# Patient Record
Sex: FEMALE | Race: WHITE | NOT HISPANIC OR LATINO | URBAN - METROPOLITAN AREA
[De-identification: names, ages, dates, MRNs, and addresses within clinical notes are randomized per-mention and may not be internally consistent; named-entity substitution may affect disease eponyms.]

---

## 2017-02-22 ENCOUNTER — OUTPATIENT (OUTPATIENT)
Dept: OUTPATIENT SERVICES | Facility: HOSPITAL | Age: 67
LOS: 1 days | End: 2017-02-22

## 2017-02-22 ENCOUNTER — APPOINTMENT (OUTPATIENT)
Dept: INTERNAL MEDICINE | Facility: HOSPITAL | Age: 67
End: 2017-02-22

## 2017-02-22 VITALS — SYSTOLIC BLOOD PRESSURE: 145 MMHG | DIASTOLIC BLOOD PRESSURE: 80 MMHG

## 2017-02-22 VITALS — BODY MASS INDEX: 51.21 KG/M2 | HEIGHT: 63 IN | WEIGHT: 289 LBS

## 2017-02-24 DIAGNOSIS — E66.9 OBESITY, UNSPECIFIED: ICD-10-CM

## 2017-02-24 DIAGNOSIS — L03.019 CELLULITIS OF UNSPECIFIED FINGER: ICD-10-CM

## 2017-02-24 DIAGNOSIS — I10 ESSENTIAL (PRIMARY) HYPERTENSION: ICD-10-CM

## 2017-08-30 ENCOUNTER — RX RENEWAL (OUTPATIENT)
Age: 67
End: 2017-08-30

## 2017-09-05 ENCOUNTER — RX RENEWAL (OUTPATIENT)
Age: 67
End: 2017-09-05

## 2017-09-11 ENCOUNTER — RX RENEWAL (OUTPATIENT)
Age: 67
End: 2017-09-11

## 2017-09-11 ENCOUNTER — OTHER (OUTPATIENT)
Age: 67
End: 2017-09-11

## 2017-09-12 ENCOUNTER — MEDICATION RENEWAL (OUTPATIENT)
Age: 67
End: 2017-09-12

## 2018-01-25 ENCOUNTER — RX RENEWAL (OUTPATIENT)
Age: 68
End: 2018-01-25

## 2018-03-14 ENCOUNTER — APPOINTMENT (OUTPATIENT)
Dept: INTERNAL MEDICINE | Facility: HOSPITAL | Age: 68
End: 2018-03-14

## 2018-07-06 ENCOUNTER — OTHER (OUTPATIENT)
Age: 68
End: 2018-07-06

## 2018-08-10 ENCOUNTER — RX RENEWAL (OUTPATIENT)
Age: 68
End: 2018-08-10

## 2018-09-11 ENCOUNTER — OTHER (OUTPATIENT)
Age: 68
End: 2018-09-11

## 2018-09-11 ENCOUNTER — MED ADMIN CHARGE (OUTPATIENT)
Age: 68
End: 2018-09-11

## 2018-09-11 ENCOUNTER — APPOINTMENT (OUTPATIENT)
Dept: INTERNAL MEDICINE | Facility: HOSPITAL | Age: 68
End: 2018-09-11
Payer: MEDICARE

## 2018-09-11 ENCOUNTER — OUTPATIENT (OUTPATIENT)
Dept: OUTPATIENT SERVICES | Facility: HOSPITAL | Age: 68
LOS: 1 days | End: 2018-09-11

## 2018-09-11 VITALS — SYSTOLIC BLOOD PRESSURE: 140 MMHG | DIASTOLIC BLOOD PRESSURE: 80 MMHG

## 2018-09-11 VITALS — HEIGHT: 63 IN | WEIGHT: 284 LBS | BODY MASS INDEX: 50.32 KG/M2

## 2018-09-11 VITALS — HEART RATE: 70 BPM

## 2018-09-11 DIAGNOSIS — L03.019 CELLULITIS OF UNSPECIFIED FINGER: ICD-10-CM

## 2018-09-11 PROCEDURE — 99213 OFFICE O/P EST LOW 20 MIN: CPT | Mod: GE

## 2018-09-11 RX ORDER — MUPIROCIN 2 G/100G
2 CREAM TOPICAL
Qty: 1 | Refills: 0 | Status: DISCONTINUED | COMMUNITY
Start: 2017-02-22 | End: 2018-09-11

## 2018-09-12 DIAGNOSIS — Z23 ENCOUNTER FOR IMMUNIZATION: ICD-10-CM

## 2018-09-14 DIAGNOSIS — E66.9 OBESITY, UNSPECIFIED: ICD-10-CM

## 2018-09-14 DIAGNOSIS — I10 ESSENTIAL (PRIMARY) HYPERTENSION: ICD-10-CM

## 2018-09-14 DIAGNOSIS — R73.03 PREDIABETES: ICD-10-CM

## 2018-09-14 DIAGNOSIS — K76.0 FATTY (CHANGE OF) LIVER, NOT ELSEWHERE CLASSIFIED: ICD-10-CM

## 2018-09-14 NOTE — PHYSICAL EXAM
[No Acute Distress] : no acute distress [Well Nourished] : well nourished [Well Developed] : well developed [Normal Sclera/Conjunctiva] : normal sclera/conjunctiva [Normal Outer Ear/Nose] : the outer ears and nose were normal in appearance [Normal Oropharynx] : the oropharynx was normal [Supple] : supple [No Lymphadenopathy] : no lymphadenopathy [No Respiratory Distress] : no respiratory distress  [Clear to Auscultation] : lungs were clear to auscultation bilaterally [No Accessory Muscle Use] : no accessory muscle use [Regular Rhythm] : with a regular rhythm [Normal S1, S2] : normal S1 and S2 [Soft] : abdomen soft [Non Tender] : non-tender [Non-distended] : non-distended [Normal Posterior Cervical Nodes] : no posterior cervical lymphadenopathy [Normal Anterior Cervical Nodes] : no anterior cervical lymphadenopathy [No CVA Tenderness] : no CVA  tenderness [No Spinal Tenderness] : no spinal tenderness [No Joint Swelling] : no joint swelling [Grossly Normal Strength/Tone] : grossly normal strength/tone [No Rash] : no rash [Normal Gait] : normal gait [No Focal Deficits] : no focal deficits [Normal Affect] : the affect was normal [de-identified] : 2+ b/l LE edema, non-putting

## 2018-09-14 NOTE — END OF VISIT
[] : Resident [FreeTextEntry3] : 67F with history as above here today for check-up. BP noted to be suboptimally controlled, will increase chlorthalidone and call patient this week to assess at-home BP control. Patient refused labs today but will come back next week for blood draws. Mammo and GYN referrals given today for HCM.

## 2018-09-14 NOTE — HISTORY OF PRESENT ILLNESS
[FreeTextEntry1] : 67 F w/ HTN, prediabetes, MEJIA, and obesity present for CPE. Pt last seen in Feb 2017. [de-identified] : 67 F w/ HTN, prediabetes, MEJIA, and obesity present for CPE. Pt last seen in Feb 2017. She reports chronic generalized joint pain. Denies any headache, blurry vision, chest pain, sob, abd pain, nausea, vomiting, fevers, sweats, or chills. Reports that she checks her BP at home and has been mostly running in the 130-140s/70-80s. She tries to eat less but endorses consuming take out and junk food. Exercise is limited due to joint pains.\par \par Per documentation, pt declined most screening exams and immunizations in the past. She is open to referrals today and will schedule them at her convenience. Advised pt that she is due for blood work but she would like to hold off until next week because she is stressed with her  being in the hospital.

## 2018-09-14 NOTE — REVIEW OF SYSTEMS
[Lower Ext Edema] : lower extremity edema [Joint Pain] : joint pain [Fever] : no fever [Chills] : no chills [Pain] : no pain [Vision Problems] : no vision problems [Hearing Loss] : no hearing loss [Nasal Discharge] : no nasal discharge [Sore Throat] : no sore throat [Chest Pain] : no chest pain [Palpitations] : no palpitations [Orthopnea] : no orthopnea [Shortness Of Breath] : no shortness of breath [Wheezing] : no wheezing [Abdominal Pain] : no abdominal pain [Nausea] : no nausea [Vomiting] : no vomiting [Heartburn] : no heartburn [Dysuria] : no dysuria [Incontinence] : no incontinence [Joint Stiffness] : no joint stiffness [Joint Swelling] : no joint swelling [Itching] : no itching [Skin Rash] : no skin rash [Headache] : no headache [Dizziness] : no dizziness [Fainting] : no fainting [Anxiety] : no anxiety [Depression] : no depression [Easy Bleeding] : no easy bleeding [Easy Bruising] : no easy bruising

## 2018-09-14 NOTE — ASSESSMENT
[FreeTextEntry1] : 67 F w/ HTN, prediabetes, MEJIA, and obesity present for CPE.\par \par #HTN\par -BP suboptimally controlled, will increase chlorthalidone to 25mg daily. c/w metoprolol 100mg BID and lisinopril 40mg QD; metoprolol is not an optimal BP medication but will continue for now as patient has been on it for some time.\par -Nutrition referral\par -Counseled on diet and exercise\par -f/u BMP (labs to be drawn next week)\par -RTC in 1 week for nursing visit for repeat BP check\par -Pt advised to continue to check BP at home and to call clinic immediately if she is symptomatic or if SBP < 100. \par \par #Pre-diabetes\par -f/u A1c (labs to be drawn next week)\par -Consider starting Metformin and diabetes wellness referral if pt diabetic\par -Nutritionist referral\par \par #MEJIA\par -f/u CMP (labs to be drawn next week)\par \par #HCM\par -Pt declined blood draws today but will return next week for blood work - sent for CBC, CMP, A1c, and lipid profile\par \par #Joint pains\par -likely OA, pt advised to take OTC Acetaminophen PRN and to continue activity as tolerated\par \par #LE Edema\par -Likely 2/2 to venous stasis and obesity. Low suspicion for DVT/CHF\par \par #HCM\par -Pt declined labs today, but will come back next week for blood work. Will have pt do BP check w/ nursing visit then\par -Pt agreeable to Prevnar today, will return for flu and zostavax. Tdap in 2016\par -Ob/Gyn referral\par -GI referral for c-scope\par -Mammogram rx given to pt\par -RTC in 5 weeks \par \par D/w Dr. Henderson\par \par WW\par [employer] [facility]

## 2018-09-20 ENCOUNTER — RX RENEWAL (OUTPATIENT)
Age: 68
End: 2018-09-20

## 2018-10-10 ENCOUNTER — APPOINTMENT (OUTPATIENT)
Dept: OBGYN | Facility: HOSPITAL | Age: 68
End: 2018-10-10

## 2018-10-12 ENCOUNTER — OTHER (OUTPATIENT)
Age: 68
End: 2018-10-12

## 2019-03-04 ENCOUNTER — RX RENEWAL (OUTPATIENT)
Age: 69
End: 2019-03-04

## 2019-03-05 ENCOUNTER — RX RENEWAL (OUTPATIENT)
Age: 69
End: 2019-03-05

## 2019-06-03 ENCOUNTER — RX RENEWAL (OUTPATIENT)
Age: 69
End: 2019-06-03

## 2019-06-24 ENCOUNTER — RX RENEWAL (OUTPATIENT)
Age: 69
End: 2019-06-24

## 2019-06-27 ENCOUNTER — MESSAGE (OUTPATIENT)
Age: 69
End: 2019-06-27

## 2019-07-17 ENCOUNTER — LABORATORY RESULT (OUTPATIENT)
Age: 69
End: 2019-07-17

## 2019-07-18 ENCOUNTER — APPOINTMENT (OUTPATIENT)
Dept: DERMATOLOGY | Facility: CLINIC | Age: 69
End: 2019-07-18
Payer: MEDICARE

## 2019-07-18 VITALS — HEIGHT: 63 IN | BODY MASS INDEX: 50.5 KG/M2 | WEIGHT: 285 LBS

## 2019-07-18 DIAGNOSIS — D48.5 NEOPLASM OF UNCERTAIN BEHAVIOR OF SKIN: ICD-10-CM

## 2019-07-18 PROCEDURE — 11105 PUNCH BX SKIN EA SEP/ADDL: CPT

## 2019-07-18 PROCEDURE — 11104 PUNCH BX SKIN SINGLE LESION: CPT

## 2019-07-18 PROCEDURE — 99203 OFFICE O/P NEW LOW 30 MIN: CPT | Mod: 25

## 2019-07-22 PROBLEM — D48.5 NEOPLASM OF UNCERTAIN BEHAVIOR OF SKIN: Status: ACTIVE | Noted: 2019-07-22

## 2019-07-22 PROBLEM — D48.5 NEOPLASM OF UNCERTAIN BEHAVIOR OF SKIN OF ABDOMEN: Status: ACTIVE | Noted: 2019-07-22

## 2019-07-31 ENCOUNTER — RX RENEWAL (OUTPATIENT)
Age: 69
End: 2019-07-31

## 2019-08-13 ENCOUNTER — APPOINTMENT (OUTPATIENT)
Dept: DERMATOLOGY | Facility: CLINIC | Age: 69
End: 2019-08-13
Payer: MEDICARE

## 2019-08-13 PROCEDURE — 99213 OFFICE O/P EST LOW 20 MIN: CPT

## 2019-08-20 ENCOUNTER — APPOINTMENT (OUTPATIENT)
Dept: DERMATOLOGY | Facility: CLINIC | Age: 69
End: 2019-08-20
Payer: MEDICARE

## 2019-08-20 PROCEDURE — 96910 PHOTCHMTX TAR&UVB/PTRLTM&UVB: CPT

## 2019-08-22 ENCOUNTER — APPOINTMENT (OUTPATIENT)
Dept: DERMATOLOGY | Facility: CLINIC | Age: 69
End: 2019-08-22
Payer: MEDICARE

## 2019-08-22 PROCEDURE — 96910 PHOTCHMTX TAR&UVB/PTRLTM&UVB: CPT

## 2019-08-27 ENCOUNTER — MESSAGE (OUTPATIENT)
Age: 69
End: 2019-08-27

## 2019-08-27 ENCOUNTER — APPOINTMENT (OUTPATIENT)
Dept: DERMATOLOGY | Facility: CLINIC | Age: 69
End: 2019-08-27
Payer: MEDICARE

## 2019-08-27 PROCEDURE — 96910 PHOTCHMTX TAR&UVB/PTRLTM&UVB: CPT

## 2019-08-29 ENCOUNTER — APPOINTMENT (OUTPATIENT)
Dept: DERMATOLOGY | Facility: CLINIC | Age: 69
End: 2019-08-29

## 2019-09-03 ENCOUNTER — APPOINTMENT (OUTPATIENT)
Dept: DERMATOLOGY | Facility: CLINIC | Age: 69
End: 2019-09-03

## 2019-09-03 ENCOUNTER — RX RENEWAL (OUTPATIENT)
Age: 69
End: 2019-09-03

## 2019-09-06 ENCOUNTER — APPOINTMENT (OUTPATIENT)
Dept: DERMATOLOGY | Facility: CLINIC | Age: 69
End: 2019-09-06

## 2019-09-10 ENCOUNTER — APPOINTMENT (OUTPATIENT)
Dept: DERMATOLOGY | Facility: CLINIC | Age: 69
End: 2019-09-10
Payer: MEDICARE

## 2019-09-10 PROCEDURE — 96910 PHOTCHMTX TAR&UVB/PTRLTM&UVB: CPT

## 2019-09-13 ENCOUNTER — APPOINTMENT (OUTPATIENT)
Dept: DERMATOLOGY | Facility: CLINIC | Age: 69
End: 2019-09-13
Payer: MEDICARE

## 2019-09-13 PROCEDURE — 96910 PHOTCHMTX TAR&UVB/PTRLTM&UVB: CPT

## 2019-09-17 ENCOUNTER — APPOINTMENT (OUTPATIENT)
Dept: DERMATOLOGY | Facility: CLINIC | Age: 69
End: 2019-09-17
Payer: MEDICARE

## 2019-09-17 PROCEDURE — 96910 PHOTCHMTX TAR&UVB/PTRLTM&UVB: CPT

## 2019-09-20 ENCOUNTER — APPOINTMENT (OUTPATIENT)
Dept: DERMATOLOGY | Facility: CLINIC | Age: 69
End: 2019-09-20
Payer: MEDICARE

## 2019-09-20 PROCEDURE — 96910 PHOTCHMTX TAR&UVB/PTRLTM&UVB: CPT

## 2019-09-23 ENCOUNTER — APPOINTMENT (OUTPATIENT)
Dept: DERMATOLOGY | Facility: CLINIC | Age: 69
End: 2019-09-23
Payer: MEDICARE

## 2019-09-23 PROCEDURE — 96910 PHOTCHMTX TAR&UVB/PTRLTM&UVB: CPT

## 2019-09-25 ENCOUNTER — APPOINTMENT (OUTPATIENT)
Dept: DERMATOLOGY | Facility: CLINIC | Age: 69
End: 2019-09-25
Payer: MEDICARE

## 2019-09-25 PROCEDURE — 96910 PHOTCHMTX TAR&UVB/PTRLTM&UVB: CPT

## 2019-09-30 ENCOUNTER — APPOINTMENT (OUTPATIENT)
Dept: DERMATOLOGY | Facility: CLINIC | Age: 69
End: 2019-09-30
Payer: MEDICARE

## 2019-09-30 PROCEDURE — 96910 PHOTCHMTX TAR&UVB/PTRLTM&UVB: CPT

## 2019-09-30 NOTE — DISCUSSION/SUMMARY
[FreeTextEntry1] : SABINO  is here for NBUVB therapy treatment for Psoriasis ( L 40.9)\par Starting dose is 250mJ increasing by 15% as tolerated three times a week, holding dose at 1000mJ as Dr Rodriguez prescribed. \par \par Pt tolerated treatment well, mineral oil applied.\par \par Today's treatment: 9/23/19  668  mJ 2m 16 seconds \par \par treatment history:\par  9/20/19  581 mJ 1m 59 seconds \par \par  9/17/19  507 mJ 1m 38 seconds \par  9/13/19  438 mJ 1m 30 seconds \par 9/10/19  384 mJ 1m 19 seconds \par 8/27/19  333 mJ 1m 14 seconds\par  8/22/19  292 mJ 1m 8 seconds \par 8/20/19  252 mJ 0m 59 seconds

## 2019-10-07 ENCOUNTER — APPOINTMENT (OUTPATIENT)
Dept: DERMATOLOGY | Facility: CLINIC | Age: 69
End: 2019-10-07
Payer: MEDICARE

## 2019-10-07 PROCEDURE — 96910 PHOTCHMTX TAR&UVB/PTRLTM&UVB: CPT

## 2019-10-09 ENCOUNTER — APPOINTMENT (OUTPATIENT)
Dept: DERMATOLOGY | Facility: CLINIC | Age: 69
End: 2019-10-09
Payer: MEDICARE

## 2019-10-09 PROCEDURE — 96910 PHOTCHMTX TAR&UVB/PTRLTM&UVB: CPT

## 2019-10-11 ENCOUNTER — APPOINTMENT (OUTPATIENT)
Dept: DERMATOLOGY | Facility: CLINIC | Age: 69
End: 2019-10-11
Payer: MEDICARE

## 2019-10-11 PROCEDURE — 96910 PHOTCHMTX TAR&UVB/PTRLTM&UVB: CPT

## 2019-10-15 ENCOUNTER — APPOINTMENT (OUTPATIENT)
Dept: DERMATOLOGY | Facility: CLINIC | Age: 69
End: 2019-10-15
Payer: MEDICARE

## 2019-10-15 PROCEDURE — 96910 PHOTCHMTX TAR&UVB/PTRLTM&UVB: CPT

## 2019-10-18 ENCOUNTER — APPOINTMENT (OUTPATIENT)
Dept: DERMATOLOGY | Facility: CLINIC | Age: 69
End: 2019-10-18
Payer: MEDICARE

## 2019-10-18 PROCEDURE — 96910 PHOTCHMTX TAR&UVB/PTRLTM&UVB: CPT

## 2019-10-22 ENCOUNTER — APPOINTMENT (OUTPATIENT)
Dept: DERMATOLOGY | Facility: CLINIC | Age: 69
End: 2019-10-22
Payer: MEDICARE

## 2019-10-22 PROCEDURE — 96910 PHOTCHMTX TAR&UVB/PTRLTM&UVB: CPT

## 2019-10-24 ENCOUNTER — APPOINTMENT (OUTPATIENT)
Dept: DERMATOLOGY | Facility: CLINIC | Age: 69
End: 2019-10-24
Payer: MEDICARE

## 2019-10-24 PROCEDURE — 96910 PHOTCHMTX TAR&UVB/PTRLTM&UVB: CPT

## 2019-10-29 ENCOUNTER — APPOINTMENT (OUTPATIENT)
Dept: DERMATOLOGY | Facility: CLINIC | Age: 69
End: 2019-10-29
Payer: MEDICARE

## 2019-10-29 PROCEDURE — 96910 PHOTCHMTX TAR&UVB/PTRLTM&UVB: CPT

## 2019-11-01 ENCOUNTER — APPOINTMENT (OUTPATIENT)
Dept: DERMATOLOGY | Facility: CLINIC | Age: 69
End: 2019-11-01
Payer: MEDICARE

## 2019-11-01 PROCEDURE — 96910 PHOTCHMTX TAR&UVB/PTRLTM&UVB: CPT

## 2019-11-05 ENCOUNTER — APPOINTMENT (OUTPATIENT)
Dept: DERMATOLOGY | Facility: CLINIC | Age: 69
End: 2019-11-05
Payer: MEDICARE

## 2019-11-05 PROCEDURE — 96910 PHOTCHMTX TAR&UVB/PTRLTM&UVB: CPT

## 2019-11-07 ENCOUNTER — APPOINTMENT (OUTPATIENT)
Dept: DERMATOLOGY | Facility: CLINIC | Age: 69
End: 2019-11-07
Payer: MEDICARE

## 2019-11-07 PROCEDURE — 96910 PHOTCHMTX TAR&UVB/PTRLTM&UVB: CPT

## 2019-11-12 ENCOUNTER — APPOINTMENT (OUTPATIENT)
Dept: DERMATOLOGY | Facility: CLINIC | Age: 69
End: 2019-11-12
Payer: MEDICARE

## 2019-11-12 PROCEDURE — 96910 PHOTCHMTX TAR&UVB/PTRLTM&UVB: CPT

## 2019-11-15 ENCOUNTER — APPOINTMENT (OUTPATIENT)
Dept: DERMATOLOGY | Facility: CLINIC | Age: 69
End: 2019-11-15
Payer: MEDICARE

## 2019-11-15 PROCEDURE — 96910 PHOTCHMTX TAR&UVB/PTRLTM&UVB: CPT

## 2019-11-19 ENCOUNTER — APPOINTMENT (OUTPATIENT)
Dept: DERMATOLOGY | Facility: CLINIC | Age: 69
End: 2019-11-19
Payer: MEDICARE

## 2019-11-19 PROCEDURE — 96910 PHOTCHMTX TAR&UVB/PTRLTM&UVB: CPT

## 2019-11-22 ENCOUNTER — OTHER (OUTPATIENT)
Age: 69
End: 2019-11-22

## 2019-11-22 ENCOUNTER — APPOINTMENT (OUTPATIENT)
Dept: DERMATOLOGY | Facility: CLINIC | Age: 69
End: 2019-11-22
Payer: MEDICARE

## 2019-11-22 PROCEDURE — 96910 PHOTCHMTX TAR&UVB/PTRLTM&UVB: CPT

## 2019-11-26 ENCOUNTER — APPOINTMENT (OUTPATIENT)
Dept: DERMATOLOGY | Facility: CLINIC | Age: 69
End: 2019-11-26

## 2019-11-27 ENCOUNTER — APPOINTMENT (OUTPATIENT)
Dept: DERMATOLOGY | Facility: CLINIC | Age: 69
End: 2019-11-27
Payer: MEDICARE

## 2019-11-27 PROCEDURE — 96910 PHOTCHMTX TAR&UVB/PTRLTM&UVB: CPT

## 2019-12-09 ENCOUNTER — APPOINTMENT (OUTPATIENT)
Dept: DERMATOLOGY | Facility: CLINIC | Age: 69
End: 2019-12-09
Payer: MEDICARE

## 2019-12-09 PROCEDURE — 96910 PHOTCHMTX TAR&UVB/PTRLTM&UVB: CPT

## 2019-12-11 ENCOUNTER — APPOINTMENT (OUTPATIENT)
Dept: DERMATOLOGY | Facility: CLINIC | Age: 69
End: 2019-12-11
Payer: MEDICARE

## 2019-12-11 PROCEDURE — 96910 PHOTCHMTX TAR&UVB/PTRLTM&UVB: CPT

## 2019-12-12 ENCOUNTER — APPOINTMENT (OUTPATIENT)
Dept: DERMATOLOGY | Facility: CLINIC | Age: 69
End: 2019-12-12

## 2019-12-13 ENCOUNTER — APPOINTMENT (OUTPATIENT)
Dept: DERMATOLOGY | Facility: CLINIC | Age: 69
End: 2019-12-13

## 2019-12-18 ENCOUNTER — APPOINTMENT (OUTPATIENT)
Dept: DERMATOLOGY | Facility: CLINIC | Age: 69
End: 2019-12-18
Payer: MEDICARE

## 2019-12-18 PROCEDURE — 96910 PHOTCHMTX TAR&UVB/PTRLTM&UVB: CPT

## 2019-12-20 ENCOUNTER — APPOINTMENT (OUTPATIENT)
Dept: DERMATOLOGY | Facility: CLINIC | Age: 69
End: 2019-12-20
Payer: MEDICARE

## 2019-12-20 PROCEDURE — 96910 PHOTCHMTX TAR&UVB/PTRLTM&UVB: CPT

## 2019-12-23 ENCOUNTER — APPOINTMENT (OUTPATIENT)
Dept: DERMATOLOGY | Facility: CLINIC | Age: 69
End: 2019-12-23
Payer: MEDICARE

## 2019-12-23 PROCEDURE — 96910 PHOTCHMTX TAR&UVB/PTRLTM&UVB: CPT

## 2020-01-03 ENCOUNTER — APPOINTMENT (OUTPATIENT)
Dept: DERMATOLOGY | Facility: CLINIC | Age: 70
End: 2020-01-03
Payer: MEDICARE

## 2020-01-03 PROCEDURE — 96910 PHOTCHMTX TAR&UVB/PTRLTM&UVB: CPT

## 2020-01-06 ENCOUNTER — APPOINTMENT (OUTPATIENT)
Dept: DERMATOLOGY | Facility: CLINIC | Age: 70
End: 2020-01-06
Payer: MEDICARE

## 2020-01-06 PROCEDURE — 96910 PHOTCHMTX TAR&UVB/PTRLTM&UVB: CPT

## 2020-01-10 ENCOUNTER — APPOINTMENT (OUTPATIENT)
Dept: DERMATOLOGY | Facility: CLINIC | Age: 70
End: 2020-01-10
Payer: MEDICARE

## 2020-01-10 PROCEDURE — 96910 PHOTCHMTX TAR&UVB/PTRLTM&UVB: CPT

## 2020-01-13 ENCOUNTER — APPOINTMENT (OUTPATIENT)
Dept: DERMATOLOGY | Facility: CLINIC | Age: 70
End: 2020-01-13
Payer: MEDICARE

## 2020-01-13 PROCEDURE — 96910 PHOTCHMTX TAR&UVB/PTRLTM&UVB: CPT

## 2020-01-15 ENCOUNTER — APPOINTMENT (OUTPATIENT)
Dept: DERMATOLOGY | Facility: CLINIC | Age: 70
End: 2020-01-15
Payer: MEDICARE

## 2020-01-15 PROCEDURE — 96910 PHOTCHMTX TAR&UVB/PTRLTM&UVB: CPT

## 2020-01-21 ENCOUNTER — APPOINTMENT (OUTPATIENT)
Dept: DERMATOLOGY | Facility: CLINIC | Age: 70
End: 2020-01-21
Payer: MEDICARE

## 2020-01-21 PROCEDURE — 96910 PHOTCHMTX TAR&UVB/PTRLTM&UVB: CPT

## 2020-01-28 ENCOUNTER — APPOINTMENT (OUTPATIENT)
Dept: DERMATOLOGY | Facility: CLINIC | Age: 70
End: 2020-01-28
Payer: MEDICARE

## 2020-01-28 PROCEDURE — 96910 PHOTCHMTX TAR&UVB/PTRLTM&UVB: CPT

## 2020-02-05 ENCOUNTER — APPOINTMENT (OUTPATIENT)
Dept: DERMATOLOGY | Facility: CLINIC | Age: 70
End: 2020-02-05
Payer: MEDICARE

## 2020-02-05 PROCEDURE — 96910 PHOTCHMTX TAR&UVB/PTRLTM&UVB: CPT

## 2020-02-11 ENCOUNTER — APPOINTMENT (OUTPATIENT)
Dept: DERMATOLOGY | Facility: CLINIC | Age: 70
End: 2020-02-11
Payer: COMMERCIAL

## 2020-02-11 PROCEDURE — 96910 PHOTCHMTX TAR&UVB/PTRLTM&UVB: CPT

## 2020-02-14 ENCOUNTER — RX RENEWAL (OUTPATIENT)
Age: 70
End: 2020-02-14

## 2020-02-18 ENCOUNTER — NON-APPOINTMENT (OUTPATIENT)
Age: 70
End: 2020-02-18

## 2020-02-18 ENCOUNTER — APPOINTMENT (OUTPATIENT)
Dept: DERMATOLOGY | Facility: CLINIC | Age: 70
End: 2020-02-18
Payer: COMMERCIAL

## 2020-02-18 ENCOUNTER — RX RENEWAL (OUTPATIENT)
Age: 70
End: 2020-02-18

## 2020-02-18 PROCEDURE — 96910 PHOTCHMTX TAR&UVB/PTRLTM&UVB: CPT

## 2020-02-25 ENCOUNTER — APPOINTMENT (OUTPATIENT)
Dept: DERMATOLOGY | Facility: CLINIC | Age: 70
End: 2020-02-25
Payer: COMMERCIAL

## 2020-02-25 PROCEDURE — 96910 PHOTCHMTX TAR&UVB/PTRLTM&UVB: CPT

## 2020-03-06 ENCOUNTER — APPOINTMENT (OUTPATIENT)
Dept: DERMATOLOGY | Facility: CLINIC | Age: 70
End: 2020-03-06
Payer: MEDICARE

## 2020-03-06 PROCEDURE — 96910 PHOTCHMTX TAR&UVB/PTRLTM&UVB: CPT

## 2020-03-13 ENCOUNTER — APPOINTMENT (OUTPATIENT)
Dept: DERMATOLOGY | Facility: CLINIC | Age: 70
End: 2020-03-13
Payer: MEDICARE

## 2020-03-13 PROCEDURE — 96910 PHOTCHMTX TAR&UVB/PTRLTM&UVB: CPT

## 2020-03-23 ENCOUNTER — NON-APPOINTMENT (OUTPATIENT)
Age: 70
End: 2020-03-23

## 2020-03-23 ENCOUNTER — APPOINTMENT (OUTPATIENT)
Dept: DERMATOLOGY | Facility: CLINIC | Age: 70
End: 2020-03-23

## 2020-06-15 ENCOUNTER — OUTPATIENT (OUTPATIENT)
Dept: OUTPATIENT SERVICES | Facility: HOSPITAL | Age: 70
LOS: 1 days | End: 2020-06-15

## 2020-06-15 ENCOUNTER — APPOINTMENT (OUTPATIENT)
Dept: INTERNAL MEDICINE | Facility: CLINIC | Age: 70
End: 2020-06-15
Payer: MEDICARE

## 2020-06-15 VITALS
SYSTOLIC BLOOD PRESSURE: 124 MMHG | BODY MASS INDEX: 51.91 KG/M2 | DIASTOLIC BLOOD PRESSURE: 55 MMHG | WEIGHT: 293 LBS | HEIGHT: 63 IN

## 2020-06-15 PROCEDURE — ZZZZZ: CPT

## 2020-06-15 NOTE — HISTORY OF PRESENT ILLNESS
[Home] : at home, [unfilled] , at the time of the visit. [Other Location: e.g. Home (Enter Location, City,State)___] : at [unfilled] [FreeTextEntry1] : follow up visit  [de-identified] : 69y woman with HTN and psorasis here for f/u visit. \par Attempted to do Telehealth video visit, however, due to tech difficulties, was converted to telephonic visit. \par \par Pt was last seen in medicine clinic Sept 2018, pt wishes to re-establish her care here. Due to COVID pandemic, pt was not seen in office, and was prescribed 3 month supply of HTN meds from clinic. \par \par HTN: currently on metoprolol 100mg BID, chlorthalidone 25mg qD, lisinopril 40mg qD, has a blood pressure machine at home. BP today 122/55.  \par \par For her Psorasis, pt was undergoing phototherapy with Dermatology clinic 2x per week, the sessions stopped due to COVID pandemic, pt is scheduled for derm follow up in a couple weeks. \par \par No recent hospitalization or ER visit. No sick contact at home. No other complain.

## 2020-06-15 NOTE — ADDENDUM
[FreeTextEntry1] : reviewed with resident via telephone due to covid19 Agree with residents evaluation and plan [email or web address]

## 2020-06-15 NOTE — ASSESSMENT
[FreeTextEntry1] : 69y woman with HTN and psorasis here for telephonic visit.\par \par HTN: \par -  BP well controlled \par - continue metoprolol 100mg BID, chlorthalidone 25mg qD, lisinopril 40mg qD, has a blood pressure machine at [facility]\par \par Psorasis: \par - continue f/u with derm \par \par RTC in 5 weeks for Comprehensive visit - pt will need in office visit for labs (CBC, CMP, lipid profile, A1c) \par \par Case discussed with Dr. Cabrera \par

## 2020-06-30 ENCOUNTER — APPOINTMENT (OUTPATIENT)
Dept: DERMATOLOGY | Facility: CLINIC | Age: 70
End: 2020-06-30
Payer: MEDICARE

## 2020-06-30 PROCEDURE — 96910 PHOTCHMTX TAR&UVB/PTRLTM&UVB: CPT

## 2020-07-02 ENCOUNTER — APPOINTMENT (OUTPATIENT)
Dept: DERMATOLOGY | Facility: CLINIC | Age: 70
End: 2020-07-02
Payer: MEDICARE

## 2020-07-02 VITALS — TEMPERATURE: 97.5 F

## 2020-07-02 PROCEDURE — 96910 PHOTCHMTX TAR&UVB/PTRLTM&UVB: CPT

## 2020-07-07 ENCOUNTER — APPOINTMENT (OUTPATIENT)
Dept: DERMATOLOGY | Facility: CLINIC | Age: 70
End: 2020-07-07
Payer: MEDICARE

## 2020-07-07 VITALS — TEMPERATURE: 96.8 F

## 2020-07-07 PROCEDURE — 96910 PHOTCHMTX TAR&UVB/PTRLTM&UVB: CPT

## 2020-07-09 ENCOUNTER — APPOINTMENT (OUTPATIENT)
Dept: DERMATOLOGY | Facility: CLINIC | Age: 70
End: 2020-07-09
Payer: MEDICARE

## 2020-07-09 PROCEDURE — 96910 PHOTCHMTX TAR&UVB/PTRLTM&UVB: CPT

## 2020-07-14 ENCOUNTER — APPOINTMENT (OUTPATIENT)
Dept: DERMATOLOGY | Facility: CLINIC | Age: 70
End: 2020-07-14
Payer: MEDICARE

## 2020-07-14 PROCEDURE — 96910 PHOTCHMTX TAR&UVB/PTRLTM&UVB: CPT

## 2020-07-16 ENCOUNTER — APPOINTMENT (OUTPATIENT)
Dept: DERMATOLOGY | Facility: CLINIC | Age: 70
End: 2020-07-16
Payer: MEDICARE

## 2020-07-16 VITALS — TEMPERATURE: 96.9 F

## 2020-07-16 PROCEDURE — 96910 PHOTCHMTX TAR&UVB/PTRLTM&UVB: CPT

## 2020-07-21 ENCOUNTER — APPOINTMENT (OUTPATIENT)
Dept: DERMATOLOGY | Facility: CLINIC | Age: 70
End: 2020-07-21
Payer: MEDICARE

## 2020-07-21 PROCEDURE — 96910 PHOTCHMTX TAR&UVB/PTRLTM&UVB: CPT

## 2020-07-23 ENCOUNTER — APPOINTMENT (OUTPATIENT)
Dept: DERMATOLOGY | Facility: CLINIC | Age: 70
End: 2020-07-23
Payer: MEDICARE

## 2020-07-23 VITALS — TEMPERATURE: 97.3 F

## 2020-07-23 PROCEDURE — 96910 PHOTCHMTX TAR&UVB/PTRLTM&UVB: CPT

## 2020-07-28 ENCOUNTER — APPOINTMENT (OUTPATIENT)
Dept: DERMATOLOGY | Facility: CLINIC | Age: 70
End: 2020-07-28

## 2020-07-30 ENCOUNTER — APPOINTMENT (OUTPATIENT)
Dept: DERMATOLOGY | Facility: CLINIC | Age: 70
End: 2020-07-30
Payer: MEDICARE

## 2020-07-30 PROCEDURE — 96910 PHOTCHMTX TAR&UVB/PTRLTM&UVB: CPT

## 2020-08-04 ENCOUNTER — APPOINTMENT (OUTPATIENT)
Dept: DERMATOLOGY | Facility: CLINIC | Age: 70
End: 2020-08-04

## 2020-08-06 ENCOUNTER — APPOINTMENT (OUTPATIENT)
Dept: DERMATOLOGY | Facility: CLINIC | Age: 70
End: 2020-08-06
Payer: MEDICARE

## 2020-08-06 PROCEDURE — 96910 PHOTCHMTX TAR&UVB/PTRLTM&UVB: CPT

## 2020-08-11 ENCOUNTER — APPOINTMENT (OUTPATIENT)
Dept: DERMATOLOGY | Facility: CLINIC | Age: 70
End: 2020-08-11
Payer: MEDICARE

## 2020-08-11 VITALS — TEMPERATURE: 96.8 F

## 2020-08-11 PROCEDURE — 96910 PHOTCHMTX TAR&UVB/PTRLTM&UVB: CPT

## 2020-08-13 ENCOUNTER — APPOINTMENT (OUTPATIENT)
Dept: DERMATOLOGY | Facility: CLINIC | Age: 70
End: 2020-08-13
Payer: MEDICARE

## 2020-08-13 PROCEDURE — 96910 PHOTCHMTX TAR&UVB/PTRLTM&UVB: CPT

## 2020-08-18 ENCOUNTER — APPOINTMENT (OUTPATIENT)
Dept: DERMATOLOGY | Facility: CLINIC | Age: 70
End: 2020-08-18
Payer: MEDICARE

## 2020-08-18 VITALS — TEMPERATURE: 96.8 F

## 2020-08-18 PROCEDURE — 96910 PHOTCHMTX TAR&UVB/PTRLTM&UVB: CPT

## 2020-08-20 ENCOUNTER — APPOINTMENT (OUTPATIENT)
Dept: DERMATOLOGY | Facility: CLINIC | Age: 70
End: 2020-08-20
Payer: MEDICARE

## 2020-08-20 PROCEDURE — 96910 PHOTCHMTX TAR&UVB/PTRLTM&UVB: CPT

## 2020-08-24 ENCOUNTER — APPOINTMENT (OUTPATIENT)
Dept: DERMATOLOGY | Facility: CLINIC | Age: 70
End: 2020-08-24

## 2020-08-24 ENCOUNTER — APPOINTMENT (OUTPATIENT)
Dept: INTERNAL MEDICINE | Facility: CLINIC | Age: 70
End: 2020-08-24

## 2020-08-26 ENCOUNTER — APPOINTMENT (OUTPATIENT)
Dept: DERMATOLOGY | Facility: CLINIC | Age: 70
End: 2020-08-26
Payer: MEDICARE

## 2020-08-26 VITALS — TEMPERATURE: 97.8 F

## 2020-08-26 PROCEDURE — 96910 PHOTCHMTX TAR&UVB/PTRLTM&UVB: CPT

## 2020-08-26 PROCEDURE — 99214 OFFICE O/P EST MOD 30 MIN: CPT | Mod: 25

## 2020-08-26 RX ORDER — TRIAMCINOLONE ACETONIDE 1 MG/G
0.1 OINTMENT TOPICAL
Qty: 454 | Refills: 0 | Status: DISCONTINUED | COMMUNITY
Start: 2019-07-18 | End: 2020-08-26

## 2020-09-01 ENCOUNTER — APPOINTMENT (OUTPATIENT)
Dept: DERMATOLOGY | Facility: CLINIC | Age: 70
End: 2020-09-01
Payer: MEDICARE

## 2020-09-01 PROCEDURE — 96910 PHOTCHMTX TAR&UVB/PTRLTM&UVB: CPT

## 2020-09-03 ENCOUNTER — APPOINTMENT (OUTPATIENT)
Dept: DERMATOLOGY | Facility: CLINIC | Age: 70
End: 2020-09-03
Payer: MEDICARE

## 2020-09-03 PROCEDURE — 96910 PHOTCHMTX TAR&UVB/PTRLTM&UVB: CPT

## 2020-09-08 ENCOUNTER — APPOINTMENT (OUTPATIENT)
Dept: DERMATOLOGY | Facility: CLINIC | Age: 70
End: 2020-09-08
Payer: MEDICARE

## 2020-09-08 PROCEDURE — 96910 PHOTCHMTX TAR&UVB/PTRLTM&UVB: CPT

## 2020-09-09 ENCOUNTER — RX RENEWAL (OUTPATIENT)
Age: 70
End: 2020-09-09

## 2020-09-10 ENCOUNTER — APPOINTMENT (OUTPATIENT)
Dept: DERMATOLOGY | Facility: CLINIC | Age: 70
End: 2020-09-10
Payer: MEDICARE

## 2020-09-10 PROCEDURE — 96910 PHOTCHMTX TAR&UVB/PTRLTM&UVB: CPT

## 2020-09-15 ENCOUNTER — APPOINTMENT (OUTPATIENT)
Dept: DERMATOLOGY | Facility: CLINIC | Age: 70
End: 2020-09-15
Payer: MEDICARE

## 2020-09-15 PROCEDURE — 96910 PHOTCHMTX TAR&UVB/PTRLTM&UVB: CPT

## 2020-09-17 ENCOUNTER — APPOINTMENT (OUTPATIENT)
Dept: DERMATOLOGY | Facility: CLINIC | Age: 70
End: 2020-09-17

## 2020-09-22 ENCOUNTER — APPOINTMENT (OUTPATIENT)
Dept: DERMATOLOGY | Facility: CLINIC | Age: 70
End: 2020-09-22
Payer: MEDICARE

## 2020-09-22 PROCEDURE — 96910 PHOTCHMTX TAR&UVB/PTRLTM&UVB: CPT

## 2020-09-24 ENCOUNTER — APPOINTMENT (OUTPATIENT)
Dept: DERMATOLOGY | Facility: CLINIC | Age: 70
End: 2020-09-24
Payer: MEDICARE

## 2020-09-24 VITALS — TEMPERATURE: 96.8 F

## 2020-09-24 PROCEDURE — 96910 PHOTCHMTX TAR&UVB/PTRLTM&UVB: CPT

## 2020-09-29 ENCOUNTER — APPOINTMENT (OUTPATIENT)
Dept: DERMATOLOGY | Facility: CLINIC | Age: 70
End: 2020-09-29
Payer: MEDICARE

## 2020-09-29 PROCEDURE — 96910 PHOTCHMTX TAR&UVB/PTRLTM&UVB: CPT

## 2020-10-01 ENCOUNTER — APPOINTMENT (OUTPATIENT)
Dept: DERMATOLOGY | Facility: CLINIC | Age: 70
End: 2020-10-01
Payer: MEDICARE

## 2020-10-01 PROCEDURE — 96910 PHOTCHMTX TAR&UVB/PTRLTM&UVB: CPT

## 2020-10-06 ENCOUNTER — APPOINTMENT (OUTPATIENT)
Dept: DERMATOLOGY | Facility: CLINIC | Age: 70
End: 2020-10-06

## 2020-10-08 ENCOUNTER — APPOINTMENT (OUTPATIENT)
Dept: DERMATOLOGY | Facility: CLINIC | Age: 70
End: 2020-10-08
Payer: MEDICARE

## 2020-10-08 PROCEDURE — 96910 PHOTCHMTX TAR&UVB/PTRLTM&UVB: CPT

## 2020-10-13 ENCOUNTER — APPOINTMENT (OUTPATIENT)
Dept: DERMATOLOGY | Facility: CLINIC | Age: 70
End: 2020-10-13
Payer: MEDICARE

## 2020-10-13 PROCEDURE — 96910 PHOTCHMTX TAR&UVB/PTRLTM&UVB: CPT

## 2020-10-15 ENCOUNTER — APPOINTMENT (OUTPATIENT)
Dept: DERMATOLOGY | Facility: CLINIC | Age: 70
End: 2020-10-15
Payer: MEDICARE

## 2020-10-15 PROCEDURE — 96910 PHOTCHMTX TAR&UVB/PTRLTM&UVB: CPT

## 2020-10-20 ENCOUNTER — APPOINTMENT (OUTPATIENT)
Dept: DERMATOLOGY | Facility: CLINIC | Age: 70
End: 2020-10-20
Payer: MEDICARE

## 2020-10-20 PROCEDURE — 96910 PHOTCHMTX TAR&UVB/PTRLTM&UVB: CPT

## 2020-10-22 ENCOUNTER — APPOINTMENT (OUTPATIENT)
Dept: DERMATOLOGY | Facility: CLINIC | Age: 70
End: 2020-10-22
Payer: MEDICARE

## 2020-10-22 PROCEDURE — 99072 ADDL SUPL MATRL&STAF TM PHE: CPT

## 2020-10-22 PROCEDURE — 96910 PHOTCHMTX TAR&UVB/PTRLTM&UVB: CPT

## 2020-10-27 ENCOUNTER — APPOINTMENT (OUTPATIENT)
Dept: DERMATOLOGY | Facility: CLINIC | Age: 70
End: 2020-10-27
Payer: MEDICARE

## 2020-10-27 PROCEDURE — 99072 ADDL SUPL MATRL&STAF TM PHE: CPT

## 2020-10-27 PROCEDURE — 96910 PHOTCHMTX TAR&UVB/PTRLTM&UVB: CPT

## 2020-10-29 ENCOUNTER — APPOINTMENT (OUTPATIENT)
Dept: DERMATOLOGY | Facility: CLINIC | Age: 70
End: 2020-10-29

## 2020-11-03 ENCOUNTER — APPOINTMENT (OUTPATIENT)
Dept: DERMATOLOGY | Facility: CLINIC | Age: 70
End: 2020-11-03

## 2020-11-05 ENCOUNTER — APPOINTMENT (OUTPATIENT)
Dept: DERMATOLOGY | Facility: CLINIC | Age: 70
End: 2020-11-05
Payer: MEDICARE

## 2020-11-05 PROCEDURE — 96910 PHOTCHMTX TAR&UVB/PTRLTM&UVB: CPT

## 2020-11-05 PROCEDURE — 99072 ADDL SUPL MATRL&STAF TM PHE: CPT

## 2020-11-09 ENCOUNTER — APPOINTMENT (OUTPATIENT)
Dept: DERMATOLOGY | Facility: CLINIC | Age: 70
End: 2020-11-09
Payer: MEDICARE

## 2020-11-09 DIAGNOSIS — L81.0 POSTINFLAMMATORY HYPERPIGMENTATION: ICD-10-CM

## 2020-11-09 PROCEDURE — 99214 OFFICE O/P EST MOD 30 MIN: CPT

## 2020-11-09 PROCEDURE — 99072 ADDL SUPL MATRL&STAF TM PHE: CPT

## 2020-11-10 ENCOUNTER — APPOINTMENT (OUTPATIENT)
Dept: DERMATOLOGY | Facility: CLINIC | Age: 70
End: 2020-11-10

## 2020-11-11 RX ORDER — GUSELKUMAB 100 MG/ML
100 INJECTION SUBCUTANEOUS
Qty: 1 | Refills: 1 | Status: DISCONTINUED | COMMUNITY
Start: 2020-08-26 | End: 2020-11-11

## 2020-11-11 RX ORDER — GUSELKUMAB 100 MG/ML
100 INJECTION SUBCUTANEOUS
Qty: 1 | Refills: 0 | Status: DISCONTINUED | COMMUNITY
Start: 2020-08-26 | End: 2020-11-11

## 2020-11-13 ENCOUNTER — RX RENEWAL (OUTPATIENT)
Age: 70
End: 2020-11-13

## 2020-11-13 ENCOUNTER — NON-APPOINTMENT (OUTPATIENT)
Age: 70
End: 2020-11-13

## 2020-11-17 ENCOUNTER — APPOINTMENT (OUTPATIENT)
Dept: DERMATOLOGY | Facility: CLINIC | Age: 70
End: 2020-11-17
Payer: MEDICARE

## 2020-11-17 PROCEDURE — 96910 PHOTCHMTX TAR&UVB/PTRLTM&UVB: CPT

## 2020-11-24 ENCOUNTER — APPOINTMENT (OUTPATIENT)
Dept: DERMATOLOGY | Facility: CLINIC | Age: 70
End: 2020-11-24

## 2020-12-08 ENCOUNTER — APPOINTMENT (OUTPATIENT)
Dept: DERMATOLOGY | Facility: CLINIC | Age: 70
End: 2020-12-08
Payer: MEDICARE

## 2020-12-08 PROCEDURE — 99072 ADDL SUPL MATRL&STAF TM PHE: CPT

## 2020-12-08 PROCEDURE — 96910 PHOTCHMTX TAR&UVB/PTRLTM&UVB: CPT

## 2020-12-11 ENCOUNTER — APPOINTMENT (OUTPATIENT)
Dept: DERMATOLOGY | Facility: CLINIC | Age: 70
End: 2020-12-11

## 2020-12-11 ENCOUNTER — APPOINTMENT (OUTPATIENT)
Dept: DERMATOLOGY | Facility: CLINIC | Age: 70
End: 2020-12-11
Payer: MEDICARE

## 2020-12-11 PROCEDURE — 99072 ADDL SUPL MATRL&STAF TM PHE: CPT

## 2020-12-11 PROCEDURE — 96910 PHOTCHMTX TAR&UVB/PTRLTM&UVB: CPT

## 2020-12-15 ENCOUNTER — APPOINTMENT (OUTPATIENT)
Dept: DERMATOLOGY | Facility: CLINIC | Age: 70
End: 2020-12-15

## 2020-12-17 ENCOUNTER — APPOINTMENT (OUTPATIENT)
Dept: DERMATOLOGY | Facility: CLINIC | Age: 70
End: 2020-12-17

## 2020-12-22 ENCOUNTER — APPOINTMENT (OUTPATIENT)
Dept: DERMATOLOGY | Facility: CLINIC | Age: 70
End: 2020-12-22

## 2020-12-24 ENCOUNTER — APPOINTMENT (OUTPATIENT)
Dept: DERMATOLOGY | Facility: CLINIC | Age: 70
End: 2020-12-24

## 2020-12-29 ENCOUNTER — APPOINTMENT (OUTPATIENT)
Dept: DERMATOLOGY | Facility: CLINIC | Age: 70
End: 2020-12-29
Payer: MEDICARE

## 2020-12-29 PROCEDURE — 99072 ADDL SUPL MATRL&STAF TM PHE: CPT

## 2020-12-29 PROCEDURE — 96910 PHOTCHMTX TAR&UVB/PTRLTM&UVB: CPT

## 2020-12-31 ENCOUNTER — APPOINTMENT (OUTPATIENT)
Dept: DERMATOLOGY | Facility: CLINIC | Age: 70
End: 2020-12-31

## 2021-01-05 ENCOUNTER — APPOINTMENT (OUTPATIENT)
Dept: DERMATOLOGY | Facility: CLINIC | Age: 71
End: 2021-01-05
Payer: MEDICARE

## 2021-01-05 PROCEDURE — 99072 ADDL SUPL MATRL&STAF TM PHE: CPT

## 2021-01-05 PROCEDURE — 96910 PHOTCHMTX TAR&UVB/PTRLTM&UVB: CPT

## 2021-01-07 ENCOUNTER — APPOINTMENT (OUTPATIENT)
Dept: DERMATOLOGY | Facility: CLINIC | Age: 71
End: 2021-01-07
Payer: MEDICARE

## 2021-01-07 PROCEDURE — 99072 ADDL SUPL MATRL&STAF TM PHE: CPT

## 2021-01-07 PROCEDURE — 96910 PHOTCHMTX TAR&UVB/PTRLTM&UVB: CPT

## 2021-01-12 ENCOUNTER — APPOINTMENT (OUTPATIENT)
Dept: DERMATOLOGY | Facility: CLINIC | Age: 71
End: 2021-01-12

## 2021-01-14 ENCOUNTER — APPOINTMENT (OUTPATIENT)
Dept: DERMATOLOGY | Facility: CLINIC | Age: 71
End: 2021-01-14
Payer: MEDICARE

## 2021-01-14 PROCEDURE — 99072 ADDL SUPL MATRL&STAF TM PHE: CPT

## 2021-01-14 PROCEDURE — 96910 PHOTCHMTX TAR&UVB/PTRLTM&UVB: CPT

## 2021-01-19 ENCOUNTER — APPOINTMENT (OUTPATIENT)
Dept: DERMATOLOGY | Facility: CLINIC | Age: 71
End: 2021-01-19
Payer: MEDICARE

## 2021-01-19 PROCEDURE — 99072 ADDL SUPL MATRL&STAF TM PHE: CPT

## 2021-01-19 PROCEDURE — 96910 PHOTCHMTX TAR&UVB/PTRLTM&UVB: CPT

## 2021-01-21 ENCOUNTER — APPOINTMENT (OUTPATIENT)
Dept: DERMATOLOGY | Facility: CLINIC | Age: 71
End: 2021-01-21
Payer: MEDICARE

## 2021-01-21 PROCEDURE — 99072 ADDL SUPL MATRL&STAF TM PHE: CPT

## 2021-01-21 PROCEDURE — 96910 PHOTCHMTX TAR&UVB/PTRLTM&UVB: CPT

## 2021-01-26 ENCOUNTER — APPOINTMENT (OUTPATIENT)
Dept: DERMATOLOGY | Facility: CLINIC | Age: 71
End: 2021-01-26

## 2021-01-28 ENCOUNTER — APPOINTMENT (OUTPATIENT)
Dept: DERMATOLOGY | Facility: CLINIC | Age: 71
End: 2021-01-28
Payer: MEDICARE

## 2021-01-28 PROCEDURE — 96910 PHOTCHMTX TAR&UVB/PTRLTM&UVB: CPT

## 2021-01-28 PROCEDURE — 99072 ADDL SUPL MATRL&STAF TM PHE: CPT

## 2021-02-02 ENCOUNTER — APPOINTMENT (OUTPATIENT)
Dept: DERMATOLOGY | Facility: CLINIC | Age: 71
End: 2021-02-02

## 2021-02-04 ENCOUNTER — APPOINTMENT (OUTPATIENT)
Dept: DERMATOLOGY | Facility: CLINIC | Age: 71
End: 2021-02-04

## 2021-02-09 ENCOUNTER — APPOINTMENT (OUTPATIENT)
Dept: DERMATOLOGY | Facility: CLINIC | Age: 71
End: 2021-02-09

## 2021-02-11 ENCOUNTER — APPOINTMENT (OUTPATIENT)
Dept: DERMATOLOGY | Facility: CLINIC | Age: 71
End: 2021-02-11

## 2021-02-16 ENCOUNTER — APPOINTMENT (OUTPATIENT)
Dept: DERMATOLOGY | Facility: CLINIC | Age: 71
End: 2021-02-16
Payer: MEDICARE

## 2021-02-16 PROCEDURE — 96910 PHOTCHMTX TAR&UVB/PTRLTM&UVB: CPT

## 2021-02-16 PROCEDURE — 99072 ADDL SUPL MATRL&STAF TM PHE: CPT

## 2021-02-18 ENCOUNTER — APPOINTMENT (OUTPATIENT)
Dept: DERMATOLOGY | Facility: CLINIC | Age: 71
End: 2021-02-18

## 2021-02-23 ENCOUNTER — APPOINTMENT (OUTPATIENT)
Dept: DERMATOLOGY | Facility: CLINIC | Age: 71
End: 2021-02-23
Payer: MEDICARE

## 2021-02-23 PROCEDURE — 99072 ADDL SUPL MATRL&STAF TM PHE: CPT

## 2021-02-23 PROCEDURE — 96910 PHOTCHMTX TAR&UVB/PTRLTM&UVB: CPT

## 2021-02-25 ENCOUNTER — APPOINTMENT (OUTPATIENT)
Dept: DERMATOLOGY | Facility: CLINIC | Age: 71
End: 2021-02-25
Payer: MEDICARE

## 2021-02-25 PROCEDURE — 99072 ADDL SUPL MATRL&STAF TM PHE: CPT

## 2021-02-25 PROCEDURE — 96910 PHOTCHMTX TAR&UVB/PTRLTM&UVB: CPT

## 2021-03-02 ENCOUNTER — APPOINTMENT (OUTPATIENT)
Dept: DERMATOLOGY | Facility: CLINIC | Age: 71
End: 2021-03-02

## 2021-03-04 ENCOUNTER — APPOINTMENT (OUTPATIENT)
Dept: DERMATOLOGY | Facility: CLINIC | Age: 71
End: 2021-03-04

## 2021-03-09 ENCOUNTER — APPOINTMENT (OUTPATIENT)
Dept: DERMATOLOGY | Facility: CLINIC | Age: 71
End: 2021-03-09

## 2021-03-11 ENCOUNTER — APPOINTMENT (OUTPATIENT)
Dept: DERMATOLOGY | Facility: CLINIC | Age: 71
End: 2021-03-11
Payer: MEDICARE

## 2021-03-11 PROCEDURE — 96910 PHOTCHMTX TAR&UVB/PTRLTM&UVB: CPT

## 2021-03-15 ENCOUNTER — APPOINTMENT (OUTPATIENT)
Dept: DERMATOLOGY | Facility: CLINIC | Age: 71
End: 2021-03-15
Payer: MEDICARE

## 2021-03-15 PROCEDURE — 99214 OFFICE O/P EST MOD 30 MIN: CPT

## 2021-03-16 ENCOUNTER — APPOINTMENT (OUTPATIENT)
Dept: INTERNAL MEDICINE | Facility: HOSPITAL | Age: 71
End: 2021-03-16

## 2021-03-18 ENCOUNTER — APPOINTMENT (OUTPATIENT)
Dept: DERMATOLOGY | Facility: CLINIC | Age: 71
End: 2021-03-18

## 2021-03-23 ENCOUNTER — APPOINTMENT (OUTPATIENT)
Dept: DERMATOLOGY | Facility: CLINIC | Age: 71
End: 2021-03-23

## 2021-03-24 ENCOUNTER — RX RENEWAL (OUTPATIENT)
Age: 71
End: 2021-03-24

## 2021-03-25 ENCOUNTER — APPOINTMENT (OUTPATIENT)
Dept: DERMATOLOGY | Facility: CLINIC | Age: 71
End: 2021-03-25
Payer: MEDICARE

## 2021-03-25 PROCEDURE — 96910 PHOTCHMTX TAR&UVB/PTRLTM&UVB: CPT

## 2021-04-01 ENCOUNTER — APPOINTMENT (OUTPATIENT)
Dept: DERMATOLOGY | Facility: CLINIC | Age: 71
End: 2021-04-01
Payer: MEDICARE

## 2021-04-01 PROCEDURE — 99072 ADDL SUPL MATRL&STAF TM PHE: CPT

## 2021-04-01 PROCEDURE — 96910 PHOTCHMTX TAR&UVB/PTRLTM&UVB: CPT

## 2021-04-06 ENCOUNTER — APPOINTMENT (OUTPATIENT)
Dept: DERMATOLOGY | Facility: CLINIC | Age: 71
End: 2021-04-06
Payer: MEDICARE

## 2021-04-06 PROCEDURE — 99072 ADDL SUPL MATRL&STAF TM PHE: CPT

## 2021-04-06 PROCEDURE — 96910 PHOTCHMTX TAR&UVB/PTRLTM&UVB: CPT

## 2021-04-08 ENCOUNTER — APPOINTMENT (OUTPATIENT)
Dept: DERMATOLOGY | Facility: CLINIC | Age: 71
End: 2021-04-08

## 2021-04-12 ENCOUNTER — APPOINTMENT (OUTPATIENT)
Dept: OPHTHALMOLOGY | Facility: CLINIC | Age: 71
End: 2021-04-12
Payer: MEDICARE

## 2021-04-12 ENCOUNTER — NON-APPOINTMENT (OUTPATIENT)
Age: 71
End: 2021-04-12

## 2021-04-12 PROCEDURE — 92004 COMPRE OPH EXAM NEW PT 1/>: CPT

## 2021-04-12 PROCEDURE — 92136 OPHTHALMIC BIOMETRY: CPT

## 2021-04-12 PROCEDURE — 99072 ADDL SUPL MATRL&STAF TM PHE: CPT

## 2021-04-12 PROCEDURE — 92134 CPTRZ OPH DX IMG PST SGM RTA: CPT

## 2021-04-13 ENCOUNTER — APPOINTMENT (OUTPATIENT)
Dept: DERMATOLOGY | Facility: CLINIC | Age: 71
End: 2021-04-13

## 2021-04-15 ENCOUNTER — APPOINTMENT (OUTPATIENT)
Dept: DERMATOLOGY | Facility: CLINIC | Age: 71
End: 2021-04-15

## 2021-04-20 ENCOUNTER — APPOINTMENT (OUTPATIENT)
Dept: DERMATOLOGY | Facility: CLINIC | Age: 71
End: 2021-04-20
Payer: MEDICARE

## 2021-04-20 PROCEDURE — 96910 PHOTCHMTX TAR&UVB/PTRLTM&UVB: CPT

## 2021-04-22 ENCOUNTER — APPOINTMENT (OUTPATIENT)
Dept: OPHTHALMOLOGY | Facility: CLINIC | Age: 71
End: 2021-04-22
Payer: MEDICARE

## 2021-04-22 ENCOUNTER — NON-APPOINTMENT (OUTPATIENT)
Age: 71
End: 2021-04-22

## 2021-04-22 ENCOUNTER — APPOINTMENT (OUTPATIENT)
Dept: DERMATOLOGY | Facility: CLINIC | Age: 71
End: 2021-04-22

## 2021-04-22 PROCEDURE — 99072 ADDL SUPL MATRL&STAF TM PHE: CPT

## 2021-04-22 PROCEDURE — 92012 INTRM OPH EXAM EST PATIENT: CPT

## 2021-04-22 PROCEDURE — 92020 GONIOSCOPY: CPT

## 2021-04-26 ENCOUNTER — NON-APPOINTMENT (OUTPATIENT)
Age: 71
End: 2021-04-26

## 2021-04-26 ENCOUNTER — APPOINTMENT (OUTPATIENT)
Dept: OPHTHALMOLOGY | Facility: CLINIC | Age: 71
End: 2021-04-26
Payer: MEDICARE

## 2021-04-26 PROCEDURE — 99072 ADDL SUPL MATRL&STAF TM PHE: CPT

## 2021-04-26 PROCEDURE — 92014 COMPRE OPH EXAM EST PT 1/>: CPT

## 2021-04-26 PROCEDURE — 92134 CPTRZ OPH DX IMG PST SGM RTA: CPT

## 2021-04-27 ENCOUNTER — APPOINTMENT (OUTPATIENT)
Dept: DERMATOLOGY | Facility: CLINIC | Age: 71
End: 2021-04-27
Payer: MEDICARE

## 2021-04-27 PROCEDURE — 96910 PHOTCHMTX TAR&UVB/PTRLTM&UVB: CPT

## 2021-04-29 ENCOUNTER — APPOINTMENT (OUTPATIENT)
Dept: DERMATOLOGY | Facility: CLINIC | Age: 71
End: 2021-04-29
Payer: MEDICARE

## 2021-04-29 PROCEDURE — 96910 PHOTCHMTX TAR&UVB/PTRLTM&UVB: CPT

## 2021-05-04 ENCOUNTER — APPOINTMENT (OUTPATIENT)
Dept: DERMATOLOGY | Facility: CLINIC | Age: 71
End: 2021-05-04
Payer: MEDICARE

## 2021-05-04 PROCEDURE — 96910 PHOTCHMTX TAR&UVB/PTRLTM&UVB: CPT

## 2021-05-11 ENCOUNTER — APPOINTMENT (OUTPATIENT)
Dept: DERMATOLOGY | Facility: CLINIC | Age: 71
End: 2021-05-11
Payer: MEDICARE

## 2021-05-11 PROCEDURE — 96910 PHOTCHMTX TAR&UVB/PTRLTM&UVB: CPT

## 2021-05-13 ENCOUNTER — APPOINTMENT (OUTPATIENT)
Dept: OPHTHALMOLOGY | Facility: CLINIC | Age: 71
End: 2021-05-13
Payer: MEDICARE

## 2021-05-13 ENCOUNTER — NON-APPOINTMENT (OUTPATIENT)
Age: 71
End: 2021-05-13

## 2021-05-13 PROCEDURE — 99072 ADDL SUPL MATRL&STAF TM PHE: CPT

## 2021-05-13 PROCEDURE — 66761 REVISION OF IRIS: CPT | Mod: RT

## 2021-05-18 ENCOUNTER — APPOINTMENT (OUTPATIENT)
Dept: DERMATOLOGY | Facility: CLINIC | Age: 71
End: 2021-05-18
Payer: MEDICARE

## 2021-05-18 PROCEDURE — 96910 PHOTCHMTX TAR&UVB/PTRLTM&UVB: CPT

## 2021-05-20 ENCOUNTER — APPOINTMENT (OUTPATIENT)
Dept: DERMATOLOGY | Facility: CLINIC | Age: 71
End: 2021-05-20

## 2021-05-25 ENCOUNTER — APPOINTMENT (OUTPATIENT)
Dept: DERMATOLOGY | Facility: CLINIC | Age: 71
End: 2021-05-25
Payer: MEDICARE

## 2021-05-25 PROCEDURE — 96910 PHOTCHMTX TAR&UVB/PTRLTM&UVB: CPT

## 2021-05-27 ENCOUNTER — APPOINTMENT (OUTPATIENT)
Dept: OPHTHALMOLOGY | Facility: CLINIC | Age: 71
End: 2021-05-27
Payer: MEDICARE

## 2021-05-27 ENCOUNTER — APPOINTMENT (OUTPATIENT)
Dept: DERMATOLOGY | Facility: CLINIC | Age: 71
End: 2021-05-27

## 2021-05-27 ENCOUNTER — NON-APPOINTMENT (OUTPATIENT)
Age: 71
End: 2021-05-27

## 2021-05-27 PROCEDURE — 99072 ADDL SUPL MATRL&STAF TM PHE: CPT

## 2021-05-27 PROCEDURE — 66761 REVISION OF IRIS: CPT | Mod: LT

## 2021-06-01 ENCOUNTER — APPOINTMENT (OUTPATIENT)
Dept: DERMATOLOGY | Facility: CLINIC | Age: 71
End: 2021-06-01
Payer: MEDICARE

## 2021-06-01 PROCEDURE — 96910 PHOTCHMTX TAR&UVB/PTRLTM&UVB: CPT

## 2021-06-03 ENCOUNTER — APPOINTMENT (OUTPATIENT)
Dept: DERMATOLOGY | Facility: CLINIC | Age: 71
End: 2021-06-03

## 2021-06-08 ENCOUNTER — APPOINTMENT (OUTPATIENT)
Dept: DERMATOLOGY | Facility: CLINIC | Age: 71
End: 2021-06-08
Payer: MEDICARE

## 2021-06-08 PROCEDURE — 96910 PHOTCHMTX TAR&UVB/PTRLTM&UVB: CPT

## 2021-06-08 PROCEDURE — 99072 ADDL SUPL MATRL&STAF TM PHE: CPT

## 2021-06-10 ENCOUNTER — NON-APPOINTMENT (OUTPATIENT)
Age: 71
End: 2021-06-10

## 2021-06-10 ENCOUNTER — APPOINTMENT (OUTPATIENT)
Dept: DERMATOLOGY | Facility: CLINIC | Age: 71
End: 2021-06-10

## 2021-06-10 ENCOUNTER — APPOINTMENT (OUTPATIENT)
Dept: OPHTHALMOLOGY | Facility: CLINIC | Age: 71
End: 2021-06-10
Payer: MEDICARE

## 2021-06-10 PROCEDURE — 92012 INTRM OPH EXAM EST PATIENT: CPT

## 2021-06-10 PROCEDURE — 99072 ADDL SUPL MATRL&STAF TM PHE: CPT

## 2021-06-15 ENCOUNTER — APPOINTMENT (OUTPATIENT)
Dept: DERMATOLOGY | Facility: CLINIC | Age: 71
End: 2021-06-15
Payer: MEDICARE

## 2021-06-15 PROCEDURE — 96910 PHOTCHMTX TAR&UVB/PTRLTM&UVB: CPT

## 2021-06-15 PROCEDURE — 99072 ADDL SUPL MATRL&STAF TM PHE: CPT

## 2021-06-17 ENCOUNTER — APPOINTMENT (OUTPATIENT)
Dept: DERMATOLOGY | Facility: CLINIC | Age: 71
End: 2021-06-17

## 2021-06-21 ENCOUNTER — NON-APPOINTMENT (OUTPATIENT)
Age: 71
End: 2021-06-21

## 2021-06-21 ENCOUNTER — APPOINTMENT (OUTPATIENT)
Dept: OPHTHALMOLOGY | Facility: CLINIC | Age: 71
End: 2021-06-21
Payer: MEDICARE

## 2021-06-21 PROCEDURE — 99072 ADDL SUPL MATRL&STAF TM PHE: CPT

## 2021-06-21 PROCEDURE — 92012 INTRM OPH EXAM EST PATIENT: CPT

## 2021-06-21 PROCEDURE — 92134 CPTRZ OPH DX IMG PST SGM RTA: CPT

## 2021-06-21 PROCEDURE — 92235 FLUORESCEIN ANGRPH MLTIFRAME: CPT

## 2021-06-22 ENCOUNTER — APPOINTMENT (OUTPATIENT)
Dept: DERMATOLOGY | Facility: CLINIC | Age: 71
End: 2021-06-22
Payer: MEDICARE

## 2021-06-22 PROCEDURE — 99072 ADDL SUPL MATRL&STAF TM PHE: CPT

## 2021-06-22 PROCEDURE — 96910 PHOTCHMTX TAR&UVB/PTRLTM&UVB: CPT

## 2021-06-23 ENCOUNTER — RX RENEWAL (OUTPATIENT)
Age: 71
End: 2021-06-23

## 2021-06-24 ENCOUNTER — APPOINTMENT (OUTPATIENT)
Dept: DERMATOLOGY | Facility: CLINIC | Age: 71
End: 2021-06-24

## 2021-06-24 ENCOUNTER — APPOINTMENT (OUTPATIENT)
Dept: DERMATOLOGY | Facility: CLINIC | Age: 71
End: 2021-06-24
Payer: MEDICARE

## 2021-06-24 PROCEDURE — 99072 ADDL SUPL MATRL&STAF TM PHE: CPT

## 2021-06-24 PROCEDURE — 96910 PHOTCHMTX TAR&UVB/PTRLTM&UVB: CPT

## 2021-06-29 ENCOUNTER — APPOINTMENT (OUTPATIENT)
Dept: DERMATOLOGY | Facility: CLINIC | Age: 71
End: 2021-06-29

## 2021-07-01 ENCOUNTER — APPOINTMENT (OUTPATIENT)
Dept: DERMATOLOGY | Facility: CLINIC | Age: 71
End: 2021-07-01
Payer: MEDICARE

## 2021-07-01 ENCOUNTER — APPOINTMENT (OUTPATIENT)
Dept: DERMATOLOGY | Facility: CLINIC | Age: 71
End: 2021-07-01

## 2021-07-01 PROCEDURE — 96910 PHOTCHMTX TAR&UVB/PTRLTM&UVB: CPT

## 2021-07-01 PROCEDURE — 99072 ADDL SUPL MATRL&STAF TM PHE: CPT

## 2021-07-02 ENCOUNTER — NON-APPOINTMENT (OUTPATIENT)
Age: 71
End: 2021-07-02

## 2021-07-06 ENCOUNTER — APPOINTMENT (OUTPATIENT)
Dept: DERMATOLOGY | Facility: CLINIC | Age: 71
End: 2021-07-06
Payer: MEDICARE

## 2021-07-06 PROCEDURE — 96910 PHOTCHMTX TAR&UVB/PTRLTM&UVB: CPT

## 2021-07-06 PROCEDURE — 99072 ADDL SUPL MATRL&STAF TM PHE: CPT

## 2021-07-06 NOTE — DISCUSSION/SUMMARY
[FreeTextEntry1] : SABINO  is here for NBUVB therapy treatment for Psoriasis ( L 40.9)\par Starting dose is 250mJ increasing by 15% as tolerated three times a week, holding dose at 1000 mJ per Dr Garcia. Patient is evaluated recently by Dr. Garcia to continue treatment 2x/week same dose, Patient wants to defer biologic (Tremfya) for now. Decreased by -33% due to 2 weeks missed treatment.Decreased by -10% due to archer calibration.\par \par Pt tolerated treatment well, mineral oil applied.\par \par Today's treatment: 07/06/21  1000.0 mJ 3 m 53 seconds \par \par treatment history:\par 07/01/21  1001.0 mJ 4 m 13 seconds \par 6/24/21  1000 mJ 4 m 1 seconds \par  6/22/21  1000 mJ 4 m 1 seconds \par 6/15/21  1000 mJ 4 m 1 seconds\par 6/8/21  1000 mJ 4 m 1 seconds \par 6/1/21  1000 mJ 4 m 1 seconds\par  5/25/21  1000 mJ 4 m 1 seconds\par  5/18/21  1000 mJ 4 m 1 seconds\par 5/11/21  1000 mJ 4 m 1 seconds \par  5/4/21  1000 mJ 4 m 1 seconds \par 4/29/21  1000 mJ 4 m 1 seconds \par  4/27/21  909 mJ 3 m 31 seconds\par  4/20/21  1002 mJ 3 m 49 seconds \par 4/6/21  1002 mJ 3 m 49 seconds\par 4/1/21  994 mJ 3 m 38 seconds \par  3/25/21  907 mJ 3 m 19 seconds \par 3/11/21  1000 mJ 4 m 16 seconds\par 2/25/21  1000 mJ 4 m 16 seconds \par  2/23/21  1000 mJ 4 m 16 seconds\par 2/16/21  1000 mJ 4 m 16 seconds\par  1/28/21  1000 mJ 4 m 16 seconds \par 1/21/21  1000 mJ 4 m 16 seconds \par 1/19/21  1000 mJ 4 m 16 seconds \par 1/14/21  1000 mJ 4 m 16 seconds \par 1/7/21  1000 mJ 4 m 16 seconds\par 1/5/21  1000 mJ 4 m 16 seconds \par  12/29/20  890 mJ 3 m 47 seconds \par 12/11/20  773 mJ 3 m 9 seconds\par  12/8/20  673 mJ 2 m 5 seconds \par 11/17/20  1000 mJ 4 m 1 seconds \par 11/5/20  1000 mJ 4 m 1 seconds\par  10/27/20  1000 mJ 4 m 1 seconds\par 10/22/20  1000 mJ 4 m 1 seconds\par 10/20/20  1000 mJ 4 m 1 seconds \par  10/15/20  1000mJ 4 m 1 seconds\par 10/13/20  1003 mJ 4 m 15 seconds\par 10/8/20  1000 mJ 3 m 50 seconds\par 10/1/20  1000 mJ 3 m 50 seconds \par 09/29/20  1000 mJ 3 m 50 seconds \par  09/24/20  1000 mJ 3 m 50 seconds\par 09/22/20  1000 mJ 3 m 50 seconds \par 09/15/20  1000 mJ 3 m 50 seconds \par 09/10/20  1000 mJ 3 m 50 seconds\par 09/8/20  1000 mJ 3 m 50 seconds \par 09/3/20  1000 mJ 3 m 50 seconds\par  09/1/20  1000 mJ 3 m 50 seconds \par  08/26/20  1000 mJ 3 m 50 seconds \par  08/20/20  1000 mJ 3 m 50 seconds\par 08/18/20  1001 mJ 3 m 56 seconds \par  08/13/20  916 mJ 3 m 49 seconds\par  08/11/20  795 mJ 3 m 7 seconds\par  08/6/20  691 mJ 2 m 51 seconds\par 07/30/20  600 mJ 2 m 32 seconds \par  07/23/20  667mJ 2 m 35 seconds\par 07/21/20  582 mJ 2 m 5 seconds \par  07/16/20  505 mJ 2 m 5 seconds\par 07/14/20  440 mJ 1 m 44 seconds \par 07/9/20  381 mJ 1 m 28 seconds\par  07/7/20  334 mJ 1 m 22 seconds \par 07/2/20  289 mJ 1 m 3 seconds\par 6/30/20 251 mJ 0 m 59 seconds\par 03/13/20  1000  mJ 3 m 49 seconds \par 03/6/20  1000  mJ 3 m 49 seconds \par  02/25/20  1000  mJ 3 m 49 seconds \par 02/18/20  1000  mJ 3 m 49 seconds\par  02/11/20  1000  mJ 3 m 25 seconds \par  02/5/20  1000  mJ 3 m 25 seconds\par 01/28/20  1000  mJ 3 m 25 seconds \par 01/21/20  1000  mJ 3 m 25 seconds \par  01/15/20  1000  mJ 3 m 25 seconds \par 01/13/20  1000  mJ 3 m 25 seconds \par 01/10/20  1000  mJ 3 m 25 seconds \par  01/06/20  1000  mJ 3 m 25 seconds \par 01/03/20  1000  mJ 3 m 25 seconds\par 12/23/19  1000  mJ 3 m 25 seconds \par  12/20/19  1000  mJ 3 m 25 seconds \par 12/18/19  1000  mJ 3 m 25 seconds \par 12/11/19  1000  mJ 3 m 25 seconds \par  12/9/19  1000  mJ 3 m 25 seconds\par 11/27/19  1000  mJ 3 m 25 seconds\par 11/22/19  1000  mJ 3 m 25 seconds\par  11/19/19  1000  mJ 3 m 25 seconds \par  11/15/19  1000  mJ 3 m 25 seconds \par 11/12/19  1000  mJ 3 m 25 seconds\par 11/7/19  1000  mJ 3 m 25 seconds\par 11/5/19  1000  mJ 3 m 25 seconds \par 11/1/19  1000  mJ 3 m 25 seconds \par 10/29/19  1000  mJ 3 m 25 seconds \par 10/24/19  1000  mJ 3 m 25 seconds \par 10/22/19  1000  mJ 3 m 25 seconds \par 10/18/19  1000  mJ 3 m 25 seconds \par \par 10/15/19  1000  mJ 3 m 25 seconds \par 10/11/19  1000  mJ 3 m 21 seconds \par 10/9/19  1000  mJ 3m 30 seconds \par  10/7/19  1000  mJ 3m 30 seconds \par 9/30/19  883  mJ 3m 9 seconds \par  9/25/19  771  mJ 2m 35 seconds \par 9/23/19  668  mJ 2m 16 seconds \par  9/20/19  581 mJ 1m 59 seconds \par \par  9/17/19  507 mJ 1m 38 seconds \par  9/13/19  438 mJ 1m 30 seconds \par 9/10/19  384 mJ 1m 19 seconds \par 8/27/19  333 mJ 1m 14 seconds\par  8/22/19  292 mJ 1m 8 seconds \par 8/20/19  252 mJ 0m 59 seconds

## 2021-07-08 ENCOUNTER — APPOINTMENT (OUTPATIENT)
Dept: DERMATOLOGY | Facility: CLINIC | Age: 71
End: 2021-07-08

## 2021-07-12 ENCOUNTER — RX RENEWAL (OUTPATIENT)
Age: 71
End: 2021-07-12

## 2021-07-13 ENCOUNTER — APPOINTMENT (OUTPATIENT)
Dept: DERMATOLOGY | Facility: CLINIC | Age: 71
End: 2021-07-13

## 2021-07-15 ENCOUNTER — APPOINTMENT (OUTPATIENT)
Dept: DERMATOLOGY | Facility: CLINIC | Age: 71
End: 2021-07-15

## 2021-07-20 ENCOUNTER — APPOINTMENT (OUTPATIENT)
Dept: DERMATOLOGY | Facility: CLINIC | Age: 71
End: 2021-07-20
Payer: MEDICARE

## 2021-07-20 PROCEDURE — 99072 ADDL SUPL MATRL&STAF TM PHE: CPT

## 2021-07-20 PROCEDURE — 96910 PHOTCHMTX TAR&UVB/PTRLTM&UVB: CPT

## 2021-07-22 ENCOUNTER — APPOINTMENT (OUTPATIENT)
Dept: DERMATOLOGY | Facility: CLINIC | Age: 71
End: 2021-07-22

## 2021-07-23 ENCOUNTER — RX RENEWAL (OUTPATIENT)
Age: 71
End: 2021-07-23

## 2021-07-27 ENCOUNTER — RX RENEWAL (OUTPATIENT)
Age: 71
End: 2021-07-27

## 2021-07-27 ENCOUNTER — APPOINTMENT (OUTPATIENT)
Dept: DERMATOLOGY | Facility: CLINIC | Age: 71
End: 2021-07-27
Payer: MEDICARE

## 2021-07-27 PROCEDURE — 99072 ADDL SUPL MATRL&STAF TM PHE: CPT

## 2021-07-27 PROCEDURE — 96910 PHOTCHMTX TAR&UVB/PTRLTM&UVB: CPT

## 2021-07-29 ENCOUNTER — APPOINTMENT (OUTPATIENT)
Dept: DERMATOLOGY | Facility: CLINIC | Age: 71
End: 2021-07-29
Payer: MEDICARE

## 2021-07-29 PROCEDURE — 96910 PHOTCHMTX TAR&UVB/PTRLTM&UVB: CPT

## 2021-08-02 ENCOUNTER — NON-APPOINTMENT (OUTPATIENT)
Age: 71
End: 2021-08-02

## 2021-08-03 ENCOUNTER — APPOINTMENT (OUTPATIENT)
Dept: DERMATOLOGY | Facility: CLINIC | Age: 71
End: 2021-08-03
Payer: MEDICARE

## 2021-08-03 PROCEDURE — 96910 PHOTCHMTX TAR&UVB/PTRLTM&UVB: CPT

## 2021-08-05 ENCOUNTER — APPOINTMENT (OUTPATIENT)
Dept: DERMATOLOGY | Facility: CLINIC | Age: 71
End: 2021-08-05
Payer: MEDICARE

## 2021-08-05 PROCEDURE — 96910 PHOTCHMTX TAR&UVB/PTRLTM&UVB: CPT

## 2021-08-10 ENCOUNTER — APPOINTMENT (OUTPATIENT)
Dept: DERMATOLOGY | Facility: CLINIC | Age: 71
End: 2021-08-10
Payer: MEDICARE

## 2021-08-10 PROCEDURE — 96910 PHOTCHMTX TAR&UVB/PTRLTM&UVB: CPT

## 2021-08-11 ENCOUNTER — NON-APPOINTMENT (OUTPATIENT)
Age: 71
End: 2021-08-11

## 2021-08-12 ENCOUNTER — APPOINTMENT (OUTPATIENT)
Dept: DERMATOLOGY | Facility: CLINIC | Age: 71
End: 2021-08-12

## 2021-08-17 ENCOUNTER — APPOINTMENT (OUTPATIENT)
Dept: DERMATOLOGY | Facility: CLINIC | Age: 71
End: 2021-08-17
Payer: MEDICARE

## 2021-08-17 PROCEDURE — 96910 PHOTCHMTX TAR&UVB/PTRLTM&UVB: CPT

## 2021-08-19 ENCOUNTER — RX RENEWAL (OUTPATIENT)
Age: 71
End: 2021-08-19

## 2021-08-19 ENCOUNTER — APPOINTMENT (OUTPATIENT)
Dept: DERMATOLOGY | Facility: CLINIC | Age: 71
End: 2021-08-19
Payer: MEDICARE

## 2021-08-19 PROCEDURE — 96910 PHOTCHMTX TAR&UVB/PTRLTM&UVB: CPT

## 2021-08-24 ENCOUNTER — APPOINTMENT (OUTPATIENT)
Dept: DERMATOLOGY | Facility: CLINIC | Age: 71
End: 2021-08-24
Payer: MEDICARE

## 2021-08-24 ENCOUNTER — RX RENEWAL (OUTPATIENT)
Age: 71
End: 2021-08-24

## 2021-08-24 PROCEDURE — 96910 PHOTCHMTX TAR&UVB/PTRLTM&UVB: CPT

## 2021-08-25 ENCOUNTER — NON-APPOINTMENT (OUTPATIENT)
Age: 71
End: 2021-08-25

## 2021-08-26 ENCOUNTER — APPOINTMENT (OUTPATIENT)
Dept: DERMATOLOGY | Facility: CLINIC | Age: 71
End: 2021-08-26

## 2021-08-31 ENCOUNTER — APPOINTMENT (OUTPATIENT)
Dept: DERMATOLOGY | Facility: CLINIC | Age: 71
End: 2021-08-31

## 2021-09-02 ENCOUNTER — APPOINTMENT (OUTPATIENT)
Dept: DERMATOLOGY | Facility: CLINIC | Age: 71
End: 2021-09-02
Payer: MEDICARE

## 2021-09-02 PROCEDURE — 96910 PHOTCHMTX TAR&UVB/PTRLTM&UVB: CPT

## 2021-09-07 ENCOUNTER — APPOINTMENT (OUTPATIENT)
Dept: DERMATOLOGY | Facility: CLINIC | Age: 71
End: 2021-09-07

## 2021-09-09 ENCOUNTER — APPOINTMENT (OUTPATIENT)
Dept: DERMATOLOGY | Facility: CLINIC | Age: 71
End: 2021-09-09
Payer: MEDICARE

## 2021-09-09 PROCEDURE — 96910 PHOTCHMTX TAR&UVB/PTRLTM&UVB: CPT

## 2021-09-14 ENCOUNTER — APPOINTMENT (OUTPATIENT)
Dept: DERMATOLOGY | Facility: CLINIC | Age: 71
End: 2021-09-14

## 2021-09-16 ENCOUNTER — APPOINTMENT (OUTPATIENT)
Dept: DERMATOLOGY | Facility: CLINIC | Age: 71
End: 2021-09-16
Payer: MEDICARE

## 2021-09-16 PROCEDURE — 96910 PHOTCHMTX TAR&UVB/PTRLTM&UVB: CPT

## 2021-09-21 ENCOUNTER — APPOINTMENT (OUTPATIENT)
Dept: DERMATOLOGY | Facility: CLINIC | Age: 71
End: 2021-09-21
Payer: MEDICARE

## 2021-09-21 PROCEDURE — 96910 PHOTCHMTX TAR&UVB/PTRLTM&UVB: CPT

## 2021-09-23 ENCOUNTER — RESULT REVIEW (OUTPATIENT)
Age: 71
End: 2021-09-23

## 2021-09-23 ENCOUNTER — APPOINTMENT (OUTPATIENT)
Dept: INTERNAL MEDICINE | Facility: CLINIC | Age: 71
End: 2021-09-23
Payer: MEDICARE

## 2021-09-23 ENCOUNTER — OUTPATIENT (OUTPATIENT)
Dept: OUTPATIENT SERVICES | Facility: HOSPITAL | Age: 71
LOS: 1 days | End: 2021-09-23

## 2021-09-23 VITALS
HEART RATE: 72 BPM | HEIGHT: 63 IN | WEIGHT: 280 LBS | OXYGEN SATURATION: 91 % | SYSTOLIC BLOOD PRESSURE: 120 MMHG | BODY MASS INDEX: 49.61 KG/M2 | DIASTOLIC BLOOD PRESSURE: 70 MMHG

## 2021-09-23 VITALS — TEMPERATURE: 97.8 F

## 2021-09-23 DIAGNOSIS — Z23 ENCOUNTER FOR IMMUNIZATION: ICD-10-CM

## 2021-09-23 LAB
ALBUMIN SERPL ELPH-MCNC: 4.5 G/DL — SIGNIFICANT CHANGE UP (ref 3.3–5)
ALP SERPL-CCNC: 83 U/L — SIGNIFICANT CHANGE UP (ref 40–120)
ALT FLD-CCNC: 19 U/L — SIGNIFICANT CHANGE UP (ref 4–33)
ANION GAP SERPL CALC-SCNC: 14 MMOL/L — SIGNIFICANT CHANGE UP (ref 7–14)
AST SERPL-CCNC: 22 U/L — SIGNIFICANT CHANGE UP (ref 4–32)
BASOPHILS # BLD AUTO: 0.09 K/UL — SIGNIFICANT CHANGE UP (ref 0–0.2)
BASOPHILS NFR BLD AUTO: 0.9 % — SIGNIFICANT CHANGE UP (ref 0–2)
BILIRUB SERPL-MCNC: 0.3 MG/DL — SIGNIFICANT CHANGE UP (ref 0.2–1.2)
BUN SERPL-MCNC: 23 MG/DL — SIGNIFICANT CHANGE UP (ref 7–23)
CALCIUM SERPL-MCNC: 10.1 MG/DL — SIGNIFICANT CHANGE UP (ref 8.4–10.5)
CHLORIDE SERPL-SCNC: 100 MMOL/L — SIGNIFICANT CHANGE UP (ref 98–107)
CHOLEST SERPL-MCNC: 196 MG/DL — SIGNIFICANT CHANGE UP
CO2 SERPL-SCNC: 29 MMOL/L — SIGNIFICANT CHANGE UP (ref 22–31)
CREAT SERPL-MCNC: 1.17 MG/DL — SIGNIFICANT CHANGE UP (ref 0.5–1.3)
CRP SERPL-MCNC: 15.7 MG/L — HIGH
EOSINOPHIL # BLD AUTO: 0.3 K/UL — SIGNIFICANT CHANGE UP (ref 0–0.5)
EOSINOPHIL NFR BLD AUTO: 3 % — SIGNIFICANT CHANGE UP (ref 0–6)
ERYTHROCYTE [SEDIMENTATION RATE] IN BLOOD: 30 MM/HR — HIGH (ref 4–25)
GLUCOSE SERPL-MCNC: 110 MG/DL — HIGH (ref 70–99)
HCT VFR BLD CALC: 43.5 % — SIGNIFICANT CHANGE UP (ref 34.5–45)
HDLC SERPL-MCNC: 60 MG/DL — SIGNIFICANT CHANGE UP
HGB BLD-MCNC: 14.4 G/DL — SIGNIFICANT CHANGE UP (ref 11.5–15.5)
IANC: 7.5 K/UL — SIGNIFICANT CHANGE UP (ref 1.5–8.5)
IMM GRANULOCYTES NFR BLD AUTO: 0.5 % — SIGNIFICANT CHANGE UP (ref 0–1.5)
LIPID PNL WITH DIRECT LDL SERPL: 102 MG/DL — HIGH
LYMPHOCYTES # BLD AUTO: 1.49 K/UL — SIGNIFICANT CHANGE UP (ref 1–3.3)
LYMPHOCYTES # BLD AUTO: 14.8 % — SIGNIFICANT CHANGE UP (ref 13–44)
MCHC RBC-ENTMCNC: 30.7 PG — SIGNIFICANT CHANGE UP (ref 27–34)
MCHC RBC-ENTMCNC: 33.1 GM/DL — SIGNIFICANT CHANGE UP (ref 32–36)
MCV RBC AUTO: 92.8 FL — SIGNIFICANT CHANGE UP (ref 80–100)
MONOCYTES # BLD AUTO: 0.66 K/UL — SIGNIFICANT CHANGE UP (ref 0–0.9)
MONOCYTES NFR BLD AUTO: 6.5 % — SIGNIFICANT CHANGE UP (ref 2–14)
NEUTROPHILS # BLD AUTO: 7.5 K/UL — HIGH (ref 1.8–7.4)
NEUTROPHILS NFR BLD AUTO: 74.3 % — SIGNIFICANT CHANGE UP (ref 43–77)
NON HDL CHOLESTEROL: 136 MG/DL — HIGH
NRBC # BLD: 0 /100 WBCS — SIGNIFICANT CHANGE UP
NRBC # FLD: 0.03 K/UL — HIGH
PLATELET # BLD AUTO: 289 K/UL — SIGNIFICANT CHANGE UP (ref 150–400)
POTASSIUM SERPL-MCNC: 4.2 MMOL/L — SIGNIFICANT CHANGE UP (ref 3.5–5.3)
POTASSIUM SERPL-SCNC: 4.2 MMOL/L — SIGNIFICANT CHANGE UP (ref 3.5–5.3)
PROT SERPL-MCNC: 7.8 G/DL — SIGNIFICANT CHANGE UP (ref 6–8.3)
RBC # BLD: 4.69 M/UL — SIGNIFICANT CHANGE UP (ref 3.8–5.2)
RBC # FLD: 14 % — SIGNIFICANT CHANGE UP (ref 10.3–14.5)
SODIUM SERPL-SCNC: 143 MMOL/L — SIGNIFICANT CHANGE UP (ref 135–145)
TRIGL SERPL-MCNC: 170 MG/DL — HIGH
WBC # BLD: 10.09 K/UL — SIGNIFICANT CHANGE UP (ref 3.8–10.5)
WBC # FLD AUTO: 10.09 K/UL — SIGNIFICANT CHANGE UP (ref 3.8–10.5)

## 2021-09-23 PROCEDURE — 99213 OFFICE O/P EST LOW 20 MIN: CPT | Mod: GE

## 2021-09-24 LAB
24R-OH-CALCIDIOL SERPL-MCNC: 60.3 NG/ML — SIGNIFICANT CHANGE UP (ref 30–80)
C PEPTIDE SERPL-MCNC: 7.4 NG/ML — HIGH (ref 1.1–4.4)
RHEUMATOID FACT SERPL-ACNC: 13 IU/ML — SIGNIFICANT CHANGE UP (ref 0–13)

## 2021-09-24 NOTE — HISTORY OF PRESENT ILLNESS
[de-identified] : 69y woman with HTN and psorasis here for f/u visit. \par \par HTN: currently on metoprolol 100mg BID, chlorthalidone 25mg qD, lisinopril 40mg qD, has a blood pressure machine at home. BP today 120/70. States she takes her BPs at home w/ similar results. \par \par Psorasis: pt was undergoing phototherapy with Dermatology clinic 2x per week. States that there was mild improvement but has been worsening within recent times. Patient currently takes clobetasol and tacrolimus ointment as per last derm appointment. Discussions of whether to start bioogic agent Temaya currently in progress with derm. Will defer management. States that she is currently experiencing sx a/w joint pain. Unclear whether psoriatic arthritis or OA.\par \par HCM: \par Due for colonoscopy but declined despite counseling. Opted for FIT testing.\par Due for mammogram but states that she is "too busy with everything going on." Counseled that referral can be sent and she can schedule whenever is convenient for her.\par Due for shingrix and flu vaccine, states that she would like to obtain both at the pharmacy.\par Due for DEXA scan, referral sent.\par Patient currently see ophthalmologist and UTD w/ screening. \par Will obtain labs at this visit since last labs during 2015\par \par No recent hospitalization or ER visit. No sick contact at home. No other complaints.

## 2021-09-24 NOTE — END OF VISIT
[] : Resident [FreeTextEntry3] : Agree with initial screen for potential inflammatory arthritis as knee pain more likely to be OA.

## 2021-09-24 NOTE — PHYSICAL EXAM
[No Acute Distress] : no acute distress [Well Nourished] : well nourished [Well Developed] : well developed [Well-Appearing] : well-appearing [Normal Sclera/Conjunctiva] : normal sclera/conjunctiva [Normal Outer Ear/Nose] : the outer ears and nose were normal in appearance [Supple] : supple [No Respiratory Distress] : no respiratory distress  [No Accessory Muscle Use] : no accessory muscle use [Clear to Auscultation] : lungs were clear to auscultation bilaterally [Normal Rate] : normal rate  [Regular Rhythm] : with a regular rhythm [Normal S1, S2] : normal S1 and S2 [Soft] : abdomen soft [Non Tender] : non-tender [Non-distended] : non-distended [No Joint Swelling] : no joint swelling [Coordination Grossly Intact] : coordination grossly intact [de-identified] : psoriatic rash noted over abdomen and medial thighs b/l

## 2021-09-24 NOTE — PLAN
[FreeTextEntry1] : 69y woman with HTN and psorasis here for f/u\par \par #HTN\par BP well controlled; currently 120/70\par - continue metoprolol 100mg BID, chlorthalidone 25mg qD, lisinopril 40mg qD, has a blood pressure machine at [facility]\par \par #Psorasis\par Currently on clobetasol and tacrolimus ointment w/ discussion of starting biologic Temaya due to decreased effectivenes of light therapy \par -f/u with derm \par \par #Joint Pain\par Unclear whether due to psoriasis or OA due to obesity\par - Will send inflammatory markers to further assess; Rheum referral if abnormal\par - b/l knee XR to assess for joint space narrowing due to OA\par - f/u Derm\par \par #HCM\par -Due for colonoscopy but declined despite counseling. Opted for FIT testing.\par -mammogram referral sent\par -Will obtain shingrix and flu vaccine at pharmacy; sent \par -Currently see ophthalmologist and UTD w/ screening \par -Will obtain labs at this visit since last labs during 2015\par -Form for handicap parking permit filled out \par \par RTC in 3 months for lab and HCM f/u  \par Case discussed with Dr. Magallon \par \par Jorge Byers \par EMIM PGY-3

## 2021-09-27 ENCOUNTER — APPOINTMENT (OUTPATIENT)
Dept: OPHTHALMOLOGY | Facility: CLINIC | Age: 71
End: 2021-09-27
Payer: MEDICARE

## 2021-09-27 ENCOUNTER — NON-APPOINTMENT (OUTPATIENT)
Age: 71
End: 2021-09-27

## 2021-09-27 PROCEDURE — 92014 COMPRE OPH EXAM EST PT 1/>: CPT

## 2021-09-27 PROCEDURE — 92250 FUNDUS PHOTOGRAPHY W/I&R: CPT

## 2021-09-28 ENCOUNTER — APPOINTMENT (OUTPATIENT)
Dept: DERMATOLOGY | Facility: CLINIC | Age: 71
End: 2021-09-28

## 2021-09-28 DIAGNOSIS — L40.9 PSORIASIS, UNSPECIFIED: ICD-10-CM

## 2021-09-28 DIAGNOSIS — I10 ESSENTIAL (PRIMARY) HYPERTENSION: ICD-10-CM

## 2021-09-28 DIAGNOSIS — M25.50 PAIN IN UNSPECIFIED JOINT: ICD-10-CM

## 2021-09-29 ENCOUNTER — APPOINTMENT (OUTPATIENT)
Dept: DERMATOLOGY | Facility: CLINIC | Age: 71
End: 2021-09-29
Payer: MEDICARE

## 2021-09-29 PROCEDURE — 99215 OFFICE O/P EST HI 40 MIN: CPT

## 2021-10-15 ENCOUNTER — APPOINTMENT (OUTPATIENT)
Dept: INTERNAL MEDICINE | Facility: CLINIC | Age: 71
End: 2021-10-15

## 2021-10-15 ENCOUNTER — OUTPATIENT (OUTPATIENT)
Dept: OUTPATIENT SERVICES | Facility: HOSPITAL | Age: 71
LOS: 1 days | End: 2021-10-15

## 2021-10-15 ENCOUNTER — RESULT REVIEW (OUTPATIENT)
Age: 71
End: 2021-10-15

## 2021-10-15 ENCOUNTER — NON-APPOINTMENT (OUTPATIENT)
Age: 71
End: 2021-10-15

## 2021-10-15 VITALS — TEMPERATURE: 97.6 F

## 2021-10-15 DIAGNOSIS — R73.03 PREDIABETES: ICD-10-CM

## 2021-10-15 DIAGNOSIS — I10 ESSENTIAL (PRIMARY) HYPERTENSION: ICD-10-CM

## 2021-10-15 DIAGNOSIS — E66.9 OBESITY, UNSPECIFIED: ICD-10-CM

## 2021-10-15 LAB
A1C WITH ESTIMATED AVERAGE GLUCOSE RESULT: 5.6 % — SIGNIFICANT CHANGE UP (ref 4–5.6)
ESTIMATED AVERAGE GLUCOSE: 114 — SIGNIFICANT CHANGE UP

## 2021-10-19 ENCOUNTER — APPOINTMENT (OUTPATIENT)
Dept: DERMATOLOGY | Facility: CLINIC | Age: 71
End: 2021-10-19
Payer: MEDICARE

## 2021-10-19 VITALS — HEIGHT: 63 IN | WEIGHT: 280 LBS | BODY MASS INDEX: 49.61 KG/M2

## 2021-10-19 PROCEDURE — 99214 OFFICE O/P EST MOD 30 MIN: CPT

## 2021-10-21 ENCOUNTER — NON-APPOINTMENT (OUTPATIENT)
Age: 71
End: 2021-10-21

## 2021-10-28 ENCOUNTER — RX RENEWAL (OUTPATIENT)
Age: 71
End: 2021-10-28

## 2021-11-28 ENCOUNTER — RX RENEWAL (OUTPATIENT)
Age: 71
End: 2021-11-28

## 2021-12-01 ENCOUNTER — RX RENEWAL (OUTPATIENT)
Age: 71
End: 2021-12-01

## 2021-12-07 ENCOUNTER — RESULT REVIEW (OUTPATIENT)
Age: 71
End: 2021-12-07

## 2021-12-07 ENCOUNTER — APPOINTMENT (OUTPATIENT)
Dept: RHEUMATOLOGY | Facility: CLINIC | Age: 71
End: 2021-12-07
Payer: MEDICARE

## 2021-12-07 VITALS
HEART RATE: 74 BPM | DIASTOLIC BLOOD PRESSURE: 77 MMHG | TEMPERATURE: 97.5 F | WEIGHT: 280 LBS | OXYGEN SATURATION: 96 % | SYSTOLIC BLOOD PRESSURE: 130 MMHG | BODY MASS INDEX: 49.61 KG/M2 | HEIGHT: 63 IN | RESPIRATION RATE: 16 BRPM

## 2021-12-07 PROCEDURE — 99204 OFFICE O/P NEW MOD 45 MIN: CPT

## 2021-12-07 NOTE — ASSESSMENT
[FreeTextEntry1] : 71F with plaque psoriasis and psoriatic arthritis and dactylitis deferring DMARDs in the past\par \par Plan: \par -Discussed importance of managing psoriatic arthritis both for symptom control as well as to prevent joint damage as well as to curb systemic inflammation which is associated with cardiovascular events\par -Discussed that apremilast can be considered, side effect profile discussed. May also recommend MTX. Though pt has hepatosteatosis, would not be contraindicated to start mtx, would require close monitoring. Discussed also excellent outcomes on Tremfya which patient is already approved for. Patient will think about it\par -for now naproxen 500mg bid for 1-2 weeks with meals\par -Infection screening labs\par -hand, knee, foot xrays\par -Discussed importance of weight loss in controlling pso and psa\par -follow up in 1month

## 2021-12-07 NOTE — HISTORY OF PRESENT ILLNESS
[FreeTextEntry1] : Ms. Miller is a 71F referred by her dermatologist for evaluation of joint pain, question of psoriatic arthritis\par \par = dg with PSO in 2019, managed with topical steroids, phototherapy, however has progressed, currently has 40% BSA. She has been approved for Tremfya, however pt is apprehensive of needles. MTX was not started d/t concern for hepatosteatosis, Otezla was offered but pt deferred fur to fear of GI side effects\par = For the past year with worsening joint pains , as well as joint swelling, she has been taking Advil/Aleve 2 tabs. Pt has been pain in her right foot, left knee, left hand\par = ambulates with a cane\par = pt has gained a lot of weight in past 6 years \par \par FH: mother RA\par SH: nc

## 2021-12-07 NOTE — CONSULT LETTER
[Dear  ___] : Dear  [unfilled], [Consult Letter:] : I had the pleasure of evaluating your patient, [unfilled]. [Please see my note below.] : Please see my note below. [Consult Closing:] : Thank you very much for allowing me to participate in the care of this patient.  If you have any questions, please do not hesitate to contact me. [Sincerely,] : Sincerely, [FreeTextEntry2] : Dr. Damon Perez [FreeTextEntry3] : Dr. Macey Matamoros\par

## 2021-12-07 NOTE — PHYSICAL EXAM
[General Appearance - Alert] : alert [General Appearance - In No Acute Distress] : in no acute distress [General Appearance - Well Nourished] : well nourished [Sensation] : the sensory exam was normal to light touch and pinprick [Motor Exam] : the motor exam was normal [Oriented To Time, Place, And Person] : oriented to person, place, and time [FreeTextEntry1] : extensive psoriatic plaques scalp, chest, extensor surfaces of bilateral elbows, knees. nail onchyomycosis left hand.

## 2021-12-09 ENCOUNTER — APPOINTMENT (OUTPATIENT)
Dept: RADIOLOGY | Facility: IMAGING CENTER | Age: 71
End: 2021-12-09
Payer: MEDICARE

## 2021-12-09 ENCOUNTER — OUTPATIENT (OUTPATIENT)
Dept: OUTPATIENT SERVICES | Facility: HOSPITAL | Age: 71
LOS: 1 days | End: 2021-12-09
Payer: MEDICARE

## 2021-12-09 DIAGNOSIS — L40.50 ARTHROPATHIC PSORIASIS, UNSPECIFIED: ICD-10-CM

## 2021-12-09 LAB
ALBUMIN SERPL ELPH-MCNC: 4.2 G/DL
ALP BLD-CCNC: 83 U/L
ALT SERPL-CCNC: 14 U/L
ANION GAP SERPL CALC-SCNC: 12 MMOL/L
AST SERPL-CCNC: 15 U/L
BASOPHILS # BLD AUTO: 0.11 K/UL
BASOPHILS NFR BLD AUTO: 1 %
BILIRUB SERPL-MCNC: 0.4 MG/DL
BUN SERPL-MCNC: 27 MG/DL
CALCIUM SERPL-MCNC: 10.3 MG/DL
CCP AB SER IA-ACNC: <8 UNITS
CHLORIDE SERPL-SCNC: 100 MMOL/L
CO2 SERPL-SCNC: 27 MMOL/L
CREAT SERPL-MCNC: 0.98 MG/DL
CRP SERPL-MCNC: 47 MG/L
EOSINOPHIL # BLD AUTO: 0.26 K/UL
EOSINOPHIL NFR BLD AUTO: 2.4 %
ERYTHROCYTE [SEDIMENTATION RATE] IN BLOOD BY WESTERGREN METHOD: 90 MM/HR
GLUCOSE SERPL-MCNC: 92 MG/DL
HBV CORE IGG+IGM SER QL: NONREACTIVE
HBV SURFACE AG SER QL: NONREACTIVE
HCT VFR BLD CALC: 43 %
HCV AB SER QL: NONREACTIVE
HCV S/CO RATIO: 0.13 S/CO
HGB BLD-MCNC: 13.5 G/DL
IMM GRANULOCYTES NFR BLD AUTO: 0.5 %
LYMPHOCYTES # BLD AUTO: 1.64 K/UL
LYMPHOCYTES NFR BLD AUTO: 15.2 %
M TB IFN-G BLD-IMP: NEGATIVE
MAN DIFF?: NORMAL
MCHC RBC-ENTMCNC: 29 PG
MCHC RBC-ENTMCNC: 31.4 GM/DL
MCV RBC AUTO: 92.5 FL
MONOCYTES # BLD AUTO: 0.9 K/UL
MONOCYTES NFR BLD AUTO: 8.3 %
NEUTROPHILS # BLD AUTO: 7.84 K/UL
NEUTROPHILS NFR BLD AUTO: 72.6 %
PLATELET # BLD AUTO: 323 K/UL
POTASSIUM SERPL-SCNC: 4.4 MMOL/L
PROT SERPL-MCNC: 7.7 G/DL
QUANTIFERON TB PLUS MITOGEN MINUS NIL: 6.85 IU/ML
QUANTIFERON TB PLUS NIL: 0.01 IU/ML
QUANTIFERON TB PLUS TB1 MINUS NIL: 0 IU/ML
QUANTIFERON TB PLUS TB2 MINUS NIL: 0 IU/ML
RBC # BLD: 4.65 M/UL
RBC # FLD: 14.4 %
RF+CCP IGG SER-IMP: NEGATIVE
SODIUM SERPL-SCNC: 139 MMOL/L
URATE SERPL-MCNC: 8.5 MG/DL
WBC # FLD AUTO: 10.8 K/UL

## 2021-12-09 PROCEDURE — 73120 X-RAY EXAM OF HAND: CPT | Mod: 26,50

## 2021-12-09 PROCEDURE — 73120 X-RAY EXAM OF HAND: CPT

## 2021-12-09 PROCEDURE — 73630 X-RAY EXAM OF FOOT: CPT | Mod: 26,50

## 2021-12-09 PROCEDURE — 73564 X-RAY EXAM KNEE 4 OR MORE: CPT | Mod: 26,50

## 2021-12-09 PROCEDURE — 73564 X-RAY EXAM KNEE 4 OR MORE: CPT

## 2021-12-09 PROCEDURE — 73630 X-RAY EXAM OF FOOT: CPT

## 2021-12-13 ENCOUNTER — NON-APPOINTMENT (OUTPATIENT)
Age: 71
End: 2021-12-13

## 2021-12-13 ENCOUNTER — APPOINTMENT (OUTPATIENT)
Dept: OPHTHALMOLOGY | Facility: CLINIC | Age: 71
End: 2021-12-13
Payer: MEDICARE

## 2021-12-13 PROCEDURE — 92134 CPTRZ OPH DX IMG PST SGM RTA: CPT

## 2021-12-13 PROCEDURE — 92012 INTRM OPH EXAM EST PATIENT: CPT

## 2021-12-14 LAB

## 2021-12-22 ENCOUNTER — APPOINTMENT (OUTPATIENT)
Dept: DERMATOLOGY | Facility: CLINIC | Age: 71
End: 2021-12-22
Payer: MEDICARE

## 2021-12-22 ENCOUNTER — NON-APPOINTMENT (OUTPATIENT)
Age: 71
End: 2021-12-22

## 2021-12-22 PROCEDURE — 99215 OFFICE O/P EST HI 40 MIN: CPT

## 2021-12-22 RX ORDER — TRIAMCINOLONE ACETONIDE 1 MG/G
0.1 CREAM TOPICAL
Qty: 1 | Refills: 1 | Status: DISCONTINUED | COMMUNITY
Start: 2021-09-30 | End: 2021-12-22

## 2021-12-22 RX ORDER — TRIAMCINOLONE ACETONIDE 1 MG/G
0.1 OINTMENT TOPICAL
Qty: 1 | Refills: 0 | Status: DISCONTINUED | COMMUNITY
Start: 2021-09-29 | End: 2021-12-22

## 2021-12-27 ENCOUNTER — APPOINTMENT (OUTPATIENT)
Dept: OPHTHALMOLOGY | Facility: CLINIC | Age: 71
End: 2021-12-27

## 2021-12-28 ENCOUNTER — APPOINTMENT (OUTPATIENT)
Dept: DERMATOLOGY | Facility: CLINIC | Age: 71
End: 2021-12-28
Payer: MEDICARE

## 2021-12-28 PROCEDURE — 96910 PHOTCHMTX TAR&UVB/PTRLTM&UVB: CPT

## 2021-12-30 ENCOUNTER — APPOINTMENT (OUTPATIENT)
Dept: DERMATOLOGY | Facility: CLINIC | Age: 71
End: 2021-12-30
Payer: MEDICARE

## 2021-12-30 PROCEDURE — 96910 PHOTCHMTX TAR&UVB/PTRLTM&UVB: CPT

## 2021-12-31 ENCOUNTER — RX RENEWAL (OUTPATIENT)
Age: 71
End: 2021-12-31

## 2022-01-04 ENCOUNTER — APPOINTMENT (OUTPATIENT)
Dept: DERMATOLOGY | Facility: CLINIC | Age: 72
End: 2022-01-04

## 2022-01-06 ENCOUNTER — APPOINTMENT (OUTPATIENT)
Dept: DERMATOLOGY | Facility: CLINIC | Age: 72
End: 2022-01-06
Payer: MEDICARE

## 2022-01-06 PROCEDURE — 96910 PHOTCHMTX TAR&UVB/PTRLTM&UVB: CPT

## 2022-01-10 ENCOUNTER — APPOINTMENT (OUTPATIENT)
Dept: DERMATOLOGY | Facility: CLINIC | Age: 72
End: 2022-01-10
Payer: MEDICARE

## 2022-01-10 PROCEDURE — 96910 PHOTCHMTX TAR&UVB/PTRLTM&UVB: CPT

## 2022-01-13 ENCOUNTER — APPOINTMENT (OUTPATIENT)
Dept: DERMATOLOGY | Facility: CLINIC | Age: 72
End: 2022-01-13
Payer: MEDICARE

## 2022-01-13 PROCEDURE — 96910 PHOTCHMTX TAR&UVB/PTRLTM&UVB: CPT

## 2022-01-15 ENCOUNTER — APPOINTMENT (OUTPATIENT)
Dept: RHEUMATOLOGY | Facility: CLINIC | Age: 72
End: 2022-01-15
Payer: MEDICARE

## 2022-01-15 VITALS
TEMPERATURE: 97.6 F | DIASTOLIC BLOOD PRESSURE: 82 MMHG | BODY MASS INDEX: 49.61 KG/M2 | HEART RATE: 89 BPM | WEIGHT: 280 LBS | OXYGEN SATURATION: 95 % | SYSTOLIC BLOOD PRESSURE: 153 MMHG | HEIGHT: 63 IN

## 2022-01-15 PROCEDURE — 99214 OFFICE O/P EST MOD 30 MIN: CPT

## 2022-01-18 ENCOUNTER — APPOINTMENT (OUTPATIENT)
Dept: DERMATOLOGY | Facility: CLINIC | Age: 72
End: 2022-01-18
Payer: MEDICARE

## 2022-01-18 PROCEDURE — 96910 PHOTCHMTX TAR&UVB/PTRLTM&UVB: CPT

## 2022-01-19 ENCOUNTER — APPOINTMENT (OUTPATIENT)
Dept: OPHTHALMOLOGY | Facility: CLINIC | Age: 72
End: 2022-01-19

## 2022-01-20 ENCOUNTER — NON-APPOINTMENT (OUTPATIENT)
Age: 72
End: 2022-01-20

## 2022-01-21 ENCOUNTER — APPOINTMENT (OUTPATIENT)
Dept: DERMATOLOGY | Facility: CLINIC | Age: 72
End: 2022-01-21

## 2022-01-25 ENCOUNTER — APPOINTMENT (OUTPATIENT)
Dept: DERMATOLOGY | Facility: CLINIC | Age: 72
End: 2022-01-25

## 2022-01-27 ENCOUNTER — APPOINTMENT (OUTPATIENT)
Dept: DERMATOLOGY | Facility: CLINIC | Age: 72
End: 2022-01-27

## 2022-02-01 ENCOUNTER — APPOINTMENT (OUTPATIENT)
Dept: DERMATOLOGY | Facility: CLINIC | Age: 72
End: 2022-02-01
Payer: MEDICARE

## 2022-02-01 PROCEDURE — 96910 PHOTCHMTX TAR&UVB/PTRLTM&UVB: CPT

## 2022-02-03 ENCOUNTER — APPOINTMENT (OUTPATIENT)
Dept: DERMATOLOGY | Facility: CLINIC | Age: 72
End: 2022-02-03
Payer: MEDICARE

## 2022-02-03 PROCEDURE — 96910 PHOTCHMTX TAR&UVB/PTRLTM&UVB: CPT

## 2022-02-04 ENCOUNTER — NON-APPOINTMENT (OUTPATIENT)
Age: 72
End: 2022-02-04

## 2022-02-07 ENCOUNTER — APPOINTMENT (OUTPATIENT)
Dept: RHEUMATOLOGY | Facility: CLINIC | Age: 72
End: 2022-02-07
Payer: MEDICARE

## 2022-02-07 PROCEDURE — 99446 NTRPROF PH1/NTRNET/EHR 5-10: CPT

## 2022-02-08 ENCOUNTER — APPOINTMENT (OUTPATIENT)
Dept: DERMATOLOGY | Facility: CLINIC | Age: 72
End: 2022-02-08
Payer: MEDICARE

## 2022-02-08 PROCEDURE — 96910 PHOTCHMTX TAR&UVB/PTRLTM&UVB: CPT

## 2022-02-10 ENCOUNTER — APPOINTMENT (OUTPATIENT)
Dept: DERMATOLOGY | Facility: CLINIC | Age: 72
End: 2022-02-10

## 2022-02-15 ENCOUNTER — APPOINTMENT (OUTPATIENT)
Dept: DERMATOLOGY | Facility: CLINIC | Age: 72
End: 2022-02-15
Payer: MEDICARE

## 2022-02-15 PROCEDURE — 96910 PHOTCHMTX TAR&UVB/PTRLTM&UVB: CPT

## 2022-02-17 ENCOUNTER — APPOINTMENT (OUTPATIENT)
Dept: DERMATOLOGY | Facility: CLINIC | Age: 72
End: 2022-02-17

## 2022-02-24 ENCOUNTER — APPOINTMENT (OUTPATIENT)
Dept: DERMATOLOGY | Facility: CLINIC | Age: 72
End: 2022-02-24

## 2022-03-08 ENCOUNTER — APPOINTMENT (OUTPATIENT)
Dept: DERMATOLOGY | Facility: CLINIC | Age: 72
End: 2022-03-08
Payer: MEDICARE

## 2022-03-08 PROCEDURE — 96910 PHOTCHMTX TAR&UVB/PTRLTM&UVB: CPT

## 2022-03-15 ENCOUNTER — APPOINTMENT (OUTPATIENT)
Dept: DERMATOLOGY | Facility: CLINIC | Age: 72
End: 2022-03-15
Payer: MEDICARE

## 2022-03-15 PROCEDURE — 96910 PHOTCHMTX TAR&UVB/PTRLTM&UVB: CPT

## 2022-03-22 ENCOUNTER — APPOINTMENT (OUTPATIENT)
Dept: DERMATOLOGY | Facility: CLINIC | Age: 72
End: 2022-03-22
Payer: MEDICARE

## 2022-03-22 PROCEDURE — 96910 PHOTCHMTX TAR&UVB/PTRLTM&UVB: CPT

## 2022-03-29 ENCOUNTER — APPOINTMENT (OUTPATIENT)
Dept: DERMATOLOGY | Facility: CLINIC | Age: 72
End: 2022-03-29
Payer: MEDICARE

## 2022-03-29 PROCEDURE — 96910 PHOTCHMTX TAR&UVB/PTRLTM&UVB: CPT

## 2022-04-04 ENCOUNTER — NON-APPOINTMENT (OUTPATIENT)
Age: 72
End: 2022-04-04

## 2022-04-04 ENCOUNTER — APPOINTMENT (OUTPATIENT)
Dept: OPHTHALMOLOGY | Facility: CLINIC | Age: 72
End: 2022-04-04
Payer: MEDICARE

## 2022-04-04 PROCEDURE — 92235 FLUORESCEIN ANGRPH MLTIFRAME: CPT

## 2022-04-04 PROCEDURE — 92012 INTRM OPH EXAM EST PATIENT: CPT

## 2022-04-04 PROCEDURE — 92250 FUNDUS PHOTOGRAPHY W/I&R: CPT

## 2022-04-06 ENCOUNTER — APPOINTMENT (OUTPATIENT)
Dept: DERMATOLOGY | Facility: CLINIC | Age: 72
End: 2022-04-06
Payer: MEDICARE

## 2022-04-06 ENCOUNTER — APPOINTMENT (OUTPATIENT)
Dept: OPHTHALMOLOGY | Facility: CLINIC | Age: 72
End: 2022-04-06

## 2022-04-06 VITALS — BODY MASS INDEX: 46.95 KG/M2 | HEIGHT: 63 IN | WEIGHT: 265 LBS

## 2022-04-06 PROCEDURE — 96910 PHOTCHMTX TAR&UVB/PTRLTM&UVB: CPT

## 2022-04-06 PROCEDURE — 99214 OFFICE O/P EST MOD 30 MIN: CPT | Mod: 25

## 2022-04-12 ENCOUNTER — APPOINTMENT (OUTPATIENT)
Dept: DERMATOLOGY | Facility: CLINIC | Age: 72
End: 2022-04-12
Payer: MEDICARE

## 2022-04-12 PROCEDURE — 96910 PHOTCHMTX TAR&UVB/PTRLTM&UVB: CPT

## 2022-04-18 ENCOUNTER — APPOINTMENT (OUTPATIENT)
Dept: RHEUMATOLOGY | Facility: CLINIC | Age: 72
End: 2022-04-18
Payer: MEDICARE

## 2022-04-18 PROCEDURE — 99447 NTRPROF PH1/NTRNET/EHR 11-20: CPT

## 2022-04-19 ENCOUNTER — APPOINTMENT (OUTPATIENT)
Dept: DERMATOLOGY | Facility: CLINIC | Age: 72
End: 2022-04-19

## 2022-04-26 ENCOUNTER — APPOINTMENT (OUTPATIENT)
Dept: DERMATOLOGY | Facility: CLINIC | Age: 72
End: 2022-04-26
Payer: MEDICARE

## 2022-04-26 PROCEDURE — 96910 PHOTCHMTX TAR&UVB/PTRLTM&UVB: CPT

## 2022-05-03 ENCOUNTER — APPOINTMENT (OUTPATIENT)
Dept: DERMATOLOGY | Facility: CLINIC | Age: 72
End: 2022-05-03
Payer: MEDICARE

## 2022-05-03 PROCEDURE — 96910 PHOTCHMTX TAR&UVB/PTRLTM&UVB: CPT

## 2022-05-06 ENCOUNTER — RX RENEWAL (OUTPATIENT)
Age: 72
End: 2022-05-06

## 2022-05-17 ENCOUNTER — APPOINTMENT (OUTPATIENT)
Dept: DERMATOLOGY | Facility: CLINIC | Age: 72
End: 2022-05-17
Payer: MEDICARE

## 2022-05-17 PROCEDURE — 96910 PHOTCHMTX TAR&UVB/PTRLTM&UVB: CPT

## 2022-05-26 ENCOUNTER — APPOINTMENT (OUTPATIENT)
Dept: RHEUMATOLOGY | Facility: CLINIC | Age: 72
End: 2022-05-26
Payer: MEDICARE

## 2022-05-26 VITALS
OXYGEN SATURATION: 95 % | TEMPERATURE: 97.2 F | SYSTOLIC BLOOD PRESSURE: 139 MMHG | WEIGHT: 263 LBS | DIASTOLIC BLOOD PRESSURE: 75 MMHG | BODY MASS INDEX: 46.6 KG/M2 | HEIGHT: 63 IN | HEART RATE: 98 BPM

## 2022-05-26 PROCEDURE — 99214 OFFICE O/P EST MOD 30 MIN: CPT

## 2022-06-07 ENCOUNTER — APPOINTMENT (OUTPATIENT)
Dept: DERMATOLOGY | Facility: CLINIC | Age: 72
End: 2022-06-07
Payer: MEDICARE

## 2022-06-07 LAB
ALBUMIN SERPL ELPH-MCNC: 4.4 G/DL
ALP BLD-CCNC: 78 U/L
ALT SERPL-CCNC: 21 U/L
ANION GAP SERPL CALC-SCNC: 16 MMOL/L
AST SERPL-CCNC: 22 U/L
BILIRUB SERPL-MCNC: 0.4 MG/DL
BUN SERPL-MCNC: 26 MG/DL
CALCIUM SERPL-MCNC: 9.2 MG/DL
CHLORIDE SERPL-SCNC: 98 MMOL/L
CO2 SERPL-SCNC: 24 MMOL/L
CREAT SERPL-MCNC: 0.94 MG/DL
CRP SERPL-MCNC: 23 MG/L
EGFR: 65 ML/MIN/1.73M2
GLUCOSE SERPL-MCNC: 98 MG/DL
POTASSIUM SERPL-SCNC: 5.7 MMOL/L
PROT SERPL-MCNC: 8.1 G/DL
SODIUM SERPL-SCNC: 139 MMOL/L
URATE SERPL-MCNC: 8.2 MG/DL

## 2022-06-07 PROCEDURE — 96910 PHOTCHMTX TAR&UVB/PTRLTM&UVB: CPT

## 2022-06-30 ENCOUNTER — APPOINTMENT (OUTPATIENT)
Dept: DERMATOLOGY | Facility: CLINIC | Age: 72
End: 2022-06-30

## 2022-06-30 PROCEDURE — 96910 PHOTCHMTX TAR&UVB/PTRLTM&UVB: CPT

## 2022-07-12 ENCOUNTER — RESULT REVIEW (OUTPATIENT)
Age: 72
End: 2022-07-12

## 2022-07-15 ENCOUNTER — APPOINTMENT (OUTPATIENT)
Dept: ULTRASOUND IMAGING | Facility: CLINIC | Age: 72
End: 2022-07-15

## 2022-07-15 ENCOUNTER — OUTPATIENT (OUTPATIENT)
Dept: OUTPATIENT SERVICES | Facility: HOSPITAL | Age: 72
LOS: 1 days | End: 2022-07-15
Payer: COMMERCIAL

## 2022-07-15 DIAGNOSIS — M79.89 OTHER SPECIFIED SOFT TISSUE DISORDERS: ICD-10-CM

## 2022-07-15 DIAGNOSIS — Z00.8 ENCOUNTER FOR OTHER GENERAL EXAMINATION: ICD-10-CM

## 2022-07-15 PROCEDURE — 93971 EXTREMITY STUDY: CPT

## 2022-07-15 PROCEDURE — 93971 EXTREMITY STUDY: CPT | Mod: 26,RT

## 2022-07-19 ENCOUNTER — APPOINTMENT (OUTPATIENT)
Dept: DERMATOLOGY | Facility: CLINIC | Age: 72
End: 2022-07-19

## 2022-07-21 ENCOUNTER — APPOINTMENT (OUTPATIENT)
Dept: ORTHOPEDIC SURGERY | Facility: CLINIC | Age: 72
End: 2022-07-21

## 2022-07-21 VITALS — HEIGHT: 63 IN | WEIGHT: 260 LBS | BODY MASS INDEX: 46.07 KG/M2

## 2022-07-21 DIAGNOSIS — M17.0 BILATERAL PRIMARY OSTEOARTHRITIS OF KNEE: ICD-10-CM

## 2022-07-21 PROCEDURE — 99203 OFFICE O/P NEW LOW 30 MIN: CPT | Mod: 25

## 2022-07-21 PROCEDURE — 20610 DRAIN/INJ JOINT/BURSA W/O US: CPT | Mod: RT

## 2022-07-21 NOTE — ADDENDUM
[FreeTextEntry1] : I, Mary Olmstead wrote this note acting as a scribe for Dr. Donald Guaman on Jul 21, 2022.

## 2022-07-21 NOTE — END OF VISIT
[FreeTextEntry3] : All medical record entries made by the Scribe were at my,  Dr. Donald Guaman MD., direction and personally dictated by me on 07/21/2022. I have personally reviewed the chart and agree that the record accurately reflects my personal performance of the history, physical exam, assessment and plan.

## 2022-07-21 NOTE — DISCUSSION/SUMMARY
[de-identified] : The underlying pathophysiology was reviewed with the patient. XR films were reviewed with the patient. Discussed at length the nature of the patient’s condition. The bilateral knee symptoms appear secondary to osteoarthritis.\par \par At this time, I discussed the nature of her pain and told her that it is not emanating from the Baker's cyst but due to moderate to severe arthritis bilaterally. She inquired about how to treat the Baker's cyst and I told her that there is nothing to do to make it resolve faster but again this is not the cause of her symptoms. We discussed treatment options consisting of Tylenol as well as topical agents such as Voltaren and Biofreeze. As she has tried these measures without relief, we also discussed further treatment options such as a cortisone injection. She did inquire about use of pain killers but I told her that I would not recommend this nor will I prescribe it due to the addictive nature and side effects.\par \par The patient wishes to proceed with a cortisone injection today. Under sterile precautions, an injection of 5cc 1% lidocaine without epinephrine and 0.5cc Kenalog 40mg, 0.5cc Dexamethasone was administered into the RIGHT knee (1). The patient tolerated the procedure well. Rest and apply ice. \par \par All questions answered, understanding verbalized. Patient in agreement with plan of care. Follow up in 6 weeks, if needed.

## 2022-07-21 NOTE — HISTORY OF PRESENT ILLNESS
[de-identified] : Pt is a 70 y/o female with history of psoriatic arthritis c/o bilateral knee pain.  She has pain with walking and standing. She is not able to walk out of her home due to the pain as of about 2 weeks ago.  It is difficult to stand from a sitting position.  She had xrays in December of 2021 which revealed arthritis.  She had a Doppler recently which showed a bakers cyst in the right knee. She takes Tylenol 500mg for pain. She has tried topical agents for pain as well with minimal relief.  She was told not to take Ibuprofen due to her blood pressure medication.

## 2022-07-21 NOTE — PHYSICAL EXAM
[de-identified] : Patient is WDWN, alert, and in no acute distress. Breathing is unlabored. She is grossly oriented to person, place, and time.\par \par She is accompanied by her daughter today.\par She presents to the office in a wheelchair.\par \par Bilateral Knees:\par There is medial joint line tenderness to palpation. tenderness noted anteriorly through the knees. There is diffuse swelling and effusion through the knees.\par Range of motion: Active flexion and extension are limited due to pain.\par Tests and Signs: All tests for stability are normal.  [de-identified] : EXAM: XR KNEE COMP 4+ VIEWS BI\par PROCEDURE DATE: 12/09/2021\par IMPRESSION:\par  Joint Space(s): Moderate medial compartment joint space is present with small to moderate tricompartmental osteophytes.\par  Effusion: Borderline small effusion may be present.\par \par NU PATEL MD; Attending Radiologist\par This document has been electronically signed. Dec 10 2021 12:35PM

## 2022-07-22 ENCOUNTER — APPOINTMENT (OUTPATIENT)
Dept: ORTHOPEDIC SURGERY | Facility: CLINIC | Age: 72
End: 2022-07-22

## 2022-07-28 ENCOUNTER — APPOINTMENT (OUTPATIENT)
Dept: OPHTHALMOLOGY | Facility: CLINIC | Age: 72
End: 2022-07-28

## 2022-08-09 ENCOUNTER — NON-APPOINTMENT (OUTPATIENT)
Age: 72
End: 2022-08-09

## 2022-08-11 ENCOUNTER — APPOINTMENT (OUTPATIENT)
Dept: OTOLARYNGOLOGY | Facility: CLINIC | Age: 72
End: 2022-08-11

## 2022-09-01 ENCOUNTER — APPOINTMENT (OUTPATIENT)
Dept: ORTHOPEDIC SURGERY | Facility: CLINIC | Age: 72
End: 2022-09-01

## 2022-09-01 DIAGNOSIS — M17.12 UNILATERAL PRIMARY OSTEOARTHRITIS, LEFT KNEE: ICD-10-CM

## 2022-09-01 PROCEDURE — 99213 OFFICE O/P EST LOW 20 MIN: CPT

## 2022-09-01 NOTE — END OF VISIT
[FreeTextEntry3] : All medical record entries made by the Scribe were at my,  Dr. Donald Guaman MD., direction and personally dictated by me on 09/01/2022. I have personally reviewed the chart and agree that the record accurately reflects my personal performance of the history, physical exam, assessment and plan.

## 2022-09-01 NOTE — HISTORY OF PRESENT ILLNESS
[de-identified] : Pt is a 72 y/o female with history of psoriatic arthritis c/o bilateral knee pain.  She has pain with walking and standing. She is not able to walk out of her home due to the pain as of about 2 weeks ago.  It is difficult to stand from a sitting position.  She had xrays in December of 2021 which revealed arthritis.  She had a Doppler recently which showed a bakers cyst in the right knee. She takes Tylenol 500mg for pain. She has tried topical agents for pain as well with minimal relief.  She was told not to take Ibuprofen due to her blood pressure medication. She was treated in office on 7/21/22 at which time she was given a cortisone injection to the right knee. She returns to the office on 9/1/22 and reports to have continued pain to the right knee. She has gross swelling to the knee and pain laterally. She denies pain posteriorly.  She is not currently taking medication for pain. She uses Biofreeze and other topical modalities for pain as needed.  She states next week she will be seeing her rheumatologist, Shiloh Starks.

## 2022-09-01 NOTE — PHYSICAL EXAM
[de-identified] : Patient is WDWN, alert, and in no acute distress. Breathing is unlabored. She is grossly oriented to person, place, and time.\par \par She presents to the office ambulating with the assistance of a cane. \par \par Bilateral Knees:\par There is medial joint line tenderness to palpation. tenderness noted anteriorly through the knees. There is diffuse swelling and effusion through the knees.\par Range of motion: Active flexion and extension are limited due to pain.\par Tests and Signs: All tests for stability are normal.  [de-identified] : EXAM: XR KNEE COMP 4+ VIEWS BI\par PROCEDURE DATE: 12/09/2021\par IMPRESSION:\par  Joint Space(s): Moderate medial compartment joint space is present with small to moderate tricompartmental osteophytes.\par  Effusion: Borderline small effusion may be present.\par \par NU PATEL MD; Attending Radiologist\par This document has been electronically signed. Dec 10 2021 12:35PM

## 2022-09-01 NOTE — ADDENDUM
[FreeTextEntry1] : I, Mary Olmstead wrote this note acting as a scribe for Dr. Donald Guaman on Sep 01, 2022.

## 2022-09-01 NOTE — DISCUSSION/SUMMARY
[de-identified] : The underlying pathophysiology was reviewed with the patient. XR films were reviewed with the patient. Discussed at length the nature of the patient’s condition. The bilateral knee symptoms appear secondary to osteoarthritis.\par \par I recommended she continue with use of topical modalities as well as Tylenol arthritis and/or NSAIDs. She deferred a prescription for Naprosyn or Ibuprofen.\par \par All questions answered, understanding verbalized. Patient in agreement with plan of care. Follow up as needed.

## 2022-09-12 ENCOUNTER — NON-APPOINTMENT (OUTPATIENT)
Age: 72
End: 2022-09-12

## 2022-09-12 ENCOUNTER — APPOINTMENT (OUTPATIENT)
Dept: OPHTHALMOLOGY | Facility: CLINIC | Age: 72
End: 2022-09-12

## 2022-09-12 PROCEDURE — 92014 COMPRE OPH EXAM EST PT 1/>: CPT

## 2022-09-12 PROCEDURE — 92250 FUNDUS PHOTOGRAPHY W/I&R: CPT

## 2022-09-15 ENCOUNTER — APPOINTMENT (OUTPATIENT)
Dept: RHEUMATOLOGY | Facility: CLINIC | Age: 72
End: 2022-09-15

## 2022-09-15 VITALS
WEIGHT: 251 LBS | RESPIRATION RATE: 16 BRPM | OXYGEN SATURATION: 96 % | TEMPERATURE: 98.3 F | HEIGHT: 63 IN | SYSTOLIC BLOOD PRESSURE: 126 MMHG | BODY MASS INDEX: 44.47 KG/M2 | DIASTOLIC BLOOD PRESSURE: 72 MMHG | HEART RATE: 89 BPM

## 2022-09-15 DIAGNOSIS — R20.0 ANESTHESIA OF SKIN: ICD-10-CM

## 2022-09-15 PROCEDURE — 99215 OFFICE O/P EST HI 40 MIN: CPT

## 2022-09-22 ENCOUNTER — APPOINTMENT (OUTPATIENT)
Dept: OPHTHALMOLOGY | Facility: CLINIC | Age: 72
End: 2022-09-22

## 2022-09-22 ENCOUNTER — RX RENEWAL (OUTPATIENT)
Age: 72
End: 2022-09-22

## 2022-09-26 LAB
ALBUMIN SERPL ELPH-MCNC: 4.6 G/DL
ALP BLD-CCNC: 90 U/L
ALT SERPL-CCNC: 17 U/L
ANION GAP SERPL CALC-SCNC: 15 MMOL/L
AST SERPL-CCNC: 20 U/L
BASOPHILS # BLD AUTO: 0.1 K/UL
BASOPHILS NFR BLD AUTO: 0.9 %
BILIRUB SERPL-MCNC: 0.3 MG/DL
BUN SERPL-MCNC: 21 MG/DL
CALCIUM SERPL-MCNC: 10.8 MG/DL
CHLORIDE SERPL-SCNC: 99 MMOL/L
CO2 SERPL-SCNC: 27 MMOL/L
CREAT SERPL-MCNC: 0.98 MG/DL
CRP SERPL-MCNC: 22 MG/L
EGFR: 62 ML/MIN/1.73M2
EOSINOPHIL # BLD AUTO: 0.17 K/UL
EOSINOPHIL NFR BLD AUTO: 1.5 %
ERYTHROCYTE [SEDIMENTATION RATE] IN BLOOD BY WESTERGREN METHOD: 53 MM/HR
GLUCOSE SERPL-MCNC: 77 MG/DL
HCT VFR BLD CALC: 44.6 %
HGB BLD-MCNC: 14 G/DL
IMM GRANULOCYTES NFR BLD AUTO: 0.5 %
LYMPHOCYTES # BLD AUTO: 1.77 K/UL
LYMPHOCYTES NFR BLD AUTO: 15.2 %
MAN DIFF?: NORMAL
MCHC RBC-ENTMCNC: 29.2 PG
MCHC RBC-ENTMCNC: 31.4 GM/DL
MCV RBC AUTO: 93.1 FL
MONOCYTES # BLD AUTO: 0.73 K/UL
MONOCYTES NFR BLD AUTO: 6.3 %
NEUTROPHILS # BLD AUTO: 8.83 K/UL
NEUTROPHILS NFR BLD AUTO: 75.6 %
PLATELET # BLD AUTO: 340 K/UL
POTASSIUM SERPL-SCNC: 4.1 MMOL/L
PROT SERPL-MCNC: 7.8 G/DL
RBC # BLD: 4.79 M/UL
RBC # FLD: 14.5 %
SODIUM SERPL-SCNC: 141 MMOL/L
WBC # FLD AUTO: 11.66 K/UL

## 2022-09-28 ENCOUNTER — APPOINTMENT (OUTPATIENT)
Dept: DERMATOLOGY | Facility: CLINIC | Age: 72
End: 2022-09-28

## 2022-10-26 ENCOUNTER — OUTPATIENT (OUTPATIENT)
Dept: OUTPATIENT SERVICES | Facility: HOSPITAL | Age: 72
LOS: 1 days | End: 2022-10-26

## 2022-10-26 ENCOUNTER — APPOINTMENT (OUTPATIENT)
Dept: INTERNAL MEDICINE | Facility: CLINIC | Age: 72
End: 2022-10-26

## 2022-10-26 ENCOUNTER — NON-APPOINTMENT (OUTPATIENT)
Age: 72
End: 2022-10-26

## 2022-10-26 VITALS
WEIGHT: 249.5 LBS | HEIGHT: 63 IN | HEART RATE: 100 BPM | SYSTOLIC BLOOD PRESSURE: 138 MMHG | DIASTOLIC BLOOD PRESSURE: 82 MMHG | OXYGEN SATURATION: 97 % | RESPIRATION RATE: 18 BRPM | BODY MASS INDEX: 44.21 KG/M2 | TEMPERATURE: 96.7 F

## 2022-10-26 DIAGNOSIS — Z00.00 ENCOUNTER FOR GENERAL ADULT MEDICAL EXAMINATION W/OUT ABNORMAL FINDINGS: ICD-10-CM

## 2022-10-26 DIAGNOSIS — E78.5 HYPERLIPIDEMIA, UNSPECIFIED: ICD-10-CM

## 2022-10-26 PROCEDURE — 99397 PER PM REEVAL EST PAT 65+ YR: CPT | Mod: GC

## 2022-10-27 ENCOUNTER — MED ADMIN CHARGE (OUTPATIENT)
Age: 72
End: 2022-10-27

## 2022-10-28 PROBLEM — E78.5 HLD (HYPERLIPIDEMIA): Status: ACTIVE | Noted: 2022-10-28

## 2022-10-28 LAB
CHOLEST SERPL-MCNC: 206 MG/DL
ESTIMATED AVERAGE GLUCOSE: 120 MG/DL
HBA1C MFR BLD HPLC: 5.8 %
HDLC SERPL-MCNC: 61 MG/DL
LDLC SERPL CALC-MCNC: 122 MG/DL
NONHDLC SERPL-MCNC: 144 MG/DL
TRIGL SERPL-MCNC: 114 MG/DL

## 2022-10-31 ENCOUNTER — APPOINTMENT (OUTPATIENT)
Dept: OPHTHALMOLOGY | Facility: CLINIC | Age: 72
End: 2022-10-31

## 2022-10-31 ENCOUNTER — NON-APPOINTMENT (OUTPATIENT)
Age: 72
End: 2022-10-31

## 2022-10-31 PROCEDURE — 92012 INTRM OPH EXAM EST PATIENT: CPT | Mod: 25

## 2022-10-31 PROCEDURE — 67028 INJECTION EYE DRUG: CPT | Mod: RT

## 2022-10-31 PROCEDURE — 92134 CPTRZ OPH DX IMG PST SGM RTA: CPT

## 2022-11-04 DIAGNOSIS — Z00.00 ENCOUNTER FOR GENERAL ADULT MEDICAL EXAMINATION WITHOUT ABNORMAL FINDINGS: ICD-10-CM

## 2022-11-04 DIAGNOSIS — E66.9 OBESITY, UNSPECIFIED: ICD-10-CM

## 2022-11-04 DIAGNOSIS — I10 ESSENTIAL (PRIMARY) HYPERTENSION: ICD-10-CM

## 2022-11-04 NOTE — ASSESSMENT
[FreeTextEntry1] : 72y woman with HTN, retinal vein occlusion, psoriatic arthritis, OA knees, obesity class 3 here for CPE. \par \par #HTN - /82, repeat 132/82; home BPs 120s/60s (9/2022 CMP wnl)\par -c/w metoprolol 100 BID, chlorthalidone 25, lisinopril 40\par \par #Obesity, class 3 - BMI 44, 249lbs (down from 280lbs Jan 2022), motivated to lose weight\par -discussed diet: decrease number of nights eating cookies from 3x/wk -> 2x/wk starting this Saturday, healthy replacements for cookie cravings (ie. fruit, breath minits)\par -discussed exercise goals: start using stationary bike 15 min 3x/wk at 10am and resistance bands 5 min 2x/wk at night, start this weekend\par -Nutrition referral (telehealth) to continue to work on weight loss goals\par \par #Retinal vein occlusion - stable, follows closely with optho\par -continue to follow with optho\par \par #Psoriatic arthritis - stable, follows closely with rheum\par -c/w otelza 30mg BID\par -continue to follow with rheum\par \par #OA BL knees - stable, follows with ortho\par -continue to follow with ortho (last 9/2022 cortisone shot)\par \par #HCM\par -flu shot today\par -A1c (last 10/2021 5.6%) and lipid (last 10/2021) today\par -Declined DEXA, mammogram, colonoscopy, low dose CT scan (20 pack year hx of smoking - quit 2005)\par \par RTC 1 year \par \par Sheron Rosales MD\par PGY1, [employer]\par \par Case discussed and seen with Dr. Dsouza

## 2022-11-04 NOTE — HISTORY OF PRESENT ILLNESS
[FreeTextEntry1] : CPE [de-identified] : 72y woman with HTN, stable retinal vein occlusion, psoriatic arthritis, OA knees, obesity class 3 here for CPE. \par \par Patient reports compliance with metoprolol 100 BID, chlorthalidone 25, and lisinopril. Reports home BPs 120s/60s. Denies headache, new vision changes, CP, palpitations, ab pain, N/V, urinary changes. Reports her psoriatic arthritis is well controlled with her Otelza 30mg BID and she follows closely with Rheum (last seen 9/2022). Reports chronic BL knee pain for her OA and saw ortho last 9/2022 for a cortisone shot. Reports that she has not been able to go to PT due to transportation restraints. Reports stable retinal vein occlusion and sees follows opthalmology closely.  Patient reports she is able to perform her ADLs and has support from her daughter. She also has a HHA that comes to take care of her  (amputee 2/2 diabetes). \par \par Patient is motivated to lose weight to improve her knee pain and overall health. Reports that she has been trying to decrease her portion sizes. Reports the Otelza helps reduce her appetite. Reports diet consists of cereal, sliced turkey, and salads during the day. Reports that she gets the 'night time munchies' with urges to eat cakes and cookies at night. Reports that she's currently eating cakes and cookies 3x/week. She eats roughly 5 cookies in one setting and plans to decrease eating sweets from 3x/week to 2x/week, starting this weekend and find healthy replacement for her cravings (ie. fruit, breath mints).  Also, reports no current physical activity. Reports that she has a stationary bike and resistance bands at home. Reports that she will start using stationary bike 15 min 3x/wk at 10am and resistance bands 5 min 2x/wk at night, starting this weekend. Reports that she would like continued support to help motivate her to keep with her health goals.

## 2022-11-04 NOTE — PHYSICAL EXAM
[No Acute Distress] : no acute distress [Well Nourished] : well nourished [Well Developed] : well developed [Well-Appearing] : well-appearing [Normal Sclera/Conjunctiva] : normal sclera/conjunctiva [PERRL] : pupils equal round and reactive to light [EOMI] : extraocular movements intact [Normal Outer Ear/Nose] : the outer ears and nose were normal in appearance [Normal Oropharynx] : the oropharynx was normal [No JVD] : no jugular venous distention [No Lymphadenopathy] : no lymphadenopathy [Supple] : supple [No Respiratory Distress] : no respiratory distress  [No Accessory Muscle Use] : no accessory muscle use [Clear to Auscultation] : lungs were clear to auscultation bilaterally [Normal Rate] : normal rate  [Regular Rhythm] : with a regular rhythm [Normal S1, S2] : normal S1 and S2 [No Murmur] : no murmur heard [Soft] : abdomen soft [Non Tender] : non-tender [Non-distended] : non-distended [No Masses] : no abdominal mass palpated [No HSM] : no HSM [Normal Bowel Sounds] : normal bowel sounds [No Spinal Tenderness] : no spinal tenderness [No Joint Swelling] : no joint swelling [Grossly Normal Strength/Tone] : grossly normal strength/tone [No Rash] : no rash [Coordination Grossly Intact] : coordination grossly intact [No Focal Deficits] : no focal deficits [Normal Gait] : normal gait [Normal Affect] : the affect was normal [Normal Insight/Judgement] : insight and judgment were intact [de-identified] : Large body habitus [de-identified] : BL knee pain

## 2022-11-21 ENCOUNTER — APPOINTMENT (OUTPATIENT)
Dept: OPHTHALMOLOGY | Facility: CLINIC | Age: 72
End: 2022-11-21

## 2022-11-28 ENCOUNTER — RX RENEWAL (OUTPATIENT)
Age: 72
End: 2022-11-28

## 2022-12-19 ENCOUNTER — NON-APPOINTMENT (OUTPATIENT)
Age: 72
End: 2022-12-19

## 2022-12-19 ENCOUNTER — APPOINTMENT (OUTPATIENT)
Dept: OPHTHALMOLOGY | Facility: CLINIC | Age: 72
End: 2022-12-19

## 2022-12-19 PROCEDURE — 67028 INJECTION EYE DRUG: CPT | Mod: RT

## 2022-12-19 PROCEDURE — 92134 CPTRZ OPH DX IMG PST SGM RTA: CPT

## 2022-12-19 PROCEDURE — 92012 INTRM OPH EXAM EST PATIENT: CPT | Mod: 25

## 2023-01-11 ENCOUNTER — APPOINTMENT (OUTPATIENT)
Dept: RHEUMATOLOGY | Facility: CLINIC | Age: 73
End: 2023-01-11

## 2023-01-12 ENCOUNTER — APPOINTMENT (OUTPATIENT)
Dept: OPHTHALMOLOGY | Facility: CLINIC | Age: 73
End: 2023-01-12

## 2023-01-12 ENCOUNTER — INPATIENT (INPATIENT)
Facility: HOSPITAL | Age: 73
LOS: 19 days | Discharge: NURSING, REHAB OR LONG-TERM CARE | End: 2023-02-01
Attending: INTERNAL MEDICINE | Admitting: INTERNAL MEDICINE
Payer: MEDICARE

## 2023-01-12 VITALS
SYSTOLIC BLOOD PRESSURE: 102 MMHG | HEART RATE: 72 BPM | DIASTOLIC BLOOD PRESSURE: 53 MMHG | RESPIRATION RATE: 16 BRPM | TEMPERATURE: 98 F

## 2023-01-12 LAB
ALBUMIN SERPL ELPH-MCNC: 2.6 G/DL — LOW (ref 3.3–5)
ALP SERPL-CCNC: 331 U/L — HIGH (ref 40–120)
ALT FLD-CCNC: 51 U/L — HIGH (ref 4–33)
ANION GAP SERPL CALC-SCNC: 18 MMOL/L — HIGH (ref 7–14)
APTT BLD: 25 SEC — LOW (ref 27–36.3)
AST SERPL-CCNC: 106 U/L — HIGH (ref 4–32)
BASE EXCESS BLDV CALC-SCNC: -3.5 MMOL/L — LOW (ref -2–3)
BILIRUB SERPL-MCNC: 1.1 MG/DL — SIGNIFICANT CHANGE UP (ref 0.2–1.2)
BLD GP AB SCN SERPL QL: NEGATIVE — SIGNIFICANT CHANGE UP
BLOOD GAS VENOUS COMPREHENSIVE RESULT: SIGNIFICANT CHANGE UP
BUN SERPL-MCNC: 76 MG/DL — HIGH (ref 7–23)
CALCIUM SERPL-MCNC: 9.4 MG/DL — SIGNIFICANT CHANGE UP (ref 8.4–10.5)
CHLORIDE BLDV-SCNC: 96 MMOL/L — SIGNIFICANT CHANGE UP (ref 96–108)
CHLORIDE SERPL-SCNC: 92 MMOL/L — LOW (ref 98–107)
CK SERPL-CCNC: 453 U/L — HIGH (ref 25–170)
CO2 BLDV-SCNC: 23.4 MMOL/L — SIGNIFICANT CHANGE UP (ref 22–26)
CO2 SERPL-SCNC: 20 MMOL/L — LOW (ref 22–31)
CREAT SERPL-MCNC: 3.35 MG/DL — HIGH (ref 0.5–1.3)
EGFR: 14 ML/MIN/1.73M2 — LOW
GAS PNL BLDV: 127 MMOL/L — LOW (ref 136–145)
GLUCOSE BLDV-MCNC: 118 MG/DL — HIGH (ref 70–99)
GLUCOSE SERPL-MCNC: 109 MG/DL — HIGH (ref 70–99)
HCO3 BLDV-SCNC: 22 MMOL/L — SIGNIFICANT CHANGE UP (ref 22–29)
HCT VFR BLD CALC: 33 % — LOW (ref 34.5–45)
HCT VFR BLDA CALC: 34 % — LOW (ref 34.5–46.5)
HGB BLD CALC-MCNC: 11.4 G/DL — LOW (ref 11.5–15.5)
HGB BLD-MCNC: 10.9 G/DL — LOW (ref 11.5–15.5)
IANC: 14.47 K/UL — HIGH (ref 1.8–7.4)
INR BLD: 1.8 RATIO — HIGH (ref 0.88–1.16)
LACTATE BLDV-MCNC: 2.1 MMOL/L — HIGH (ref 0.5–2)
LIDOCAIN IGE QN: 72 U/L — HIGH (ref 7–60)
MAGNESIUM SERPL-MCNC: 2 MG/DL — SIGNIFICANT CHANGE UP (ref 1.6–2.6)
MCHC RBC-ENTMCNC: 27.4 PG — SIGNIFICANT CHANGE UP (ref 27–34)
MCHC RBC-ENTMCNC: 33 GM/DL — SIGNIFICANT CHANGE UP (ref 32–36)
MCV RBC AUTO: 82.9 FL — SIGNIFICANT CHANGE UP (ref 80–100)
PCO2 BLDV: 41 MMHG — SIGNIFICANT CHANGE UP (ref 39–42)
PH BLDV: 7.34 — SIGNIFICANT CHANGE UP (ref 7.32–7.43)
PHOSPHATE SERPL-MCNC: 4.9 MG/DL — HIGH (ref 2.5–4.5)
PLATELET # BLD AUTO: 366 K/UL — SIGNIFICANT CHANGE UP (ref 150–400)
PO2 BLDV: 48 MMHG — SIGNIFICANT CHANGE UP
POTASSIUM BLDV-SCNC: 3.8 MMOL/L — SIGNIFICANT CHANGE UP (ref 3.5–5.1)
POTASSIUM SERPL-MCNC: 3.5 MMOL/L — SIGNIFICANT CHANGE UP (ref 3.5–5.3)
POTASSIUM SERPL-SCNC: 3.5 MMOL/L — SIGNIFICANT CHANGE UP (ref 3.5–5.3)
PROT SERPL-MCNC: 7.1 G/DL — SIGNIFICANT CHANGE UP (ref 6–8.3)
PROTHROM AB SERPL-ACNC: 21 SEC — HIGH (ref 10.5–13.4)
RBC # BLD: 3.98 M/UL — SIGNIFICANT CHANGE UP (ref 3.8–5.2)
RBC # FLD: 14.3 % — SIGNIFICANT CHANGE UP (ref 10.3–14.5)
RH IG SCN BLD-IMP: POSITIVE — SIGNIFICANT CHANGE UP
SAO2 % BLDV: 76.5 % — SIGNIFICANT CHANGE UP
SODIUM SERPL-SCNC: 130 MMOL/L — LOW (ref 135–145)
TROPONIN T, HIGH SENSITIVITY RESULT: 250 NG/L — CRITICAL HIGH
WBC # BLD: 17.62 K/UL — HIGH (ref 3.8–10.5)
WBC # FLD AUTO: 17.62 K/UL — HIGH (ref 3.8–10.5)

## 2023-01-12 PROCEDURE — 71045 X-RAY EXAM CHEST 1 VIEW: CPT | Mod: 26

## 2023-01-12 PROCEDURE — 99285 EMERGENCY DEPT VISIT HI MDM: CPT

## 2023-01-12 RX ORDER — SODIUM CHLORIDE 9 MG/ML
2000 INJECTION INTRAMUSCULAR; INTRAVENOUS; SUBCUTANEOUS ONCE
Refills: 0 | Status: COMPLETED | OUTPATIENT
Start: 2023-01-12 | End: 2023-01-12

## 2023-01-12 RX ADMIN — SODIUM CHLORIDE 2000 MILLILITER(S): 9 INJECTION INTRAMUSCULAR; INTRAVENOUS; SUBCUTANEOUS at 23:01

## 2023-01-12 NOTE — ED ADULT NURSE NOTE - OBJECTIVE STATEMENT
FLOAT:: 73 yo F AAOx4 received to 5A p/w generalized weakness, constipation, decreased urine output, decreased appetite, recent mechanical fall at home, no obvious injuries/traumas- #20 placed to L wrist, pending CT, no distress on exam, report given to primary RN

## 2023-01-12 NOTE — ED PROVIDER NOTE - CLINICAL SUMMARY MEDICAL DECISION MAKING FREE TEXT BOX
72-year-old female with history of hypertension, hyperlipidemia, MEJIA, psoriasis, presenting with generalized weakness and decreased appetite x ~ 1wk. Nontoxic here but significantly clinically dehydrated, otherwise nonfocal exam, high suspicion for infectious vs metabolic derrangement, will obtain labs/ua/cxr/viral swab, IV rehydration, reassess

## 2023-01-12 NOTE — ED ADULT TRIAGE NOTE - CHIEF COMPLAINT QUOTE
Pt c/o generalized weakness, dec PO intake, dec urine output x 1 week. No complaints of chest pain, headache, nausea, dizziness, vomiting  SOB, fever, chills verbalized.

## 2023-01-12 NOTE — ED PROVIDER NOTE - ATTENDING CONTRIBUTION TO CARE
The patient is a 72y Female who has a past medical and surgery history of HLD DM MEJIA HTN Psoriasis/arthropathy PTED with generalized weakness, dec PO intake, dec urine output x 1 week. No complaints of chest pain, headache, nausea, dizziness, vomiting  SOB, fever, chills verbalized. /also admits to recent fall with pain to RLE/Knee    Vital Signs Last 24 Hrs  T(F): 98.2 HR: 72 BP: 102/53 RR: 16 (12 Jan 2023 21:28) (16 - 16)  PE: as described; my additions and exceptions are noted in the chart    DATA:  EKG: pending at time of evaluation  LAB: Pending at time of evaluation    IMPRESSION/RISK:  Dx= Probable electrolyte/renal etiology   Consideration include Given chlorthalidone/ACE combo; if coupled with decreased PO serious electrolyte abnormalities could ensue Will look for precipitants   Plan  lytes mag phos creatinine K   ekg  ua c and S  analgesics  CXR plain films   reassess   patient very disheveled in weakened state family ability to care at home probably compromised will admit for safety even if w/u negative

## 2023-01-12 NOTE — ED PROVIDER NOTE - OBJECTIVE STATEMENT
72-year-old female with history of hypertension, hyperlipidemia, MEJIA, psoriasis, presenting with generalized weakness and decreased appetite x ~ 1wk. States that she has had decreased urine output, feels dehydrated, otherwise has had some constipation but denies fevers, cough, chest/abdominal pain, n/v, SOB, dysuria.     PMD: Luis Armando Cabrera

## 2023-01-12 NOTE — ED PROVIDER NOTE - PHYSICAL EXAMINATION
Gen - Nontoxic but weak appearing; A+Ox3   HEENT - NCAT, EOMI, dry mucous membranes  Neck - supple  Resp - CTAB, no increased WOB  CV -  RRR, no m/r/g  Abd - soft, NT, ND; no guarding or rebound  MSK - FROM of b/l UE and LE, no gross deformities  Extrem - b/l non pitting edema, nontender  Neuro - no focal motor or sensation deficits  Skin - warm, well perfused

## 2023-01-12 NOTE — ED PROVIDER NOTE - CROS ED ROS STATEMENT
[facility]     Hospitalist Progress Note     Selena Amezcua Patient Status:  Observation    1938 MRN [id number]   [facility] [place] 4NW-A Attending Kathy Lazo MD   Hosp Day # 0 PCP Oliverio Rodriguez     Chief Complaint: neutropen hours. Cardiac  No results for input(s): TROP, PBNP in the last 168 hours. Creatinine Kinase  No results for input(s): CK in the last 168 hours. Inflammatory Markers  No results for input(s): CRP, RICARDO, LDH, DDIMER in the last 168 hours.     Imaging all other ROS negative except as per HPI

## 2023-01-13 ENCOUNTER — NON-APPOINTMENT (OUTPATIENT)
Age: 73
End: 2023-01-13

## 2023-01-13 DIAGNOSIS — Z29.9 ENCOUNTER FOR PROPHYLACTIC MEASURES, UNSPECIFIED: ICD-10-CM

## 2023-01-13 DIAGNOSIS — N17.9 ACUTE KIDNEY FAILURE, UNSPECIFIED: ICD-10-CM

## 2023-01-13 DIAGNOSIS — R74.8 ABNORMAL LEVELS OF OTHER SERUM ENZYMES: ICD-10-CM

## 2023-01-13 DIAGNOSIS — R79.89 OTHER SPECIFIED ABNORMAL FINDINGS OF BLOOD CHEMISTRY: ICD-10-CM

## 2023-01-13 DIAGNOSIS — F41.9 ANXIETY DISORDER, UNSPECIFIED: ICD-10-CM

## 2023-01-13 DIAGNOSIS — K55.069 ACUTE INFARCTION OF INTESTINE, PART AND EXTENT UNSPECIFIED: ICD-10-CM

## 2023-01-13 DIAGNOSIS — E87.1 HYPO-OSMOLALITY AND HYPONATREMIA: ICD-10-CM

## 2023-01-13 DIAGNOSIS — R74.01 ELEVATION OF LEVELS OF LIVER TRANSAMINASE LEVELS: ICD-10-CM

## 2023-01-13 DIAGNOSIS — M06.9 RHEUMATOID ARTHRITIS, UNSPECIFIED: ICD-10-CM

## 2023-01-13 DIAGNOSIS — N19 UNSPECIFIED KIDNEY FAILURE: ICD-10-CM

## 2023-01-13 DIAGNOSIS — I10 ESSENTIAL (PRIMARY) HYPERTENSION: ICD-10-CM

## 2023-01-13 DIAGNOSIS — L40.50 ARTHROPATHIC PSORIASIS, UNSPECIFIED: ICD-10-CM

## 2023-01-13 DIAGNOSIS — K57.92 DIVERTICULITIS OF INTESTINE, PART UNSPECIFIED, WITHOUT PERFORATION OR ABSCESS WITHOUT BLEEDING: ICD-10-CM

## 2023-01-13 DIAGNOSIS — D64.9 ANEMIA, UNSPECIFIED: ICD-10-CM

## 2023-01-13 LAB
ALBUMIN SERPL ELPH-MCNC: 2.4 G/DL — LOW (ref 3.3–5)
ALBUMIN SERPL ELPH-MCNC: 2.5 G/DL — LOW (ref 3.3–5)
ALP SERPL-CCNC: 343 U/L — HIGH (ref 40–120)
ALP SERPL-CCNC: 366 U/L — HIGH (ref 40–120)
ALT FLD-CCNC: 43 U/L — HIGH (ref 4–33)
ALT FLD-CCNC: 50 U/L — HIGH (ref 4–33)
ANION GAP SERPL CALC-SCNC: 11 MMOL/L — SIGNIFICANT CHANGE UP (ref 7–14)
ANION GAP SERPL CALC-SCNC: 16 MMOL/L — HIGH (ref 7–14)
ANION GAP SERPL CALC-SCNC: 17 MMOL/L — HIGH (ref 7–14)
ANISOCYTOSIS BLD QL: SLIGHT — SIGNIFICANT CHANGE UP
ANISOCYTOSIS BLD QL: SLIGHT — SIGNIFICANT CHANGE UP
APPEARANCE UR: ABNORMAL
AST SERPL-CCNC: 73 U/L — HIGH (ref 4–32)
AST SERPL-CCNC: 91 U/L — HIGH (ref 4–32)
B PERT DNA SPEC QL NAA+PROBE: SIGNIFICANT CHANGE UP
B PERT+PARAPERT DNA PNL SPEC NAA+PROBE: SIGNIFICANT CHANGE UP
BACTERIA # UR AUTO: ABNORMAL
BASOPHILS # BLD AUTO: 0 K/UL — SIGNIFICANT CHANGE UP (ref 0–0.2)
BASOPHILS # BLD AUTO: 0.08 K/UL — SIGNIFICANT CHANGE UP (ref 0–0.2)
BASOPHILS # BLD AUTO: 0.11 K/UL — SIGNIFICANT CHANGE UP (ref 0–0.2)
BASOPHILS NFR BLD AUTO: 0 % — SIGNIFICANT CHANGE UP (ref 0–2)
BASOPHILS NFR BLD AUTO: 0.4 % — SIGNIFICANT CHANGE UP (ref 0–2)
BASOPHILS NFR BLD AUTO: 0.9 % — SIGNIFICANT CHANGE UP (ref 0–2)
BILIRUB SERPL-MCNC: 1 MG/DL — SIGNIFICANT CHANGE UP (ref 0.2–1.2)
BILIRUB SERPL-MCNC: 1 MG/DL — SIGNIFICANT CHANGE UP (ref 0.2–1.2)
BILIRUB UR-MCNC: NEGATIVE — SIGNIFICANT CHANGE UP
BORDETELLA PARAPERTUSSIS (RAPRVP): SIGNIFICANT CHANGE UP
BUN SERPL-MCNC: 70 MG/DL — HIGH (ref 7–23)
BUN SERPL-MCNC: 73 MG/DL — HIGH (ref 7–23)
BUN SERPL-MCNC: 78 MG/DL — HIGH (ref 7–23)
C PNEUM DNA SPEC QL NAA+PROBE: SIGNIFICANT CHANGE UP
CALCIUM SERPL-MCNC: 7.8 MG/DL — LOW (ref 8.4–10.5)
CALCIUM SERPL-MCNC: 8.2 MG/DL — LOW (ref 8.4–10.5)
CALCIUM SERPL-MCNC: 8.9 MG/DL — SIGNIFICANT CHANGE UP (ref 8.4–10.5)
CHLORIDE SERPL-SCNC: 95 MMOL/L — LOW (ref 98–107)
CHLORIDE SERPL-SCNC: 96 MMOL/L — LOW (ref 98–107)
CHLORIDE SERPL-SCNC: 98 MMOL/L — SIGNIFICANT CHANGE UP (ref 98–107)
CK SERPL-CCNC: 400 U/L — HIGH (ref 25–170)
CO2 SERPL-SCNC: 18 MMOL/L — LOW (ref 22–31)
CO2 SERPL-SCNC: 18 MMOL/L — LOW (ref 22–31)
CO2 SERPL-SCNC: 19 MMOL/L — LOW (ref 22–31)
COLOR SPEC: YELLOW — SIGNIFICANT CHANGE UP
CREAT ?TM UR-MCNC: 146 MG/DL — SIGNIFICANT CHANGE UP
CREAT SERPL-MCNC: 2.66 MG/DL — HIGH (ref 0.5–1.3)
CREAT SERPL-MCNC: 2.99 MG/DL — HIGH (ref 0.5–1.3)
CREAT SERPL-MCNC: 3.2 MG/DL — HIGH (ref 0.5–1.3)
CRP SERPL-MCNC: 250.1 MG/L — HIGH
DIFF PNL FLD: NEGATIVE — SIGNIFICANT CHANGE UP
EGFR: 15 ML/MIN/1.73M2 — LOW
EGFR: 16 ML/MIN/1.73M2 — LOW
EGFR: 19 ML/MIN/1.73M2 — LOW
EOSINOPHIL # BLD AUTO: 0 K/UL — SIGNIFICANT CHANGE UP (ref 0–0.5)
EOSINOPHIL # BLD AUTO: 0.08 K/UL — SIGNIFICANT CHANGE UP (ref 0–0.5)
EOSINOPHIL # BLD AUTO: 0.22 K/UL — SIGNIFICANT CHANGE UP (ref 0–0.5)
EOSINOPHIL NFR BLD AUTO: 0 % — SIGNIFICANT CHANGE UP (ref 0–6)
EOSINOPHIL NFR BLD AUTO: 0.4 % — SIGNIFICANT CHANGE UP (ref 0–6)
EOSINOPHIL NFR BLD AUTO: 1.7 % — SIGNIFICANT CHANGE UP (ref 0–6)
EPI CELLS # UR: 7 /HPF — HIGH (ref 0–5)
ERYTHROCYTE [SEDIMENTATION RATE] IN BLOOD: 86 MM/HR — HIGH (ref 4–25)
FERRITIN SERPL-MCNC: 1339 NG/ML — HIGH (ref 15–150)
FLUAV SUBTYP SPEC NAA+PROBE: SIGNIFICANT CHANGE UP
FLUBV RNA SPEC QL NAA+PROBE: SIGNIFICANT CHANGE UP
GGT SERPL-CCNC: 118 U/L — HIGH (ref 5–36)
GIANT PLATELETS BLD QL SMEAR: PRESENT — SIGNIFICANT CHANGE UP
GLUCOSE SERPL-MCNC: 101 MG/DL — HIGH (ref 70–99)
GLUCOSE SERPL-MCNC: 88 MG/DL — SIGNIFICANT CHANGE UP (ref 70–99)
GLUCOSE SERPL-MCNC: 88 MG/DL — SIGNIFICANT CHANGE UP (ref 70–99)
GLUCOSE UR QL: NEGATIVE — SIGNIFICANT CHANGE UP
HADV DNA SPEC QL NAA+PROBE: SIGNIFICANT CHANGE UP
HCOV 229E RNA SPEC QL NAA+PROBE: SIGNIFICANT CHANGE UP
HCOV HKU1 RNA SPEC QL NAA+PROBE: SIGNIFICANT CHANGE UP
HCOV NL63 RNA SPEC QL NAA+PROBE: SIGNIFICANT CHANGE UP
HCOV OC43 RNA SPEC QL NAA+PROBE: SIGNIFICANT CHANGE UP
HCT VFR BLD CALC: 32 % — LOW (ref 34.5–45)
HCT VFR BLD CALC: 32.4 % — LOW (ref 34.5–45)
HGB BLD-MCNC: 10.6 G/DL — LOW (ref 11.5–15.5)
HGB BLD-MCNC: 11 G/DL — LOW (ref 11.5–15.5)
HMPV RNA SPEC QL NAA+PROBE: SIGNIFICANT CHANGE UP
HPIV1 RNA SPEC QL NAA+PROBE: SIGNIFICANT CHANGE UP
HPIV2 RNA SPEC QL NAA+PROBE: SIGNIFICANT CHANGE UP
HPIV3 RNA SPEC QL NAA+PROBE: SIGNIFICANT CHANGE UP
HPIV4 RNA SPEC QL NAA+PROBE: SIGNIFICANT CHANGE UP
HYALINE CASTS # UR AUTO: 1 /LPF — SIGNIFICANT CHANGE UP (ref 0–7)
IANC: 18.35 K/UL — HIGH (ref 1.8–7.4)
IANC: 9.75 K/UL — HIGH (ref 1.8–7.4)
IMM GRANULOCYTES NFR BLD AUTO: 2.6 % — HIGH (ref 0–0.9)
IRON SATN MFR SERPL: 24 % — SIGNIFICANT CHANGE UP (ref 14–50)
IRON SATN MFR SERPL: 35 UG/DL — SIGNIFICANT CHANGE UP (ref 30–160)
KETONES UR-MCNC: NEGATIVE — SIGNIFICANT CHANGE UP
LEUKOCYTE ESTERASE UR-ACNC: ABNORMAL
LYMPHOCYTES # BLD AUTO: 0.62 K/UL — LOW (ref 1–3.3)
LYMPHOCYTES # BLD AUTO: 1.23 K/UL — SIGNIFICANT CHANGE UP (ref 1–3.3)
LYMPHOCYTES # BLD AUTO: 1.54 K/UL — SIGNIFICANT CHANGE UP (ref 1–3.3)
LYMPHOCYTES # BLD AUTO: 12.1 % — LOW (ref 13–44)
LYMPHOCYTES # BLD AUTO: 3 % — LOW (ref 13–44)
LYMPHOCYTES # BLD AUTO: 7 % — LOW (ref 13–44)
M PNEUMO DNA SPEC QL NAA+PROBE: SIGNIFICANT CHANGE UP
MACROCYTES BLD QL: SLIGHT — SIGNIFICANT CHANGE UP
MACROCYTES BLD QL: SLIGHT — SIGNIFICANT CHANGE UP
MAGNESIUM SERPL-MCNC: 2 MG/DL — SIGNIFICANT CHANGE UP (ref 1.6–2.6)
MANUAL SMEAR VERIFICATION: SIGNIFICANT CHANGE UP
MCHC RBC-ENTMCNC: 27.7 PG — SIGNIFICANT CHANGE UP (ref 27–34)
MCHC RBC-ENTMCNC: 28.1 PG — SIGNIFICANT CHANGE UP (ref 27–34)
MCHC RBC-ENTMCNC: 33.1 GM/DL — SIGNIFICANT CHANGE UP (ref 32–36)
MCHC RBC-ENTMCNC: 34 GM/DL — SIGNIFICANT CHANGE UP (ref 32–36)
MCV RBC AUTO: 82.7 FL — SIGNIFICANT CHANGE UP (ref 80–100)
MCV RBC AUTO: 83.8 FL — SIGNIFICANT CHANGE UP (ref 80–100)
METAMYELOCYTES # FLD: 1.7 % — HIGH (ref 0–1)
MONOCYTES # BLD AUTO: 0.33 K/UL — SIGNIFICANT CHANGE UP (ref 0–0.9)
MONOCYTES # BLD AUTO: 0.62 K/UL — SIGNIFICANT CHANGE UP (ref 0–0.9)
MONOCYTES # BLD AUTO: 0.95 K/UL — HIGH (ref 0–0.9)
MONOCYTES NFR BLD AUTO: 2.6 % — SIGNIFICANT CHANGE UP (ref 2–14)
MONOCYTES NFR BLD AUTO: 3.5 % — SIGNIFICANT CHANGE UP (ref 2–14)
MONOCYTES NFR BLD AUTO: 4.6 % — SIGNIFICANT CHANGE UP (ref 2–14)
MYELOCYTES NFR BLD: 0.9 % — HIGH (ref 0–0)
MYELOCYTES NFR BLD: 1.7 % — HIGH (ref 0–0)
NEUTROPHILS # BLD AUTO: 15.31 K/UL — HIGH (ref 1.8–7.4)
NEUTROPHILS # BLD AUTO: 18.35 K/UL — HIGH (ref 1.8–7.4)
NEUTROPHILS # BLD AUTO: 9.96 K/UL — HIGH (ref 1.8–7.4)
NEUTROPHILS NFR BLD AUTO: 69.8 % — SIGNIFICANT CHANGE UP (ref 43–77)
NEUTROPHILS NFR BLD AUTO: 81.6 % — HIGH (ref 43–77)
NEUTROPHILS NFR BLD AUTO: 89 % — HIGH (ref 43–77)
NEUTS BAND # BLD: 5.3 % — SIGNIFICANT CHANGE UP (ref 0–6)
NEUTS BAND # BLD: 8.6 % — HIGH (ref 0–6)
NITRITE UR-MCNC: NEGATIVE — SIGNIFICANT CHANGE UP
NRBC # BLD: 0 /100 WBCS — SIGNIFICANT CHANGE UP (ref 0–0)
NRBC # BLD: 1 /100 — HIGH (ref 0–0)
NRBC # FLD: 0 K/UL — SIGNIFICANT CHANGE UP (ref 0–0)
OSMOLALITY UR: 300 MOSM/KG — SIGNIFICANT CHANGE UP (ref 50–1200)
OVALOCYTES BLD QL SMEAR: SLIGHT — SIGNIFICANT CHANGE UP
PH UR: 5.5 — SIGNIFICANT CHANGE UP (ref 5–8)
PHOSPHATE SERPL-MCNC: 4.6 MG/DL — HIGH (ref 2.5–4.5)
PLAT MORPH BLD: ABNORMAL
PLAT MORPH BLD: NORMAL — SIGNIFICANT CHANGE UP
PLATELET # BLD AUTO: 312 K/UL — SIGNIFICANT CHANGE UP (ref 150–400)
PLATELET # BLD AUTO: 339 K/UL — SIGNIFICANT CHANGE UP (ref 150–400)
PLATELET COUNT - ESTIMATE: NORMAL — SIGNIFICANT CHANGE UP
PLATELET COUNT - ESTIMATE: NORMAL — SIGNIFICANT CHANGE UP
POIKILOCYTOSIS BLD QL AUTO: SLIGHT — SIGNIFICANT CHANGE UP
POLYCHROMASIA BLD QL SMEAR: SLIGHT — SIGNIFICANT CHANGE UP
POLYCHROMASIA BLD QL SMEAR: SLIGHT — SIGNIFICANT CHANGE UP
POTASSIUM SERPL-MCNC: 3.1 MMOL/L — LOW (ref 3.5–5.3)
POTASSIUM SERPL-MCNC: 3.8 MMOL/L — SIGNIFICANT CHANGE UP (ref 3.5–5.3)
POTASSIUM SERPL-MCNC: SIGNIFICANT CHANGE UP MMOL/L (ref 3.5–5.3)
POTASSIUM SERPL-SCNC: 3.1 MMOL/L — LOW (ref 3.5–5.3)
POTASSIUM SERPL-SCNC: 3.8 MMOL/L — SIGNIFICANT CHANGE UP (ref 3.5–5.3)
POTASSIUM SERPL-SCNC: SIGNIFICANT CHANGE UP MMOL/L (ref 3.5–5.3)
POTASSIUM UR-SCNC: 30.7 MMOL/L — SIGNIFICANT CHANGE UP
PROCALCITONIN SERPL-MCNC: 21.46 NG/ML — HIGH (ref 0.02–0.1)
PROT ?TM UR-MCNC: 43 MG/DL — SIGNIFICANT CHANGE UP
PROT SERPL-MCNC: 6.5 G/DL — SIGNIFICANT CHANGE UP (ref 6–8.3)
PROT SERPL-MCNC: 6.8 G/DL — SIGNIFICANT CHANGE UP (ref 6–8.3)
PROT UR-MCNC: ABNORMAL
PROT/CREAT UR-RTO: 0.3 RATIO — HIGH (ref 0–0.2)
RAPID RVP RESULT: SIGNIFICANT CHANGE UP
RBC # BLD: 3.82 M/UL — SIGNIFICANT CHANGE UP (ref 3.8–5.2)
RBC # BLD: 3.92 M/UL — SIGNIFICANT CHANGE UP (ref 3.8–5.2)
RBC # FLD: 14.4 % — SIGNIFICANT CHANGE UP (ref 10.3–14.5)
RBC # FLD: 14.4 % — SIGNIFICANT CHANGE UP (ref 10.3–14.5)
RBC BLD AUTO: ABNORMAL
RBC BLD AUTO: ABNORMAL
RBC CASTS # UR COMP ASSIST: 7 /HPF — HIGH (ref 0–4)
RSV RNA SPEC QL NAA+PROBE: SIGNIFICANT CHANGE UP
RV+EV RNA SPEC QL NAA+PROBE: SIGNIFICANT CHANGE UP
SARS-COV-2 RNA SPEC QL NAA+PROBE: SIGNIFICANT CHANGE UP
SODIUM SERPL-SCNC: 124 MMOL/L — LOW (ref 135–145)
SODIUM SERPL-SCNC: 131 MMOL/L — LOW (ref 135–145)
SODIUM SERPL-SCNC: 133 MMOL/L — LOW (ref 135–145)
SODIUM UR-SCNC: 21 MMOL/L — SIGNIFICANT CHANGE UP
SP GR SPEC: 1.01 — SIGNIFICANT CHANGE UP (ref 1.01–1.05)
TIBC SERPL-MCNC: 143 UG/DL — LOW (ref 220–430)
TROPONIN T, HIGH SENSITIVITY RESULT: 163 NG/L — CRITICAL HIGH
UIBC SERPL-MCNC: 108 UG/DL — LOW (ref 110–370)
UROBILINOGEN FLD QL: SIGNIFICANT CHANGE UP
UUN UR-MCNC: 420 MG/DL — SIGNIFICANT CHANGE UP
VARIANT LYMPHS # BLD: 0.9 % — SIGNIFICANT CHANGE UP (ref 0–6)
VARIANT LYMPHS # BLD: 1.7 % — SIGNIFICANT CHANGE UP (ref 0–6)
WBC # BLD: 12.7 K/UL — HIGH (ref 3.8–10.5)
WBC # BLD: 20.62 K/UL — HIGH (ref 3.8–10.5)
WBC # FLD AUTO: 12.7 K/UL — HIGH (ref 3.8–10.5)
WBC # FLD AUTO: 20.62 K/UL — HIGH (ref 3.8–10.5)
WBC UR QL: 10 /HPF — HIGH (ref 0–5)

## 2023-01-13 PROCEDURE — 74176 CT ABD & PELVIS W/O CONTRAST: CPT | Mod: 26

## 2023-01-13 PROCEDURE — 99223 1ST HOSP IP/OBS HIGH 75: CPT | Mod: GC

## 2023-01-13 RX ORDER — ACETAMINOPHEN 500 MG
650 TABLET ORAL ONCE
Refills: 0 | Status: COMPLETED | OUTPATIENT
Start: 2023-01-13 | End: 2023-01-13

## 2023-01-13 RX ORDER — PIPERACILLIN AND TAZOBACTAM 4; .5 G/20ML; G/20ML
3.38 INJECTION, POWDER, LYOPHILIZED, FOR SOLUTION INTRAVENOUS EVERY 12 HOURS
Refills: 0 | Status: DISCONTINUED | OUTPATIENT
Start: 2023-01-14 | End: 2023-01-18

## 2023-01-13 RX ORDER — PIPERACILLIN AND TAZOBACTAM 4; .5 G/20ML; G/20ML
3.38 INJECTION, POWDER, LYOPHILIZED, FOR SOLUTION INTRAVENOUS ONCE
Refills: 0 | Status: COMPLETED | OUTPATIENT
Start: 2023-01-13 | End: 2023-01-13

## 2023-01-13 RX ORDER — HEPARIN SODIUM 5000 [USP'U]/ML
5000 INJECTION INTRAVENOUS; SUBCUTANEOUS EVERY 8 HOURS
Refills: 0 | Status: DISCONTINUED | OUTPATIENT
Start: 2023-01-13 | End: 2023-01-17

## 2023-01-13 RX ORDER — SODIUM CHLORIDE 9 MG/ML
1000 INJECTION INTRAMUSCULAR; INTRAVENOUS; SUBCUTANEOUS
Refills: 0 | Status: DISCONTINUED | OUTPATIENT
Start: 2023-01-13 | End: 2023-01-13

## 2023-01-13 RX ORDER — ATORVASTATIN CALCIUM 80 MG/1
1 TABLET, FILM COATED ORAL
Qty: 0 | Refills: 0 | DISCHARGE

## 2023-01-13 RX ORDER — LIDOCAINE 4 G/100G
1 CREAM TOPICAL
Refills: 0 | Status: DISCONTINUED | OUTPATIENT
Start: 2023-01-13 | End: 2023-02-01

## 2023-01-13 RX ORDER — PANTOPRAZOLE SODIUM 20 MG/1
40 TABLET, DELAYED RELEASE ORAL
Refills: 0 | Status: DISCONTINUED | OUTPATIENT
Start: 2023-01-13 | End: 2023-02-01

## 2023-01-13 RX ORDER — SODIUM CHLORIDE 9 MG/ML
1000 INJECTION, SOLUTION INTRAVENOUS
Refills: 0 | Status: DISCONTINUED | OUTPATIENT
Start: 2023-01-13 | End: 2023-01-13

## 2023-01-13 RX ORDER — SODIUM CHLORIDE 9 MG/ML
1000 INJECTION INTRAMUSCULAR; INTRAVENOUS; SUBCUTANEOUS
Refills: 0 | Status: DISCONTINUED | OUTPATIENT
Start: 2023-01-13 | End: 2023-01-14

## 2023-01-13 RX ORDER — LANOLIN ALCOHOL/MO/W.PET/CERES
3 CREAM (GRAM) TOPICAL AT BEDTIME
Refills: 0 | Status: DISCONTINUED | OUTPATIENT
Start: 2023-01-13 | End: 2023-01-27

## 2023-01-13 RX ORDER — SODIUM CHLORIDE 9 MG/ML
500 INJECTION INTRAMUSCULAR; INTRAVENOUS; SUBCUTANEOUS
Refills: 0 | Status: COMPLETED | OUTPATIENT
Start: 2023-01-13 | End: 2023-01-13

## 2023-01-13 RX ORDER — POTASSIUM CHLORIDE 20 MEQ
40 PACKET (EA) ORAL ONCE
Refills: 0 | Status: COMPLETED | OUTPATIENT
Start: 2023-01-13 | End: 2023-01-13

## 2023-01-13 RX ADMIN — LIDOCAINE 1 APPLICATION(S): 4 CREAM TOPICAL at 20:55

## 2023-01-13 RX ADMIN — SODIUM CHLORIDE 1000 MILLILITER(S): 9 INJECTION INTRAMUSCULAR; INTRAVENOUS; SUBCUTANEOUS at 16:16

## 2023-01-13 RX ADMIN — SODIUM CHLORIDE 100 MILLILITER(S): 9 INJECTION INTRAMUSCULAR; INTRAVENOUS; SUBCUTANEOUS at 04:06

## 2023-01-13 RX ADMIN — Medication 650 MILLIGRAM(S): at 21:55

## 2023-01-13 RX ADMIN — SODIUM CHLORIDE 100 MILLILITER(S): 9 INJECTION INTRAMUSCULAR; INTRAVENOUS; SUBCUTANEOUS at 12:33

## 2023-01-13 RX ADMIN — PIPERACILLIN AND TAZOBACTAM 25 GRAM(S): 4; .5 INJECTION, POWDER, LYOPHILIZED, FOR SOLUTION INTRAVENOUS at 23:05

## 2023-01-13 RX ADMIN — Medication 40 MILLIEQUIVALENT(S): at 10:32

## 2023-01-13 RX ADMIN — SODIUM CHLORIDE 100 MILLILITER(S): 9 INJECTION INTRAMUSCULAR; INTRAVENOUS; SUBCUTANEOUS at 23:06

## 2023-01-13 RX ADMIN — PIPERACILLIN AND TAZOBACTAM 200 GRAM(S): 4; .5 INJECTION, POWDER, LYOPHILIZED, FOR SOLUTION INTRAVENOUS at 14:42

## 2023-01-13 RX ADMIN — HEPARIN SODIUM 5000 UNIT(S): 5000 INJECTION INTRAVENOUS; SUBCUTANEOUS at 23:07

## 2023-01-13 RX ADMIN — Medication 650 MILLIGRAM(S): at 20:55

## 2023-01-13 RX ADMIN — SODIUM CHLORIDE 100 MILLILITER(S): 9 INJECTION, SOLUTION INTRAVENOUS at 18:30

## 2023-01-13 RX ADMIN — Medication 3 MILLIGRAM(S): at 23:06

## 2023-01-13 NOTE — H&P ADULT - PROBLEM SELECTOR PLAN 4
- Baseline Cr ~1.0  - BUN:Cr > 20 showing prerenal. ISO poor intake  - Urine studies pending  - IVF - Baseline Cr ~1.0  - BUN:Cr > 20   - Urine studies pending  - Received 3L IVF in the ED, repeat labs after IVF

## 2023-01-13 NOTE — ED ADULT NURSE REASSESSMENT NOTE - NS ED NURSE REASSESS COMMENT FT1
[facility] Pt resting in stretcher, respirations are even and unlabored. PT to be transported

## 2023-01-13 NOTE — H&P ADULT - NSHPLABSRESULTS_GEN_ALL_CORE
LABS:                         11.0   12.70 )-----------( 339      ( 13 Jan 2023 07:30 )             32.4     01-13    131<L>  |  96<L>  |  73<H>  ----------------------------<  88  3.1<L>   |  19<L>  |  2.99<H>    Ca    8.9      13 Jan 2023 07:30  Phos  4.6     01-13  Mg     2.00     01-13    TPro  6.8  /  Alb  2.4<L>  /  TBili  1.0  /  DBili  x   /  AST  91<H>  /  ALT  50<H>  /  AlkPhos  343<H>  01-13    PT/INR - ( 12 Jan 2023 23:00 )   PT: 21.0 sec;   INR: 1.80 ratio         PTT - ( 12 Jan 2023 23:00 )  PTT:25.0 sec          RADIOLOGY, EKG & ADDITIONAL TESTS:  < from: Xray Chest 1 View AP/PA (01.12.23 @ 23:53) >    FINDINGS:  The lungs are clear.  There is no pleural effusion or pneumothorax.  The heart size is not well evaluated on this projection.  The visualized osseous structures demonstrate no acute pathology.    < end of copied text > LABS:                         11.0   12.70 )-----------( 339      ( 13 Jan 2023 07:30 )             32.4     01-13    131<L>  |  96<L>  |  73<H>  ----------------------------<  88  3.1<L>   |  19<L>  |  2.99<H>    Ca    8.9      13 Jan 2023 07:30  Phos  4.6     01-13  Mg     2.00     01-13    TPro  6.8  /  Alb  2.4<L>  /  TBili  1.0  /  DBili  x   /  AST  91<H>  /  ALT  50<H>  /  AlkPhos  343<H>  01-13    PT/INR - ( 12 Jan 2023 23:00 )   PT: 21.0 sec;   INR: 1.80 ratio         PTT - ( 12 Jan 2023 23:00 )  PTT:25.0 sec          RADIOLOGY, EKG & ADDITIONAL TESTS:  < from: Xray Chest 1 View AP/PA (01.12.23 @ 23:53) >    FINDINGS:  The lungs are clear.  There is no pleural effusion or pneumothorax.  The heart size is not well evaluated on this projection.  The visualized osseous structures demonstrate no acute pathology.    < end of copied text >    < from: CT Abdomen and Pelvis No Cont (01.13.23 @ 12:04) >    IMPRESSION:  Acute diverticulitis involving sigmoid and distal descending colon.   Thrombosis/thrombophlebitis of inferior mesenteric vein.  Multiple cystic lesions in the liver are felt to represent cysts but are   not well characterized on this exam.  Distended endometrial cavity in this postmenopausal patient for which a   pelvic ultrasound is advised on outpatient basis.    < end of copied text > LABS:                         11.0   12.70 )-----------( 339      ( 13 Jan 2023 07:30 )             32.4     01-13    131<L>  |  96<L>  |  73<H>  ----------------------------<  88  3.1<L>   |  19<L>  |  2.99<H>    Ca    8.9      13 Jan 2023 07:30  Phos  4.6     01-13  Mg     2.00     01-13    TPro  6.8  /  Alb  2.4<L>  /  TBili  1.0  /  DBili  x   /  AST  91<H>  /  ALT  50<H>  /  AlkPhos  343<H>  01-13    PT/INR - ( 12 Jan 2023 23:00 )   PT: 21.0 sec;   INR: 1.80 ratio         PTT - ( 12 Jan 2023 23:00 )  PTT:25.0 sec          RADIOLOGY, EKG & ADDITIONAL TESTS:  < from: Xray Chest 1 View AP/PA (01.12.23 @ 23:53) >    FINDINGS:  The lungs are clear.  There is no pleural effusion or pneumothorax.  The heart size is not well evaluated on this projection.  The visualized osseous structures demonstrate no acute pathology.    < end of copied text >    < from: CT Abdomen and Pelvis No Cont (01.13.23 @ 12:04) >    IMPRESSION:  Acute diverticulitis involving sigmoid and distal descending colon.   Thrombosis/thrombophlebitis of inferior mesenteric vein.  Multiple cystic lesions in the liver are felt to represent cysts but are   not well characterized on this exam.  Distended endometrial cavity in this postmenopausal patient for which a   pelvic ultrasound is advised on outpatient basis.    < end of copied text >    EKG NSR

## 2023-01-13 NOTE — H&P ADULT - PROBLEM/PLAN-12
Is This A New Presentation, Or A Follow-Up?: Discoloration
How Severe Is It?: moderate
DISPLAY PLAN FREE TEXT

## 2023-01-13 NOTE — H&P ADULT - HISTORY OF PRESENT ILLNESS
72-year-old female with history of psoriatic arthritis on otezla, retinal vein occlusion, hypertension, hyperlipidemia, MEJIA, obesity, psoriasis, presenting with generalized weakness and decreased appetite x ~ 1wk. States that she has had decreased urine output, feels dehydrated, otherwise has had some constipation but denies fevers, cough, chest/abdominal pain, n/v, SOB, dysuria.  72-year-old female with history of psoriatic arthritis, retinal vein occlusion, hypertension, hyperlipidemia, MEJIA, obesity, psoriasis, presenting with generalized weakness and decreased appetite x ~ 1wk. States that she has had decreased urine output, feels dehydrated, otherwise has had some constipation but denies fevers, cough, chest/abdominal pain, n/v, SOB, dysuria.     ED Course:  Initial vitals 98.2 HR 72 102/53 RR16 100% RA  Given 3L IVF for dehydration    On my exam in the ED patient was very anxious. Per multiple RNs pt rude, loud, demanding. She began shivering during exam, stating that this happens at times and asking for heating packs. She endorses anxiety as the triggering factor. Recent vitals were normal.    She states multiple times that she does not want to die. She will refuse CT scan because she does not feel well, saying that she wants to ride it out. Offered medication for CT scan but she would not comprehend.    Given drop on HGB, she denies dark or bloody stools. She also denies abdominal/postprandial pain. Other ROS difficult to obtain given anxiety. 72-year-old female with history of psoriatic arthritis, retinal vein occlusion, hypertension, hyperlipidemia, MEJIA, obesity, psoriasis, presenting with generalized weakness and decreased appetite x ~ 1wk. States that she has had decreased urine output, feels dehydrated, otherwise has had some constipation but denies fevers, cough, chest/abdominal pain, n/v, SOB, dysuria.     ED Course:  Initial vitals 98.2 HR 72 102/53 RR16 100% RA  Given 3L IVF for dehydration    On my exam in the ED patient was very anxious. She began shivering during exam, stating that this happens at times and asking for heating packs. She endorses anxiety as the triggering factor. Recent vitals were normal.    She states multiple times that she does not want to die.    Given drop on HGB, she denies dark or bloody stools. She also denies abdominal/postprandial pain. Other ROS difficult to obtain given anxiety.

## 2023-01-13 NOTE — H&P ADULT - NSHPPHYSICALEXAM_GEN_ALL_CORE
VITALS:   T(C): 36.6 (01-13-23 @ 05:26), Max: 36.8 (01-12-23 @ 21:28)  HR: 89 (01-13-23 @ 05:26) (70 - 89)  BP: 118/65 (01-13-23 @ 05:26) (102/53 - 118/65)  RR: 18 (01-13-23 @ 05:26) (16 - 18)  SpO2: 97% (01-13-23 @ 05:26) (97% - 100%)    GENERAL: Obese, very very anxious  HEAD:  Atraumatic, Normocephalic  EYES: EOMI, PERRLA, conjunctiva and sclera clear  ENT: Moist mucous membranes  NECK: Supple, No JVD  CHEST/LUNG: Clear to auscultation bilaterally; No wheezing or rhonchi  HEART: Tachycardic; No murmurs.  Peripheral edema: None. Legs large  ABDOMEN: BSx4; Soft, nontender, large  EXTREMITIES:  2+ radial pulses  NERVOUS SYSTEM:  A&Ox3, no gross lateralizing deficits   SKIN: Legs with livedo reticularis VITALS:   T(C): 36.6 (01-13-23 @ 05:26), Max: 36.8 (01-12-23 @ 21:28)  HR: 89 (01-13-23 @ 05:26) (70 - 89)  BP: 118/65 (01-13-23 @ 05:26) (102/53 - 118/65)  RR: 18 (01-13-23 @ 05:26) (16 - 18)  SpO2: 97% (01-13-23 @ 05:26) (97% - 100%)    GENERAL: , anxious appearing  HEAD:  Atraumatic, Normocephalic  EYES: EOMI, PERRLA, conjunctiva and sclera clear  ENT: Moist mucous membranes  NECK: Supple, No JVD  CHEST/LUNG: Clear to auscultation bilaterally; No wheezing or rhonchi  HEART: Tachycardic; No murmurs.  Peripheral edema: None. Legs large  ABDOMEN: BSx4; Soft, nontender, large  EXTREMITIES:  2+ radial pulses  NERVOUS SYSTEM:  A&Ox3, no gross lateralizing deficits   SKIN: Legs with livedo reticularis

## 2023-01-13 NOTE — H&P ADULT - PROBLEM SELECTOR PLAN 2
-Thrombosis vs thrombphlebitis of IMV  - Doppler abdomen U/S? -Thrombosis vs thrombphlebitis of IMV  - Discuss with radiology whether further imaging would better elucidate diagnosis  - Consider anticoagulation for IMV thrombosis pending further workup

## 2023-01-13 NOTE — H&P ADULT - NSHPREVIEWOFSYSTEMS_GEN_ALL_CORE
REVIEW OF SYSTEMS:  CONSTITUTIONAL: Chills, very very anxious  EYES/ENT: No visual changes;  No vertigo or throat pain   NECK: No pain or stiffness  RESPIRATORY: No cough, wheezing, hemoptysis; No shortness of breath  CARDIOVASCULAR: No chest pain or palpitations  GASTROINTESTINAL: No abdominal or epigastric pain. No nausea, vomiting, or hematemesis; No diarrhea or constipation. No melena or hematochezia.  GENITOURINARY: No dysuria, frequency or hematuria  NEUROLOGICAL: No numbness or weakness  SKIN: No itching, rashes

## 2023-01-13 NOTE — H&P ADULT - NSICDXPASTMEDICALHX_GEN_ALL_CORE_FT
PAST MEDICAL HISTORY:  H/O retinal vein occlusion     HLD (hyperlipidemia)     HTN (hypertension)     Osteoarthritis     Psoriatic arthritis

## 2023-01-13 NOTE — H&P ADULT - PROBLEM SELECTOR PLAN 3
- Baseline hgb 14 in 9/22  - New acute anemia with no obvious signs of bleeding.  - Hemodynamically stable  - CT abdomen with diverticulitis  - Pending iron studies

## 2023-01-13 NOTE — H&P ADULT - PROBLEM SELECTOR PLAN 10
- 21  - May be elevated ISO JAZMYNE  - CT with acute diverticulitis - Pt taking Otezla. Last dose 2 days ago  - Would keep rheumatoid flare on differential given elevated ESR, CRP - Pt taking Otezla. Last dose 2 days ago  - Would keep other autoimmune condition on differential given elevated ESR, CRP

## 2023-01-13 NOTE — H&P ADULT - ATTENDING COMMENTS
75F w/ psoriatic arthritis (apremilast), HTN (lisinopril/chlorthalidone) presenting w/ generalized weakness and found to have multiple metabolic derangements including JAZMYNE, hypokalemia, hyponatremia, transaminitis, AGMA and found to have severe sepsis with CT w/ acute uncomplicated diverticulitis c/b IMV thrombosis vs thrombophlebitis, hepatic cysts.    - Severe sepsis due to acute diverticulitis - discuss with radiology given mention of "free fluid" on report, but no description of complicated disease; Zosyn, f/up cultures; will require colonoscopy as outpatient after acute episode is resolved  - IMV thrombosis vs thrombophlebitis - likely sequelae of acute diverticulitis, discuss with radiology whether further imaging can better elucidate to drive decision regarding need for anticoagulation; currently no abdominal pain or other concern for bowel ischemia  - JAZMYNE - pre-renal in setting of sepsis most likely, repeat after IVF in the ED  - LFT abnormalitis - possibly in setting of sepsis, CT w/ hepatic cysts    Discussed with patient and brother at bedside  CT A/P reviewed by me with acute diverticulitis, hepatic cysts

## 2023-01-13 NOTE — H&P ADULT - PROBLEM SELECTOR PLAN 5
- Baseline BUN 21. Elevation may be due to JAZMYNE vs GI bleed  - Will continue to give fluids and re-eval

## 2023-01-13 NOTE — H&P ADULT - ASSESSMENT
72F with hypertension, MEJIA, psoriatic arthritis presenting with several days of poor PO intake found to be hyponatremic with JAZMYNE, anemia, uremia, and elevated liver enzymes. 72F with hypertension, MEJIA, psoriatic arthritis presenting with several days of poor PO intake found to be hyponatremic with JAZMYNE, anemia, uremia, and elevated liver enzymes found to have acute diverticulitis with multiple hepatic cysts.  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

## 2023-01-13 NOTE — PATIENT PROFILE ADULT - FALL HARM RISK - HARM RISK INTERVENTIONS

## 2023-01-13 NOTE — H&P ADULT - PROBLEM SELECTOR PLAN 1
- Seen on CT  - Most likely cause of metabolic abnormalities  - Will start ceftriaxone and metronidazole  - Bowel rest  - Monitor for perforation, abscess, obstruction, fistula - Seen on CT  - Most likely cause of metabolic abnormalities  - Will start zosyn  - Bowel rest  - Monitor for perforation, abscess, obstruction, fistula - Seen on CT  - Most likely cause of metabolic abnormalities + leukocytosis with left shift  - Will start zosyn  - Bowel rest  - Monitor for perforation, abscess, obstruction, fistula

## 2023-01-13 NOTE — H&P ADULT - PROBLEM SELECTOR PLAN 9
- Pt taking Otezla. Last dose 2 days ago  - Would keep rheumatoid flare on differential given elevated ESR, CRP - 21  - May be elevated ISO JAZMYNE  - CT with acute diverticulitis  - f/up cultures as above

## 2023-01-14 DIAGNOSIS — I95.9 HYPOTENSION, UNSPECIFIED: ICD-10-CM

## 2023-01-14 LAB
ALBUMIN SERPL ELPH-MCNC: 2.1 G/DL — LOW (ref 3.3–5)
ALBUMIN SERPL ELPH-MCNC: 2.6 G/DL — LOW (ref 3.3–5)
ALP SERPL-CCNC: 314 U/L — HIGH (ref 40–120)
ALP SERPL-CCNC: 363 U/L — HIGH (ref 40–120)
ALT FLD-CCNC: 33 U/L — SIGNIFICANT CHANGE UP (ref 4–33)
ALT FLD-CCNC: 38 U/L — HIGH (ref 4–33)
ANION GAP SERPL CALC-SCNC: 18 MMOL/L — HIGH (ref 7–14)
ANION GAP SERPL CALC-SCNC: 19 MMOL/L — HIGH (ref 7–14)
AST SERPL-CCNC: 50 U/L — HIGH (ref 4–32)
AST SERPL-CCNC: 50 U/L — HIGH (ref 4–32)
BASE EXCESS BLDV CALC-SCNC: -8.5 MMOL/L — LOW (ref -2–3)
BASOPHILS # BLD AUTO: 0.07 K/UL — SIGNIFICANT CHANGE UP (ref 0–0.2)
BASOPHILS NFR BLD AUTO: 0.4 % — SIGNIFICANT CHANGE UP (ref 0–2)
BILIRUB SERPL-MCNC: 1.1 MG/DL — SIGNIFICANT CHANGE UP (ref 0.2–1.2)
BILIRUB SERPL-MCNC: 1.3 MG/DL — HIGH (ref 0.2–1.2)
BLOOD GAS VENOUS COMPREHENSIVE RESULT: SIGNIFICANT CHANGE UP
BUN SERPL-MCNC: 77 MG/DL — HIGH (ref 7–23)
BUN SERPL-MCNC: 77 MG/DL — HIGH (ref 7–23)
CALCIUM SERPL-MCNC: 7.8 MG/DL — LOW (ref 8.4–10.5)
CALCIUM SERPL-MCNC: 8.6 MG/DL — SIGNIFICANT CHANGE UP (ref 8.4–10.5)
CHLORIDE BLDV-SCNC: 105 MMOL/L — SIGNIFICANT CHANGE UP (ref 96–108)
CHLORIDE SERPL-SCNC: 102 MMOL/L — SIGNIFICANT CHANGE UP (ref 98–107)
CHLORIDE SERPL-SCNC: 103 MMOL/L — SIGNIFICANT CHANGE UP (ref 98–107)
CO2 BLDV-SCNC: 18.4 MMOL/L — LOW (ref 22–26)
CO2 SERPL-SCNC: 15 MMOL/L — LOW (ref 22–31)
CO2 SERPL-SCNC: 16 MMOL/L — LOW (ref 22–31)
CREAT SERPL-MCNC: 2.98 MG/DL — HIGH (ref 0.5–1.3)
CREAT SERPL-MCNC: 3.07 MG/DL — HIGH (ref 0.5–1.3)
EGFR: 16 ML/MIN/1.73M2 — LOW
EGFR: 16 ML/MIN/1.73M2 — LOW
EOSINOPHIL # BLD AUTO: 0.46 K/UL — SIGNIFICANT CHANGE UP (ref 0–0.5)
EOSINOPHIL NFR BLD AUTO: 2.8 % — SIGNIFICANT CHANGE UP (ref 0–6)
GAS PNL BLDV: 134 MMOL/L — LOW (ref 136–145)
GAS PNL BLDV: SIGNIFICANT CHANGE UP
GLUCOSE BLDC GLUCOMTR-MCNC: 81 MG/DL — SIGNIFICANT CHANGE UP (ref 70–99)
GLUCOSE BLDV-MCNC: 91 MG/DL — SIGNIFICANT CHANGE UP (ref 70–99)
GLUCOSE SERPL-MCNC: 74 MG/DL — SIGNIFICANT CHANGE UP (ref 70–99)
GLUCOSE SERPL-MCNC: 92 MG/DL — SIGNIFICANT CHANGE UP (ref 70–99)
HCO3 BLDV-SCNC: 17 MMOL/L — LOW (ref 22–29)
HCT VFR BLD CALC: 27 % — LOW (ref 34.5–45)
HCT VFR BLDA CALC: 31 % — LOW (ref 34.5–46.5)
HCV AB S/CO SERPL IA: 0.1 S/CO — SIGNIFICANT CHANGE UP (ref 0–0.99)
HCV AB SERPL-IMP: SIGNIFICANT CHANGE UP
HGB BLD CALC-MCNC: 10.3 G/DL — LOW (ref 11.5–15.5)
HGB BLD-MCNC: 9.1 G/DL — LOW (ref 11.5–15.5)
IANC: 13.8 K/UL — HIGH (ref 1.8–7.4)
IMM GRANULOCYTES NFR BLD AUTO: 2.8 % — HIGH (ref 0–0.9)
LACTATE BLDV-MCNC: 1.3 MMOL/L — SIGNIFICANT CHANGE UP (ref 0.5–2)
LACTATE SERPL-SCNC: 1 MMOL/L — SIGNIFICANT CHANGE UP (ref 0.5–2)
LYMPHOCYTES # BLD AUTO: 0.9 K/UL — LOW (ref 1–3.3)
LYMPHOCYTES # BLD AUTO: 5.5 % — LOW (ref 13–44)
MAGNESIUM SERPL-MCNC: 1.5 MG/DL — LOW (ref 1.6–2.6)
MCHC RBC-ENTMCNC: 27.7 PG — SIGNIFICANT CHANGE UP (ref 27–34)
MCHC RBC-ENTMCNC: 33.7 GM/DL — SIGNIFICANT CHANGE UP (ref 32–36)
MCV RBC AUTO: 82.3 FL — SIGNIFICANT CHANGE UP (ref 80–100)
MONOCYTES # BLD AUTO: 0.68 K/UL — SIGNIFICANT CHANGE UP (ref 0–0.9)
MONOCYTES NFR BLD AUTO: 4.2 % — SIGNIFICANT CHANGE UP (ref 2–14)
MRSA PCR RESULT.: SIGNIFICANT CHANGE UP
NEUTROPHILS # BLD AUTO: 13.8 K/UL — HIGH (ref 1.8–7.4)
NEUTROPHILS NFR BLD AUTO: 84.3 % — HIGH (ref 43–77)
NRBC # BLD: 0 /100 WBCS — SIGNIFICANT CHANGE UP (ref 0–0)
NRBC # FLD: 0 K/UL — SIGNIFICANT CHANGE UP (ref 0–0)
PCO2 BLDV: 36 MMHG — LOW (ref 39–42)
PH BLDV: 7.29 — LOW (ref 7.32–7.43)
PHOSPHATE SERPL-MCNC: 4 MG/DL — SIGNIFICANT CHANGE UP (ref 2.5–4.5)
PLATELET # BLD AUTO: 247 K/UL — SIGNIFICANT CHANGE UP (ref 150–400)
PO2 BLDV: 39 MMHG — SIGNIFICANT CHANGE UP
POTASSIUM BLDV-SCNC: 3.8 MMOL/L — SIGNIFICANT CHANGE UP (ref 3.5–5.1)
POTASSIUM SERPL-MCNC: 3.6 MMOL/L — SIGNIFICANT CHANGE UP (ref 3.5–5.3)
POTASSIUM SERPL-MCNC: 4 MMOL/L — SIGNIFICANT CHANGE UP (ref 3.5–5.3)
POTASSIUM SERPL-SCNC: 3.6 MMOL/L — SIGNIFICANT CHANGE UP (ref 3.5–5.3)
POTASSIUM SERPL-SCNC: 4 MMOL/L — SIGNIFICANT CHANGE UP (ref 3.5–5.3)
PROT SERPL-MCNC: 5.9 G/DL — LOW (ref 6–8.3)
PROT SERPL-MCNC: 6.7 G/DL — SIGNIFICANT CHANGE UP (ref 6–8.3)
RBC # BLD: 3.28 M/UL — LOW (ref 3.8–5.2)
RBC # FLD: 14.3 % — SIGNIFICANT CHANGE UP (ref 10.3–14.5)
S AUREUS DNA NOSE QL NAA+PROBE: SIGNIFICANT CHANGE UP
SAO2 % BLDV: 66.9 % — SIGNIFICANT CHANGE UP
SODIUM SERPL-SCNC: 136 MMOL/L — SIGNIFICANT CHANGE UP (ref 135–145)
SODIUM SERPL-SCNC: 137 MMOL/L — SIGNIFICANT CHANGE UP (ref 135–145)
WBC # BLD: 16.37 K/UL — HIGH (ref 3.8–10.5)
WBC # FLD AUTO: 16.37 K/UL — HIGH (ref 3.8–10.5)

## 2023-01-14 PROCEDURE — 99233 SBSQ HOSP IP/OBS HIGH 50: CPT | Mod: GC

## 2023-01-14 PROCEDURE — 99221 1ST HOSP IP/OBS SF/LOW 40: CPT

## 2023-01-14 RX ORDER — MIDODRINE HYDROCHLORIDE 2.5 MG/1
10 TABLET ORAL EVERY 8 HOURS
Refills: 0 | Status: DISCONTINUED | OUTPATIENT
Start: 2023-01-14 | End: 2023-01-14

## 2023-01-14 RX ORDER — SODIUM CHLORIDE 9 MG/ML
1000 INJECTION INTRAMUSCULAR; INTRAVENOUS; SUBCUTANEOUS ONCE
Refills: 0 | Status: COMPLETED | OUTPATIENT
Start: 2023-01-14 | End: 2023-01-14

## 2023-01-14 RX ORDER — MAGNESIUM SULFATE 500 MG/ML
2 VIAL (ML) INJECTION ONCE
Refills: 0 | Status: COMPLETED | OUTPATIENT
Start: 2023-01-14 | End: 2023-01-14

## 2023-01-14 RX ORDER — ACETAMINOPHEN 500 MG
1000 TABLET ORAL ONCE
Refills: 0 | Status: DISCONTINUED | OUTPATIENT
Start: 2023-01-14 | End: 2023-01-20

## 2023-01-14 RX ORDER — SODIUM CHLORIDE 9 MG/ML
1000 INJECTION, SOLUTION INTRAVENOUS
Refills: 0 | Status: DISCONTINUED | OUTPATIENT
Start: 2023-01-14 | End: 2023-01-15

## 2023-01-14 RX ORDER — SODIUM CHLORIDE 9 MG/ML
1000 INJECTION, SOLUTION INTRAVENOUS
Refills: 0 | Status: DISCONTINUED | OUTPATIENT
Start: 2023-01-14 | End: 2023-01-14

## 2023-01-14 RX ORDER — POTASSIUM CHLORIDE 20 MEQ
40 PACKET (EA) ORAL ONCE
Refills: 0 | Status: COMPLETED | OUTPATIENT
Start: 2023-01-14 | End: 2023-01-14

## 2023-01-14 RX ADMIN — LIDOCAINE 1 APPLICATION(S): 4 CREAM TOPICAL at 05:07

## 2023-01-14 RX ADMIN — HEPARIN SODIUM 5000 UNIT(S): 5000 INJECTION INTRAVENOUS; SUBCUTANEOUS at 13:58

## 2023-01-14 RX ADMIN — SODIUM CHLORIDE 100 MILLILITER(S): 9 INJECTION, SOLUTION INTRAVENOUS at 05:05

## 2023-01-14 RX ADMIN — PANTOPRAZOLE SODIUM 40 MILLIGRAM(S): 20 TABLET, DELAYED RELEASE ORAL at 05:06

## 2023-01-14 RX ADMIN — SODIUM CHLORIDE 1000 MILLILITER(S): 9 INJECTION INTRAMUSCULAR; INTRAVENOUS; SUBCUTANEOUS at 06:06

## 2023-01-14 RX ADMIN — Medication 40 MILLIEQUIVALENT(S): at 11:27

## 2023-01-14 RX ADMIN — MIDODRINE HYDROCHLORIDE 10 MILLIGRAM(S): 2.5 TABLET ORAL at 07:55

## 2023-01-14 RX ADMIN — PANTOPRAZOLE SODIUM 40 MILLIGRAM(S): 20 TABLET, DELAYED RELEASE ORAL at 17:39

## 2023-01-14 RX ADMIN — LIDOCAINE 1 APPLICATION(S): 4 CREAM TOPICAL at 17:27

## 2023-01-14 RX ADMIN — PIPERACILLIN AND TAZOBACTAM 25 GRAM(S): 4; .5 INJECTION, POWDER, LYOPHILIZED, FOR SOLUTION INTRAVENOUS at 21:06

## 2023-01-14 RX ADMIN — SODIUM CHLORIDE 1000 MILLILITER(S): 9 INJECTION INTRAMUSCULAR; INTRAVENOUS; SUBCUTANEOUS at 03:16

## 2023-01-14 RX ADMIN — HEPARIN SODIUM 5000 UNIT(S): 5000 INJECTION INTRAVENOUS; SUBCUTANEOUS at 05:07

## 2023-01-14 RX ADMIN — PIPERACILLIN AND TAZOBACTAM 25 GRAM(S): 4; .5 INJECTION, POWDER, LYOPHILIZED, FOR SOLUTION INTRAVENOUS at 08:53

## 2023-01-14 RX ADMIN — SODIUM CHLORIDE 75 MILLILITER(S): 9 INJECTION, SOLUTION INTRAVENOUS at 14:23

## 2023-01-14 RX ADMIN — HEPARIN SODIUM 5000 UNIT(S): 5000 INJECTION INTRAVENOUS; SUBCUTANEOUS at 21:07

## 2023-01-14 RX ADMIN — Medication 25 GRAM(S): at 11:28

## 2023-01-14 NOTE — PROGRESS NOTE ADULT - PROBLEM SELECTOR PLAN 2
-Thrombosis vs thrombphlebitis of IMV  - Discuss with radiology whether further imaging would better elucidate diagnosis  - Consider anticoagulation for IMV thrombosis pending further workup - Seen on CT  - Most likely cause of metabolic abnormalities + leukocytosis with left shift  - Zosyn 1/13-  - NPO as per surgery. They do not think her hypotension is a result of this.  - IVF while NPO  - Monitor for perforation, abscess, obstruction, fistula - Seen on CT  - Most likely cause of metabolic abnormalities + leukocytosis with left shift  - Zosyn 1/13-  - Surgery consulted  - NPO as per surgery. They do not think her hypotension is a result of this.  - IVF while NPO  - Monitor for perforation, abscess, obstruction, fistula

## 2023-01-14 NOTE — CONSULT NOTE ADULT - SUBJECTIVE AND OBJECTIVE BOX
General Surgery Consult  Consulting surgical team:  Consulting attending:    HPI:  72F w/ PMH sign for  HTN, HLD, MEJIA, obesity, psoriasis, presenting with generalized weakness and decreased appetite x ~ 1wk. States that she has had decreased urine output, feels dehydrated, otherwise has had some constipation but denies fevers, cough, chest/abdominal pain, n/v, SOB, dysuria.     ED Course:  Initial vitals 98.2 HR 72 102/53 RR16 100% RA  Given 3L IVF for dehydration      PAST MEDICAL HISTORY:  HTN (hypertension)    HLD (hyperlipidemia)    Psoriatic arthritis    H/O retinal vein occlusion    Osteoarthritis        PAST SURGICAL HISTORY:  No significant past surgical history        MEDICATIONS:  acetaminophen   IVPB .. 1000 milliGRAM(s) IV Intermittent once  heparin   Injectable 5000 Unit(s) SubCutaneous every 8 hours  lidocaine 2% Gel 1 Application(s) Topical two times a day  magnesium sulfate  IVPB 2 Gram(s) IV Intermittent once  melatonin 3 milliGRAM(s) Oral at bedtime PRN  pantoprazole    Tablet 40 milliGRAM(s) Oral two times a day  piperacillin/tazobactam IVPB.. 3.375 Gram(s) IV Intermittent every 12 hours  potassium chloride    Tablet ER 40 milliEquivalent(s) Oral once      ALLERGIES:  No Known Allergies      VITALS & I/Os:  Vital Signs Last 24 Hrs  T(C): 36.6 (2023 05:32), Max: 36.7 (2023 17:47)  T(F): 97.9 (2023 05:32), Max: 98.1 (2023 17:47)  HR: 90 (2023 07:50) (80 - 100)  BP: 113/50 (2023 07:50) (78/38 - 125/92)  BP(mean): --  RR: 18 (2023 07:50) (17 - 20)  SpO2: 98% (2023 07:50) (95% - 98%)    Parameters below as of 2023 07:50  Patient On (Oxygen Delivery Method): room air        I&O's Summary      PHYSICAL EXAM:  General: No acute distress  Respiratory: Nonlabored  Cardiovascular: RRR  Abdominal: Soft, morbidly obese, nondistended, nontender. No rebound or guarding. No organomegaly, no palpable mass.  Extremities: Warm    LABS:                        9.1    16.37 )-----------( 247      ( 2023 07:11 )             27.0         136  |  103  |  77<H>  ----------------------------<  74  3.6   |  15<L>  |  3.07<H>    Ca    7.8<L>      2023 07:11  Phos  4.0       Mg     1.50         TPro  5.9<L>  /  Alb  2.1<L>  /  TBili  1.1  /  DBili  x   /  AST  50<H>  /  ALT  33  /  AlkPhos  314<H>      Lactate: Lactate, Blood: 1.0 mmol/L ( @ 07:11)     PT/INR - ( 2023 23:00 )   PT: 21.0 sec;   INR: 1.80 ratio         PTT - ( 2023 23:00 )  PTT:25.0 sec    CARDIAC MARKERS ( 2023 07:30 )  x     / x     / 400 U/L / x     / x      CARDIAC MARKERS ( 2023 23:00 )  x     / x     / 453 U/L / x     / x            Urinalysis Basic - ( 2023 19:30 )    Color: Yellow / Appearance: Slightly Turbid / S.015 / pH: x  Gluc: x / Ketone: Negative  / Bili: Negative / Urobili: <2 mg/dL   Blood: x / Protein: 30 mg/dL / Nitrite: Negative   Leuk Esterase: Small / RBC: 7 /HPF / WBC 10 /HPF   Sq Epi: x / Non Sq Epi: 7 /HPF / Bacteria: Many      < from: CT Abdomen and Pelvis No Cont (23 @ 12:04) >  PROCEDURE:  CT of the Abdomen and Pelvis was performed.  Sagittal and coronal reformats were performed.    FINDINGS: Suboptimal evaluation due to persistent motion artifacts and   streak artifacts from patient's arms.  LOWER CHEST: Within normal limits.    LIVER: Multiple cystic lesions, largest at the hepatic dome measuring 5.6   x 7.5 cm.  BILE DUCTS: Normal caliber.  GALLBLADDER: Within normal limits.  SPLEEN: Within normal limits.  PANCREAS: Within normal limits.  ADRENALS: Left adrenal nodularity and thickening  KIDNEYS/URETERS: A simple left renal cyst. No hydronephrosis.    BLADDER: Decompressed around an indwelling catheter.  REPRODUCTIVE ORGANS: Distended endometrial cavity measuring up to 11 mm.   No adnexal mass.    BOWEL: No bowel obstruction. Appendix is normal. Colonic diverticulosis   with mural thickening, adjacent fatty stranding and free fluid associated   with sigmoid and distal descending colon.  PERITONEUM: No ascites.  VESSELS: Atherosclerotic changes. Focal enlargement and stranding   associated with inferior mesenteric vein.  RETROPERITONEUM/LYMPH NODES: No lymphadenopathy.  ABDOMINAL WALL: Within normal limits.  BONES: Degenerative changes.    IMPRESSION:  Acute diverticulitis involving sigmoid and distal descending colon.   Thrombosis/thrombophlebitis of inferior mesenteric vein.  Multiple cystic lesions in the liver are felt to represent cysts but are   not well characterized on this exam.  Distended endometrial cavity in this postmenopausal patient for which a   pelvic ultrasound is advised on outpatient basis.    < end of copied text >                                                                                                 General Surgery Consult  Consulting surgical team:  Consulting attending:    HPI:  72F w/ PMH sign for  HTN, HLD, MEJIA, obesity, psoriasis, presenting with generalized weakness and decreased appetite x ~ 1wk. States that she has had decreased urine output, feels dehydrated, otherwise has had some constipation but denies fevers, cough, chest/abdominal pain, n/v, SOB, dysuria. Pt never had colonoscopy.     ED Course:  Initial vitals 98.2 HR 72 102/53 RR16 100% RA  Given 3L IVF for dehydration      PAST MEDICAL HISTORY:  HTN (hypertension)    HLD (hyperlipidemia)    Psoriatic arthritis    H/O retinal vein occlusion    Osteoarthritis        PAST SURGICAL HISTORY:  No significant past surgical history        MEDICATIONS:  acetaminophen   IVPB .. 1000 milliGRAM(s) IV Intermittent once  heparin   Injectable 5000 Unit(s) SubCutaneous every 8 hours  lidocaine 2% Gel 1 Application(s) Topical two times a day  magnesium sulfate  IVPB 2 Gram(s) IV Intermittent once  melatonin 3 milliGRAM(s) Oral at bedtime PRN  pantoprazole    Tablet 40 milliGRAM(s) Oral two times a day  piperacillin/tazobactam IVPB.. 3.375 Gram(s) IV Intermittent every 12 hours  potassium chloride    Tablet ER 40 milliEquivalent(s) Oral once      ALLERGIES:  No Known Allergies      VITALS & I/Os:  Vital Signs Last 24 Hrs  T(C): 36.6 (2023 05:32), Max: 36.7 (2023 17:47)  T(F): 97.9 (2023 05:32), Max: 98.1 (2023 17:47)  HR: 90 (2023 07:50) (80 - 100)  BP: 113/50 (2023 07:50) (78/38 - 125/92)  BP(mean): --  RR: 18 (2023 07:50) (17 - 20)  SpO2: 98% (2023 07:50) (95% - 98%)    Parameters below as of 2023 07:50  Patient On (Oxygen Delivery Method): room air        I&O's Summary      PHYSICAL EXAM:  General: No acute distress  Respiratory: Nonlabored  Cardiovascular: RRR  Abdominal: Soft, morbidly obese, nondistended, nontender. No rebound or guarding. No organomegaly, no palpable mass.  Extremities: Warm    LABS:                        9.1    16.37 )-----------( 247      ( 2023 07:11 )             27.0         136  |  103  |  77<H>  ----------------------------<  74  3.6   |  15<L>  |  3.07<H>    Ca    7.8<L>      2023 07:11  Phos  4.0       Mg     1.50         TPro  5.9<L>  /  Alb  2.1<L>  /  TBili  1.1  /  DBili  x   /  AST  50<H>  /  ALT  33  /  AlkPhos  314<H>      Lactate: Lactate, Blood: 1.0 mmol/L ( @ 07:11)     PT/INR - ( 2023 23:00 )   PT: 21.0 sec;   INR: 1.80 ratio         PTT - ( 2023 23:00 )  PTT:25.0 sec    CARDIAC MARKERS ( 2023 07:30 )  x     / x     / 400 U/L / x     / x      CARDIAC MARKERS ( 2023 23:00 )  x     / x     / 453 U/L / x     / x            Urinalysis Basic - ( 2023 19:30 )    Color: Yellow / Appearance: Slightly Turbid / S.015 / pH: x  Gluc: x / Ketone: Negative  / Bili: Negative / Urobili: <2 mg/dL   Blood: x / Protein: 30 mg/dL / Nitrite: Negative   Leuk Esterase: Small / RBC: 7 /HPF / WBC 10 /HPF   Sq Epi: x / Non Sq Epi: 7 /HPF / Bacteria: Many      < from: CT Abdomen and Pelvis No Cont (23 @ 12:04) >  PROCEDURE:  CT of the Abdomen and Pelvis was performed.  Sagittal and coronal reformats were performed.    FINDINGS: Suboptimal evaluation due to persistent motion artifacts and   streak artifacts from patient's arms.  LOWER CHEST: Within normal limits.    LIVER: Multiple cystic lesions, largest at the hepatic dome measuring 5.6   x 7.5 cm.  BILE DUCTS: Normal caliber.  GALLBLADDER: Within normal limits.  SPLEEN: Within normal limits.  PANCREAS: Within normal limits.  ADRENALS: Left adrenal nodularity and thickening  KIDNEYS/URETERS: A simple left renal cyst. No hydronephrosis.    BLADDER: Decompressed around an indwelling catheter.  REPRODUCTIVE ORGANS: Distended endometrial cavity measuring up to 11 mm.   No adnexal mass.    BOWEL: No bowel obstruction. Appendix is normal. Colonic diverticulosis   with mural thickening, adjacent fatty stranding and free fluid associated   with sigmoid and distal descending colon.  PERITONEUM: No ascites.  VESSELS: Atherosclerotic changes. Focal enlargement and stranding   associated with inferior mesenteric vein.  RETROPERITONEUM/LYMPH NODES: No lymphadenopathy.  ABDOMINAL WALL: Within normal limits.  BONES: Degenerative changes.    IMPRESSION:  Acute diverticulitis involving sigmoid and distal descending colon.   Thrombosis/thrombophlebitis of inferior mesenteric vein.  Multiple cystic lesions in the liver are felt to represent cysts but are   not well characterized on this exam.  Distended endometrial cavity in this postmenopausal patient for which a   pelvic ultrasound is advised on outpatient basis.    < end of copied text >

## 2023-01-14 NOTE — PROGRESS NOTE ADULT - PROBLEM SELECTOR PLAN 10
- Pt taking Otezla. Last dose 2 days ago  - Would keep other autoimmune condition on differential given elevated ESR, CRP - 21  - May be elevated ISO JAZMYNE  - CT with acute diverticulitis  - f/up cultures as above

## 2023-01-14 NOTE — PROGRESS NOTE ADULT - PROBLEM SELECTOR PLAN 9
- 21  - May be elevated ISO JAZMYNE  - CT with acute diverticulitis  - f/up cultures as above - Resolving. Likely an ischemic component  - GGT elevated  - CT abdomen with multiple hepatic cysts  - F/u blood cultures

## 2023-01-14 NOTE — CONSULT NOTE ADULT - SUBJECTIVE AND OBJECTIVE BOX
CHIEF COMPLAINT:    HPI:  72F with hypertension, MEJIA, psoriatic arthritis presenting with several days of poor PO intake found to be hyponatremic with JAZMYNE, anemia, uremia, and elevated liver enzymes found to have acute diverticulitis with inferior mesenteric vein thrombosis vs thrombophlebitis and multiple hepatic cysts.     MICU consulted for hypotension. Patient is s/p ~4L of fluid in the past 2 days. Upon my arrival patient is AOx4, comfortable in bed, denies abdominal pain and diarrhea, ROS+ weakness    PAST MEDICAL & SURGICAL HISTORY:  HTN (hypertension)      HLD (hyperlipidemia)      Psoriatic arthritis      H/O retinal vein occlusion      Osteoarthritis      No significant past surgical history          FAMILY HISTORY:      SOCIAL HISTORY:  20 pack year smoking history quit in     Allergies    No Known Allergies    Intolerances        HOME MEDICATIONS:    REVIEW OF SYSTEMS:  Constitutional: +Weakness  Eyes:  ENT:  CV:  Resp:  GI:  :  MSK:  Integumentary:  Neurological:  Psychiatric:  Endocrine:  Hematologic/Lymphatic:  Allergic/Immunologic:  [X ] All other systems negative  [ ] Unable to assess ROS because ________    OBJECTIVE:  ICU Vital Signs Last 24 Hrs  T(C): 36.6 (2023 05:32), Max: 36.7 (2023 17:47)  T(F): 97.9 (2023 05:32), Max: 98.1 (2023 17:47)  HR: 90 (2023 07:50) (80 - 100)  BP: 113/50 (2023 07:50) (78/38 - 125/92)  BP(mean): --  ABP: --  ABP(mean): --  RR: 18 (2023 07:50) (17 - 20)  SpO2: 98% (2023 07:50) (95% - 98%)    O2 Parameters below as of 2023 07:50  Patient On (Oxygen Delivery Method): room air              CAPILLARY BLOOD GLUCOSE      POCT Blood Glucose.: 81 mg/dL (2023 06:13)      PHYSICAL EXAM:      GENERAL: Laying comfortably, NAD  EYES: EOMI, PERRL, no scleral icterus  NECK: No JVD  LUNG: Clear to auscultation bilaterally; No wheeze, crackles or rhonci  HEART: Regular rate and rhythm; No murmurs, rubs, or gallops  ABDOMEN: Soft, Nontender, Nondistended  EXTREMITIES:  No LE edema, Peripheral Pulses intact, No clubbing, cyanosis, or edema  PSYCH: AAOx3  NEUROLOGY: non-focal, strength 5/5 in all extremities, sensation intact  SKIN: No rashes or lesions      HOSPITAL MEDICATIONS:  MEDICATIONS  (STANDING):  acetaminophen   IVPB .. 1000 milliGRAM(s) IV Intermittent once  dextrose 5% + lactated ringers. 1000 milliLiter(s) (75 mL/Hr) IV Continuous <Continuous>  heparin   Injectable 5000 Unit(s) SubCutaneous every 8 hours  lidocaine 2% Gel 1 Application(s) Topical two times a day  pantoprazole    Tablet 40 milliGRAM(s) Oral two times a day  piperacillin/tazobactam IVPB.. 3.375 Gram(s) IV Intermittent every 12 hours    MEDICATIONS  (PRN):  melatonin 3 milliGRAM(s) Oral at bedtime PRN Sleep      LABS:                        9.1    16.37 )-----------( 247      ( 2023 07:11 )             27.0         136  |  103  |  77<H>  ----------------------------<  74  3.6   |  15<L>  |  3.07<H>    Ca    7.8<L>      2023 07:11  Phos  4.0       Mg     1.50         TPro  5.9<L>  /  Alb  2.1<L>  /  TBili  1.1  /  DBili  x   /  AST  50<H>  /  ALT  33  /  AlkPhos  314<H>      PT/INR - ( 2023 23:00 )   PT: 21.0 sec;   INR: 1.80 ratio         PTT - ( 2023 23:00 )  PTT:25.0 sec  Urinalysis Basic - ( 2023 19:30 )    Color: Yellow / Appearance: Slightly Turbid / S.015 / pH: x  Gluc: x / Ketone: Negative  / Bili: Negative / Urobili: <2 mg/dL   Blood: x / Protein: 30 mg/dL / Nitrite: Negative   Leuk Esterase: Small / RBC: 7 /HPF / WBC 10 /HPF   Sq Epi: x / Non Sq Epi: 7 /HPF / Bacteria: Many        Venous Blood Gas:   @ 23:00  7.34/41/48/22/76.5  VBG Lactate: 2.1      MICROBIOLOGY:     RADIOLOGY:  [ X] Reviewed and interpreted by me

## 2023-01-14 NOTE — PROGRESS NOTE ADULT - PROBLEM SELECTOR PLAN 8
- Unclear etiology  - GGT elevated  - CT abdomen with multiple hepatic cysts  - F/u blood cultures - GGT elevated  - CT abdomen with multiple hepatic cysts.  - F/u blood cultures

## 2023-01-14 NOTE — PROGRESS NOTE ADULT - PROBLEM SELECTOR PLAN 1
- Seen on CT  - Most likely cause of metabolic abnormalities + leukocytosis with left shift  - Will start zosyn  - Bowel rest  - Monitor for perforation, abscess, obstruction, fistula - Most likely due to sepsis though cultures have not returned yet  - Pt asymptomatic  - Will hold off on midodrine as no evidence for use in this scenario  - MICU aware  - Continue maintenance  - AM cortisol. Denies recent steroid use  - Continue abx - Most likely due to sepsis due to diverticulitis,   - BG NGTD   - Pt asymptomatic, other vital signs wnl  - Will hold off on midodrine as no evidence for use in this scenario  - MICU aware  - Continue maintenance IVF  - AM cortisol. Denies recent steroid use  - Continue abx

## 2023-01-14 NOTE — PROGRESS NOTE ADULT - PROBLEM SELECTOR PLAN 5
- Baseline BUN 21. Elevation may be due to JAZMYNE vs GI bleed  - Will continue to give fluids and re-eval - Baseline Cr ~1.0  - BUN:Cr > 20 though it is more likely the patient is in ATN given that it has not improved after 5L fluids  - Consider nephrology if HCO3 decreased more/electrolyte abnormalities/worsening uremia

## 2023-01-14 NOTE — PROGRESS NOTE ADULT - PROBLEM SELECTOR PLAN 3
- Baseline hgb 14 in 9/22  - New acute anemia with no obvious signs of bleeding.  - Hemodynamically stable  - CT abdomen with diverticulitis  - Pending iron studies - Thrombosis vs thrombphlebitis of IMV  - Discuss with radiology whether further imaging would better elucidate diagnosis  - Consider anticoagulation for IMV thrombosis pending further workup  - DVT proph for now - Thrombosis vs thrombphlebitis of IMV  - Radiology recommends CT venogram but patient cant get contrast for now  - Consider anticoagulation for IMV thrombosis pending further workup  - DVT proph for now - Thrombosis vs thrombphlebitis of IMV  - Radiology recommends CT venogram but patient cant get contrast for now  - Consider anticoagulation for IMV thrombosis pending further workup  - Consider vascular surgery consult for further guidance  - DVT proph for now

## 2023-01-14 NOTE — PROGRESS NOTE ADULT - PROBLEM SELECTOR PLAN 6
- Mild hyponatremia. Level of 124 likely error  - Calculated serum osm ~290  - Urine lytes pending - Baseline BUN 21. Elevation may be due to JAZMYNE vs GI bleed  - Will continue to give fluids and re-eval

## 2023-01-14 NOTE — PROGRESS NOTE ADULT - PROBLEM SELECTOR PLAN 11
- will hold home meds for now - Pt taking Otezla. Last dose 2 days ago  - Would keep other autoimmune condition on differential given elevated ESR, CRP

## 2023-01-14 NOTE — CONSULT NOTE ADULT - TIME BILLING
Reviewing the EMR, vitals, imaging, medication list, recent labs, prior records and coordinating care with medical providers
no

## 2023-01-14 NOTE — PROGRESS NOTE ADULT - SUBJECTIVE AND OBJECTIVE BOX
PROGRESS NOTE:   Authored by: Bj Park M.D.   Internal Medicine PGY-1  Please Contact Via Teams    Patient is a 72y old  Female who presents with a chief complaint of     SUBJECTIVE / OVERNIGHT EVENTS:  Hypotensive x2 over night. Shivering/rigors. Got 1000mL bolus x2.    Brief Daily Plan:  - F/U blood cultures  - Potential MICU consult    MEDICATIONS  (STANDING):  dextrose 5% + sodium chloride 0.9%. 1000 milliLiter(s) (100 mL/Hr) IV Continuous <Continuous>  heparin   Injectable 5000 Unit(s) SubCutaneous every 8 hours  lidocaine 2% Gel 1 Application(s) Topical two times a day  midodrine 10 milliGRAM(s) Oral every 8 hours  pantoprazole    Tablet 40 milliGRAM(s) Oral two times a day  piperacillin/tazobactam IVPB.. 3.375 Gram(s) IV Intermittent every 12 hours  sodium chloride 0.9%. 1000 milliLiter(s) (100 mL/Hr) IV Continuous <Continuous>    MEDICATIONS  (PRN):  melatonin 3 milliGRAM(s) Oral at bedtime PRN Sleep      CAPILLARY BLOOD GLUCOSE      POCT Blood Glucose.: 81 mg/dL (2023 06:13)    I&O's Summary      PHYSICAL EXAM:  Vital Signs Last 24 Hrs  T(C): 36.6 (2023 05:32), Max: 36.7 (2023 17:47)  T(F): 97.9 (2023 05:32), Max: 98.1 (2023 17:47)  HR: 100 (2023 05:32) (80 - 100)  BP: 78/38 (2023 05:58) (78/38 - 125/92)  BP(mean): --  RR: 18 (2023 05:32) (17 - 20)  SpO2: 97% (2023 05:32) (95% - 98%)    Parameters below as of 2023 05:32  Patient On (Oxygen Delivery Method): room air        GENERAL: Obese, anxious appearing  HEAD:  Atraumatic, Normocephalic  EYES: EOMI, PERRLA, conjunctiva and sclera clear  ENT: Moist mucous membranes  NECK: Supple, No JVD  CHEST/LUNG: Clear to auscultation bilaterally; No wheezing or rhonchi  HEART: Tachycardic; No murmurs.  Peripheral edema: None. Legs large  ABDOMEN: BSx4; Soft, nontender, large  EXTREMITIES:  2+ radial pulses  NERVOUS SYSTEM:  A&Ox3, no gross lateralizing deficits   SKIN: Legs with livedo reticularis    LABS:                        10.6   20.62 )-----------( 312      ( 2023 16:46 )             32.0         133<L>  |  98  |  78<H>  ----------------------------<  101<H>  3.8   |  18<L>  |  3.20<H>    Ca    8.2<L>      2023 16:46  Phos  4.6       Mg     2.00         TPro  6.5  /  Alb  2.5<L>  /  TBili  1.0  /  DBili  x   /  AST  73<H>  /  ALT  43<H>  /  AlkPhos  366<H>      PT/INR - ( 2023 23:00 )   PT: 21.0 sec;   INR: 1.80 ratio         PTT - ( 2023 23:00 )  PTT:25.0 sec  CARDIAC MARKERS ( 2023 07:30 )  x     / x     / 400 U/L / x     / x      CARDIAC MARKERS ( 2023 23:00 )  x     / x     / 453 U/L / x     / x          Urinalysis Basic - ( 2023 19:30 )    Color: Yellow / Appearance: Slightly Turbid / S.015 / pH: x  Gluc: x / Ketone: Negative  / Bili: Negative / Urobili: <2 mg/dL   Blood: x / Protein: 30 mg/dL / Nitrite: Negative   Leuk Esterase: Small / RBC: 7 /HPF / WBC 10 /HPF   Sq Epi: x / Non Sq Epi: 7 /HPF / Bacteria: Many       PROGRESS NOTE:   Authored by: Bj Park M.D.   Internal Medicine PGY-1  Please Contact Via Teams    Patient is a 72y old  Female who presents with a chief complaint of     SUBJECTIVE / OVERNIGHT EVENTS:  Hypotensive x2 over night. Shivering/rigors. Got 1000mL bolus x2. This am is completely asymptomatic. Denies headache, change in vision, orthostasis, dizziness.    Brief Daily Plan:  - F/U blood cultures  - MICU consult  - Surgery consult    MEDICATIONS  (STANDING):  dextrose 5% + sodium chloride 0.9%. 1000 milliLiter(s) (100 mL/Hr) IV Continuous <Continuous>  heparin   Injectable 5000 Unit(s) SubCutaneous every 8 hours  lidocaine 2% Gel 1 Application(s) Topical two times a day  midodrine 10 milliGRAM(s) Oral every 8 hours  pantoprazole    Tablet 40 milliGRAM(s) Oral two times a day  piperacillin/tazobactam IVPB.. 3.375 Gram(s) IV Intermittent every 12 hours  sodium chloride 0.9%. 1000 milliLiter(s) (100 mL/Hr) IV Continuous <Continuous>    MEDICATIONS  (PRN):  melatonin 3 milliGRAM(s) Oral at bedtime PRN Sleep      CAPILLARY BLOOD GLUCOSE      POCT Blood Glucose.: 81 mg/dL (2023 06:13)    I&O's Summary      PHYSICAL EXAM:  Vital Signs Last 24 Hrs  T(C): 36.6 (2023 05:32), Max: 36.7 (2023 17:47)  T(F): 97.9 (2023 05:32), Max: 98.1 (2023 17:47)  HR: 100 (2023 05:32) (80 - 100)  BP: 78/38 (2023 05:58) (78/38 - 125/92)  BP(mean): --  RR: 18 (2023 05:32) (17 - 20)  SpO2: 97% (2023 05:32) (95% - 98%)    Parameters below as of 2023 05:32  Patient On (Oxygen Delivery Method): room air        GENERAL: Obese, anxious appearing  HEAD:  Atraumatic, Normocephalic  EYES: EOMI, PERRLA, conjunctiva and sclera clear  ENT: Moist mucous membranes  NECK: Supple, No JVD  CHEST/LUNG: Clear to auscultation bilaterally; No wheezing or rhonchi  HEART: Tachycardic; No murmurs.  Peripheral edema: None. Legs large  ABDOMEN: BSx4; Soft, nontender, large  EXTREMITIES:  2+ radial pulses  NERVOUS SYSTEM:  A&Ox3, no gross lateralizing deficits   SKIN: Legs with livedo reticularis    LABS:                        10.6   20.62 )-----------( 312      ( 2023 16:46 )             32.0         133<L>  |  98  |  78<H>  ----------------------------<  101<H>  3.8   |  18<L>  |  3.20<H>    Ca    8.2<L>      2023 16:46  Phos  4.6       Mg     2.00         TPro  6.5  /  Alb  2.5<L>  /  TBili  1.0  /  DBili  x   /  AST  73<H>  /  ALT  43<H>  /  AlkPhos  366<H>      PT/INR - ( 2023 23:00 )   PT: 21.0 sec;   INR: 1.80 ratio         PTT - ( 2023 23:00 )  PTT:25.0 sec  CARDIAC MARKERS ( 2023 07:30 )  x     / x     / 400 U/L / x     / x      CARDIAC MARKERS ( 2023 23:00 )  x     / x     / 453 U/L / x     / x          Urinalysis Basic - ( 2023 19:30 )    Color: Yellow / Appearance: Slightly Turbid / S.015 / pH: x  Gluc: x / Ketone: Negative  / Bili: Negative / Urobili: <2 mg/dL   Blood: x / Protein: 30 mg/dL / Nitrite: Negative   Leuk Esterase: Small / RBC: 7 /HPF / WBC 10 /HPF   Sq Epi: x / Non Sq Epi: 7 /HPF / Bacteria: Many

## 2023-01-14 NOTE — CONSULT NOTE ADULT - ASSESSMENT
72F w/ PMH sign for  HTN, HLD, MEJIA, obesity, psoriasis, presenting with generalized weakness and decreased appetite foudn to have acute diverticulitis.     - No urgent surgical intervention  - Recommend NPO and IVF   - C/w Zosyn   - Surgical team will follow closely     A team [id number] 72F w/ PMH sign for  HTN, HLD, MEJIA, obesity, psoriasis, presenting with generalized weakness and decreased appetite foudn to have acute diverticulitis.     - No urgent surgical intervention  - Recommend NPO   - Strongly recommend IVF   - Strict intake and out   - C/w Zosyn   - Rest of care per primary   - Colonoscopy as outpatient   - Surgical team will follow closely     A team [id number] 72F w/ PMH sign for  HTN, HLD, MEJIA, obesity, psoriasis, presenting with generalized weakness and decreased appetite found to have acute diverticulitis.     - No urgent surgical intervention  - Recommend NPO   - Strongly recommend IVF   - Strict intake and out   - C/w Zosyn   - Recommend repeat CT IV con study to better evaluate potential IMV thrombosis/thrombophlebitis seen on non-con CT  - Rest of care per primary   - Colonoscopy as outpatient   - Surgical team will follow closely     A team [id number]

## 2023-01-14 NOTE — CONSULT NOTE ADULT - ASSESSMENT
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     #Hypotension  Likely 2/2 sepsis i/s/o acute diverticulitis  - patient is in no distress, AOx4, mentating  - BP improved after fluid ressusitaiton - most recept 130s/80s  - recommend continuing fluid ressusitaiton   - agree with trial of midodrine   - recommend vascular surgery consult for mesenteric vein thrombosis  - patient is not a MICU candidate at this time, please reconsult as needed    Discussed case w/ attending Dr. Tabor

## 2023-01-14 NOTE — PROGRESS NOTE ADULT - PROBLEM SELECTOR PLAN 4
- Baseline Cr ~1.0  - BUN:Cr > 20   - Urine studies pending  - Received 3L IVF in the ED, repeat labs after IVF - Baseline hgb 14 in 9/22  - New acute anemia with no obvious signs of bleeding.  - Hemodynamically stable  - CT abdomen with diverticulitis  - Pending iron studies

## 2023-01-14 NOTE — PROGRESS NOTE ADULT - ASSESSMENT
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

## 2023-01-14 NOTE — PROGRESS NOTE ADULT - ATTENDING COMMENTS
75F w/ psoriatic arthritis (apremilast), HTN (lisinopril/chlorthalidone) presenting w/ generalized weakness and found to have multiple metabolic derangements including JAZMYNE, hypokalemia, hyponatremia, transaminitis, AGMA and found to have severe sepsis with CT w/ acute uncomplicated diverticulitis c/b IMV thrombosis vs thrombophlebitis, today course notable for episodes of hypotension responsive to IV fluid.    - Acute severe hypotension - etiology not clear possibly sepsis and/or severe volume depletion as seems to respond to IV fluid; other VS normal and asymptomatic during hypotensive episodes; MICU consulted this morning  - Severe sepsis due to acute diverticulitis - s/p 5L IV fluid, surgery consult today, NPO per surgery, mIVF  - IMV thrombosis vs thrombophlebitis - likely sequelae of acute diverticulitis, currently no abdominal pain or other concern for bowel ischemia; surgery consulted regarding consideration of anticoagulation  - JAZMYNE - Cr remains stably elevated after fluid resuscitation, most likely ATN due to hypotension prior to admission  - LFT abnormalities - possibly in setting of sepsis, resolving after IV fluid CT w/ hepatic cysts

## 2023-01-14 NOTE — PROGRESS NOTE ADULT - PROBLEM SELECTOR PLAN 12
Diet: Clear liquid diet, advance pending review of CT with radiology  DVT Proph: Lovenox  Dispo: Likely home Diet: NPO  DVT Proph: Heparin subq  Dispo: Likely home. Diet: NPO except medications for diverticulitis management per surgery  DVT Proph: Heparin subq  Dispo: Likely home.

## 2023-01-15 LAB
ALBUMIN SERPL ELPH-MCNC: 2.2 G/DL — LOW (ref 3.3–5)
ALP SERPL-CCNC: 407 U/L — HIGH (ref 40–120)
ALT FLD-CCNC: 31 U/L — SIGNIFICANT CHANGE UP (ref 4–33)
ANION GAP SERPL CALC-SCNC: 14 MMOL/L — SIGNIFICANT CHANGE UP (ref 7–14)
AST SERPL-CCNC: 35 U/L — HIGH (ref 4–32)
BASE EXCESS BLDV CALC-SCNC: -8 MMOL/L — LOW (ref -2–3)
BASOPHILS # BLD AUTO: 0.05 K/UL — SIGNIFICANT CHANGE UP (ref 0–0.2)
BASOPHILS NFR BLD AUTO: 0.3 % — SIGNIFICANT CHANGE UP (ref 0–2)
BILIRUB SERPL-MCNC: 1.2 MG/DL — SIGNIFICANT CHANGE UP (ref 0.2–1.2)
BLOOD GAS VENOUS COMPREHENSIVE RESULT: SIGNIFICANT CHANGE UP
BUN SERPL-MCNC: 76 MG/DL — HIGH (ref 7–23)
CALCIUM SERPL-MCNC: 8.9 MG/DL — SIGNIFICANT CHANGE UP (ref 8.4–10.5)
CHLORIDE BLDV-SCNC: 105 MMOL/L — SIGNIFICANT CHANGE UP (ref 96–108)
CHLORIDE SERPL-SCNC: 101 MMOL/L — SIGNIFICANT CHANGE UP (ref 98–107)
CO2 BLDV-SCNC: 19.6 MMOL/L — LOW (ref 22–26)
CO2 SERPL-SCNC: 17 MMOL/L — LOW (ref 22–31)
CORTIS AM PEAK SERPL-MCNC: 12.1 UG/DL — SIGNIFICANT CHANGE UP (ref 6–18.4)
CREAT SERPL-MCNC: 2.92 MG/DL — HIGH (ref 0.5–1.3)
EGFR: 17 ML/MIN/1.73M2 — LOW
EOSINOPHIL # BLD AUTO: 0.62 K/UL — HIGH (ref 0–0.5)
EOSINOPHIL NFR BLD AUTO: 3.8 % — SIGNIFICANT CHANGE UP (ref 0–6)
GAS PNL BLDV: 132 MMOL/L — LOW (ref 136–145)
GLUCOSE BLDV-MCNC: 128 MG/DL — HIGH (ref 70–99)
GLUCOSE SERPL-MCNC: 126 MG/DL — HIGH (ref 70–99)
HCO3 BLDV-SCNC: 18 MMOL/L — LOW (ref 22–29)
HCT VFR BLD CALC: 29.6 % — LOW (ref 34.5–45)
HCT VFR BLDA CALC: 31 % — LOW (ref 34.5–46.5)
HGB BLD CALC-MCNC: 10.2 G/DL — LOW (ref 11.5–15.5)
HGB BLD-MCNC: 9.9 G/DL — LOW (ref 11.5–15.5)
IANC: 11.52 K/UL — HIGH (ref 1.8–7.4)
IMM GRANULOCYTES NFR BLD AUTO: 5.1 % — HIGH (ref 0–0.9)
LACTATE BLDV-MCNC: 1.7 MMOL/L — SIGNIFICANT CHANGE UP (ref 0.5–2)
LACTATE SERPL-SCNC: 1.2 MMOL/L — SIGNIFICANT CHANGE UP (ref 0.5–2)
LYMPHOCYTES # BLD AUTO: 1.95 K/UL — SIGNIFICANT CHANGE UP (ref 1–3.3)
LYMPHOCYTES # BLD AUTO: 12 % — LOW (ref 13–44)
MAGNESIUM SERPL-MCNC: 2.2 MG/DL — SIGNIFICANT CHANGE UP (ref 1.6–2.6)
MCHC RBC-ENTMCNC: 27.5 PG — SIGNIFICANT CHANGE UP (ref 27–34)
MCHC RBC-ENTMCNC: 33.4 GM/DL — SIGNIFICANT CHANGE UP (ref 32–36)
MCV RBC AUTO: 82.2 FL — SIGNIFICANT CHANGE UP (ref 80–100)
MONOCYTES # BLD AUTO: 1.27 K/UL — HIGH (ref 0–0.9)
MONOCYTES NFR BLD AUTO: 7.8 % — SIGNIFICANT CHANGE UP (ref 2–14)
NEUTROPHILS # BLD AUTO: 11.52 K/UL — HIGH (ref 1.8–7.4)
NEUTROPHILS NFR BLD AUTO: 71 % — SIGNIFICANT CHANGE UP (ref 43–77)
NRBC # BLD: 0 /100 WBCS — SIGNIFICANT CHANGE UP (ref 0–0)
NRBC # FLD: 0 K/UL — SIGNIFICANT CHANGE UP (ref 0–0)
PCO2 BLDV: 40 MMHG — SIGNIFICANT CHANGE UP (ref 39–42)
PH BLDV: 7.27 — LOW (ref 7.32–7.43)
PHOSPHATE SERPL-MCNC: 4.7 MG/DL — HIGH (ref 2.5–4.5)
PLATELET # BLD AUTO: 234 K/UL — SIGNIFICANT CHANGE UP (ref 150–400)
PO2 BLDV: 48 MMHG — SIGNIFICANT CHANGE UP
POTASSIUM BLDV-SCNC: 3.7 MMOL/L — SIGNIFICANT CHANGE UP (ref 3.5–5.1)
POTASSIUM SERPL-MCNC: 3.8 MMOL/L — SIGNIFICANT CHANGE UP (ref 3.5–5.3)
POTASSIUM SERPL-SCNC: 3.8 MMOL/L — SIGNIFICANT CHANGE UP (ref 3.5–5.3)
PROT SERPL-MCNC: 6.4 G/DL — SIGNIFICANT CHANGE UP (ref 6–8.3)
RBC # BLD: 3.6 M/UL — LOW (ref 3.8–5.2)
RBC # FLD: 14.6 % — HIGH (ref 10.3–14.5)
SAO2 % BLDV: 77.5 % — SIGNIFICANT CHANGE UP
SODIUM SERPL-SCNC: 132 MMOL/L — LOW (ref 135–145)
TSH SERPL-MCNC: 4.28 UIU/ML — HIGH (ref 0.27–4.2)
WBC # BLD: 16.24 K/UL — HIGH (ref 3.8–10.5)
WBC # FLD AUTO: 16.24 K/UL — HIGH (ref 3.8–10.5)

## 2023-01-15 PROCEDURE — 99232 SBSQ HOSP IP/OBS MODERATE 35: CPT | Mod: GC

## 2023-01-15 RX ORDER — SODIUM CHLORIDE 9 MG/ML
1000 INJECTION, SOLUTION INTRAVENOUS
Refills: 0 | Status: DISCONTINUED | OUTPATIENT
Start: 2023-01-15 | End: 2023-01-16

## 2023-01-15 RX ORDER — POLYETHYLENE GLYCOL 3350 17 G/17G
17 POWDER, FOR SOLUTION ORAL DAILY
Refills: 0 | Status: DISCONTINUED | OUTPATIENT
Start: 2023-01-15 | End: 2023-01-22

## 2023-01-15 RX ADMIN — PIPERACILLIN AND TAZOBACTAM 25 GRAM(S): 4; .5 INJECTION, POWDER, LYOPHILIZED, FOR SOLUTION INTRAVENOUS at 22:26

## 2023-01-15 RX ADMIN — SODIUM CHLORIDE 75 MILLILITER(S): 9 INJECTION, SOLUTION INTRAVENOUS at 13:48

## 2023-01-15 RX ADMIN — HEPARIN SODIUM 5000 UNIT(S): 5000 INJECTION INTRAVENOUS; SUBCUTANEOUS at 06:06

## 2023-01-15 RX ADMIN — POLYETHYLENE GLYCOL 3350 17 GRAM(S): 17 POWDER, FOR SOLUTION ORAL at 18:22

## 2023-01-15 RX ADMIN — HEPARIN SODIUM 5000 UNIT(S): 5000 INJECTION INTRAVENOUS; SUBCUTANEOUS at 22:30

## 2023-01-15 RX ADMIN — LIDOCAINE 1 APPLICATION(S): 4 CREAM TOPICAL at 18:13

## 2023-01-15 RX ADMIN — PIPERACILLIN AND TAZOBACTAM 25 GRAM(S): 4; .5 INJECTION, POWDER, LYOPHILIZED, FOR SOLUTION INTRAVENOUS at 09:06

## 2023-01-15 RX ADMIN — PANTOPRAZOLE SODIUM 40 MILLIGRAM(S): 20 TABLET, DELAYED RELEASE ORAL at 18:13

## 2023-01-15 RX ADMIN — HEPARIN SODIUM 5000 UNIT(S): 5000 INJECTION INTRAVENOUS; SUBCUTANEOUS at 13:48

## 2023-01-15 RX ADMIN — PANTOPRAZOLE SODIUM 40 MILLIGRAM(S): 20 TABLET, DELAYED RELEASE ORAL at 06:06

## 2023-01-15 RX ADMIN — LIDOCAINE 1 APPLICATION(S): 4 CREAM TOPICAL at 06:06

## 2023-01-15 RX ADMIN — SODIUM CHLORIDE 75 MILLILITER(S): 9 INJECTION, SOLUTION INTRAVENOUS at 09:11

## 2023-01-15 NOTE — PROGRESS NOTE ADULT - SUBJECTIVE AND OBJECTIVE BOX
--------------------------------------------------------------  Juan Carlos Rosales MD  PGY-3, Internal Medicine  Pager #: [phone], [phone]  After 7 PM: Night Float Pager  --------------------------------------------------------------    Patient is a 72y old  Female who presents with a chief complaint of Decreased PO intake, Diverticulitis, renal failure (2023 06:56)    OVERNIGHT / INTERVAL EVENTS: No acute overnight events.     SUBJECTIVE: Patient seen and examined bedside.     Denies fevers/chills, headaches/dizziness, nausea/vomiting, chest pain, SOB, abdominal pain.     MEDICATIONS  (STANDING):  acetaminophen   IVPB .. 1000 milliGRAM(s) IV Intermittent once  dextrose 5% + lactated ringers. 1000 milliLiter(s) (75 mL/Hr) IV Continuous <Continuous>  heparin   Injectable 5000 Unit(s) SubCutaneous every 8 hours  lidocaine 2% Gel 1 Application(s) Topical two times a day  pantoprazole    Tablet 40 milliGRAM(s) Oral two times a day  piperacillin/tazobactam IVPB.. 3.375 Gram(s) IV Intermittent every 12 hours    MEDICATIONS  (PRN):  melatonin 3 milliGRAM(s) Oral at bedtime PRN Sleep      CAPILLARY BLOOD GLUCOSE        I&O's Summary      PHYSICAL EXAM:  Vital Signs Last 24 Hrs  T(C): 36.3 (15 Nawaf 2023 05:16), Max: 36.7 (2023 09:10)  T(F): 97.3 (15 Nawaf 2023 05:16), Max: 98.1 (2023 09:10)  HR: 83 (15 Nawaf 2023 05:16) (76 - 90)  BP: 96/49 (15 Nawaf 2023 05:16) (87/48 - 113/50)  BP(mean): --  RR: 17 (15 Nawaf 2023 05:16) (17 - 18)  SpO2: 96% (15 Nawaf 2023 05:16) (94% - 100%)    Parameters below as of 15 Nawaf 2023 05:16  Patient On (Oxygen Delivery Method): room air        GENERAL: No acute distress  EYES: EOMI, PERRLA; conjunctiva and sclera clear  ENMT: Moist oral mucosa  NECK: Supple, no palpable masses  RESPIRATORY: Lungs clear to ascultation b/l; unlabored respirations  CARDIOVASCULAR: Regular rate and rhythm, normal S1 and S2, no murmurs/rubs/gallops  ABDOMEN: Soft, normal bowel sounds, nondistended, nontender  MUSCULOSKELETAL: No joint swelling or tenderness to palpation  PSYCH: Affect appropriate  NEUROLOGY: AAOx3, CNs grossly intact  SKIN: No rashes; no palpable lesions    LABS:                        9.1    16.37 )-----------( 247      ( 2023 07:11 )             27.0     01-14    137  |  102  |  77<H>  ----------------------------<  92  4.0   |  16<L>  |  2.98<H>    Ca    8.6      2023 14:27  Phos  4.0     -14  Mg     1.50         TPro  6.7  /  Alb  2.6<L>  /  TBili  1.3<H>  /  DBili  x   /  AST  50<H>  /  ALT  38<H>  /  AlkPhos  363<H>  -      CARDIAC MARKERS ( 2023 07:30 )  x     / x     / 400 U/L / x     / x          Urinalysis Basic - ( 2023 19:30 )    Color: Yellow / Appearance: Slightly Turbid / S.015 / pH: x  Gluc: x / Ketone: Negative  / Bili: Negative / Urobili: <2 mg/dL   Blood: x / Protein: 30 mg/dL / Nitrite: Negative   Leuk Esterase: Small / RBC: 7 /HPF / WBC 10 /HPF   Sq Epi: x / Non Sq Epi: 7 /HPF / Bacteria: Many        Culture - Blood (collected 2023 11:15)  Source: .Blood Blood-Peripheral  Preliminary Report (2023 15:08):    No growth to date.    Culture - Blood (collected 2023 11:00)  Source: .Blood Blood-Peripheral  Preliminary Report (2023 15:08):    No growth to date.        INTERVAL RADIOLOGY/IMAGING STUDIES:     --------------------------------------------------------------  Juan Carlos Rosales MD  PGY-3, Internal Medicine  Pager #: [phone], [phone]  After 7 PM: Night Float Pager  --------------------------------------------------------------    Patient is a 72y old  Female who presents with a chief complaint of Decreased PO intake, Diverticulitis, renal failure (2023 06:56)    OVERNIGHT / INTERVAL EVENTS: No acute overnight events.     SUBJECTIVE: Patient seen and examined bedside. Patient states she feels okay. Does endorse feeling a little bit thirsty but otherwise without acute new complaints. States she was seen earlier today by surgical team and without abdominal tenderness at the time. Passing flatus but no BM for a couple of days.     Denies fevers/chills, headaches/dizziness, nausea/vomiting, chest pain, SOB, abdominal tenderness.     MEDICATIONS  (STANDING):  acetaminophen   IVPB .. 1000 milliGRAM(s) IV Intermittent once  dextrose 5% + lactated ringers. 1000 milliLiter(s) (75 mL/Hr) IV Continuous <Continuous>  heparin   Injectable 5000 Unit(s) SubCutaneous every 8 hours  lidocaine 2% Gel 1 Application(s) Topical two times a day  pantoprazole    Tablet 40 milliGRAM(s) Oral two times a day  piperacillin/tazobactam IVPB.. 3.375 Gram(s) IV Intermittent every 12 hours    MEDICATIONS  (PRN):  melatonin 3 milliGRAM(s) Oral at bedtime PRN Sleep      CAPILLARY BLOOD GLUCOSE        I&O's Summary      PHYSICAL EXAM:  Vital Signs Last 24 Hrs  T(C): 36.3 (15 Nawaf 2023 05:16), Max: 36.7 (2023 09:10)  T(F): 97.3 (15 Nawaf 2023 05:16), Max: 98.1 (2023 09:10)  HR: 83 (15 Nawaf 2023 05:16) (76 - 90)  BP: 96/49 (15 Nawaf 2023 05:16) (87/48 - 113/50)  RR: 17 (15 Nawaf 2023 05:16) (17 - 18)  SpO2: 96% (15 Nawaf 2023 05:16) (94% - 100%)    Parameters below as of 15 Nawaf 2023 05:16  Patient On (Oxygen Delivery Method): room air        GENERAL: No acute distress, obese body habitus  EYES: EOMI, PERRLA; conjunctiva and sclera clear  ENMT: Moist oral mucosa  NECK: Supple, no palpable masses  RESPIRATORY: Lungs clear to ascultation b/l; unlabored respirations  CARDIOVASCULAR: Regular rate and rhythm, normal S1 and S2, no murmurs/rubs/gallops  ABDOMEN: Soft, normal bowel sounds, nondistended, nontender  MUSCULOSKELETAL: No joint swelling or tenderness to palpation  PSYCH: Affect appropriate  NEUROLOGY: AAOx3, CNs grossly intact  SKIN: No rashes; no palpable lesions    LABS:                        9.1    16.37 )-----------( 247      ( 2023 07:11 )             27.0     01-14    137  |  102  |  77<H>  ----------------------------<  92  4.0   |  16<L>  |  2.98<H>    Ca    8.6      2023 14:27  Phos  4.0     -  Mg     1.50         TPro  6.7  /  Alb  2.6<L>  /  TBili  1.3<H>  /  DBili  x   /  AST  50<H>  /  ALT  38<H>  /  AlkPhos  363<H>  -14      CARDIAC MARKERS ( 2023 07:30 )  x     / x     / 400 U/L / x     / x          Urinalysis Basic - ( 2023 19:30 )    Color: Yellow / Appearance: Slightly Turbid / S.015 / pH: x  Gluc: x / Ketone: Negative  / Bili: Negative / Urobili: <2 mg/dL   Blood: x / Protein: 30 mg/dL / Nitrite: Negative   Leuk Esterase: Small / RBC: 7 /HPF / WBC 10 /HPF   Sq Epi: x / Non Sq Epi: 7 /HPF / Bacteria: Many        Culture - Blood (collected 2023 11:15)  Source: .Blood Blood-Peripheral  Preliminary Report (2023 15:08):    No growth to date.    Culture - Blood (collected 2023 11:00)  Source: .Blood Blood-Peripheral  Preliminary Report (2023 15:08):    No growth to date.        INTERVAL RADIOLOGY/IMAGING STUDIES:

## 2023-01-15 NOTE — PROGRESS NOTE ADULT - PROBLEM SELECTOR PLAN 12
Diet: NPO except medications for diverticulitis management per surgery  DVT Proph: Heparin subq  Dispo: Likely home. Diet: full liquid diet  DVT Proph: Heparin subq  Dispo: Likely home.

## 2023-01-15 NOTE — PROGRESS NOTE ADULT - SUBJECTIVE AND OBJECTIVE BOX
[facility] Surgery Progress Note  Patient is a 72y old  Female who presents with a chief complaint of Decreased PO intake, Diverticulitis, renal failure (2023 06:56)      INTERVAL EVENTS: Hypotensive    SUBJECTIVE: Patient seen and examined at bedside with surgical team. Denies pain/n/v. +/-.    OBJECTIVE:    Vital Signs Last 24 Hrs  T(C): 36.3 (15 Nawaf 2023 05:16), Max: 36.7 (2023 09:10)  T(F): 97.3 (15 Nawaf 2023 05:16), Max: 98.1 (2023 09:10)  HR: 83 (15 Nawaf 2023 05:16) (76 - 90)  BP: 96/49 (15 Nawaf 2023 05:16) (87/48 - 113/50)  BP(mean): --  RR: 17 (15 Nawaf 2023 05:16) (17 - 18)  SpO2: 96% (15 Nawaf 2023 05:16) (94% - 100%)    Parameters below as of 15 Nawaf 2023 05:16  Patient On (Oxygen Delivery Method): room air    I&O's Detail  MEDICATIONS  (STANDING):  acetaminophen   IVPB .. 1000 milliGRAM(s) IV Intermittent once  dextrose 5% + lactated ringers. 1000 milliLiter(s) (75 mL/Hr) IV Continuous <Continuous>  heparin   Injectable 5000 Unit(s) SubCutaneous every 8 hours  lidocaine 2% Gel 1 Application(s) Topical two times a day  pantoprazole    Tablet 40 milliGRAM(s) Oral two times a day  piperacillin/tazobactam IVPB.. 3.375 Gram(s) IV Intermittent every 12 hours    MEDICATIONS  (PRN):  melatonin 3 milliGRAM(s) Oral at bedtime PRN Sleep      PHYSICAL EXAM:  Constitutional: A&Ox3, NAD  Respiratory: Unlabored breathing  Abdomen: Soft, obese, nondistended, NTTP.  Extremities: WWP, KYLE spontaneously    LABS:                        9.9    16.24 )-----------( 234      ( 15 Nawaf 2023 07:06 )             29.6     01-14    137  |  102  |  77<H>  ----------------------------<  92  4.0   |  16<L>  |  2.98<H>    Ca    8.6      2023 14:27  Phos  4.0       Mg     1.50         TPro  6.7  /  Alb  2.6<L>  /  TBili  1.3<H>  /  DBili  x   /  AST  50<H>  /  ALT  38<H>  /  AlkPhos  363<H>        LIVER FUNCTIONS - ( 2023 14:27 )  Alb: 2.6 g/dL / Pro: 6.7 g/dL / ALK PHOS: 363 U/L / ALT: 38 U/L / AST: 50 U/L / GGT: x           Urinalysis Basic - ( 2023 19:30 )    Color: Yellow / Appearance: Slightly Turbid / S.015 / pH: x  Gluc: x / Ketone: Negative  / Bili: Negative / Urobili: <2 mg/dL   Blood: x / Protein: 30 mg/dL / Nitrite: Negative   Leuk Esterase: Small / RBC: 7 /HPF / WBC 10 /HPF   Sq Epi: x / Non Sq Epi: 7 /HPF / Bacteria: Many          IMAGING:

## 2023-01-15 NOTE — PROGRESS NOTE ADULT - ASSESSMENT
72F w/ PMH sign for  HTN, HLD, MEJIA, obesity, psoriasis, presenting with generalized weakness and decreased appetite found to have acute diverticulitis.     - No urgent surgical intervention  - Recommend NPO   - Strongly recommend IVF   - Strict intake and out   - C/w Zosyn   - Recommend repeat CT IV con study to better evaluate potential IMV thrombosis/thrombophlebitis seen on non-con CT  - Rest of care per primary   - Colonoscopy as outpatient

## 2023-01-15 NOTE — PROGRESS NOTE ADULT - PROBLEM SELECTOR PLAN 11
- Pt taking Otezla. Last dose 2 days ago  - Would keep other autoimmune condition on differential given elevated ESR, CRP

## 2023-01-15 NOTE — PROGRESS NOTE ADULT - PROBLEM SELECTOR PLAN 2
- Seen on CT  - Most likely cause of metabolic abnormalities + leukocytosis with left shift  - Zosyn 1/13-  - Surgery consulted  - NPO as per surgery. They do not think her hypotension is a result of this.  - IVF while NPO  - Monitor for perforation, abscess, obstruction, fistula - Seen on CT  - Most likely cause of metabolic abnormalities + leukocytosis with left shift  - Zosyn 1/13-  - Surgery consulted, appreciate recommendations  - Advanced diet given reassuring abdominal exam without any abdominal pain, tolerating without issue  - Monitor for perforation, abscess, obstruction, fistula

## 2023-01-15 NOTE — PROGRESS NOTE ADULT - PROBLEM SELECTOR PLAN 4
- Baseline hgb 14 in 9/22  - New acute anemia with no obvious signs of bleeding.  - Hemodynamically stable  - CT abdomen with diverticulitis  - Pending iron studies - Baseline hgb 14 in 9/22  - New acute anemia with no obvious signs of bleeding.  - Hemodynamically stable  - CT abdomen with diverticulitis  - Tsat 24% and ferritin 1339 consistent with anemia of chronic inflammation

## 2023-01-15 NOTE — PROGRESS NOTE ADULT - PROBLEM SELECTOR PLAN 1
- Most likely due to sepsis due to diverticulitis,   - BG NGTD   - Pt asymptomatic, other vital signs wnl  - Will hold off on midodrine as no evidence for use in this scenario  - MICU aware  - Continue maintenance IVF  - AM cortisol. Denies recent steroid use  - Continue abx - Most likely due to sepsis due to diverticulitis,   - BG NGTD   - Pt asymptomatic, other vital signs wnl  - Will hold off on midodrine as no evidence for use in this scenario  - MICU aware  - Continue maintenance IVF  - AM cortisol wnl. Denies recent steroid use  - TSH mildly elevated but wnl given age  - Continue abx

## 2023-01-15 NOTE — PROGRESS NOTE ADULT - PROBLEM SELECTOR PLAN 9
- Resolving. Likely an ischemic component  - GGT elevated  - CT abdomen with multiple hepatic cysts  - F/u blood cultures

## 2023-01-15 NOTE — PROGRESS NOTE ADULT - PROBLEM SELECTOR PLAN 3
- Thrombosis vs thrombphlebitis of IMV  - Radiology recommends CT venogram but patient cant get contrast for now  - Consider anticoagulation for IMV thrombosis pending further workup  - Consider vascular surgery consult for further guidance  - DVT proph for now - Thrombosis vs thrombphlebitis of IMV  - Radiology recommends CT venogram but patient cant get contrast for now  - Consider anticoagulation for IMV thrombosis pending further workup  - Consider vascular surgery consult for further guidance  - DVT proph for now  - surgical recs appreciated (1/15): repeat CT with IV contrast for better assessment of thrombosis vs. thrombphlebitis. However, will hold off for now given relative JAZMYNE as well as recent improvement, consider repeat CT in a couple of days - Thrombosis vs thrombphlebitis of IMV  - Radiology recommends CT venogram but patient cant get contrast for now given acute renal failure  - Consider anticoagulation for IMV thrombosis pending further workup  - Consider vascular surgery consult for further guidance  - DVT proph for now  - surgical recs appreciated (1/15): repeat CT with IV contrast for better assessment of thrombosis vs. thrombphlebitis. However, will hold off for now given relative JAZMYNE as well as recent improvement, consider repeat CT in a couple of days

## 2023-01-15 NOTE — PROGRESS NOTE ADULT - ATTENDING COMMENTS
75F w/ psoriatic arthritis (apremilast), HTN (lisinopril/chlorthalidone) presenting w/ generalized weakness and found to have multiple metabolic derangements including JAZMYNE, hypokalemia, hyponatremia, transaminitis, AGMA and found to have severe sepsis with CT w/ acute uncomplicated diverticulitis c/b IMV thrombosis vs thrombophlebitis and course c/b multiple episode of asymptomatic HoTN, responsive to fluids.     This morning hypotension is improved. She feels well and is tolerating a diet. Creatinine plateaued ~3.    - Severe sepsis due to acute diverticulitis - s/p 5L IV fluid, surgery consulted, Zosyn, mIVF, abdominal exam remains very reassuring, tolerating full liquid diet without any pain  - IMV thrombosis vs thrombophlebitis - likely sequelae of acute diverticulitis, currently no abdominal pain or other concern for bowel ischemia; radiology recommended CT venogram to better evlauate, but holding off on contrast given JAZMYNE and current clinical stability such that scan does not seem urgent  - JAZMYNE - Cr remains stably elevated after fluid resuscitation, most likely ATN due to hypotension prior to admission  - LFT abnormalities - possibly in setting of sepsis, resolving after IV fluid CT w/ hepatic cysts 72F w/ psoriatic arthritis (apremilast), HTN (lisinopril/chlorthalidone) presenting w/ generalized weakness and found to have multiple metabolic derangements including JAZMYNE, hypokalemia, hyponatremia, transaminitis, AGMA and found to have severe sepsis with CT w/ acute uncomplicated diverticulitis c/b IMV thrombosis vs thrombophlebitis and course c/b multiple episode of asymptomatic HoTN, responsive to fluids.     This morning hypotension is improved. She feels well and is tolerating a diet. Creatinine plateaued ~3.    - Severe sepsis due to acute diverticulitis - s/p 5L IV fluid, surgery consulted, Zosyn, mIVF, abdominal exam remains very reassuring, tolerating full liquid diet without any pain  - IMV thrombosis vs thrombophlebitis - likely sequelae of acute diverticulitis, currently no abdominal pain or other concern for bowel ischemia; radiology recommended CT venogram to better evlauate, but holding off on contrast given JAZMYNE and current clinical stability such that scan does not seem urgent  - JAZMYNE - Cr remains stably elevated after fluid resuscitation, most likely ATN due to hypotension prior to admission  - LFT abnormalities - possibly in setting of sepsis, resolving after IV fluid CT w/ hepatic cysts

## 2023-01-15 NOTE — PROGRESS NOTE ADULT - PROBLEM SELECTOR PLAN 5
- Baseline Cr ~1.0  - BUN:Cr > 20 though it is more likely the patient is in ATN given that it has not improved after 5L fluids  - Consider nephrology if HCO3 decreased more/electrolyte abnormalities/worsening uremia

## 2023-01-16 LAB
-  COAGULASE NEGATIVE STAPHYLOCOCCUS: SIGNIFICANT CHANGE UP
ALBUMIN SERPL ELPH-MCNC: 2.2 G/DL — LOW (ref 3.3–5)
ALP SERPL-CCNC: 540 U/L — HIGH (ref 40–120)
ALT FLD-CCNC: 26 U/L — SIGNIFICANT CHANGE UP (ref 4–33)
ANION GAP SERPL CALC-SCNC: 15 MMOL/L — HIGH (ref 7–14)
AST SERPL-CCNC: 30 U/L — SIGNIFICANT CHANGE UP (ref 4–32)
BASOPHILS # BLD AUTO: 0.12 K/UL — SIGNIFICANT CHANGE UP (ref 0–0.2)
BASOPHILS NFR BLD AUTO: 0.8 % — SIGNIFICANT CHANGE UP (ref 0–2)
BILIRUB SERPL-MCNC: 1.5 MG/DL — HIGH (ref 0.2–1.2)
BUN SERPL-MCNC: 61 MG/DL — HIGH (ref 7–23)
CALCIUM SERPL-MCNC: 9.2 MG/DL — SIGNIFICANT CHANGE UP (ref 8.4–10.5)
CHLORIDE SERPL-SCNC: 102 MMOL/L — SIGNIFICANT CHANGE UP (ref 98–107)
CO2 SERPL-SCNC: 19 MMOL/L — LOW (ref 22–31)
CREAT SERPL-MCNC: 2.05 MG/DL — HIGH (ref 0.5–1.3)
EGFR: 25 ML/MIN/1.73M2 — LOW
EOSINOPHIL # BLD AUTO: 0.63 K/UL — HIGH (ref 0–0.5)
EOSINOPHIL NFR BLD AUTO: 4 % — SIGNIFICANT CHANGE UP (ref 0–6)
GLUCOSE SERPL-MCNC: 121 MG/DL — HIGH (ref 70–99)
GRAM STN FLD: SIGNIFICANT CHANGE UP
HCT VFR BLD CALC: 30.3 % — LOW (ref 34.5–45)
HGB BLD-MCNC: 10 G/DL — LOW (ref 11.5–15.5)
IANC: 10.76 K/UL — HIGH (ref 1.8–7.4)
IMM GRANULOCYTES NFR BLD AUTO: 5.3 % — HIGH (ref 0–0.9)
LYMPHOCYTES # BLD AUTO: 13.2 % — SIGNIFICANT CHANGE UP (ref 13–44)
LYMPHOCYTES # BLD AUTO: 2.07 K/UL — SIGNIFICANT CHANGE UP (ref 1–3.3)
MAGNESIUM SERPL-MCNC: 1.8 MG/DL — SIGNIFICANT CHANGE UP (ref 1.6–2.6)
MCHC RBC-ENTMCNC: 26.7 PG — LOW (ref 27–34)
MCHC RBC-ENTMCNC: 33 GM/DL — SIGNIFICANT CHANGE UP (ref 32–36)
MCV RBC AUTO: 80.8 FL — SIGNIFICANT CHANGE UP (ref 80–100)
METHOD TYPE: SIGNIFICANT CHANGE UP
MONOCYTES # BLD AUTO: 1.26 K/UL — HIGH (ref 0–0.9)
MONOCYTES NFR BLD AUTO: 8 % — SIGNIFICANT CHANGE UP (ref 2–14)
NEUTROPHILS # BLD AUTO: 10.76 K/UL — HIGH (ref 1.8–7.4)
NEUTROPHILS NFR BLD AUTO: 68.7 % — SIGNIFICANT CHANGE UP (ref 43–77)
NRBC # BLD: 0 /100 WBCS — SIGNIFICANT CHANGE UP (ref 0–0)
NRBC # FLD: 0 K/UL — SIGNIFICANT CHANGE UP (ref 0–0)
PHOSPHATE SERPL-MCNC: 4.2 MG/DL — SIGNIFICANT CHANGE UP (ref 2.5–4.5)
PLATELET # BLD AUTO: 212 K/UL — SIGNIFICANT CHANGE UP (ref 150–400)
POTASSIUM SERPL-MCNC: 4 MMOL/L — SIGNIFICANT CHANGE UP (ref 3.5–5.3)
POTASSIUM SERPL-SCNC: 4 MMOL/L — SIGNIFICANT CHANGE UP (ref 3.5–5.3)
PROT SERPL-MCNC: 6.6 G/DL — SIGNIFICANT CHANGE UP (ref 6–8.3)
RBC # BLD: 3.75 M/UL — LOW (ref 3.8–5.2)
RBC # FLD: 14.8 % — HIGH (ref 10.3–14.5)
SODIUM SERPL-SCNC: 136 MMOL/L — SIGNIFICANT CHANGE UP (ref 135–145)
WBC # BLD: 15.67 K/UL — HIGH (ref 3.8–10.5)
WBC # FLD AUTO: 15.67 K/UL — HIGH (ref 3.8–10.5)

## 2023-01-16 PROCEDURE — 99232 SBSQ HOSP IP/OBS MODERATE 35: CPT | Mod: GC

## 2023-01-16 RX ORDER — LIDOCAINE 4 G/100G
1 CREAM TOPICAL DAILY
Refills: 0 | Status: DISCONTINUED | OUTPATIENT
Start: 2023-01-16 | End: 2023-02-01

## 2023-01-16 RX ORDER — SENNA PLUS 8.6 MG/1
2 TABLET ORAL AT BEDTIME
Refills: 0 | Status: DISCONTINUED | OUTPATIENT
Start: 2023-01-16 | End: 2023-02-01

## 2023-01-16 RX ORDER — LIDOCAINE 4 G/100G
1 CREAM TOPICAL EVERY 24 HOURS
Refills: 0 | Status: DISCONTINUED | OUTPATIENT
Start: 2023-01-16 | End: 2023-01-30

## 2023-01-16 RX ORDER — ACETAMINOPHEN 500 MG
650 TABLET ORAL EVERY 6 HOURS
Refills: 0 | Status: DISCONTINUED | OUTPATIENT
Start: 2023-01-16 | End: 2023-02-01

## 2023-01-16 RX ADMIN — LIDOCAINE 1 PATCH: 4 CREAM TOPICAL at 19:36

## 2023-01-16 RX ADMIN — PIPERACILLIN AND TAZOBACTAM 25 GRAM(S): 4; .5 INJECTION, POWDER, LYOPHILIZED, FOR SOLUTION INTRAVENOUS at 08:53

## 2023-01-16 RX ADMIN — Medication 3 MILLIGRAM(S): at 21:40

## 2023-01-16 RX ADMIN — LIDOCAINE 1 PATCH: 4 CREAM TOPICAL at 13:13

## 2023-01-16 RX ADMIN — PANTOPRAZOLE SODIUM 40 MILLIGRAM(S): 20 TABLET, DELAYED RELEASE ORAL at 18:18

## 2023-01-16 RX ADMIN — POLYETHYLENE GLYCOL 3350 17 GRAM(S): 17 POWDER, FOR SOLUTION ORAL at 13:14

## 2023-01-16 RX ADMIN — HEPARIN SODIUM 5000 UNIT(S): 5000 INJECTION INTRAVENOUS; SUBCUTANEOUS at 21:32

## 2023-01-16 RX ADMIN — LIDOCAINE 1 APPLICATION(S): 4 CREAM TOPICAL at 18:17

## 2023-01-16 RX ADMIN — LIDOCAINE 1 APPLICATION(S): 4 CREAM TOPICAL at 06:04

## 2023-01-16 RX ADMIN — PIPERACILLIN AND TAZOBACTAM 25 GRAM(S): 4; .5 INJECTION, POWDER, LYOPHILIZED, FOR SOLUTION INTRAVENOUS at 20:42

## 2023-01-16 RX ADMIN — HEPARIN SODIUM 5000 UNIT(S): 5000 INJECTION INTRAVENOUS; SUBCUTANEOUS at 13:14

## 2023-01-16 RX ADMIN — HEPARIN SODIUM 5000 UNIT(S): 5000 INJECTION INTRAVENOUS; SUBCUTANEOUS at 05:57

## 2023-01-16 RX ADMIN — PANTOPRAZOLE SODIUM 40 MILLIGRAM(S): 20 TABLET, DELAYED RELEASE ORAL at 05:58

## 2023-01-16 NOTE — PROGRESS NOTE ADULT - PROBLEM SELECTOR PLAN 1
RESOLVED  - Most likely due to sepsis related to diverticulitis  - BG NGTD   - Pt asymptomatic, other vital signs wnl  - Will hold off on midodrine as no evidence for use in this scenario  - MICU aware  - Monitor off IVF  - AM cortisol wnl. Denies recent steroid use  - TSH mildly elevated but wnl given age  - Continue abx (zosyn)

## 2023-01-16 NOTE — PROGRESS NOTE ADULT - PROBLEM SELECTOR PLAN 5
- Baseline Cr ~1.0  - BUN:Cr > 20 though it is more likely the patient is in ATN given that it has not improved after 5L fluids  - Consider nephrology if HCO3 decreased more/electrolyte abnormalities/worsening uremia - Baseline Cr ~1.0  - BUN:Cr > 20 though it is more likely the patient is in ATN given that it has not improved after 5L fluids  - 1/16 creatinine is downtrending

## 2023-01-16 NOTE — PROGRESS NOTE ADULT - PROBLEM SELECTOR PLAN 3
- Thrombosis vs thrombphlebitis of IMV  - Radiology recommends CT venogram but patient cant get contrast for now given acute renal failure  - Consider anticoagulation for IMV thrombosis pending further workup  - Consider vascular surgery consult for further guidance  - DVT proph for now  - surgical recs appreciated (1/15): repeat CT with IV contrast for better assessment of thrombosis vs. thrombphlebitis. However, will hold off for now given relative JAZMYNE as well as recent improvement, consider repeat CT in a couple of days

## 2023-01-16 NOTE — PROGRESS NOTE ADULT - SUBJECTIVE AND OBJECTIVE BOX
PROGRESS NOTE:     Patient is a 72y old  Female who presents with a chief complaint of Decreased PO intake, Diverticulitis, renal failure (14 Jan 2023 06:56)      SUBJECTIVE / OVERNIGHT EVENTS:    No acute events overnight. Tolerating full diet. Passing flatus, no BMs yet. C/o R. knee pain (chronic, follows with [facility], Dr. Matamoros)    ADDITIONAL REVIEW OF SYSTEMS:    MEDICATIONS  (STANDING):  acetaminophen   IVPB .. 1000 milliGRAM(s) IV Intermittent once  heparin   Injectable 5000 Unit(s) SubCutaneous every 8 hours  lidocaine 2% Gel 1 Application(s) Topical two times a day  pantoprazole    Tablet 40 milliGRAM(s) Oral two times a day  piperacillin/tazobactam IVPB.. 3.375 Gram(s) IV Intermittent every 12 hours  polyethylene glycol 3350 17 Gram(s) Oral daily    MEDICATIONS  (PRN):  melatonin 3 milliGRAM(s) Oral at bedtime PRN Sleep      CAPILLARY BLOOD GLUCOSE        I&O's Summary      PHYSICAL EXAM:  Vital Signs Last 24 Hrs  T(C): 36.3 (16 Jan 2023 05:38), Max: 36.6 (15 Nawaf 2023 09:46)  T(F): 97.4 (16 Jan 2023 05:38), Max: 97.8 (15 Nawaf 2023 09:46)  HR: 87 (16 Jan 2023 05:38) (64 - 90)  BP: 106/56 (16 Jan 2023 05:38) (103/55 - 114/54)  BP(mean): --  RR: 17 (16 Jan 2023 05:38) (17 - 18)  SpO2: 95% (16 Jan 2023 05:38) (95% - 100%)    Parameters below as of 16 Jan 2023 05:38  Patient On (Oxygen Delivery Method): room air    GENERAL: No acute distress, obese body habitus  EYES: EOMI, PERRLA; conjunctiva and sclera clear  ENMT: Moist oral mucosa  NECK: Supple, no palpable masses  RESPIRATORY: Lungs clear to ascultation b/l; unlabored respirations  CARDIOVASCULAR: Regular rate and rhythm, normal S1 and S2, no murmurs/rubs/gallops  ABDOMEN: Soft, normal bowel sounds, nondistended, nontender  MUSCULOSKELETAL: No joint swelling or tenderness to palpation  PSYCH: Affect appropriate  NEUROLOGY: AAOx3, CNs grossly intact  SKIN: No rashes; no palpable lesions    LABS:                        10.0   15.67 )-----------( 212      ( 16 Jan 2023 06:17 )             30.3     01-16    136  |  102  |  61<H>  ----------------------------<  121<H>  4.0   |  19<L>  |  2.05<H>    Ca    9.2      16 Jan 2023 06:17  Phos  4.2     01-16  Mg     1.80     01-16    TPro  6.6  /  Alb  2.2<L>  /  TBili  1.5<H>  /  DBili  x   /  AST  30  /  ALT  26  /  AlkPhos  540<H>  01-16      Culture - Blood (collected 13 Jan 2023 11:15)  Source: .Blood Blood-Peripheral  Preliminary Report (14 Jan 2023 15:08):    No growth to date.    Culture - Blood (collected 13 Jan 2023 11:00)  Source: .Blood Blood-Peripheral  Preliminary Report (14 Jan 2023 15:08):    No growth to date.        RADIOLOGY & ADDITIONAL TESTS:  Results Reviewed:   Imaging Personally Reviewed:  Electrocardiogram Personally Reviewed:    COORDINATION OF CARE:  Care Discussed with Consultants/Other Providers [Y/N]:  Prior or Outpatient Records Reviewed [Y/N]:

## 2023-01-16 NOTE — PROGRESS NOTE ADULT - PROBLEM SELECTOR PLAN 6
- Baseline BUN 21. Elevation may be due to JAZMYNE vs GI bleed  - Will continue to give fluids and re-eval - related to renal failure  - no symptoms of uremia  - continue to trend

## 2023-01-16 NOTE — PROGRESS NOTE ADULT - PROBLEM SELECTOR PLAN 2
- Seen on CT  - Most likely cause of metabolic abnormalities + leukocytosis with left shift  - Zosyn 1/13-  - Surgery consulted, appreciate recommendations  - Advanced diet given reassuring abdominal exam without any abdominal pain, tolerating without issue  - Monitor for perforation, abscess, obstruction, fistula

## 2023-01-16 NOTE — PROGRESS NOTE ADULT - PROBLEM SELECTOR PLAN 4
- Baseline hgb 14 in 9/22  - New acute anemia with no obvious signs of bleeding.  - Hemodynamically stable  - CT abdomen with diverticulitis  - Tsat 24% and ferritin 1339 consistent with anemia of chronic inflammation

## 2023-01-16 NOTE — PROVIDER CONTACT NOTE (OTHER) - ASSESSMENT
Blood Cultures Growth in anaerobic bottle in gram positive cocci in clusters from cultures collected in 1/13/2023

## 2023-01-16 NOTE — PROGRESS NOTE ADULT - ATTENDING COMMENTS
72F w/ psoriatic arthritis (apremilast), HTN (lisinopril/chlorthalidone) presenting w/ generalized weakness and found to have multiple metabolic derangements including JAZMYNE, hypokalemia, hyponatremia, transaminitis, AGMA and found to have severe sepsis with CT w/ acute uncomplicated diverticulitis c/b IMV thrombosis vs thrombophlebitis and course c/b multiple episode of asymptomatic HoTN, responsive to fluids.     - Acute diverticulitis - s/p 5L IV fluid, surgery consulted, Zosyn, abdominal exam remains very reassuring, tolerating regular diet  - IMV thrombosis vs thrombophlebitis - likely sequelae of acute diverticulitis, currently no abdominal pain or other concern for bowel ischemia; radiology recommended CT venogram to better evlauate, could consider this tomorrow if creatinine continues to downtrend  - JAZMYNE - Cr improved from 3-->2 today, likely resolving ATN

## 2023-01-16 NOTE — PROGRESS NOTE ADULT - PROBLEM SELECTOR PLAN 12
Diet: full liquid diet  DVT Proph: Heparin subq  Dispo: Likely home. Diet: full liquid diet, advance to regular  DVT Proph: Heparin subq  Dispo: Likely home.

## 2023-01-16 NOTE — PROGRESS NOTE ADULT - ASSESSMENT
72F w/ PMH sign for  HTN, HLD, MEJIA, obesity, psoriasis, presenting with generalized weakness and decreased appetite found to have acute uncomplicated diverticulitis.     - No urgent surgical intervention  - Advance diet as tolerated  - C/w Zosyn   - Consider CT IV con study to better evaluate potential IMV thrombosis/thrombophlebitis seen on non-con CT  - Rest of care per primary   - Colonoscopy as outpatient     [facility]  [id number]

## 2023-01-16 NOTE — PROGRESS NOTE ADULT - SUBJECTIVE AND OBJECTIVE BOX
Subjective:   Patient seen at bedside this AM. Tolerating diet without issues. Passing flatus but no BM yet    Objective:  Vital Signs  T(C): 36.3 (01-16 @ 05:38), Max: 36.6 (01-15 @ 09:46)  HR: 87 (01-16 @ 05:38) (64 - 90)  BP: 106/56 (01-16 @ 05:38) (103/55 - 114/54)  RR: 17 (01-16 @ 05:38) (17 - 18)  SpO2: 95% (01-16 @ 05:38) (95% - 100%)    Physical Exam:  GEN: resting in bed comfortably in NAD  RESP: no increased WOB  ABD: soft, non-distended, non-tender  EXTR: warm, well-perfused, no edema  NEURO: awake, alert    Labs:                        10.0   15.67 )-----------( 212      ( 16 Jan 2023 06:17 )             30.3   01-15    132<L>  |  101  |  76<H>  ----------------------------<  126<H>  3.8   |  17<L>  |  2.92<H>    Ca    8.9      15 Nawaf 2023 07:06  Phos  4.7     01-15  Mg     2.20     01-15    TPro  6.4  /  Alb  2.2<L>  /  TBili  1.2  /  DBili  x   /  AST  35<H>  /  ALT  31  /  AlkPhos  407<H>  01-15    CAPILLARY BLOOD GLUCOSE          Imaging:

## 2023-01-17 ENCOUNTER — APPOINTMENT (OUTPATIENT)
Dept: RHEUMATOLOGY | Facility: CLINIC | Age: 73
End: 2023-01-17

## 2023-01-17 PROBLEM — E78.5 HYPERLIPIDEMIA, UNSPECIFIED: Chronic | Status: ACTIVE | Noted: 2023-01-12

## 2023-01-17 PROBLEM — L40.50 ARTHROPATHIC PSORIASIS, UNSPECIFIED: Chronic | Status: ACTIVE | Noted: 2023-01-13

## 2023-01-17 PROBLEM — Z86.79 PERSONAL HISTORY OF OTHER DISEASES OF THE CIRCULATORY SYSTEM: Chronic | Status: ACTIVE | Noted: 2023-01-13

## 2023-01-17 PROBLEM — M19.90 UNSPECIFIED OSTEOARTHRITIS, UNSPECIFIED SITE: Chronic | Status: ACTIVE | Noted: 2023-01-13

## 2023-01-17 PROBLEM — I10 ESSENTIAL (PRIMARY) HYPERTENSION: Chronic | Status: ACTIVE | Noted: 2023-01-12

## 2023-01-17 LAB
ALBUMIN SERPL ELPH-MCNC: 2.1 G/DL — LOW (ref 3.3–5)
ALP SERPL-CCNC: 506 U/L — HIGH (ref 40–120)
ALT FLD-CCNC: 23 U/L — SIGNIFICANT CHANGE UP (ref 4–33)
ANION GAP SERPL CALC-SCNC: 14 MMOL/L — SIGNIFICANT CHANGE UP (ref 7–14)
ANISOCYTOSIS BLD QL: SLIGHT — SIGNIFICANT CHANGE UP
AST SERPL-CCNC: 34 U/L — HIGH (ref 4–32)
BASOPHILS # BLD AUTO: 0.11 K/UL — SIGNIFICANT CHANGE UP (ref 0–0.2)
BASOPHILS NFR BLD AUTO: 0.9 % — SIGNIFICANT CHANGE UP (ref 0–2)
BILIRUB SERPL-MCNC: 1.6 MG/DL — HIGH (ref 0.2–1.2)
BUN SERPL-MCNC: 44 MG/DL — HIGH (ref 7–23)
CALCIUM SERPL-MCNC: 9.5 MG/DL — SIGNIFICANT CHANGE UP (ref 8.4–10.5)
CHLORIDE SERPL-SCNC: 102 MMOL/L — SIGNIFICANT CHANGE UP (ref 98–107)
CO2 SERPL-SCNC: 21 MMOL/L — LOW (ref 22–31)
CREAT SERPL-MCNC: 1.43 MG/DL — HIGH (ref 0.5–1.3)
CULTURE RESULTS: SIGNIFICANT CHANGE UP
EGFR: 39 ML/MIN/1.73M2 — LOW
EOSINOPHIL # BLD AUTO: 0.42 K/UL — SIGNIFICANT CHANGE UP (ref 0–0.5)
EOSINOPHIL NFR BLD AUTO: 3.4 % — SIGNIFICANT CHANGE UP (ref 0–6)
GLUCOSE SERPL-MCNC: 113 MG/DL — HIGH (ref 70–99)
HCT VFR BLD CALC: 32.7 % — LOW (ref 34.5–45)
HGB BLD-MCNC: 10.8 G/DL — LOW (ref 11.5–15.5)
IANC: 8.03 K/UL — HIGH (ref 1.8–7.4)
LYMPHOCYTES # BLD AUTO: 1.34 K/UL — SIGNIFICANT CHANGE UP (ref 1–3.3)
LYMPHOCYTES # BLD AUTO: 10.9 % — LOW (ref 13–44)
MACROCYTES BLD QL: SLIGHT — SIGNIFICANT CHANGE UP
MAGNESIUM SERPL-MCNC: 1.5 MG/DL — LOW (ref 1.6–2.6)
MCHC RBC-ENTMCNC: 27.1 PG — SIGNIFICANT CHANGE UP (ref 27–34)
MCHC RBC-ENTMCNC: 33 GM/DL — SIGNIFICANT CHANGE UP (ref 32–36)
MCV RBC AUTO: 82.2 FL — SIGNIFICANT CHANGE UP (ref 80–100)
METAMYELOCYTES # FLD: 0.8 % — SIGNIFICANT CHANGE UP (ref 0–1)
MONOCYTES # BLD AUTO: 0.93 K/UL — HIGH (ref 0–0.9)
MONOCYTES NFR BLD AUTO: 7.6 % — SIGNIFICANT CHANGE UP (ref 2–14)
MYELOCYTES NFR BLD: 0.8 % — HIGH (ref 0–0)
NEUTROPHILS # BLD AUTO: 8.87 K/UL — HIGH (ref 1.8–7.4)
NEUTROPHILS NFR BLD AUTO: 71.4 % — SIGNIFICANT CHANGE UP (ref 43–77)
NEUTS BAND # BLD: 0.8 % — SIGNIFICANT CHANGE UP (ref 0–6)
ORGANISM # SPEC MICROSCOPIC CNT: SIGNIFICANT CHANGE UP
ORGANISM # SPEC MICROSCOPIC CNT: SIGNIFICANT CHANGE UP
PHOSPHATE SERPL-MCNC: 3.5 MG/DL — SIGNIFICANT CHANGE UP (ref 2.5–4.5)
PLAT MORPH BLD: ABNORMAL
PLATELET # BLD AUTO: 191 K/UL — SIGNIFICANT CHANGE UP (ref 150–400)
PLATELET COUNT - ESTIMATE: NORMAL — SIGNIFICANT CHANGE UP
POIKILOCYTOSIS BLD QL AUTO: SLIGHT — SIGNIFICANT CHANGE UP
POLYCHROMASIA BLD QL SMEAR: SLIGHT — SIGNIFICANT CHANGE UP
POTASSIUM SERPL-MCNC: 4.1 MMOL/L — SIGNIFICANT CHANGE UP (ref 3.5–5.3)
POTASSIUM SERPL-SCNC: 4.1 MMOL/L — SIGNIFICANT CHANGE UP (ref 3.5–5.3)
PROT SERPL-MCNC: 6.8 G/DL — SIGNIFICANT CHANGE UP (ref 6–8.3)
RBC # BLD: 3.98 M/UL — SIGNIFICANT CHANGE UP (ref 3.8–5.2)
RBC # FLD: 14.9 % — HIGH (ref 10.3–14.5)
RBC BLD AUTO: ABNORMAL
SODIUM SERPL-SCNC: 137 MMOL/L — SIGNIFICANT CHANGE UP (ref 135–145)
SPECIMEN SOURCE: SIGNIFICANT CHANGE UP
TARGETS BLD QL SMEAR: SIGNIFICANT CHANGE UP
VARIANT LYMPHS # BLD: 3.4 % — SIGNIFICANT CHANGE UP (ref 0–6)
WBC # BLD: 12.28 K/UL — HIGH (ref 3.8–10.5)
WBC # FLD AUTO: 12.28 K/UL — HIGH (ref 3.8–10.5)

## 2023-01-17 PROCEDURE — 74177 CT ABD & PELVIS W/CONTRAST: CPT | Mod: 26

## 2023-01-17 PROCEDURE — 99233 SBSQ HOSP IP/OBS HIGH 50: CPT | Mod: GC

## 2023-01-17 RX ORDER — ENOXAPARIN SODIUM 100 MG/ML
120 INJECTION SUBCUTANEOUS EVERY 12 HOURS
Refills: 0 | Status: DISCONTINUED | OUTPATIENT
Start: 2023-01-17 | End: 2023-01-26

## 2023-01-17 RX ORDER — MAGNESIUM SULFATE 500 MG/ML
2 VIAL (ML) INJECTION
Refills: 0 | Status: COMPLETED | OUTPATIENT
Start: 2023-01-17 | End: 2023-01-17

## 2023-01-17 RX ORDER — APIXABAN 2.5 MG/1
10 TABLET, FILM COATED ORAL EVERY 12 HOURS
Refills: 0 | Status: DISCONTINUED | OUTPATIENT
Start: 2023-01-17 | End: 2023-01-17

## 2023-01-17 RX ORDER — SODIUM CHLORIDE 9 MG/ML
1000 INJECTION, SOLUTION INTRAVENOUS
Refills: 0 | Status: DISCONTINUED | OUTPATIENT
Start: 2023-01-17 | End: 2023-01-18

## 2023-01-17 RX ADMIN — PANTOPRAZOLE SODIUM 40 MILLIGRAM(S): 20 TABLET, DELAYED RELEASE ORAL at 19:44

## 2023-01-17 RX ADMIN — LIDOCAINE 1 APPLICATION(S): 4 CREAM TOPICAL at 05:42

## 2023-01-17 RX ADMIN — Medication 25 GRAM(S): at 10:29

## 2023-01-17 RX ADMIN — Medication 650 MILLIGRAM(S): at 19:42

## 2023-01-17 RX ADMIN — SENNA PLUS 2 TABLET(S): 8.6 TABLET ORAL at 22:15

## 2023-01-17 RX ADMIN — Medication 650 MILLIGRAM(S): at 22:04

## 2023-01-17 RX ADMIN — HEPARIN SODIUM 5000 UNIT(S): 5000 INJECTION INTRAVENOUS; SUBCUTANEOUS at 05:42

## 2023-01-17 RX ADMIN — Medication 25 GRAM(S): at 13:32

## 2023-01-17 RX ADMIN — Medication 3 MILLIGRAM(S): at 23:15

## 2023-01-17 RX ADMIN — PIPERACILLIN AND TAZOBACTAM 25 GRAM(S): 4; .5 INJECTION, POWDER, LYOPHILIZED, FOR SOLUTION INTRAVENOUS at 10:28

## 2023-01-17 RX ADMIN — LIDOCAINE 1 PATCH: 4 CREAM TOPICAL at 19:00

## 2023-01-17 RX ADMIN — ENOXAPARIN SODIUM 120 MILLIGRAM(S): 100 INJECTION SUBCUTANEOUS at 22:15

## 2023-01-17 RX ADMIN — LIDOCAINE 1 PATCH: 4 CREAM TOPICAL at 13:31

## 2023-01-17 RX ADMIN — HEPARIN SODIUM 5000 UNIT(S): 5000 INJECTION INTRAVENOUS; SUBCUTANEOUS at 13:32

## 2023-01-17 RX ADMIN — LIDOCAINE 1 APPLICATION(S): 4 CREAM TOPICAL at 19:15

## 2023-01-17 RX ADMIN — PIPERACILLIN AND TAZOBACTAM 25 GRAM(S): 4; .5 INJECTION, POWDER, LYOPHILIZED, FOR SOLUTION INTRAVENOUS at 21:39

## 2023-01-17 RX ADMIN — LIDOCAINE 1 PATCH: 4 CREAM TOPICAL at 01:01

## 2023-01-17 RX ADMIN — PANTOPRAZOLE SODIUM 40 MILLIGRAM(S): 20 TABLET, DELAYED RELEASE ORAL at 05:42

## 2023-01-17 RX ADMIN — POLYETHYLENE GLYCOL 3350 17 GRAM(S): 17 POWDER, FOR SOLUTION ORAL at 13:31

## 2023-01-17 NOTE — PROGRESS NOTE ADULT - PROBLEM SELECTOR PLAN 5
- Baseline Cr ~1.0  - BUN:Cr > 20 though it is more likely the patient is in ATN given that it has not improved after 5L fluids  - 1/16 creatinine is downtrending - related to renal failure  - no symptoms of uremia  - continue to trend

## 2023-01-17 NOTE — PROGRESS NOTE ADULT - PROBLEM SELECTOR PLAN 8
- GGT elevated  - CT abdomen with multiple hepatic cysts.  - F/u blood cultures - 21  - May be elevated ISO JAZMYNE  - CT with acute diverticulitis  - f/up cultures as above

## 2023-01-17 NOTE — CHART NOTE - NSCHARTNOTEFT_GEN_A_CORE
Received call from Radiology of preliminary read of CT with IV contrast timed for venous phase. Filling defects noted in IMV, SMV, portal vein and possible feeling defect in pulmonary artery but not optimal study. Given this information patient requires full dose anticoagulation, will start therapeutic lovenox pending final read with likely transition to DOAC. Dosing discussed with pharmacy.     AS, pgy3 Received call from Radiology of preliminary read of CT with IV contrast timed for venous phase. Filling defects noted in IMV, SMV, portal vein and possible filling defect in pulmonary artery but not optimal study. Given this information patient requires full dose anticoagulation, will start therapeutic lovenox pending final read with likely transition to DOAC. Dosing discussed with pharmacy.     AS, pgy3

## 2023-01-17 NOTE — PROGRESS NOTE ADULT - PROBLEM SELECTOR PLAN 12
Diet: full liquid diet, advance to regular  DVT Proph: Heparin subq  Dispo: Likely home. Diet: full liquid diet, advance to regular  DVT Proph: Heparin subq  Dispo: PT recommends rehab

## 2023-01-17 NOTE — PROGRESS NOTE ADULT - PROBLEM SELECTOR PLAN 10
- 21  - May be elevated ISO JAZMYNE  - CT with acute diverticulitis  - f/up cultures as above RESOLVED

## 2023-01-17 NOTE — PROGRESS NOTE ADULT - PROBLEM SELECTOR PLAN 6
- related to renal failure  - no symptoms of uremia  - continue to trend - GGT elevated  - CT abdomen with multiple hepatic cysts  - Still persistently elevated. CT a/p with no abnormalities other than the liver cysts. - GGT elevated  - CT abdomen with multiple hepatic cysts  - Still persistently elevated. Likely cholestasis. CT a/p with no abnormalities other than the liver cysts.

## 2023-01-17 NOTE — PROGRESS NOTE ADULT - PROBLEM SELECTOR PLAN 11
- Pt taking Otezla. Last dose 2 days ago  - Would keep other autoimmune condition on differential given elevated ESR, CRP RESOLVED  - Most likely due to sepsis related to diverticulitis  - BG NGTD   - Pt asymptomatic, other vital signs wnl  - Will hold off on midodrine as no evidence for use in this scenario  - MICU aware  - Monitor off IVF  - AM cortisol wnl. Denies recent steroid use  - TSH mildly elevated but wnl given age  - Continue abx (zosyn)

## 2023-01-17 NOTE — PROGRESS NOTE ADULT - ASSESSMENT
72F w/ PMH sign for  HTN, HLD, MEJIA, obesity, psoriasis, presenting with generalized weakness and decreased appetite found to have acute uncomplicated diverticulitis.     - Pt tolerating regular diet without issues  - No surgical intervention at this time  - Please have patient follow up with Dr. Huitron in the office after discharge  - Surgery signing off, care per primary team    [facility]  [id number]

## 2023-01-17 NOTE — PROGRESS NOTE ADULT - PROBLEM SELECTOR PLAN 9
- Resolving. Likely an ischemic component  - GGT elevated  - CT abdomen with multiple hepatic cysts  - F/u blood cultures - Pt taking Otezla. Last dose 2 prior to admission  - Would keep other autoimmune condition on differential given elevated ESR, CRP

## 2023-01-17 NOTE — PROGRESS NOTE ADULT - PROBLEM SELECTOR PLAN 7
RESOLVED - Resolving. Likely an ischemic component  - GGT elevated  - CT abdomen with multiple hepatic cysts

## 2023-01-17 NOTE — PROGRESS NOTE ADULT - PROBLEM SELECTOR PLAN 2
- Seen on CT  - Most likely cause of metabolic abnormalities + leukocytosis with left shift  - Zosyn 1/13-  - Surgery consulted, appreciate recommendations  - Advanced diet given reassuring abdominal exam without any abdominal pain, tolerating without issue  - Monitor for perforation, abscess, obstruction, fistula - Thrombosis vs thrombphlebitis of IMV  - Radiology and surgery recommends CT venogram but patient cant get contrast for now given JAZMYNE.  - Consider anticoagulation for IMV thrombosis pending further workup  - DVT proph for now - Thrombosis vs thrombphlebitis of IMV  - Radiology and surgery recommends CT venogram. Will pre and post hydrate and perform study.  - Consider anticoagulation for IMV thrombosis pending further workup  - DVT proph for now

## 2023-01-17 NOTE — PHYSICAL THERAPY INITIAL EVALUATION ADULT - PERTINENT HX OF CURRENT PROBLEM, REHAB EVAL
Patient is 72 year old female with hypertension, MEJIA, psoriatic arthritis presenting with several days of poor PO intake found to be hyponatremic with JAZMYNE, anemia, uremia, and elevated liver enzymes found to have acute diverticulitis with inferior mesenteric vein thrombosis vs thrombophlebitis and multiple hepatic cysts. Uremia, liver enzymes improving. Persistent alk phos elevation.

## 2023-01-17 NOTE — PROGRESS NOTE ADULT - PROBLEM SELECTOR PLAN 1
RESOLVED  - Most likely due to sepsis related to diverticulitis  - BG NGTD   - Pt asymptomatic, other vital signs wnl  - Will hold off on midodrine as no evidence for use in this scenario  - MICU aware  - Monitor off IVF  - AM cortisol wnl. Denies recent steroid use  - TSH mildly elevated but wnl given age  - Continue abx (zosyn) - Seen on CT  - Most likely cause of metabolic abnormalities + leukocytosis with left shift  - Zosyn 1/13-  - Advanced diet given reassuring abdominal exam without any abdominal pain, tolerating without issue. Passing gas  - Monitor for perforation, abscess, obstruction, fistula

## 2023-01-17 NOTE — PROGRESS NOTE ADULT - SUBJECTIVE AND OBJECTIVE BOX
Subjective:   Patient seen at bedside this AM. Tolerated regular diet without issues. Passing flatus. Denies abd pain, fevers/chills, n/v    Objective:  Vital Signs  T(C): 36.2 (01-17 @ 10:00), Max: 37.2 (01-16 @ 17:45)  HR: 80 (01-17 @ 10:00) (75 - 84)  BP: 134/70 (01-17 @ 10:00) (120/65 - 136/65)  RR: 17 (01-17 @ 10:00) (17 - 19)  SpO2: 97% (01-17 @ 10:00) (95% - 99%)  01-16-23 @ 07:01  -  01-17-23 @ 07:00  --------------------------------------------------------  IN:  Total IN: 0 mL    OUT:    Voided (mL): 400 mL  Total OUT: 400 mL    Total NET: -400 mL          Physical Exam:  GEN: resting in bed comfortably in NAD  RESP: no increased WOB  ABD: soft, non-distended, non-tender   EXTR: warm, well-perfused, no edema  NEURO: awake, alert    Labs:                        10.8   12.28 )-----------( 191      ( 17 Jan 2023 07:10 )             32.7   01-17    137  |  102  |  44<H>  ----------------------------<  113<H>  4.1   |  21<L>  |  1.43<H>    Ca    9.5      17 Jan 2023 07:10  Phos  3.5     01-17  Mg     1.50     01-17    TPro  6.8  /  Alb  2.1<L>  /  TBili  1.6<H>  /  DBili  x   /  AST  34<H>  /  ALT  23  /  AlkPhos  506<H>  01-17    CAPILLARY BLOOD GLUCOSE          Imaging:

## 2023-01-17 NOTE — PROGRESS NOTE ADULT - SUBJECTIVE AND OBJECTIVE BOX
PROGRESS NOTE:   Authored by: Bj Park M.D.   Internal Medicine PGY-1  Please Contact Via Teams    Patient is a 72y old  Female who presents with a chief complaint of Sepsis, Abnormal Labs (16 Jan 2023 08:48)      SUBJECTIVE / OVERNIGHT EVENTS:  No acute events overnight.     Brief Daily Plan:    ADDITIONAL REVIEW OF SYSTEMS:  Patient denies fevers, chills, chest pain, shortness of breath, nausea, abdominal pain, diarrhea, constipation, dysuria, leg swelling, headache, light headedness.    MEDICATIONS  (STANDING):  acetaminophen   IVPB .. 1000 milliGRAM(s) IV Intermittent once  heparin   Injectable 5000 Unit(s) SubCutaneous every 8 hours  lidocaine   4% Patch 1 Patch Transdermal every 24 hours  lidocaine   4% Patch 1 Patch Transdermal daily  lidocaine 2% Gel 1 Application(s) Topical two times a day  pantoprazole    Tablet 40 milliGRAM(s) Oral two times a day  piperacillin/tazobactam IVPB.. 3.375 Gram(s) IV Intermittent every 12 hours  polyethylene glycol 3350 17 Gram(s) Oral daily  senna 2 Tablet(s) Oral at bedtime    MEDICATIONS  (PRN):  acetaminophen     Tablet .. 650 milliGRAM(s) Oral every 6 hours PRN Moderate Pain (4 - 6)  melatonin 3 milliGRAM(s) Oral at bedtime PRN Sleep      CAPILLARY BLOOD GLUCOSE        I&O's Summary    16 Jan 2023 07:01  -  17 Jan 2023 06:49  --------------------------------------------------------  IN: 0 mL / OUT: 400 mL / NET: -400 mL        PHYSICAL EXAM:  Vital Signs Last 24 Hrs  T(C): 36.5 (17 Jan 2023 05:59), Max: 37.2 (16 Jan 2023 17:45)  T(F): 97.7 (17 Jan 2023 05:59), Max: 98.9 (16 Jan 2023 17:45)  HR: 75 (17 Jan 2023 05:59) (75 - 84)  BP: 129/67 (17 Jan 2023 05:59) (120/65 - 136/65)  BP(mean): --  RR: 18 (17 Jan 2023 05:59) (18 - 19)  SpO2: 95% (17 Jan 2023 05:59) (95% - 99%)    Parameters below as of 17 Jan 2023 05:59  Patient On (Oxygen Delivery Method): room air        CONSTITUTIONAL: NAD, well-developed  RESPIRATORY: Normal respiratory effort; lungs are clear to auscultation bilaterally  CARDIOVASCULAR: Regular rate and rhythm, normal S1 and S2, no murmur/rub/gallop; No lower extremity edema; Peripheral pulses are 2+ bilaterally  ABDOMEN: Nontender to palpation, normoactive bowel sounds, no rebound/guarding; No hepatosplenomegaly  MUSCLOSKELETAL: no clubbing or cyanosis of digits; no joint swelling or tenderness to palpation  PSYCH: A+O to person, place, and time; affect appropriate    LABS:                        10.0   15.67 )-----------( 212      ( 16 Jan 2023 06:17 )             30.3     01-16    136  |  102  |  61<H>  ----------------------------<  121<H>  4.0   |  19<L>  |  2.05<H>    Ca    9.2      16 Jan 2023 06:17  Phos  4.2     01-16  Mg     1.80     01-16    TPro  6.6  /  Alb  2.2<L>  /  TBili  1.5<H>  /  DBili  x   /  AST  30  /  ALT  26  /  AlkPhos  540<H>  01-16                Tele Reviewed:    RADIOLOGY & ADDITIONAL TESTS:  Results Reviewed:   Imaging Personally Reviewed:  Electrocardiogram Personally Reviewed: PROGRESS NOTE:   Authored by: Bj Park M.D.   Internal Medicine PGY-1  Please Contact Via Teams    Patient is a 72y old  Female who presents with a chief complaint of Sepsis, Abnormal Labs (16 Jan 2023 08:48)      SUBJECTIVE / OVERNIGHT EVENTS:  No acute events overnight. Patient feeling well this morning. Has not had a BM but is passing gas. Did not eat dinner last night because she didnt like the food. Will have someone bring food from home today. Says she is peeing like crazy    Brief Daily Plan:  - F/u labs    MEDICATIONS  (STANDING):  acetaminophen   IVPB .. 1000 milliGRAM(s) IV Intermittent once  heparin   Injectable 5000 Unit(s) SubCutaneous every 8 hours  lidocaine   4% Patch 1 Patch Transdermal every 24 hours  lidocaine   4% Patch 1 Patch Transdermal daily  lidocaine 2% Gel 1 Application(s) Topical two times a day  pantoprazole    Tablet 40 milliGRAM(s) Oral two times a day  piperacillin/tazobactam IVPB.. 3.375 Gram(s) IV Intermittent every 12 hours  polyethylene glycol 3350 17 Gram(s) Oral daily  senna 2 Tablet(s) Oral at bedtime    MEDICATIONS  (PRN):  acetaminophen     Tablet .. 650 milliGRAM(s) Oral every 6 hours PRN Moderate Pain (4 - 6)  melatonin 3 milliGRAM(s) Oral at bedtime PRN Sleep      CAPILLARY BLOOD GLUCOSE        I&O's Summary    16 Jan 2023 07:01  -  17 Jan 2023 06:49  --------------------------------------------------------  IN: 0 mL / OUT: 400 mL / NET: -400 mL        PHYSICAL EXAM:  Vital Signs Last 24 Hrs  T(C): 36.5 (17 Jan 2023 05:59), Max: 37.2 (16 Jan 2023 17:45)  T(F): 97.7 (17 Jan 2023 05:59), Max: 98.9 (16 Jan 2023 17:45)  HR: 75 (17 Jan 2023 05:59) (75 - 84)  BP: 129/67 (17 Jan 2023 05:59) (120/65 - 136/65)  BP(mean): --  RR: 18 (17 Jan 2023 05:59) (18 - 19)  SpO2: 95% (17 Jan 2023 05:59) (95% - 99%)    Parameters below as of 17 Jan 2023 05:59  Patient On (Oxygen Delivery Method): room air        GENERAL: Obese, comfortable  HEAD:  Atraumatic, Normocephalic  EYES: EOMI, PERRLA, conjunctiva and sclera clear  ENT: Moist mucous membranes  NECK: Supple, No JVD  CHEST/LUNG: Clear to auscultation bilaterally; No wheezing or rhonchi  HEART: Tachycardic; No murmurs.  Peripheral edema: None. Legs large  ABDOMEN: BSx4; Soft, nontender, large  EXTREMITIES:  2+ radial pulses  NERVOUS SYSTEM:  A&Ox3, no gross lateralizing deficits   SKIN: Legs with livedo reticularis      LABS:                        10.0   15.67 )-----------( 212      ( 16 Jan 2023 06:17 )             30.3     01-16    136  |  102  |  61<H>  ----------------------------<  121<H>  4.0   |  19<L>  |  2.05<H>    Ca    9.2      16 Jan 2023 06:17  Phos  4.2     01-16  Mg     1.80     01-16    TPro  6.6  /  Alb  2.2<L>  /  TBili  1.5<H>  /  DBili  x   /  AST  30  /  ALT  26  /  AlkPhos  540<H>  01-16           PROGRESS NOTE:   Authored by: Bj Park M.D.   Internal Medicine PGY-1  Please Contact Via Teams    Patient is a 72y old  Female who presents with a chief complaint of Sepsis, Abnormal Labs (16 Jan 2023 08:48)      SUBJECTIVE / OVERNIGHT EVENTS:  No acute events overnight. Patient feeling well this morning. Has not had a BM but is passing gas. Did not eat dinner last night because she didnt like the food. Will have someone bring food from home today. Says she is peeing like crazy    Brief Daily Plan:  - F/u labs, PT eval    MEDICATIONS  (STANDING):  acetaminophen   IVPB .. 1000 milliGRAM(s) IV Intermittent once  heparin   Injectable 5000 Unit(s) SubCutaneous every 8 hours  lidocaine   4% Patch 1 Patch Transdermal every 24 hours  lidocaine   4% Patch 1 Patch Transdermal daily  lidocaine 2% Gel 1 Application(s) Topical two times a day  pantoprazole    Tablet 40 milliGRAM(s) Oral two times a day  piperacillin/tazobactam IVPB.. 3.375 Gram(s) IV Intermittent every 12 hours  polyethylene glycol 3350 17 Gram(s) Oral daily  senna 2 Tablet(s) Oral at bedtime    MEDICATIONS  (PRN):  acetaminophen     Tablet .. 650 milliGRAM(s) Oral every 6 hours PRN Moderate Pain (4 - 6)  melatonin 3 milliGRAM(s) Oral at bedtime PRN Sleep      CAPILLARY BLOOD GLUCOSE        I&O's Summary    16 Jan 2023 07:01  -  17 Jan 2023 06:49  --------------------------------------------------------  IN: 0 mL / OUT: 400 mL / NET: -400 mL        PHYSICAL EXAM:  Vital Signs Last 24 Hrs  T(C): 36.5 (17 Jan 2023 05:59), Max: 37.2 (16 Jan 2023 17:45)  T(F): 97.7 (17 Jan 2023 05:59), Max: 98.9 (16 Jan 2023 17:45)  HR: 75 (17 Jan 2023 05:59) (75 - 84)  BP: 129/67 (17 Jan 2023 05:59) (120/65 - 136/65)  BP(mean): --  RR: 18 (17 Jan 2023 05:59) (18 - 19)  SpO2: 95% (17 Jan 2023 05:59) (95% - 99%)    Parameters below as of 17 Jan 2023 05:59  Patient On (Oxygen Delivery Method): room air        GENERAL: Obese, comfortable  HEAD:  Atraumatic, Normocephalic  EYES: EOMI, PERRLA, conjunctiva and sclera clear  ENT: Moist mucous membranes  NECK: Supple, No JVD  CHEST/LUNG: Clear to auscultation bilaterally; No wheezing or rhonchi  HEART: Tachycardic; No murmurs.  Peripheral edema: None. Legs large  ABDOMEN: BSx4; Soft, nontender, large  EXTREMITIES:  2+ radial pulses  NERVOUS SYSTEM:  A&Ox3, no gross lateralizing deficits   SKIN: Legs with livedo reticularis      LABS:                        10.0   15.67 )-----------( 212      ( 16 Jan 2023 06:17 )             30.3     01-16    136  |  102  |  61<H>  ----------------------------<  121<H>  4.0   |  19<L>  |  2.05<H>    Ca    9.2      16 Jan 2023 06:17  Phos  4.2     01-16  Mg     1.80     01-16    TPro  6.6  /  Alb  2.2<L>  /  TBili  1.5<H>  /  DBili  x   /  AST  30  /  ALT  26  /  AlkPhos  540<H>  01-16

## 2023-01-17 NOTE — PROGRESS NOTE ADULT - PROBLEM SELECTOR PLAN 4
- Baseline hgb 14 in 9/22  - New acute anemia with no obvious signs of bleeding.  - Hemodynamically stable  - CT abdomen with diverticulitis  - Tsat 24% and ferritin 1339 consistent with anemia of chronic inflammation - Baseline Cr ~1.0  - BUN:Cr > 20 though it is more likely the patient is in ATN given that it has not improved after 5L fluids  - 1/17 creatinine is downtrending. Patient making lots of urine.

## 2023-01-17 NOTE — PROGRESS NOTE ADULT - PROBLEM SELECTOR PLAN 3
- Thrombosis vs thrombphlebitis of IMV  - Radiology recommends CT venogram but patient cant get contrast for now given acute renal failure  - Consider anticoagulation for IMV thrombosis pending further workup  - Consider vascular surgery consult for further guidance  - DVT proph for now  - surgical recs appreciated (1/15): repeat CT with IV contrast for better assessment of thrombosis vs. thrombphlebitis. However, will hold off for now given relative JAZMYNE as well as recent improvement, consider repeat CT in a couple of days - Baseline hgb 14 in 9/22  - New acute anemia with no obvious signs of bleeding.  - Hemodynamically stable  - CT abdomen with diverticulitis  - Tsat 24% and ferritin 1339 consistent with anemia of chronic disease

## 2023-01-17 NOTE — PROGRESS NOTE ADULT - ATTENDING COMMENTS
72F w/ psoriatic arthritis (apremilast), HTN (lisinopril/chlorthalidone) presenting w/ generalized weakness and found to have multiple metabolic derangements including JAZMYNE, hypokalemia, hyponatremia, transaminitis, AGMA and found to have severe sepsis with CT w/ acute uncomplicated diverticulitis c/b IMV thrombosis vs thrombophlebitis and course c/b multiple episode of asymptomatic HoTN, responsive to fluids and now resolved. Overall, patient is improving.   - C/w Zosyn for diverticulitis - to complete a 7 day course. Tolerating diet but no BM since Thursday. Start bowel regimen.   - Pt. found to have IMV thrombosis vs thrombophlebitis on imaging which may be related to inflammation/diverticulitis. Pending CT venogram to better evaluate to determine need for anticoagulation.   - Resolving ATN - Cr. continues to downtrend and pt. reports significant urine output. Continue to trend.   - Electrolyte abnormalities from admission are resolved.   - Severe sepsis 2/2 diverticulitis on admission is resolved. 72F w/ psoriatic arthritis (apremilast), HTN (lisinopril/chlorthalidone) presenting w/ generalized weakness and found to have multiple metabolic derangements including JAZMYNE, hypokalemia, hyponatremia, transaminitis, AGMA and found to have severe sepsis with CT w/ acute uncomplicated diverticulitis c/b IMV thrombosis vs thrombophlebitis and course c/b multiple episode of asymptomatic HoTN, responsive to fluids and now resolved. Overall, patient is improving.   - C/w Zosyn for diverticulitis - to complete a 7 day course. Tolerating diet but no BM since Thursday. Start bowel regimen.   - Pt. found to have IMV thrombosis vs thrombophlebitis on imaging which may be related to inflammation/diverticulitis. Pending CT venogram to better evaluate to determine need for anticoagulation.   - Resolving ATN - Cr. continues to downtrend and pt. reports significant urine output. Continue to trend.   - Electrolyte abnormalities from admission are resolved.   - Severe sepsis 2/2 diverticulitis on admission is resolved.  - Dispo, PT recommending [facility] - will need to discuss with patient if she is amenable for rehab.

## 2023-01-17 NOTE — PROGRESS NOTE ADULT - ASSESSMENT
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 Uremia, liver enzymes improving. Persistent alk phos elevation.

## 2023-01-18 LAB
ALBUMIN SERPL ELPH-MCNC: 2.2 G/DL — LOW (ref 3.3–5)
ALP SERPL-CCNC: 487 U/L — HIGH (ref 40–120)
ALT FLD-CCNC: 23 U/L — SIGNIFICANT CHANGE UP (ref 4–33)
ANION GAP SERPL CALC-SCNC: 11 MMOL/L — SIGNIFICANT CHANGE UP (ref 7–14)
AST SERPL-CCNC: 35 U/L — HIGH (ref 4–32)
BASOPHILS # BLD AUTO: 0.18 K/UL — SIGNIFICANT CHANGE UP (ref 0–0.2)
BASOPHILS NFR BLD AUTO: 1.5 % — SIGNIFICANT CHANGE UP (ref 0–2)
BILIRUB SERPL-MCNC: 1.6 MG/DL — HIGH (ref 0.2–1.2)
BUN SERPL-MCNC: 33 MG/DL — HIGH (ref 7–23)
CALCIUM SERPL-MCNC: 9.7 MG/DL — SIGNIFICANT CHANGE UP (ref 8.4–10.5)
CHLORIDE SERPL-SCNC: 99 MMOL/L — SIGNIFICANT CHANGE UP (ref 98–107)
CO2 SERPL-SCNC: 25 MMOL/L — SIGNIFICANT CHANGE UP (ref 22–31)
CREAT SERPL-MCNC: 1.06 MG/DL — SIGNIFICANT CHANGE UP (ref 0.5–1.3)
CULTURE RESULTS: SIGNIFICANT CHANGE UP
EGFR: 56 ML/MIN/1.73M2 — LOW
EOSINOPHIL # BLD AUTO: 0.54 K/UL — HIGH (ref 0–0.5)
EOSINOPHIL NFR BLD AUTO: 4.4 % — SIGNIFICANT CHANGE UP (ref 0–6)
GLUCOSE SERPL-MCNC: 94 MG/DL — SIGNIFICANT CHANGE UP (ref 70–99)
HCT VFR BLD CALC: 33.9 % — LOW (ref 34.5–45)
HGB BLD-MCNC: 11 G/DL — LOW (ref 11.5–15.5)
IANC: 7.6 K/UL — HIGH (ref 1.8–7.4)
IMM GRANULOCYTES NFR BLD AUTO: 6.6 % — HIGH (ref 0–0.9)
LYMPHOCYTES # BLD AUTO: 17.7 % — SIGNIFICANT CHANGE UP (ref 13–44)
LYMPHOCYTES # BLD AUTO: 2.19 K/UL — SIGNIFICANT CHANGE UP (ref 1–3.3)
MAGNESIUM SERPL-MCNC: 1.8 MG/DL — SIGNIFICANT CHANGE UP (ref 1.6–2.6)
MCHC RBC-ENTMCNC: 27.2 PG — SIGNIFICANT CHANGE UP (ref 27–34)
MCHC RBC-ENTMCNC: 32.4 GM/DL — SIGNIFICANT CHANGE UP (ref 32–36)
MCV RBC AUTO: 83.9 FL — SIGNIFICANT CHANGE UP (ref 80–100)
MONOCYTES # BLD AUTO: 1.04 K/UL — HIGH (ref 0–0.9)
MONOCYTES NFR BLD AUTO: 8.4 % — SIGNIFICANT CHANGE UP (ref 2–14)
NEUTROPHILS # BLD AUTO: 7.6 K/UL — HIGH (ref 1.8–7.4)
NEUTROPHILS NFR BLD AUTO: 61.4 % — SIGNIFICANT CHANGE UP (ref 43–77)
NRBC # BLD: 0 /100 WBCS — SIGNIFICANT CHANGE UP (ref 0–0)
NRBC # FLD: 0 K/UL — SIGNIFICANT CHANGE UP (ref 0–0)
PHOSPHATE SERPL-MCNC: 2.9 MG/DL — SIGNIFICANT CHANGE UP (ref 2.5–4.5)
PLATELET # BLD AUTO: 214 K/UL — SIGNIFICANT CHANGE UP (ref 150–400)
POTASSIUM SERPL-MCNC: 3.7 MMOL/L — SIGNIFICANT CHANGE UP (ref 3.5–5.3)
POTASSIUM SERPL-SCNC: 3.7 MMOL/L — SIGNIFICANT CHANGE UP (ref 3.5–5.3)
PROT SERPL-MCNC: 7 G/DL — SIGNIFICANT CHANGE UP (ref 6–8.3)
RBC # BLD: 4.04 M/UL — SIGNIFICANT CHANGE UP (ref 3.8–5.2)
RBC # FLD: 15.1 % — HIGH (ref 10.3–14.5)
SODIUM SERPL-SCNC: 135 MMOL/L — SIGNIFICANT CHANGE UP (ref 135–145)
SPECIMEN SOURCE: SIGNIFICANT CHANGE UP
WBC # BLD: 12.36 K/UL — HIGH (ref 3.8–10.5)
WBC # FLD AUTO: 12.36 K/UL — HIGH (ref 3.8–10.5)

## 2023-01-18 PROCEDURE — 99232 SBSQ HOSP IP/OBS MODERATE 35: CPT | Mod: GC

## 2023-01-18 PROCEDURE — 99223 1ST HOSP IP/OBS HIGH 75: CPT

## 2023-01-18 RX ORDER — MAGNESIUM SULFATE 500 MG/ML
2 VIAL (ML) INJECTION ONCE
Refills: 0 | Status: DISCONTINUED | OUTPATIENT
Start: 2023-01-18 | End: 2023-01-18

## 2023-01-18 RX ORDER — POTASSIUM CHLORIDE 20 MEQ
40 PACKET (EA) ORAL ONCE
Refills: 0 | Status: DISCONTINUED | OUTPATIENT
Start: 2023-01-18 | End: 2023-01-18

## 2023-01-18 RX ADMIN — PANTOPRAZOLE SODIUM 40 MILLIGRAM(S): 20 TABLET, DELAYED RELEASE ORAL at 06:27

## 2023-01-18 RX ADMIN — ENOXAPARIN SODIUM 120 MILLIGRAM(S): 100 INJECTION SUBCUTANEOUS at 22:13

## 2023-01-18 RX ADMIN — SENNA PLUS 2 TABLET(S): 8.6 TABLET ORAL at 22:00

## 2023-01-18 RX ADMIN — PIPERACILLIN AND TAZOBACTAM 25 GRAM(S): 4; .5 INJECTION, POWDER, LYOPHILIZED, FOR SOLUTION INTRAVENOUS at 10:22

## 2023-01-18 RX ADMIN — Medication 1 TABLET(S): at 19:52

## 2023-01-18 RX ADMIN — PANTOPRAZOLE SODIUM 40 MILLIGRAM(S): 20 TABLET, DELAYED RELEASE ORAL at 19:52

## 2023-01-18 RX ADMIN — Medication 3 MILLIGRAM(S): at 22:13

## 2023-01-18 RX ADMIN — ENOXAPARIN SODIUM 120 MILLIGRAM(S): 100 INJECTION SUBCUTANEOUS at 10:21

## 2023-01-18 RX ADMIN — LIDOCAINE 1 PATCH: 4 CREAM TOPICAL at 19:52

## 2023-01-18 RX ADMIN — LIDOCAINE 1 APPLICATION(S): 4 CREAM TOPICAL at 06:28

## 2023-01-18 NOTE — PROGRESS NOTE ADULT - PROBLEM SELECTOR PLAN 12
Diet: full liquid diet, advance to regular  DVT Proph: Heparin subq  Dispo: PT recommends rehab Diet: full liquid diet, advance to regular  DVT Proph: Heparin subq  Dispo: PT recommends rehab. Will have daughter aid in discussion.

## 2023-01-18 NOTE — CONSULT NOTE ADULT - SUBJECTIVE AND OBJECTIVE BOX
Cardiology/Vascular Medicine Inpatient Consultation Note    HISTORY OF PRESENT ILLNESS:    Patient is a 72-year-old woman with psoriatic arthritis, retinal vein occlusion, hypertension, hyperlipidemia, MEJIA, obesity, psoriasis, presenting with generalized weakness and decreased appetite x ~ 1 week.     Noted to be anemic.    Hospital course further complicated by finding of portal vein, SMV, and IMV thromboses on CT scan:    Filling defects within the portal vein and within the superior   mesenteric vein consistent with thrombosis. Heterogenous appearance of   the inferior mesenteric vein with surrounding fat stranding consistent   with thrombosis and thrombophlebitis.  RETROPERITONEUM/LYMPH NODES: Mesenteric adenopathy along the course of   the IMV measuring up to 2.4 x 2.3 cm (2:97)    Started on enoxaparin 1 mg/kg BID.    At this time, etiology unclear, but would need to rule out occult neoplasm as contributing factor.  Agree with enoxaparin at this time.  For long-term anticoagulation, favor continuing with enoxaparin at 1 mg/kg as long as benefits continue to exceed bleeding risks.  May eventually need to consider transition to oral anticoagulation with warfarin (clots in strange places warrant anticoagulation with warfarin rather than DOACs).    Allergies  No Known Allergies    MEDICATIONS:  enoxaparin Injectable 120 milliGRAM(s) SubCutaneous every 12 hours  amoxicillin  875 milliGRAM(s)/clavulanate 1 Tablet(s) Oral two times a day  acetaminophen     Tablet .. 650 milliGRAM(s) Oral every 6 hours PRN  acetaminophen   IVPB .. 1000 milliGRAM(s) IV Intermittent once  melatonin 3 milliGRAM(s) Oral at bedtime PRN  pantoprazole    Tablet 40 milliGRAM(s) Oral two times a day  polyethylene glycol 3350 17 Gram(s) Oral daily  senna 2 Tablet(s) Oral at bedtime  lidocaine   4% Patch 1 Patch Transdermal every 24 hours  lidocaine   4% Patch 1 Patch Transdermal daily  lidocaine 2% Gel 1 Application(s) Topical two times a day    PAST MEDICAL & SURGICAL HISTORY:  HTN (hypertension)  HLD (hyperlipidemia)  Psoriatic arthritis  H/O retinal vein occlusion  Osteoarthritis  No significant past surgical history    FAMILY HISTORY:  NC    SOCIAL HISTORY:    As above, as per chart notes    REVIEW OF SYSTEMS:  As above, as per chart notes    PHYSICAL EXAM:  T(C): 36.3 (01-18-23 @ 13:48), Max: 36.7 (01-17-23 @ 21:47)  HR: 80 (01-18-23 @ 13:48) (75 - 88)  BP: 130/67 (01-18-23 @ 13:48) (122/66 - 136/64)  RR: 18 (01-18-23 @ 13:48) (17 - 18)  SpO2: 95% (01-18-23 @ 13:48) (95% - 98%)    Appearance: NAD, appears chronically ill, laying flat in bed	  HEENT:   No apparent JVD  Cardiovascular: Normal S1 S2  Respiratory: Coarse breath sounds anteriorly  Psychiatry: Awake, can answer questions  Gastrointestinal:  Obese, soft  Extremities: +Edema      LABS:	 	    CBC Full  -  ( 18 Jan 2023 06:25 )  WBC Count : 12.36 K/uL  Hemoglobin : 11.0 g/dL  Hematocrit : 33.9 %  Platelet Count - Automated : 214 K/uL  Mean Cell Volume : 83.9 fL  Mean Cell Hemoglobin : 27.2 pg  Mean Cell Hemoglobin Concentration : 32.4 gm/dL  Auto Neutrophil # : 7.60 K/uL  Auto Lymphocyte # : 2.19 K/uL  Auto Monocyte # : 1.04 K/uL  Auto Eosinophil # : 0.54 K/uL  Auto Basophil # : 0.18 K/uL  Auto Neutrophil % : 61.4 %  Auto Lymphocyte % : 17.7 %  Auto Monocyte % : 8.4 %  Auto Eosinophil % : 4.4 %  Auto Basophil % : 1.5 %    01-18    135  |  99  |  33<H>  ----------------------------<  94  3.7   |  25  |  1.06  01-17    137  |  102  |  44<H>  ----------------------------<  113<H>  4.1   |  21<L>  |  1.43<H>    Ca    9.7      18 Jan 2023 06:25  Ca    9.5      17 Jan 2023 07:10  Phos  2.9     01-18  Phos  3.5     01-17  Mg     1.80     01-18  Mg     1.50     01-17    TPro  7.0  /  Alb  2.2<L>  /  TBili  1.6<H>  /  DBili  x   /  AST  35<H>  /  ALT  23  /  AlkPhos  487<H>  01-18  TPro  6.8  /  Alb  2.1<L>  /  TBili  1.6<H>  /  DBili  x   /  AST  34<H>  /  ALT  23  /  AlkPhos  506<H>  01-17    rad< from: CT Abdomen and Pelvis w/ IV Cont (01.17.23 @ 19:13) >    ACC: [id number] EXAM:  CT ABDOMEN AND PELVIS IC                          PROCEDURE DATE:  01/17/2023          INTERPRETATION:  CLINICAL INFORMATION: Follow-up study for IMV thrombosis   versus thrombophlebitis.    COMPARISON: CT abdomen and pelvis 1/13/2023    CONTRAST/COMPLICATIONS:  IV Contrast: Omnipaque 350  90 cc administered   10 cc discarded  Oral Contrast: NONE  Complications: None reported at time of study completion    PROCEDURE:  CT of the Abdomen and Pelvis was performed.  Sagittal and coronal reformats were performed.    FINDINGS:  LOWER CHEST: Coronary artery calcifications. Right middle lobe cystic   change. Bibasilar subsegmental atelectasis.    LIVER: Unchanged multiple cysts, with the largest one measuring 8 cm in   the hepatic dome. Left hepatic lobe granuloma.  BILE DUCTS: Normal caliber.  GALLBLADDER: Within normal limits.  SPLEEN: Within normal limits.  PANCREAS: Within normal limits.  ADRENALS: 1.9 cm left adrenal nodule.    KIDNEYS/URETERS: Left renal cysts. No hydronephrosis.    BLADDER: Within normal limits.  REPRODUCTIVE ORGANS: Distended endometrial cavity measuring up to 2.0 cm.   Small focus of air in the distal portable view of the endocervical canal.    BOWEL: Sigmoid and distal descending diverticulitis is redemonstrated. No   bowel obstruction. Appendix is normal.  PERITONEUM: No ascites.  VESSELS: Filling defects within the portal vein and within the superior   mesenteric vein consistent with thrombosis. Heterogenous appearance of   the inferior mesenteric vein with surrounding fat stranding consistent   with thrombosis and thrombophlebitis.  RETROPERITONEUM/LYMPH NODES: Mesenteric adenopathy along the course of   the IMV measuring up to 2.4 x 2.3 cm (2:97)  ABDOMINAL WALL: Small fat-containing umbilical hernia. Foci of air   droplet in the right anterior abdominal wall.  BONES: Within normal limits.    IMPRESSION:  *  Nonperforated acute diverticulitis, not significantly changed from   prior exam.  *  Nonocclusive clot in the SMV with wall thickening consistent with   thrombophlebitis.  *  Mesenteric adenopathy along the course of the IMV. While possibly   reactive, malignancy is a consideration. Further evaluation with   colonoscopy is therefore recommended once the patient's acute symptoms   have resolved.  *  Thickened endometrial complex, which may be further evaluated with   pelvic ultrasound.          --- End of Report ---          PRESTON NATHAN MD; Resident Radiologist  This document has been electronically signed.  FEDERICO HERBERT MD; Attending Radiologist  This document has been electronically signed. Jan 18 2023  9:13AM    < end of copied text >     Cardiology/Vascular Medicine Inpatient Consultation Note    HISTORY OF PRESENT ILLNESS:    Patient is a 72-year-old woman with psoriatic arthritis, retinal vein occlusion, hypertension, hyperlipidemia, MEJIA, obesity, psoriasis, presenting with generalized weakness and decreased appetite x ~ 1 week.     Noted to be anemic.    Hospital course further complicated by finding of portal vein, SMV, and IMV thromboses on CT scan:    Filling defects within the portal vein and within the superior   mesenteric vein consistent with thrombosis. Heterogenous appearance of   the inferior mesenteric vein with surrounding fat stranding consistent   with thrombosis and thrombophlebitis.  RETROPERITONEUM/LYMPH NODES: Mesenteric adenopathy along the course of   the IMV measuring up to 2.4 x 2.3 cm (2:97)    Started on enoxaparin 1 mg/kg BID.    At this time, etiology unclear, but would need to rule out occult neoplasm as contributing factor.  Portal vein and/or mesenteric vein thromboses is also a rare complication of abdominal infections including diverticulitis.  Agree with enoxaparin at this time.  For long-term anticoagulation, favor continuing with enoxaparin at 1 mg/kg as long as benefits continue to exceed bleeding risks.  May eventually need to consider transition to oral anticoagulation with warfarin (clots in strange places warrant anticoagulation with warfarin rather than DOACs).    Allergies  No Known Allergies    MEDICATIONS:  enoxaparin Injectable 120 milliGRAM(s) SubCutaneous every 12 hours  amoxicillin  875 milliGRAM(s)/clavulanate 1 Tablet(s) Oral two times a day  acetaminophen     Tablet .. 650 milliGRAM(s) Oral every 6 hours PRN  acetaminophen   IVPB .. 1000 milliGRAM(s) IV Intermittent once  melatonin 3 milliGRAM(s) Oral at bedtime PRN  pantoprazole    Tablet 40 milliGRAM(s) Oral two times a day  polyethylene glycol 3350 17 Gram(s) Oral daily  senna 2 Tablet(s) Oral at bedtime  lidocaine   4% Patch 1 Patch Transdermal every 24 hours  lidocaine   4% Patch 1 Patch Transdermal daily  lidocaine 2% Gel 1 Application(s) Topical two times a day    PAST MEDICAL & SURGICAL HISTORY:  HTN (hypertension)  HLD (hyperlipidemia)  Psoriatic arthritis  H/O retinal vein occlusion  Osteoarthritis  No significant past surgical history    FAMILY HISTORY:  NC    SOCIAL HISTORY:    As above, as per chart notes    REVIEW OF SYSTEMS:  As above, as per chart notes    PHYSICAL EXAM:  T(C): 36.3 (01-18-23 @ 13:48), Max: 36.7 (01-17-23 @ 21:47)  HR: 80 (01-18-23 @ 13:48) (75 - 88)  BP: 130/67 (01-18-23 @ 13:48) (122/66 - 136/64)  RR: 18 (01-18-23 @ 13:48) (17 - 18)  SpO2: 95% (01-18-23 @ 13:48) (95% - 98%)    Appearance: NAD, appears chronically ill, laying flat in bed	  HEENT:   No apparent JVD  Cardiovascular: Normal S1 S2  Respiratory: Coarse breath sounds anteriorly  Psychiatry: Awake, can answer questions  Gastrointestinal:  Obese, soft  Extremities: +Edema      LABS:	 	    CBC Full  -  ( 18 Jan 2023 06:25 )  WBC Count : 12.36 K/uL  Hemoglobin : 11.0 g/dL  Hematocrit : 33.9 %  Platelet Count - Automated : 214 K/uL  Mean Cell Volume : 83.9 fL  Mean Cell Hemoglobin : 27.2 pg  Mean Cell Hemoglobin Concentration : 32.4 gm/dL  Auto Neutrophil # : 7.60 K/uL  Auto Lymphocyte # : 2.19 K/uL  Auto Monocyte # : 1.04 K/uL  Auto Eosinophil # : 0.54 K/uL  Auto Basophil # : 0.18 K/uL  Auto Neutrophil % : 61.4 %  Auto Lymphocyte % : 17.7 %  Auto Monocyte % : 8.4 %  Auto Eosinophil % : 4.4 %  Auto Basophil % : 1.5 %    01-18    135  |  99  |  33<H>  ----------------------------<  94  3.7   |  25  |  1.06  01-17    137  |  102  |  44<H>  ----------------------------<  113<H>  4.1   |  21<L>  |  1.43<H>    Ca    9.7      18 Jan 2023 06:25  Ca    9.5      17 Jan 2023 07:10  Phos  2.9     01-18  Phos  3.5     01-17  Mg     1.80     01-18  Mg     1.50     01-17    TPro  7.0  /  Alb  2.2<L>  /  TBili  1.6<H>  /  DBili  x   /  AST  35<H>  /  ALT  23  /  AlkPhos  487<H>  01-18  TPro  6.8  /  Alb  2.1<L>  /  TBili  1.6<H>  /  DBili  x   /  AST  34<H>  /  ALT  23  /  AlkPhos  506<H>  01-17    rad< from: CT Abdomen and Pelvis w/ IV Cont (01.17.23 @ 19:13) >    ACC: [id number] EXAM:  CT ABDOMEN AND PELVIS IC                          PROCEDURE DATE:  01/17/2023          INTERPRETATION:  CLINICAL INFORMATION: Follow-up study for IMV thrombosis   versus thrombophlebitis.    COMPARISON: CT abdomen and pelvis 1/13/2023    CONTRAST/COMPLICATIONS:  IV Contrast: Omnipaque 350  90 cc administered   10 cc discarded  Oral Contrast: NONE  Complications: None reported at time of study completion    PROCEDURE:  CT of the Abdomen and Pelvis was performed.  Sagittal and coronal reformats were performed.    FINDINGS:  LOWER CHEST: Coronary artery calcifications. Right middle lobe cystic   change. Bibasilar subsegmental atelectasis.    LIVER: Unchanged multiple cysts, with the largest one measuring 8 cm in   the hepatic dome. Left hepatic lobe granuloma.  BILE DUCTS: Normal caliber.  GALLBLADDER: Within normal limits.  SPLEEN: Within normal limits.  PANCREAS: Within normal limits.  ADRENALS: 1.9 cm left adrenal nodule.    KIDNEYS/URETERS: Left renal cysts. No hydronephrosis.    BLADDER: Within normal limits.  REPRODUCTIVE ORGANS: Distended endometrial cavity measuring up to 2.0 cm.   Small focus of air in the distal portable view of the endocervical canal.    BOWEL: Sigmoid and distal descending diverticulitis is redemonstrated. No   bowel obstruction. Appendix is normal.  PERITONEUM: No ascites.  VESSELS: Filling defects within the portal vein and within the superior   mesenteric vein consistent with thrombosis. Heterogenous appearance of   the inferior mesenteric vein with surrounding fat stranding consistent   with thrombosis and thrombophlebitis.  RETROPERITONEUM/LYMPH NODES: Mesenteric adenopathy along the course of   the IMV measuring up to 2.4 x 2.3 cm (2:97)  ABDOMINAL WALL: Small fat-containing umbilical hernia. Foci of air   droplet in the right anterior abdominal wall.  BONES: Within normal limits.    IMPRESSION:  *  Nonperforated acute diverticulitis, not significantly changed from   prior exam.  *  Nonocclusive clot in the SMV with wall thickening consistent with   thrombophlebitis.  *  Mesenteric adenopathy along the course of the IMV. While possibly   reactive, malignancy is a consideration. Further evaluation with   colonoscopy is therefore recommended once the patient's acute symptoms   have resolved.  *  Thickened endometrial complex, which may be further evaluated with   pelvic ultrasound.          --- End of Report ---          PRESTON NATHAN MD; Resident Radiologist  This document has been electronically signed.  FEDERICO HERBERT MD; Attending Radiologist  This document has been electronically signed. Jan 18 2023  9:13AM    < end of copied text >

## 2023-01-18 NOTE — PROGRESS NOTE ADULT - ATTENDING COMMENTS
72F w/ psoriatic arthritis (apremilast), HTN (lisinopril/chlorthalidone) presenting w/ generalized weakness and found to have multiple metabolic derangements including JAZMYNE, hypokalemia, hyponatremia, transaminitis, AGMA and found to have severe sepsis with CT w/ acute uncomplicated diverticulitis c/b IMV thrombosis vs thrombophlebitis and course c/b multiple episode of asymptomatic HoTN, responsive to fluids and now resolved. Overall, patient is improving.   - D/c zosyn today and switch to Augmentin for diverticulitis.   - CTAP with IV contrast showing nonocclusive clot in the SMV as well as thrombophlebitis, started on lovenox overnight. Vascular cardiology consulted to comment. It is possible that the clot was triggered by localized inflammation however the scan also demonstrated mesenteric adenopathy along the course of the IMV which is concerning for possible malignancy.   - Thickened endometrium demonstrated on CT scan, will obtain u/s as part of malignancy w/u  - Consider colonoscopy however will likely need to hold off in the setting of acute diverticulitis.   - Resolving ATN - Cr. continues to downtrend and pt. reports significant urine output. Continue to trend.   - Electrolyte abnormalities from admission are resolved.   - Severe sepsis 2/2 diverticulitis on admission is resolved.  - Dispo, PT recommending [facility] - will need to discuss with patient if she is amenable for rehab.

## 2023-01-18 NOTE — PROGRESS NOTE ADULT - PROBLEM SELECTOR PLAN 4
- Baseline Cr ~1.0  - BUN:Cr > 20 though it is more likely the patient is in ATN given that it has not improved after 5L fluids  - 1/17 creatinine is downtrending. Patient making lots of urine. RESOLVED, was most likely ATN

## 2023-01-18 NOTE — PROGRESS NOTE ADULT - PROBLEM SELECTOR PLAN 1
- Seen on CT  - Most likely cause of metabolic abnormalities + leukocytosis with left shift  - Zosyn 1/13-  - Advanced diet given reassuring abdominal exam without any abdominal pain, tolerating without issue. Passing gas  - Monitor for perforation, abscess, obstruction, fistula - Seen on CT  - Most likely cause of metabolic abnormalities + leukocytosis with left shift  - Zosyn 1/13-1/18  - Advanced diet given reassuring abdominal exam without any abdominal pain, tolerating without issue. Having BMs  - Monitor for perforation, abscess, obstruction, fistula - Seen on CT  - Most likely cause of metabolic abnormalities + leukocytosis with left shift  - Zosyn 1/13-1/18  - Augmentin 1/18-  - Advanced diet given reassuring abdominal exam without any abdominal pain, tolerating without issue. Having BMs  - Monitor for perforation, abscess, obstruction, fistula

## 2023-01-18 NOTE — PROGRESS NOTE ADULT - ASSESSMENT
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 Uremia, liver enzymes improving. Persistent alk phos elevation. 72F with hypertension, MEJIA, psoriatic arthritis presenting with several days of poor PO intake found to be hyponatremic with JAZMYNE, anemia, uremia, and elevated liver enzymes found to have acute diverticulitis and multiple hepatic cysts. F/U CT with contrast showing multiple non-occlusive filling defects. Uremia, liver enzymes improving. Now on anti-coagulation. Persistent alk phos elevation.

## 2023-01-18 NOTE — PROGRESS NOTE ADULT - PROBLEM SELECTOR PLAN 3
- Baseline hgb 14 in 9/22  - New acute anemia with no obvious signs of bleeding.  - Hemodynamically stable  - CT abdomen with diverticulitis  - Tsat 24% and ferritin 1339 consistent with anemia of chronic disease - Baseline hgb 14 in 9/22  - New anemia with no obvious signs of bleeding.  - Hemodynamically stable  - CT abdomen with diverticulitis  - Tsat 24% and ferritin 1339 consistent with anemia of chronic disease

## 2023-01-18 NOTE — PROGRESS NOTE ADULT - PROBLEM SELECTOR PLAN 6
- GGT elevated  - CT abdomen with multiple hepatic cysts  - Still persistently elevated. Likely cholestasis. CT a/p with no abnormalities other than the liver cysts. - GGT elevated  - CT abdomen with multiple hepatic cysts, non-occlusive portal vein thrombus  - Still persistently elevated. Likely cholestasis. CT a/p with no abnormalities other than the liver cysts.

## 2023-01-18 NOTE — PROGRESS NOTE ADULT - SUBJECTIVE AND OBJECTIVE BOX
PROGRESS NOTE:   Authored by: Bj Park M.D.   Internal Medicine PGY-1  Please Contact Via Teams    Patient is a 72y old  Female who presents with a chief complaint of Sepsis, Abnormal Labs (16 Jan 2023 08:48)      SUBJECTIVE / OVERNIGHT EVENTS:  Prelim read for CT venogram with multiple filling defects. Started on therapeutic lovenox for anticoagulation pending final read.    Brief Daily Plan:  - F/U final read  - Begin anticoagulation    ADDITIONAL REVIEW OF SYSTEMS:  Patient denies fevers, chills, chest pain, shortness of breath, nausea, abdominal pain, diarrhea, constipation, dysuria, leg swelling, headache, light headedness.    MEDICATIONS  (STANDING):  acetaminophen   IVPB .. 1000 milliGRAM(s) IV Intermittent once  enoxaparin Injectable 120 milliGRAM(s) SubCutaneous every 12 hours  lactated ringers. 1000 milliLiter(s) (100 mL/Hr) IV Continuous <Continuous>  lidocaine   4% Patch 1 Patch Transdermal every 24 hours  lidocaine   4% Patch 1 Patch Transdermal daily  lidocaine 2% Gel 1 Application(s) Topical two times a day  pantoprazole    Tablet 40 milliGRAM(s) Oral two times a day  piperacillin/tazobactam IVPB.. 3.375 Gram(s) IV Intermittent every 12 hours  polyethylene glycol 3350 17 Gram(s) Oral daily  senna 2 Tablet(s) Oral at bedtime    MEDICATIONS  (PRN):  acetaminophen     Tablet .. 650 milliGRAM(s) Oral every 6 hours PRN Moderate Pain (4 - 6)  melatonin 3 milliGRAM(s) Oral at bedtime PRN Sleep      CAPILLARY BLOOD GLUCOSE        I&O's Summary    16 Jan 2023 07:01  -  17 Jan 2023 07:00  --------------------------------------------------------  IN: 0 mL / OUT: 400 mL / NET: -400 mL        PHYSICAL EXAM:  Vital Signs Last 24 Hrs  T(C): 36.4 (18 Jan 2023 05:49), Max: 36.7 (17 Jan 2023 18:00)  T(F): 97.5 (18 Jan 2023 05:49), Max: 98.1 (17 Jan 2023 21:47)  HR: 81 (18 Jan 2023 05:49) (70 - 88)  BP: 122/66 (18 Jan 2023 05:49) (116/60 - 136/64)  BP(mean): --  RR: 18 (18 Jan 2023 05:49) (17 - 18)  SpO2: 97% (18 Jan 2023 05:49) (95% - 97%)    Parameters below as of 18 Jan 2023 05:49  Patient On (Oxygen Delivery Method): room air        CONSTITUTIONAL: NAD, well-developed  RESPIRATORY: Normal respiratory effort; lungs are clear to auscultation bilaterally  CARDIOVASCULAR: Regular rate and rhythm, normal S1 and S2, no murmur/rub/gallop; No lower extremity edema; Peripheral pulses are 2+ bilaterally  ABDOMEN: Nontender to palpation, normoactive bowel sounds, no rebound/guarding; No hepatosplenomegaly  MUSCLOSKELETAL: no clubbing or cyanosis of digits; no joint swelling or tenderness to palpation  PSYCH: A+O to person, place, and time; affect appropriate    LABS:                        10.8   12.28 )-----------( 191      ( 17 Jan 2023 07:10 )             32.7     01-17    137  |  102  |  44<H>  ----------------------------<  113<H>  4.1   |  21<L>  |  1.43<H>    Ca    9.5      17 Jan 2023 07:10  Phos  3.5     01-17  Mg     1.50     01-17    TPro  6.8  /  Alb  2.1<L>  /  TBili  1.6<H>  /  DBili  x   /  AST  34<H>  /  ALT  23  /  AlkPhos  506<H>  01-17                Tele Reviewed:    RADIOLOGY & ADDITIONAL TESTS:  Results Reviewed:   Imaging Personally Reviewed:  Electrocardiogram Personally Reviewed: PROGRESS NOTE:   Authored by: Bj Park M.D.   Internal Medicine PGY-1  Please Contact Via Teams    Patient is a 72y old  Female who presents with a chief complaint of Sepsis, Abnormal Labs (16 Jan 2023 08:48)      SUBJECTIVE / OVERNIGHT EVENTS:  Prelim read for CT venogram with multiple filling defects. Started on therapeutic lovenox. Pt this morning is comfortable and with no complaints. I discussed keeping her on the blood thinner, which she is amenable for. Additionally, we discussed discharge planning. PT has recommended rehab but patient is hesitant. Her , with an amputation and s/p CABG is at home and she would like to be present with him. Additionally, they had bad experiences at rehab for her , so she is hesitant about that as well.    Brief Daily Plan:  - TVUS  - Begin anticoagulation    MEDICATIONS  (STANDING):  acetaminophen   IVPB .. 1000 milliGRAM(s) IV Intermittent once  enoxaparin Injectable 120 milliGRAM(s) SubCutaneous every 12 hours  lactated ringers. 1000 milliLiter(s) (100 mL/Hr) IV Continuous <Continuous>  lidocaine   4% Patch 1 Patch Transdermal every 24 hours  lidocaine   4% Patch 1 Patch Transdermal daily  lidocaine 2% Gel 1 Application(s) Topical two times a day  pantoprazole    Tablet 40 milliGRAM(s) Oral two times a day  piperacillin/tazobactam IVPB.. 3.375 Gram(s) IV Intermittent every 12 hours  polyethylene glycol 3350 17 Gram(s) Oral daily  senna 2 Tablet(s) Oral at bedtime    MEDICATIONS  (PRN):  acetaminophen     Tablet .. 650 milliGRAM(s) Oral every 6 hours PRN Moderate Pain (4 - 6)  melatonin 3 milliGRAM(s) Oral at bedtime PRN Sleep      CAPILLARY BLOOD GLUCOSE        I&O's Summary    16 Jan 2023 07:01  -  17 Jan 2023 07:00  --------------------------------------------------------  IN: 0 mL / OUT: 400 mL / NET: -400 mL        PHYSICAL EXAM:  Vital Signs Last 24 Hrs  T(C): 36.4 (18 Jan 2023 05:49), Max: 36.7 (17 Jan 2023 18:00)  T(F): 97.5 (18 Jan 2023 05:49), Max: 98.1 (17 Jan 2023 21:47)  HR: 81 (18 Jan 2023 05:49) (70 - 88)  BP: 122/66 (18 Jan 2023 05:49) (116/60 - 136/64)  BP(mean): --  RR: 18 (18 Jan 2023 05:49) (17 - 18)  SpO2: 97% (18 Jan 2023 05:49) (95% - 97%)    Parameters below as of 18 Jan 2023 05:49  Patient On (Oxygen Delivery Method): room air      GENERAL: Obese, comfortable  HEAD:  Atraumatic, Normocephalic  EYES: EOMI, PERRLA, conjunctiva and sclera clear  ENT: Moist mucous membranes  NECK: Supple, No JVD  CHEST/LUNG: Clear to auscultation bilaterally; No wheezing or rhonchi  HEART: Tachycardic; No murmurs.  Peripheral edema: None. Legs large  ABDOMEN: BSx4; Soft, nontender, large  EXTREMITIES:  2+ radial pulses. Calfs nontender non erythematous.  NERVOUS SYSTEM:  A&Ox3, no gross lateralizing deficits   SKIN: No rashes      LABS:                        10.8   12.28 )-----------( 191      ( 17 Jan 2023 07:10 )             32.7     01-17    137  |  102  |  44<H>  ----------------------------<  113<H>  4.1   |  21<L>  |  1.43<H>    Ca    9.5      17 Jan 2023 07:10  Phos  3.5     01-17  Mg     1.50     01-17    TPro  6.8  /  Alb  2.1<L>  /  TBili  1.6<H>  /  DBili  x   /  AST  34<H>  /  ALT  23  /  AlkPhos  506<H>  01-17                < from: CT Abdomen and Pelvis w/ IV Cont (01.17.23 @ 19:13) >  FINDINGS:  LOWER CHEST: Coronary artery calcifications. Right middle lobe cystic   change. Bibasilar subsegmental atelectasis.    LIVER: Unchanged multiple cysts, with the largest one measuring 8 cm in   the hepatic dome. Left hepatic lobe granuloma.  BILE DUCTS: Normal caliber.  GALLBLADDER: Within normal limits.  SPLEEN: Within normal limits.  PANCREAS: Within normal limits.  ADRENALS: 1.9 cm left adrenal nodule.    KIDNEYS/URETERS: Left renal cysts. No hydronephrosis.    BLADDER: Within normal limits.  REPRODUCTIVE ORGANS: Distended endometrial cavity measuring up to 2.0 cm.   Small focus of air in the distal portable view of the endocervical canal.    BOWEL: Sigmoid and distal descending diverticulitis is redemonstrated. No   bowel obstruction. Appendix is normal.  PERITONEUM: No ascites.  VESSELS: Filling defects within the portal vein and within the superior   mesenteric vein consistent with thrombosis. Heterogenous appearance of   the inferior mesenteric vein with surrounding fat stranding consistent   with thrombosis and thrombophlebitis.  RETROPERITONEUM/LYMPH NODES: Mesenteric adenopathy along the course of   the IMV measuring up to 2.4 x 2.3 cm (2:97)  ABDOMINAL WALL: Small fat-containing umbilical hernia. Foci of air   droplet in the right anterior abdominal wall.  BONES: Within normal limits.    < end of copied text >

## 2023-01-18 NOTE — PROGRESS NOTE ADULT - PROBLEM SELECTOR PLAN 2
- Thrombosis vs thrombphlebitis of IMV  - Radiology and surgery recommends CT venogram. Will pre and post hydrate and perform study.  - Consider anticoagulation for IMV thrombosis pending further workup  - DVT proph for now - Thrombosis + thrombphlebitis of IMV  - Filling defects in SMV, portal vein, PA as well  - Concern for malignancy given adenopathy on CT scan. Either uterine or colonic. Pt has not had a colonoscopy.  - Will get TVUS to better evaluate uterine mass  - Will need colonoscopy outpatient. Can do hypercoagulable workup if those negative.  - Will start anti-coagulation  - F/U echo  - Vascular cards consult

## 2023-01-18 NOTE — CONSULT NOTE ADULT - ASSESSMENT
Patient is a 72-year-old woman with psoriatic arthritis, retinal vein occlusion, hypertension, hyperlipidemia, MEJIA, obesity, psoriasis, presenting with generalized weakness and decreased appetite x ~ 1 week.     Noted to be anemic.    Hospital course further complicated by finding of portal vein, SMV, and IMV thromboses on CT scan:    Filling defects within the portal vein and within the superior   mesenteric vein consistent with thrombosis. Heterogenous appearance of   the inferior mesenteric vein with surrounding fat stranding consistent   with thrombosis and thrombophlebitis.  RETROPERITONEUM/LYMPH NODES: Mesenteric adenopathy along the course of   the IMV measuring up to 2.4 x 2.3 cm (2:97)    Started on enoxaparin 1 mg/kg BID.    At this time, etiology unclear, but would need to rule out occult neoplasm as contributing factor.  Agree with enoxaparin at this time.  For long-term anticoagulation, favor continuing with enoxaparin at 1 mg/kg as long as benefits continue to exceed bleeding risks.  May eventually need to consider transition to oral anticoagulation with warfarin (clots in strange places warrant anticoagulation with warfarin rather than DOACs).   Patient is a 72-year-old woman with psoriatic arthritis, retinal vein occlusion, hypertension, hyperlipidemia, MEJIA, obesity, psoriasis, presenting with generalized weakness and decreased appetite x ~ 1 week.     Noted to be anemic.    Hospital course further complicated by finding of portal vein, SMV, and IMV thromboses on CT scan:    Filling defects within the portal vein and within the superior   mesenteric vein consistent with thrombosis. Heterogenous appearance of   the inferior mesenteric vein with surrounding fat stranding consistent   with thrombosis and thrombophlebitis.  RETROPERITONEUM/LYMPH NODES: Mesenteric adenopathy along the course of   the IMV measuring up to 2.4 x 2.3 cm (2:97)    Started on enoxaparin 1 mg/kg BID.    At this time, etiology unclear, but would need to rule out occult neoplasm as contributing factor.  Portal vein and/or mesenteric vein thromboses is also a rare complication of abdominal infections including diverticulitis.  Agree with enoxaparin at this time.  For long-term anticoagulation, favor continuing with enoxaparin at 1 mg/kg as long as benefits continue to exceed bleeding risks.  May eventually need to consider transition to oral anticoagulation with warfarin (clots in strange places warrant anticoagulation with warfarin rather than DOACs).

## 2023-01-18 NOTE — PROGRESS NOTE ADULT - PROBLEM SELECTOR PLAN 9
- Pt taking Otezla. Last dose 2 prior to admission  - Would keep other autoimmune condition on differential given elevated ESR, CRP

## 2023-01-19 ENCOUNTER — TRANSCRIPTION ENCOUNTER (OUTPATIENT)
Age: 73
End: 2023-01-19

## 2023-01-19 LAB
ALBUMIN SERPL ELPH-MCNC: 2.2 G/DL — LOW (ref 3.3–5)
ALP SERPL-CCNC: 401 U/L — HIGH (ref 40–120)
ALT FLD-CCNC: 18 U/L — SIGNIFICANT CHANGE UP (ref 4–33)
ANION GAP SERPL CALC-SCNC: 9 MMOL/L — SIGNIFICANT CHANGE UP (ref 7–14)
AST SERPL-CCNC: 27 U/L — SIGNIFICANT CHANGE UP (ref 4–32)
BASOPHILS # BLD AUTO: 0.05 K/UL — SIGNIFICANT CHANGE UP (ref 0–0.2)
BASOPHILS NFR BLD AUTO: 0.5 % — SIGNIFICANT CHANGE UP (ref 0–2)
BILIRUB SERPL-MCNC: 1.4 MG/DL — HIGH (ref 0.2–1.2)
BUN SERPL-MCNC: 23 MG/DL — SIGNIFICANT CHANGE UP (ref 7–23)
CALCIUM SERPL-MCNC: 9.6 MG/DL — SIGNIFICANT CHANGE UP (ref 8.4–10.5)
CHLORIDE SERPL-SCNC: 100 MMOL/L — SIGNIFICANT CHANGE UP (ref 98–107)
CO2 SERPL-SCNC: 27 MMOL/L — SIGNIFICANT CHANGE UP (ref 22–31)
CREAT SERPL-MCNC: 0.9 MG/DL — SIGNIFICANT CHANGE UP (ref 0.5–1.3)
EGFR: 68 ML/MIN/1.73M2 — SIGNIFICANT CHANGE UP
EOSINOPHIL # BLD AUTO: 0.43 K/UL — SIGNIFICANT CHANGE UP (ref 0–0.5)
EOSINOPHIL NFR BLD AUTO: 4.2 % — SIGNIFICANT CHANGE UP (ref 0–6)
GLUCOSE SERPL-MCNC: 100 MG/DL — HIGH (ref 70–99)
HCT VFR BLD CALC: 32.7 % — LOW (ref 34.5–45)
HGB BLD-MCNC: 10.6 G/DL — LOW (ref 11.5–15.5)
IANC: 6.73 K/UL — SIGNIFICANT CHANGE UP (ref 1.8–7.4)
IMM GRANULOCYTES NFR BLD AUTO: 5 % — HIGH (ref 0–0.9)
LYMPHOCYTES # BLD AUTO: 1.78 K/UL — SIGNIFICANT CHANGE UP (ref 1–3.3)
LYMPHOCYTES # BLD AUTO: 17.3 % — SIGNIFICANT CHANGE UP (ref 13–44)
MAGNESIUM SERPL-MCNC: 1.8 MG/DL — SIGNIFICANT CHANGE UP (ref 1.6–2.6)
MCHC RBC-ENTMCNC: 27.2 PG — SIGNIFICANT CHANGE UP (ref 27–34)
MCHC RBC-ENTMCNC: 32.4 GM/DL — SIGNIFICANT CHANGE UP (ref 32–36)
MCV RBC AUTO: 84.1 FL — SIGNIFICANT CHANGE UP (ref 80–100)
MONOCYTES # BLD AUTO: 0.8 K/UL — SIGNIFICANT CHANGE UP (ref 0–0.9)
MONOCYTES NFR BLD AUTO: 7.8 % — SIGNIFICANT CHANGE UP (ref 2–14)
NEUTROPHILS # BLD AUTO: 6.73 K/UL — SIGNIFICANT CHANGE UP (ref 1.8–7.4)
NEUTROPHILS NFR BLD AUTO: 65.2 % — SIGNIFICANT CHANGE UP (ref 43–77)
NRBC # BLD: 0 /100 WBCS — SIGNIFICANT CHANGE UP (ref 0–0)
NRBC # FLD: 0 K/UL — SIGNIFICANT CHANGE UP (ref 0–0)
PHOSPHATE SERPL-MCNC: 2.9 MG/DL — SIGNIFICANT CHANGE UP (ref 2.5–4.5)
PLATELET # BLD AUTO: 215 K/UL — SIGNIFICANT CHANGE UP (ref 150–400)
POTASSIUM SERPL-MCNC: 3.8 MMOL/L — SIGNIFICANT CHANGE UP (ref 3.5–5.3)
POTASSIUM SERPL-SCNC: 3.8 MMOL/L — SIGNIFICANT CHANGE UP (ref 3.5–5.3)
PROT SERPL-MCNC: 6.6 G/DL — SIGNIFICANT CHANGE UP (ref 6–8.3)
RBC # BLD: 3.89 M/UL — SIGNIFICANT CHANGE UP (ref 3.8–5.2)
RBC # FLD: 15.1 % — HIGH (ref 10.3–14.5)
SODIUM SERPL-SCNC: 136 MMOL/L — SIGNIFICANT CHANGE UP (ref 135–145)
WBC # BLD: 10.31 K/UL — SIGNIFICANT CHANGE UP (ref 3.8–10.5)
WBC # FLD AUTO: 10.31 K/UL — SIGNIFICANT CHANGE UP (ref 3.8–10.5)

## 2023-01-19 PROCEDURE — 76830 TRANSVAGINAL US NON-OB: CPT | Mod: 26

## 2023-01-19 PROCEDURE — 99232 SBSQ HOSP IP/OBS MODERATE 35: CPT | Mod: GC

## 2023-01-19 RX ORDER — ENOXAPARIN SODIUM 100 MG/ML
120 INJECTION SUBCUTANEOUS
Qty: 1 | Refills: 0
Start: 2023-01-19 | End: 2023-03-19

## 2023-01-19 RX ORDER — ENOXAPARIN SODIUM 100 MG/ML
120 INJECTION SUBCUTANEOUS
Qty: 48 | Refills: 0
Start: 2023-01-19 | End: 2023-02-17

## 2023-01-19 RX ADMIN — ENOXAPARIN SODIUM 120 MILLIGRAM(S): 100 INJECTION SUBCUTANEOUS at 09:59

## 2023-01-19 RX ADMIN — LIDOCAINE 1 PATCH: 4 CREAM TOPICAL at 13:55

## 2023-01-19 RX ADMIN — Medication 1 TABLET(S): at 18:59

## 2023-01-19 RX ADMIN — Medication 1 TABLET(S): at 05:50

## 2023-01-19 RX ADMIN — LIDOCAINE 1 PATCH: 4 CREAM TOPICAL at 08:45

## 2023-01-19 RX ADMIN — PANTOPRAZOLE SODIUM 40 MILLIGRAM(S): 20 TABLET, DELAYED RELEASE ORAL at 19:04

## 2023-01-19 RX ADMIN — Medication 3 MILLIGRAM(S): at 21:25

## 2023-01-19 RX ADMIN — LIDOCAINE 1 PATCH: 4 CREAM TOPICAL at 19:47

## 2023-01-19 RX ADMIN — LIDOCAINE 1 PATCH: 4 CREAM TOPICAL at 07:56

## 2023-01-19 RX ADMIN — LIDOCAINE 1 PATCH: 4 CREAM TOPICAL at 19:48

## 2023-01-19 RX ADMIN — ENOXAPARIN SODIUM 120 MILLIGRAM(S): 100 INJECTION SUBCUTANEOUS at 21:24

## 2023-01-19 RX ADMIN — LIDOCAINE 1 APPLICATION(S): 4 CREAM TOPICAL at 18:58

## 2023-01-19 RX ADMIN — LIDOCAINE 1 APPLICATION(S): 4 CREAM TOPICAL at 05:50

## 2023-01-19 RX ADMIN — PANTOPRAZOLE SODIUM 40 MILLIGRAM(S): 20 TABLET, DELAYED RELEASE ORAL at 05:51

## 2023-01-19 NOTE — DISCHARGE NOTE PROVIDER - NSDCFUADDAPPT_GEN_ALL_CORE_FT
Please Follow-up with the Ob-Gyn clinic. Please call [phone] to set up the appointment. Please Follow-up with the Ob-Gyn clinic. Please call [phone] to set up the appointment.    APPTS ARE READY TO BE MADE: [ ] YES    Best Family or Patient Contact (if needed):    Additional Information about above appointments (if needed):    1:   2:   3:     Other comments or requests:    Please Follow-up with the Ob-Gyn clinic. Please call [phone] to set up the appointment.    APPTS ARE READY TO BE MADE: [ ] YES    Best Family or Patient Contact (if needed):    Additional Information about above appointments (if needed):    1: Dr Matamoros  2: Dr Goldberg  3: Dr Cabrera    Other comments or requests:

## 2023-01-19 NOTE — DISCHARGE NOTE PROVIDER - NSDCCPTREATMENT_GEN_ALL_CORE_FT
PRINCIPAL PROCEDURE  Procedure: CT abdomen pelvis  Findings and Treatment: FINDINGS:  LOWER CHEST: Coronary artery calcifications. Right middle lobe cystic   change. Bibasilar sub  < end of copied text >  segmental atelectasis.  LIVER: Unchanged multiple cysts, with the largest one measuring 8 cm in   the hepatic dome. Left hepatic lobe granuloma.  BILE DUCTS: Normal caliber.  GALLBLADDER: Within normal limits.  SPLEEN: Within normal limits.  PANCREAS: Within normal limits.  ADRENALS: 1.9 cm left adrenal nodule.  KIDNEYS/URETERS: Left renal cysts. No hydronephrosis.  BLADDER: Within normal limits.  REPRODUCTIVE ORGANS: Distended endometrial cavity measuring up to 2.0 cm.   Small focus of air in the distal portable view of the endocervical canal.  BOWEL: Sigmoid and distal descending diverticulitis is redemonstrated. No   bowel obstruction. Appendix is normal.  PERITONEUM: No ascites.  VESSELS: Filling defects within the portal vein and within the superior   mesenteric vein consistent with thrombosis. Heterogenous appearance of   the inferior mesenteric vein with surrounding fat stranding consistent   with thrombosis and thrombophlebitis.  RETROPERITONEUM/LYMPH NODES: Mesenteric adenopathy along the course of   the IMV measuring up to 2.4 x 2.3 cm (2:97)  ABDOMINAL WALL: Small fat-containing umbilical hernia. Foci of air   droplet in the right anterior abdominal wall.  BONES: Within normal limits.< from: CT Abdomen and Pelvis w/ IV Cont (01.17.23 @ 19:13) >        SECONDARY PROCEDURE  Procedure: US transvaginal  Findings and Treatment:   < end of copied text >  FINDINGS:  Uterus: 8.4 cm x 4.0 cm x 5.2 cm. Within normal limits.  Endometrium: 17 mm. Markedly thickened with cystic change and minimal   internal vascularity.  Right ovary: Not visualized.  Leftovary: Not visualized.  Fluid: None.< from: US Transvaginal (01.19.23 @ 12:17) >

## 2023-01-19 NOTE — DISCHARGE NOTE PROVIDER - NSDCFUSCHEDAPPT_GEN_ALL_CORE_FT
Marlin Ribeiro  [facility]  OPHTHALM [address]  Scheduled Appointment: 01/26/2023    [facility]  INTMED OP [id number]  Scheduled Appointment: 02/01/2023    Goldberg, Naomi  [facility]  OPHTHALM [address]  Scheduled Appointment: 02/27/2023     [facility]  INTMED OP [id number]  Scheduled Appointment: 02/01/2023    Goldberg, Naomi  [facility] Physician [facility] [address] [facility]  Scheduled Appointment: 02/27/2023     [facility]  INTMED OP [id number]  Scheduled Appointment: 02/01/2023    [facility]  OBGYNGEN  [address]  Scheduled Appointment: 02/21/2023    Goldberg, Naomi  [address]  Scheduled Appointment: 02/27/2023     [facility]  OBGYMADI  [address]  Scheduled Appointment: 02/21/2023    Goldberg, Naomi  [facility] [address] [facility]  Scheduled Appointment: 02/27/2023

## 2023-01-19 NOTE — PROGRESS NOTE ADULT - PROBLEM SELECTOR PLAN 1
- Seen on CT  - Most likely cause of metabolic abnormalities + leukocytosis with left shift  - Zosyn 1/13-1/18  - Augmentin 1/18-  - Advanced diet given reassuring abdominal exam without any abdominal pain, tolerating without issue. Having BMs  - Monitor for perforation, abscess, obstruction, fistula

## 2023-01-19 NOTE — PROGRESS NOTE ADULT - PROBLEM SELECTOR PLAN 6
- GGT elevated  - CT abdomen with multiple hepatic cysts, non-occlusive portal vein thrombus  - Still persistently elevated. Likely cholestasis. CT a/p with no abnormalities other than the liver cysts.

## 2023-01-19 NOTE — DISCHARGE NOTE PROVIDER - NSDCMRMEDTOKEN_GEN_ALL_CORE_FT
atorvastatin 20 mg oral tablet: 1 tab(s) orally once a day  chlorthalidone 25 mg oral tablet: 1 tab(s) orally once a day  lisinopril 40 mg oral tablet: 1 tab(s) orally once a day  Metoprolol Tartrate 100 mg oral tablet: 1 tab(s) orally 2 times a day  Otezla 30 mg oral tablet: 1 tab(s) orally 2 times a day   atorvastatin 20 mg oral tablet: 1 tab(s) orally once a day  chlorthalidone 25 mg oral tablet: 1 tab(s) orally once a day  enoxaparin 120 mg/0.8 mL injectable solution: 120 milligram(s) subcutaneously every 12 hours   lisinopril 40 mg oral tablet: 1 tab(s) orally once a day  Metoprolol Tartrate 100 mg oral tablet: 1 tab(s) orally 2 times a day  Otezla 30 mg oral tablet: 1 tab(s) orally 2 times a day   atorvastatin 20 mg oral tablet: 1 tab(s) orally once a day  chlorthalidone 25 mg oral tablet: 1 tab(s) orally once a day  enoxaparin 120 mg/0.8 mL injectable solution: 120 milligram(s) subcutaneously every 12 hours   enoxaparin 120 mg/0.8 mL injectable solution: 120 milligram(s) subcutaneously 2 times a day   lisinopril 40 mg oral tablet: 1 tab(s) orally once a day  Metoprolol Tartrate 100 mg oral tablet: 1 tab(s) orally 2 times a day  Otezla 30 mg oral tablet: 1 tab(s) orally 2 times a day   atorvastatin 20 mg oral tablet: 1 tab(s) orally once a day  chlorthalidone 25 mg oral tablet: 1 tab(s) orally once a day  enoxaparin: 100 milligram(s) subcutaneous 2 times a day  guaiFENesin 600 mg oral tablet, extended release: 1 tab(s) orally every 12 hours  Lidocare Pain Relief Patch 4% topical film: Apply topically to affected area once a day  lisinopril 40 mg oral tablet: 1 tab(s) orally once a day  melatonin 3 mg oral tablet: 2 tab(s) orally once a day (at bedtime)  Metoprolol Tartrate 100 mg oral tablet: 1 tab(s) orally 2 times a day  Otezla 30 mg oral tablet: 1 tab(s) orally 2 times a day   acetaminophen 325 mg oral tablet: 2 tab(s) orally every 6 hours, As needed, Moderate Pain (4 - 6)  atorvastatin 20 mg oral tablet: 1 tab(s) orally once a day  enoxaparin: 100 milligram(s) subcutaneous 2 times a day  guaiFENesin 600 mg oral tablet, extended release: 1 tab(s) orally every 12 hours  Lidocare Pain Relief Patch 4% topical film: Apply topically to affected area once a day  melatonin 3 mg oral tablet: 2 tab(s) orally once a day (at bedtime)  pantoprazole 40 mg oral delayed release tablet: 1 tab(s) orally 2 times a day

## 2023-01-19 NOTE — PROGRESS NOTE ADULT - PROBLEM SELECTOR PLAN 3
- Baseline hgb 14 in 9/22  - New anemia with no obvious signs of bleeding.  - Hemodynamically stable  - CT abdomen with diverticulitis  - Tsat 24% and ferritin 1339 consistent with anemia of chronic disease

## 2023-01-19 NOTE — PROGRESS NOTE ADULT - ATTENDING COMMENTS
72F w/ psoriatic arthritis (apremilast), HTN (lisinopril/chlorthalidone) presenting w/ generalized weakness and found to have multiple metabolic derangements including JAZMYNE, hypokalemia, hyponatremia, transaminitis, AGMA and found to have severe sepsis with CT w/ acute uncomplicated diverticulitis c/b IMV thrombosis vs thrombophlebitis and course c/b multiple episode of asymptomatic HoTN, responsive to fluids and now resolved. Overall, patient is improving.   - D/c zosyn today and switch to Augmentin for diverticulitis.   - CTAP with IV contrast showing nonocclusive clot in the SMV as well as thrombophlebitis, started on lovenox overnight. It is possible that the clot was triggered by localized inflammation however the scan also demonstrated mesenteric adenopathy along the course of the IMV which is concerning for possible malignancy. Vascular cardiology says lovenox is okay for now but coumadin is the most appropriate choice.   - Thickened endometrium demonstrated on CT scan, u/s with thickened endometrial stripe of 17mm. Will need outpatient gyne follow up.  - Outpatient colonoscopy to evaluate for malignancy as well.   - Resolving ATN - Cr. continues to downtrend and pt. reports significant urine output. Continue to trend.   - Electrolyte abnormalities from admission are resolved.   - Severe sepsis 2/2 diverticulitis on admission is resolved.  - Dispo, PT recommending [facility] - patient does not want to go to [facility] however she is currently deconditioned and admits that she cannot ambulate on her own. Lives with  who requires an aide. Need to determine if services would be available to her at home.

## 2023-01-19 NOTE — DISCHARGE NOTE PROVIDER - NSFOLLOWUPCLINICS_GEN_ALL_ED_FT
Gastroenterology at [facility]  Gastroenterology  [address] [place], [place] [address]  Phone: [phone]  Fax:   Follow Up Time: 1 month     Gastroenterology at [facility]  Gastroenterology  [address] [place] [address]  Phone: [phone]  Fax:   Follow Up Time: 1 month    [facility]  Orthopedic Surgery  [address] [facility], 3rd & 4th [place] [address]  Phone: [phone]  Fax:   Follow Up Time: 1 month

## 2023-01-19 NOTE — DISCHARGE NOTE PROVIDER - NSDCCPCAREPLAN_GEN_ALL_CORE_FT
PRINCIPAL DISCHARGE DIAGNOSIS  Diagnosis: Acute diverticulitis  Assessment and Plan of Treatment:       SECONDARY DISCHARGE DIAGNOSES  Diagnosis: PVT (portal vein thrombosis)  Assessment and Plan of Treatment: You were found to have mulitple blood clots in the vessels in your abdomen.     PRINCIPAL DISCHARGE DIAGNOSIS  Diagnosis: Acute diverticulitis  Assessment and Plan of Treatment: You came to the hospital because you had weakness. You were found to have diverticulitis, which in inflammation of the descending colon. You received IV adn the oral antibiotics for this, with improvment in your symptoms.      SECONDARY DISCHARGE DIAGNOSES  Diagnosis: PVT (portal vein thrombosis)  Assessment and Plan of Treatment: You were found to have mulitple blood clots in the vessels in your abdomen. This was likely due to either sedentary lifestyle, obesity, or an underlying malignancy. You should follow with GI outpatient for a colonoscopy, and with GYN for results of your endometrial biopsy. You were started on anticoagulation to prevent new blood clots from forming and the curernt ones from increasing in size. **** home anticoagulation*****     PRINCIPAL DISCHARGE DIAGNOSIS  Diagnosis: Acute diverticulitis  Assessment and Plan of Treatment: You came to the hospital because you had weakness. You were found to have diverticulitis, which in inflammation of the descending colon. You received IV and oral antibiotics for this. Your symptoms have improved and this condition has resolved.  Please return to the hospital if you experience fever, chills, severe headache, dizziness, lightheadedness, loss of consciousness, visual disturbance, chest pain, palpitations, shortness of breath,  abdominal pain, nausea, vomiting, diarrhea, blood in your vomit/stool/urine, burning with urination or change in urinary pattern, numbness, weakness, tingling, or other alarming symptoms.      SECONDARY DISCHARGE DIAGNOSES  Diagnosis: PVT (portal vein thrombosis)  Assessment and Plan of Treatment: You were found to have multiple blood clots in the vessels in your abdomen. This was likely due to either sedentary lifestyle, obesity, or an underlying malignancy. You should follow with GI outpatient for a colonoscopy, and with GYN for results of your endometrial biopsy. You were started on anticoagulation to prevent new blood clots from forming and the current ones from increasing in size. You were started on lovenox injections, however you noted that you are unable to self-inject lovenox so we are going to treat you with warfarin. You need to check your therapeutic levels (INR) of warfarin, which requires weekly visits to your PCP. Follow-up with the warfarin clinic at [facility].    Diagnosis: Osteoarthritis  Assessment and Plan of Treatment: You were found to have osteoarthritis of your right knee on x-ray which is due to overuse and excessive body weight. Continue to work with physical therapy at sub-acute rehab to improve your muscle strength. You were also complaining of bilateral foot pain. Follow-up with your rheumatologist for management of this condition.     PRINCIPAL DISCHARGE DIAGNOSIS  Diagnosis: Acute diverticulitis  Assessment and Plan of Treatment: You came to the hospital because you had weakness. You were found to have diverticulitis, which in inflammation of the descending colon. You received IV and oral antibiotics for this. Your symptoms have improved and this condition has resolved.  Please return to the hospital if you experience fever, chills, severe headache, dizziness, lightheadedness, loss of consciousness, visual disturbance, chest pain, palpitations, shortness of breath,  abdominal pain, nausea, vomiting, diarrhea, blood in your vomit/stool/urine, burning with urination or change in urinary pattern, numbness, weakness, tingling, or other alarming symptoms.      SECONDARY DISCHARGE DIAGNOSES  Diagnosis: PVT (portal vein thrombosis)  Assessment and Plan of Treatment: You were found to have multiple blood clots in the vessels in your abdomen. This was likely due to either sedentary lifestyle, obesity, or an underlying malignancy. You should follow with GI outpatient for a colonoscopy, and with GYN for results of your endometrial biopsy. You were started on anticoagulation to prevent new blood clots from forming and the current ones from increasing in size. You were started on lovenox injections, however you noted that you are unable to self-inject lovenox so you can be bridged to warfarin at rehab. You will need to check your therapeutic levels (INR) of warfarin, which requires weekly visits to your PCP. Follow-up with the warfarin clinic at [facility] [place] [facility].    Diagnosis: Osteoarthritis  Assessment and Plan of Treatment: You were found to have osteoarthritis of your right knee on x-ray which is due to overuse and excessive body weight. Continue to work with physical therapy at sub-acute rehab to improve your muscle strength. You were also complaining of bilateral foot pain. Follow-up with your rheumatologist for management of this condition.

## 2023-01-19 NOTE — PROGRESS NOTE ADULT - ASSESSMENT
72F with hypertension, MEJIA, psoriatic arthritis presenting with several days of poor PO intake found to be hyponatremic with JAZMYNE, anemia, uremia, and elevated liver enzymes found to have acute diverticulitis and multiple hepatic cysts. F/U CT with contrast showing multiple non-occlusive filling defects. Uremia, liver enzymes improving. Now on anti-coagulation. Persistent alk phos elevation.

## 2023-01-19 NOTE — DISCHARGE NOTE PROVIDER - HOSPITAL COURSE
HPI: 72-year-old female with history of psoriatic arthritis, retinal vein occlusion, hypertension, hyperlipidemia, MEJIA, obesity, psoriasis, presenting with generalized weakness and decreased appetite x ~ 1wk. States that she has had decreased urine output, feels dehydrated, otherwise has had some constipation but denies fevers, cough, chest/abdominal pain, n/v, SOB, dysuria.     ED Course:  Initial vitals 98.2 HR 72 102/53 RR16 100% RA  Given 3L IVF for dehydration    On my exam in the ED patient was very anxious. She began shivering during exam, stating that this happens at times and asking for heating packs. She endorses anxiety as the triggering factor. Recent vitals were normal.    She states multiple times that she does not want to die.    Given drop on HGB, she denies dark or bloody stools. She also denies abdominal/postprandial pain. Other ROS difficult to obtain given anxiety.    Hospital Course:    Patient was found to have acute diverticulitis with possible IMV thrombus vs thrombophlebitis on non-contrast CT scan. She could not receive contrast because she had JAZMYNE that was ultimately ATN. She received 9 days of antibiotics for the diverticulitis. When the renal function returned to normal, a CT venogram which showed non-occlusive clots in the IMV, SMV and portal vein as well. Vascular cardiology was consulted and recommended lovenox and then a possible bridge to warfarin. No DOAC because clots in strange places warrant warfarin.     Once anticoagulation was started, she was essentially medically ready. However, PT recommended rehab and she didn’t want to go because she has had a bad experience with her  who needed rehab after an amputation. She and her daughter Mg went back and forth about rehab vs no rehab, so in the meantime a malignancy workup began. A TVUS which showed a thickened endometrial stripe of 17mm. GYN was consulted for EMB, and patient DID/DID not receive EMB    She has chronic RLE pain for which she has been seen by rheum outpatient. Diagnosis is osteoarthritis, and a right knee MRI was recommended on 9/15/22 but was never done. Xray of the RLE confirmed osteoarthritis of the right knee. Was recommended she follow with an orthopedic surgeon outpatient.    The patient ultimately chose _____ for her disposition. She will be on lovenox/warfarin for anticoagulation, and follow with GI, GYN, vascular cardiology outpatient.   72 year old woman with psoriatic arthritis, retinal vein occlusion, hypertension, HLD, MEJIA, obesity, psoriasis who presented for generalized weakness and decreased appetite. Patient found to have acute diverticulitis with possible IMV thrombus on non-contrast CT scan. Course was complicated by JAZMYNE, likely etiology ATN that has resolved. Patient was treated with 9 day course of antibiotics for diverticulitis. CT venogram was done that showed non-occlusive clots in the IMV, SMV and portal vein. Vascular cardiology was consulted who recommended lovenox and possible bridge to warfarin. Patient and family decided that lovenox was optimal for their situation. Lovenox education was provided to patient and family. Course was complicated by dactylitis that resolved with NSAIDs. Rheumatology recommended restarting home otezla for psoriatic arthritis. Course complicated by b/l foot pain and rt knee pain. Xrays done showing osteoarthritis of the right knee, with exacerbation likely due to prolonged immobilization from hospitalization. During hospitalization etiology of hypercoagulable state was investigated. Endometrial biopsy performed for thickened endometrial stripe of 17mm seen on CT. Patient seen by PT who recommended [facility], patient initially against going to [facility] due to past experiences with family, however ultimately decided to proceed with [facility]. Patient will be discharged on lovenox injections. Patient to follow-up with GI for outpatient colonoscopy, GYN,  vascular cardiology, PCP, and orthopedic surgery.   72 year old woman with psoriatic arthritis, retinal vein occlusion, hypertension, HLD, MEJIA, obesity, psoriasis who presented for generalized weakness and decreased appetite. Patient found to have acute diverticulitis with possible IMV thrombus on non-contrast CT scan. Course was complicated by JAZMYNE, likely etiology ATN that has resolved. Patient was treated with 9 day course of antibiotics for diverticulitis. CT venogram was done that showed non-occlusive clots in the IMV, SMV and portal vein. Vascular cardiology was consulted who recommended lovenox and possible bridge to warfarin. Patient and family decided that lovenox was optimal for their situation. Lovenox education was provided to patient and family. Course was complicated by dactylitis that resolved with NSAIDs. Rheumatology recommended restarting home otezla for psoriatic arthritis. Course complicated by b/l foot pain and rt knee pain. Xrays done showing osteoarthritis of the right knee, with exacerbation likely due to prolonged immobilization from hospitalization. During hospitalization etiology of hypercoagulable state was investigated. Endometrial biopsy performed for thickened endometrial stripe of 17mm seen on CT. Patient seen by PT who recommended [facility], patient initially against going to [facility] due to past experiences with family, however ultimately decided to proceed with [facility]. Patient will be discharged on lovenox injections. Course complicated by COVID-19 infection, patient asymptomatic but otezla discontinued as a precaution. Course complicated by vaginal bleeding present on urinalysis, confirmed that exclusively vaginal/uterine in origin from straight catheterization of bladder. Patient to follow-up with GI for outpatient colonoscopy, GYN,  vascular cardiology, PCP, and orthopedic surgery.   72 year old woman with psoriatic arthritis, retinal vein occlusion, hypertension, HLD, MEJIA, obesity, psoriasis who presented for generalized weakness and decreased appetite. Patient found to have acute diverticulitis with possible IMV thrombus on non-contrast CT scan. Course was complicated by JAZMYNE, likely etiology ATN that has resolved. Patient was treated with 9 day course of antibiotics for diverticulitis. CT venogram was done that showed non-occlusive clots in the IMV, SMV and portal vein. Vascular cardiology was consulted who recommended lovenox and possible bridge to warfarin. Patient and family decided that lovenox was optimal for their situation. Lovenox education was provided to patient and family. Patient may decide to switch to warfarin while in [facility]. Course was complicated by dactylitis that resolved with NSAIDs. Rheumatology recommended restarting home otezla for psoriatic arthritis. Course complicated by b/l foot pain and rt knee pain. Xrays done showing osteoarthritis of the right knee, with exacerbation likely due to prolonged immobilization from hospitalization. During hospitalization etiology of hypercoagulable state was investigated. Endometrial biopsy performed for thickened endometrial stripe of 17mm seen on CT. Inadequate biopsy obtained due to cervical stenosis. Patient seen by PT who recommended [facility], patient initially against going to [facility] due to past experiences with family, however ultimately decided to proceed with [facility]. Patient will be discharged on lovenox injections. Course complicated by COVID-19 infection, patient asymptomatic but otezla discontinued as a precaution. Course complicated by vaginal bleeding  confirmed that exclusively vaginal/uterine in origin from straight catheterization of bladder. Patient to follow-up with GI for outpatient colonoscopy, GYN for D&C,  vascular cardiology for venous thrombi, PCP for INR monitoring, and orthopedic surgery.

## 2023-01-19 NOTE — PROGRESS NOTE ADULT - PROBLEM SELECTOR PLAN 2
- Thrombosis + thrombphlebitis of IMV  - Filling defects in SMV, portal vein, PA as well  - Concern for malignancy given adenopathy on CT scan. Either uterine or colonic. Pt has not had a colonoscopy.  - Will get TVUS to better evaluate uterine mass  - Will need colonoscopy outpatient. Can do hypercoagulable workup if those negative.  - Will start anti-coagulation  - F/U echo  - Vascular cards consult - Thrombosis + thrombphlebitis of IMV  - Filling defects in SMV, portal vein as well  - Concern for malignancy given adenopathy on CT scan. Either uterine or colonic. Pt has not had a colonoscopy.  -TVUS with 17mm stripe. Can f/u outpatient for EMB  - Will need colonoscopy outpatient. Can do hypercoagulable workup if those negative.  - Continue lovenox. Would prefer to keep on lovenox for anticoagulation as opposed to warfarin. No DOAC as per vasc cards.  - F/U echo

## 2023-01-19 NOTE — DISCHARGE NOTE PROVIDER - CARE PROVIDER_API CALL
Luis Antonio Mcgarry (MD; PhD)  Cardiology; Internal Medicine; Vascular Medicine  [address] [place] [address], [address]  [place] [address]  Phone: [phone]  Fax: [phone]  Follow Up Time: 2 weeks   Luis Antonio Mcgarry; PhD)  Cardiology; Internal Medicine; Vascular Medicine  [address] [place] [address], [address]  [place] [address]  Phone: [phone]  Fax: [phone]  Follow Up Time: 2 weeks    Luis Armando Cabrera)  Internal Medicine  [address] [place] [address]  Phone: [phone]  Fax: [phone]  Follow Up Time: 2 weeks   Luis Antonio Mcgarry; PhD)  Cardiology; Internal Medicine; Vascular Medicine  [address] [place] [address], [address]  [place] [address]  Phone: [phone]  Fax: [phone]  Follow Up Time: 2 weeks    Luis Armando Cabrera)  Internal Medicine  [address] [place], [place] [address]  [place] [address]  Phone: [phone]  Fax: [phone]  Follow Up Time: 2 weeks    Macey Matamoros)  Internal Medicine; Rheumatology  [address] [place] [address], [address]  [place] [address]  Phone: [phone]  Fax: [phone]  Follow Up Time: 1 month

## 2023-01-19 NOTE — DISCHARGE NOTE PROVIDER - PROVIDER TOKENS
PROVIDER:[TOKEN:[[id number]],FOLLOWUP:[2 weeks]] PROVIDER:[TOKEN:[[id number]],FOLLOWUP:[2 weeks]],PROVIDER:[TOKEN:[[id number]],FOLLOWUP:[2 weeks]] PROVIDER:[TOKEN:[[id number]],FOLLOWUP:[2 weeks]],PROVIDER:[TOKEN:[[id number]],FOLLOWUP:[2 weeks]],PROVIDER:[TOKEN:[[id number]],FOLLOWUP:[1 month]]

## 2023-01-19 NOTE — PROGRESS NOTE ADULT - PROBLEM SELECTOR PLAN 12
Diet: full liquid diet, advance to regular  DVT Proph: Heparin subq  Dispo: PT recommends rehab. Will have daughter aid in discussion. Diet: full liquid diet, advance to regular  DVT Proph: Heparin subq  Dispo: PT wants to go home. Will set up with home PT. Can follow with PCP, gyn resident clinic, GI.

## 2023-01-19 NOTE — DISCHARGE NOTE PROVIDER - CARE PROVIDERS DIRECT ADDRESSES
,[email or web address]

## 2023-01-20 LAB
ALBUMIN SERPL ELPH-MCNC: 2.6 G/DL — LOW (ref 3.3–5)
ALP SERPL-CCNC: 418 U/L — HIGH (ref 40–120)
ALT FLD-CCNC: 20 U/L — SIGNIFICANT CHANGE UP (ref 4–33)
ANION GAP SERPL CALC-SCNC: 9 MMOL/L — SIGNIFICANT CHANGE UP (ref 7–14)
AST SERPL-CCNC: 45 U/L — HIGH (ref 4–32)
BASE EXCESS BLDV CALC-SCNC: 5.5 MMOL/L — HIGH (ref -2–3)
BASOPHILS # BLD AUTO: 0.09 K/UL — SIGNIFICANT CHANGE UP (ref 0–0.2)
BASOPHILS NFR BLD AUTO: 0.9 % — SIGNIFICANT CHANGE UP (ref 0–2)
BILIRUB SERPL-MCNC: 1.2 MG/DL — SIGNIFICANT CHANGE UP (ref 0.2–1.2)
BLOOD GAS VENOUS COMPREHENSIVE RESULT: SIGNIFICANT CHANGE UP
BUN SERPL-MCNC: 18 MG/DL — SIGNIFICANT CHANGE UP (ref 7–23)
CALCIUM SERPL-MCNC: 9.8 MG/DL — SIGNIFICANT CHANGE UP (ref 8.4–10.5)
CHLORIDE BLDV-SCNC: 101 MMOL/L — SIGNIFICANT CHANGE UP (ref 96–108)
CHLORIDE SERPL-SCNC: 100 MMOL/L — SIGNIFICANT CHANGE UP (ref 98–107)
CO2 BLDV-SCNC: 32.6 MMOL/L — HIGH (ref 22–26)
CO2 SERPL-SCNC: 28 MMOL/L — SIGNIFICANT CHANGE UP (ref 22–31)
CREAT SERPL-MCNC: 0.82 MG/DL — SIGNIFICANT CHANGE UP (ref 0.5–1.3)
EGFR: 76 ML/MIN/1.73M2 — SIGNIFICANT CHANGE UP
EOSINOPHIL # BLD AUTO: 0.34 K/UL — SIGNIFICANT CHANGE UP (ref 0–0.5)
EOSINOPHIL NFR BLD AUTO: 3.6 % — SIGNIFICANT CHANGE UP (ref 0–6)
GAS PNL BLDV: 135 MMOL/L — LOW (ref 136–145)
GLUCOSE BLDV-MCNC: 87 MG/DL — SIGNIFICANT CHANGE UP (ref 70–99)
GLUCOSE SERPL-MCNC: 88 MG/DL — SIGNIFICANT CHANGE UP (ref 70–99)
HCO3 BLDV-SCNC: 31 MMOL/L — HIGH (ref 22–29)
HCT VFR BLD CALC: 33.9 % — LOW (ref 34.5–45)
HCT VFR BLDA CALC: 33 % — LOW (ref 34.5–46.5)
HGB BLD CALC-MCNC: 11.1 G/DL — LOW (ref 11.5–15.5)
HGB BLD-MCNC: 10.9 G/DL — LOW (ref 11.5–15.5)
IANC: 6.08 K/UL — SIGNIFICANT CHANGE UP (ref 1.8–7.4)
IMM GRANULOCYTES NFR BLD AUTO: 2.6 % — HIGH (ref 0–0.9)
LACTATE BLDV-MCNC: 1.5 MMOL/L — SIGNIFICANT CHANGE UP (ref 0.5–2)
LYMPHOCYTES # BLD AUTO: 1.92 K/UL — SIGNIFICANT CHANGE UP (ref 1–3.3)
LYMPHOCYTES # BLD AUTO: 20.2 % — SIGNIFICANT CHANGE UP (ref 13–44)
MAGNESIUM SERPL-MCNC: 1.6 MG/DL — SIGNIFICANT CHANGE UP (ref 1.6–2.6)
MCHC RBC-ENTMCNC: 27.2 PG — SIGNIFICANT CHANGE UP (ref 27–34)
MCHC RBC-ENTMCNC: 32.2 GM/DL — SIGNIFICANT CHANGE UP (ref 32–36)
MCV RBC AUTO: 84.5 FL — SIGNIFICANT CHANGE UP (ref 80–100)
MONOCYTES # BLD AUTO: 0.81 K/UL — SIGNIFICANT CHANGE UP (ref 0–0.9)
MONOCYTES NFR BLD AUTO: 8.5 % — SIGNIFICANT CHANGE UP (ref 2–14)
NEUTROPHILS # BLD AUTO: 6.08 K/UL — SIGNIFICANT CHANGE UP (ref 1.8–7.4)
NEUTROPHILS NFR BLD AUTO: 64.2 % — SIGNIFICANT CHANGE UP (ref 43–77)
NRBC # BLD: 0 /100 WBCS — SIGNIFICANT CHANGE UP (ref 0–0)
NRBC # FLD: 0 K/UL — SIGNIFICANT CHANGE UP (ref 0–0)
PCO2 BLDV: 49 MMHG — HIGH (ref 39–42)
PH BLDV: 7.41 — SIGNIFICANT CHANGE UP (ref 7.32–7.43)
PHOSPHATE SERPL-MCNC: 2.8 MG/DL — SIGNIFICANT CHANGE UP (ref 2.5–4.5)
PLATELET # BLD AUTO: 248 K/UL — SIGNIFICANT CHANGE UP (ref 150–400)
PO2 BLDV: 38 MMHG — SIGNIFICANT CHANGE UP
POTASSIUM BLDV-SCNC: 4 MMOL/L — SIGNIFICANT CHANGE UP (ref 3.5–5.1)
POTASSIUM SERPL-MCNC: 4 MMOL/L — SIGNIFICANT CHANGE UP (ref 3.5–5.3)
POTASSIUM SERPL-SCNC: 4 MMOL/L — SIGNIFICANT CHANGE UP (ref 3.5–5.3)
PROT SERPL-MCNC: 7.5 G/DL — SIGNIFICANT CHANGE UP (ref 6–8.3)
RBC # BLD: 4.01 M/UL — SIGNIFICANT CHANGE UP (ref 3.8–5.2)
RBC # FLD: 15.1 % — HIGH (ref 10.3–14.5)
SAO2 % BLDV: 61.6 % — SIGNIFICANT CHANGE UP
SODIUM SERPL-SCNC: 137 MMOL/L — SIGNIFICANT CHANGE UP (ref 135–145)
WBC # BLD: 9.49 K/UL — SIGNIFICANT CHANGE UP (ref 3.8–10.5)
WBC # FLD AUTO: 9.49 K/UL — SIGNIFICANT CHANGE UP (ref 3.8–10.5)

## 2023-01-20 PROCEDURE — 99232 SBSQ HOSP IP/OBS MODERATE 35: CPT | Mod: GC

## 2023-01-20 PROCEDURE — 99221 1ST HOSP IP/OBS SF/LOW 40: CPT

## 2023-01-20 RX ORDER — HYDROCORTISONE 1 %
1 OINTMENT (GRAM) TOPICAL DAILY
Refills: 0 | Status: DISCONTINUED | OUTPATIENT
Start: 2023-01-20 | End: 2023-01-20

## 2023-01-20 RX ORDER — HYDROCORTISONE 1 %
1 OINTMENT (GRAM) TOPICAL DAILY
Refills: 0 | Status: DISCONTINUED | OUTPATIENT
Start: 2023-01-20 | End: 2023-02-01

## 2023-01-20 RX ORDER — MAGNESIUM SULFATE 500 MG/ML
2 VIAL (ML) INJECTION
Refills: 0 | Status: COMPLETED | OUTPATIENT
Start: 2023-01-20 | End: 2023-01-20

## 2023-01-20 RX ORDER — DIPHENHYDRAMINE HCL 50 MG
25 CAPSULE ORAL ONCE
Refills: 0 | Status: COMPLETED | OUTPATIENT
Start: 2023-01-20 | End: 2023-01-20

## 2023-01-20 RX ADMIN — LIDOCAINE 1 PATCH: 4 CREAM TOPICAL at 11:55

## 2023-01-20 RX ADMIN — Medication 1 TABLET(S): at 05:21

## 2023-01-20 RX ADMIN — Medication 25 MILLIGRAM(S): at 04:15

## 2023-01-20 RX ADMIN — LIDOCAINE 1 PATCH: 4 CREAM TOPICAL at 19:30

## 2023-01-20 RX ADMIN — Medication 1 TABLET(S): at 19:11

## 2023-01-20 RX ADMIN — LIDOCAINE 1 APPLICATION(S): 4 CREAM TOPICAL at 05:21

## 2023-01-20 RX ADMIN — ENOXAPARIN SODIUM 120 MILLIGRAM(S): 100 INJECTION SUBCUTANEOUS at 09:58

## 2023-01-20 RX ADMIN — Medication 25 GRAM(S): at 11:53

## 2023-01-20 RX ADMIN — Medication 1 APPLICATION(S): at 19:10

## 2023-01-20 RX ADMIN — LIDOCAINE 1 PATCH: 4 CREAM TOPICAL at 01:49

## 2023-01-20 RX ADMIN — PANTOPRAZOLE SODIUM 40 MILLIGRAM(S): 20 TABLET, DELAYED RELEASE ORAL at 05:21

## 2023-01-20 RX ADMIN — PANTOPRAZOLE SODIUM 40 MILLIGRAM(S): 20 TABLET, DELAYED RELEASE ORAL at 19:13

## 2023-01-20 RX ADMIN — Medication 25 GRAM(S): at 09:58

## 2023-01-20 RX ADMIN — Medication 3 MILLIGRAM(S): at 22:07

## 2023-01-20 RX ADMIN — LIDOCAINE 1 PATCH: 4 CREAM TOPICAL at 23:00

## 2023-01-20 RX ADMIN — ENOXAPARIN SODIUM 120 MILLIGRAM(S): 100 INJECTION SUBCUTANEOUS at 22:01

## 2023-01-20 NOTE — DIETITIAN INITIAL EVALUATION ADULT - PERTINENT MEDS FT
MEDICATIONS  (STANDING):  amoxicillin  875 milliGRAM(s)/clavulanate 1 Tablet(s) Oral two times a day  enoxaparin Injectable 120 milliGRAM(s) SubCutaneous every 12 hours  hydrocortisone 2.5% Cream 1 Application(s) Topical daily  lidocaine   4% Patch 1 Patch Transdermal every 24 hours  lidocaine   4% Patch 1 Patch Transdermal daily  lidocaine 2% Gel 1 Application(s) Topical two times a day  pantoprazole    Tablet 40 milliGRAM(s) Oral two times a day  polyethylene glycol 3350 17 Gram(s) Oral daily  senna 2 Tablet(s) Oral at bedtime    MEDICATIONS  (PRN):  acetaminophen     Tablet .. 650 milliGRAM(s) Oral every 6 hours PRN Moderate Pain (4 - 6)  melatonin 3 milliGRAM(s) Oral at bedtime PRN Sleep

## 2023-01-20 NOTE — PROGRESS NOTE ADULT - ATTENDING COMMENTS
72F w/ psoriatic arthritis (apremilast), HTN (lisinopril/chlorthalidone) presenting w/ generalized weakness and found to have multiple metabolic derangements including JAZMYNE, hypokalemia, hyponatremia, transaminitis, AGMA and found to have severe sepsis with CT w/ acute uncomplicated diverticulitis c/b IMV thrombosis vs thrombophlebitis and course c/b multiple episode of asymptomatic HoTN, responsive to fluids and now resolved. Overall, patient is improving.   - C/w augmentin for 10 day course of antibiotics for diverticulitis.   - CTAP with IV contrast showing nonocclusive clot in the SMV as well as thrombophlebitis, started on lovenox overnight. It is possible that the clot was triggered by localized inflammation however the scan also demonstrated mesenteric adenopathy along the course of the IMV which is concerning for possible malignancy. Vascular cardiology says lovenox is okay for now but coumadin is the most appropriate choice. Will bridge to coumadin.   - Thickened endometrium demonstrated on CT scan, u/s with thickened endometrial stripe of 17mm. Will discuss if gyne will biopsy on this admission.   - Outpatient colonoscopy to evaluate for malignancy as well.   - Resolving ATN - Cr. continues to downtrend and pt. reports significant urine output. Continue to trend.   - Electrolyte abnormalities from admission are resolved.   - Severe sepsis 2/2 diverticulitis on admission is resolved.  -Pt reporting RLE leg and knee pain that has been chronic. She visits rheum for this and they recommended an MRI but they did not get it done. Pt reports that she fell at home prior to admission. Plan to get xrays to rule out fractures given recent falls. Tylenol PRN pain. Outpatient MRI.   - Dispo, PT recommending [facility] - patient does not want to go to [facility] however she is currently deconditioned and admits that she cannot ambulate on her own. Lives with  who requires an aide. Had a long discussion with the daughter Tierra (on 1/20) about the importance of going to rehab to get stronger. She understands and will have the discussion with her mom.

## 2023-01-20 NOTE — CONSULT NOTE ADULT - SUBJECTIVE AND OBJECTIVE BOX
SABINO PASTRANAJOSE AALFONSO  72y  Female [id number]    HPI:  73 yo  with PMH of psoriatic arthritis, retinal vein occlusion, hypertension, hyperlipidemia and MEJIA, who originally presented with generalized weakness and decreased appetite x ~ 1wk. States that she has had decreased urine output, feels dehydrated, otherwise has had some constipation but denies fevers, cough, chest/abdominal pain, n/v, SOB, dysuria.           Name of GYN Physician: Denies seeing GYN w/in last 10 yrs  OBHx: 1    GynHx: Denies fibroids, cysts, endometriosis, STI's, Abnormal pap smears   PMH: HTN, psoriatic arthritis, retinal vein occlusion, hyperlipidemia, MEJIA  PSH: Denies  Meds:   Allx: NKDA  Social History:  Denies smoking use, drug use, alcohol use.      Vital Signs Last 24 Hrs  T(C): 36.7 (2023 13:05), Max: 36.7 (2023 13:05)  T(F): 98 (2023 13:05), Max: 98 (2023 13:05)  HR: 79 (2023 13:05) (78 - 88)  BP: 138/65 (2023 13:05) (127/61 - 139/63)  BP(mean): --  RR: 18 (2023 13:05) (17 - 18)  SpO2: 91% (2023 13:05) (91% - 98%)    Parameters below as of 2023 13:05  Patient On (Oxygen Delivery Method): room air        Physical Exam:   General: sitting comfortably in bed, NAD   HEENT: neck supple, full ROM  CV: RRR  Lungs: CTA b/l, good air flow b/l   Back: No CVA tenderness  Abd: Soft, non-tender, non-distended.  Bowel sounds present.    :  Deferred    LABS:                     10.9   9.49  )-----------( 248      ( 2023 06:48 )             33.9     -    137  |  100  |  18  ----------------------------<  88  4.0   |  28  |  0.82    Ca    9.8      2023 06:48  Phos  2.8       Mg     1.60         TPro  7.5  /  Alb  2.6<L>  /  TBili  1.2  /  DBili  x   /  AST  45<H>  /  ALT  20  /  AlkPhos  418<H>      I&O's Detail          RADIOLOGY & ADDITIONAL STUDIES:    < from: US Transvaginal (23 @ 12:17) >  ACC: [id number] EXAM:  US TRANSVAGINAL   ORDERED BY: LUZ MARINA WU     PROCEDURE DATE:  2023          INTERPRETATION:  CLINICAL INFORMATION: Distended endometrial cavity.    LMP: Postmenopausal.    COMPARISON: None available.    TECHNIQUE:  Endovaginal and transabdominal pelvic sonogram.    FINDINGS:  Uterus: 8.4 cm x 4.0 cm x 5.2 cm. Within normal limits.  Endometrium: 17 mm. Markedly thickened with cystic change and minimal   internal vascularity.    Right ovary: Not visualized.  Leftovary: Not visualized.    Fluid: None.    IMPRESSION:  Thickening of endometrial cavity with cystic change in this   postmenopausal patient. Neoplasm is possible. Histologic sampling is   advised.        --- End of Report ---      GUADALUPE PERRIN MD;Attending Radiologist  This document has been electronically signed. 2023 12:24PM    < end of copied text >      < from: CT Abdomen and Pelvis w/ IV Cont (23 @ 19:13) >  ACC: [id number] EXAM:  CT ABDOMEN AND PELVIS IC                          PROCEDURE DATE:  2023          INTERPRETATION:  CLINICAL INFORMATION: Follow-up study for IMV thrombosis   versus thrombophlebitis.    COMPARISON: CT abdomen and pelvis 2023    CONTRAST/COMPLICATIONS:  IV Contrast: Omnipaque 350  90 cc administered   10 cc discarded  Oral Contrast: NONE  Complications: None reported at time of study completion    PROCEDURE:  CT of the Abdomen and Pelvis was performed.  Sagittal and coronal reformats were performed.    FINDINGS:  LOWER CHEST: Coronary artery calcifications. Right middle lobe cystic   change. Bibasilar subsegmental atelectasis.    LIVER: Unchanged multiple cysts, with the largest one measuring 8 cm in   the hepatic dome. Left hepatic lobe granuloma.  BILE DUCTS: Normal caliber.  GALLBLADDER: Within normal limits.  SPLEEN: Within normal limits.  PANCREAS: Within normal limits.  ADRENALS: 1.9 cm left adrenal nodule.    KIDNEYS/URETERS: Left renal cysts. No hydronephrosis.    BLADDER: Within normal limits.  REPRODUCTIVE ORGANS: Distended endometrial cavity measuring up to 2.0 cm.   Small focus of air in the distal portable view of the endocervical canal.    BOWEL: Sigmoid and distal descending diverticulitis is redemonstrated. No   bowel obstruction. Appendix is normal.  PERITONEUM: No ascites.  VESSELS: Filling defects within the portal vein and within the superior   mesenteric vein consistent with thrombosis. Heterogenous appearance of   the inferior mesenteric vein with surrounding fat stranding consistent   with thrombosis and thrombophlebitis.  RETROPERITONEUM/LYMPH NODES: Mesenteric adenopathy along the course of   the IMV measuring up to 2.4 x 2.3 cm (2:97)  ABDOMINAL WALL: Small fat-containing umbilical hernia. Foci of air   droplet in the right anterior abdominal wall.  BONES: Within normal limits.    IMPRESSION:  *  Nonperforated acute diverticulitis, not significantly changed from   prior exam.  *  Nonocclusive clot in the SMV with wall thickening consistent with   thrombophlebitis.  *  Mesenteric adenopathy along the course of the IMV. While possibly   reactive, malignancy is a consideration. Further evaluation with   colonoscopy is therefore recommended once the patient's acute symptoms   have resolved.  *  Thickened endometrial complex, which may be further evaluated with   pelvic ultrasound.    --- End of Report ---    PRESTON NATHAN MD; Resident Radiologist  This document has been electronically signed.  FEDERICO HERBERT MD; Attending Radiologist  This document has been electronically signed. 2023  9:13AM    < end of copied text >   SABINO PASTRANAANTHONY  72y  Female [id number]    HPI:  73 yo  with PMH of psoriatic arthritis, retinal vein occlusion, hypertension, hyperlipidemia and MEJIA, who originally presented with generalized weakness and decreased appetite x ~ 1wk. Patient states she is currently being treated for diverticulitis and states that her abdominal pain has somewhat improved since the beginning of her admission, she denies nausea/vomiting and is tolerating PO. Pt states she has not seen a gynecologist within the past 10 years and does not remember the name of the last private GYN she saw. She denies any episodes of post menopausal vaginal bleeding. She denies a family history of endometrial, ovarian, or cervical cancer. Denies any unintended weight loss, night sweats, hematochezia, fevers, chills, chest pain, SOB.       Name of GYN Physician: Denies seeing GYN w/in last 10 yrs  OBHx: 1  33 yrs ago  GynHx: Denies fibroids, cysts, endometriosis, STI's, Abnormal pap smears   PMH: HTN, psoriatic arthritis, retinal vein occlusion, hyperlipidemia, MEJIA  PSH: Denies  Meds: Lovenox, augmentin, tylenol, lidocaine patch, senna, miralax, melatonin, pantoprazole  Allx: NKDA  Social History:  Denies smoking use, drug use, alcohol use.      Vital Signs Last 24 Hrs  T(C): 36.7 (2023 13:05), Max: 36.7 (2023 13:05)  T(F): 98 (2023 13:05), Max: 98 (2023 13:05)  HR: 79 (2023 13:05) (78 - 88)  BP: 138/65 (2023 13:05) (127/61 - 139/63)  BP(mean): --  RR: 18 (2023 13:05) (17 - 18)  SpO2: 91% (2023 13:05) (91% - 98%)    Parameters below as of 2023 13:05  Patient On (Oxygen Delivery Method): room air        Physical Exam:   General: sitting comfortably in bed, NAD   HEENT: neck supple, full ROM  CV: RRR  Lungs: CTA b/l, good air flow b/l   Back: No CVA tenderness  Abd: Soft, non-tender, non-distended.  Bowel sounds present.    :  Deferred    LABS:                     10.9   9.49  )-----------( 248      ( 2023 06:48 )             33.9         137  |  100  |  18  ----------------------------<  88  4.0   |  28  |  0.82    Ca    9.8      2023 06:48  Phos  2.8       Mg     1.60         TPro  7.5  /  Alb  2.6<L>  /  TBili  1.2  /  DBili  x   /  AST  45<H>  /  ALT  20  /  AlkPhos  418<H>      I&O's Detail          RADIOLOGY & ADDITIONAL STUDIES:    < from: US Transvaginal (23 @ 12:17) >  ACC: [id number] EXAM:  US TRANSVAGINAL   ORDERED BY: LUZ MARINA WU     PROCEDURE DATE:  2023          INTERPRETATION:  CLINICAL INFORMATION: Distended endometrial cavity.    LMP: Postmenopausal.    COMPARISON: None available.    TECHNIQUE:  Endovaginal and transabdominal pelvic sonogram.    FINDINGS:  Uterus: 8.4 cm x 4.0 cm x 5.2 cm. Within normal limits.  Endometrium: 17 mm. Markedly thickened with cystic change and minimal   internal vascularity.    Right ovary: Not visualized.  Leftovary: Not visualized.    Fluid: None.    IMPRESSION:  Thickening of endometrial cavity with cystic change in this   postmenopausal patient. Neoplasm is possible. Histologic sampling is   advised.        --- End of Report ---      GUADALUPE PERRIN MD;Attending Radiologist  This document has been electronically signed. 2023 12:24PM    < end of copied text >      < from: CT Abdomen and Pelvis w/ IV Cont (23 @ 19:13) >  ACC: [id number] EXAM:  CT ABDOMEN AND PELVIS IC                          PROCEDURE DATE:  2023          INTERPRETATION:  CLINICAL INFORMATION: Follow-up study for IMV thrombosis   versus thrombophlebitis.    COMPARISON: CT abdomen and pelvis 2023    CONTRAST/COMPLICATIONS:  IV Contrast: Omnipaque 350  90 cc administered   10 cc discarded  Oral Contrast: NONE  Complications: None reported at time of study completion    PROCEDURE:  CT of the Abdomen and Pelvis was performed.  Sagittal and coronal reformats were performed.    FINDINGS:  LOWER CHEST: Coronary artery calcifications. Right middle lobe cystic   change. Bibasilar subsegmental atelectasis.    LIVER: Unchanged multiple cysts, with the largest one measuring 8 cm in   the hepatic dome. Left hepatic lobe granuloma.  BILE DUCTS: Normal caliber.  GALLBLADDER: Within normal limits.  SPLEEN: Within normal limits.  PANCREAS: Within normal limits.  ADRENALS: 1.9 cm left adrenal nodule.    KIDNEYS/URETERS: Left renal cysts. No hydronephrosis.    BLADDER: Within normal limits.  REPRODUCTIVE ORGANS: Distended endometrial cavity measuring up to 2.0 cm.   Small focus of air in the distal portable view of the endocervical canal.    BOWEL: Sigmoid and distal descending diverticulitis is redemonstrated. No   bowel obstruction. Appendix is normal.  PERITONEUM: No ascites.  VESSELS: Filling defects within the portal vein and within the superior   mesenteric vein consistent with thrombosis. Heterogenous appearance of   the inferior mesenteric vein with surrounding fat stranding consistent   with thrombosis and thrombophlebitis.  RETROPERITONEUM/LYMPH NODES: Mesenteric adenopathy along the course of   the IMV measuring up to 2.4 x 2.3 cm (2:97)  ABDOMINAL WALL: Small fat-containing umbilical hernia. Foci of air   droplet in the right anterior abdominal wall.  BONES: Within normal limits.    IMPRESSION:  *  Nonperforated acute diverticulitis, not significantly changed from   prior exam.  *  Nonocclusive clot in the SMV with wall thickening consistent with   thrombophlebitis.  *  Mesenteric adenopathy along the course of the IMV. While possibly   reactive, malignancy is a consideration. Further evaluation with   colonoscopy is therefore recommended once the patient's acute symptoms   have resolved.  *  Thickened endometrial complex, which may be further evaluated with   pelvic ultrasound.    --- End of Report ---    PRESTON NATHAN MD; Resident Radiologist  This document has been electronically signed.  FEDERICO HERBERT MD; Attending Radiologist  This document has been electronically signed. 2023  9:13AM    < end of copied text >   SABINO PASTRANAJOSE AALFONSO  72y  Female [id number]    HPI:  73 yo  with PMH of psoriatic arthritis, retinal vein occlusion, hypertension, hyperlipidemia and MEJIA, who originally presented with generalized weakness and decreased appetite x ~ 1wk. Patient states she is currently being treated for diverticulitis and states that her abdominal pain has somewhat improved since the beginning of her admission, she denies nausea/vomiting and is tolerating PO. Pt states she has not seen a gynecologist within the past 10 years and does not remember the name of the last private GYN she saw. She denies any episodes of post menopausal vaginal bleeding. She denies a family history of endometrial, ovarian, or cervical cancer. Denies any unintended weight loss, night sweats, hematochezia, fevers, chills, chest pain, SOB.       Name of GYN Physician: Denies seeing GYN w/in last 10 yrs  OBHx: 1  33 yrs ago  GynHx: Denies fibroids, cysts, endometriosis, STI's, Abnormal pap smears   PMH: HTN, psoriatic arthritis, retinal vein occlusion, hyperlipidemia, MEJIA  PSH: Denies  Meds: Lovenox, augmentin, tylenol, lidocaine patch, senna, miralax, melatonin, pantoprazole  Allx: NKDA  Social History:  Denies smoking use, drug use, alcohol use.      Vital Signs Last 24 Hrs  T(C): 36.7 (2023 13:05), Max: 36.7 (2023 13:05)  T(F): 98 (2023 13:05), Max: 98 (2023 13:05)  HR: 79 (2023 13:05) (78 - 88)  BP: 138/65 (2023 13:05) (127/61 - 139/63)  RR: 18 (2023 13:05) (17 - 18)  SpO2: 91% (2023 13:05) (91% - 98%)    Parameters below as of 2023 13:05  Patient On (Oxygen Delivery Method): room air        Physical Exam:   General: sitting comfortably in bed, NAD   HEENT: neck supple, full ROM  CV: RRR  Lungs: CTA b/l, good air flow b/l   Back: No CVA tenderness  Abd: Soft, non-tender, non-distended.  Bowel sounds present.    :  Deferred    LABS:                     10.9   9.49  )-----------( 248      ( 2023 06:48 )             33.9         137  |  100  |  18  ----------------------------<  88  4.0   |  28  |  0.82    Ca    9.8      2023 06:48  Phos  2.8       Mg     1.60         TPro  7.5  /  Alb  2.6<L>  /  TBili  1.2  /  DBili  x   /  AST  45<H>  /  ALT  20  /  AlkPhos  418<H>      I&O's Detail          RADIOLOGY & ADDITIONAL STUDIES:    < from: US Transvaginal (23 @ 12:17) >  ACC: [id number] EXAM:  US TRANSVAGINAL   ORDERED BY: LUZ MARINA WU     PROCEDURE DATE:  2023          INTERPRETATION:  CLINICAL INFORMATION: Distended endometrial cavity.    LMP: Postmenopausal.    COMPARISON: None available.    TECHNIQUE:  Endovaginal and transabdominal pelvic sonogram.    FINDINGS:  Uterus: 8.4 cm x 4.0 cm x 5.2 cm. Within normal limits.  Endometrium: 17 mm. Markedly thickened with cystic change and minimal   internal vascularity.    Right ovary: Not visualized.  Leftovary: Not visualized.    Fluid: None.    IMPRESSION:  Thickening of endometrial cavity with cystic change in this   postmenopausal patient. Neoplasm is possible. Histologic sampling is   advised.        --- End of Report ---      GUADALUPE PERRIN MD;Attending Radiologist  This document has been electronically signed. 2023 12:24PM    < end of copied text >      < from: CT Abdomen and Pelvis w/ IV Cont (23 @ 19:13) >  ACC: [id number] EXAM:  CT ABDOMEN AND PELVIS IC                          PROCEDURE DATE:  2023          INTERPRETATION:  CLINICAL INFORMATION: Follow-up study for IMV thrombosis   versus thrombophlebitis.    COMPARISON: CT abdomen and pelvis 2023    CONTRAST/COMPLICATIONS:  IV Contrast: Omnipaque 350  90 cc administered   10 cc discarded  Oral Contrast: NONE  Complications: None reported at time of study completion    PROCEDURE:  CT of the Abdomen and Pelvis was performed.  Sagittal and coronal reformats were performed.    FINDINGS:  LOWER CHEST: Coronary artery calcifications. Right middle lobe cystic   change. Bibasilar subsegmental atelectasis.    LIVER: Unchanged multiple cysts, with the largest one measuring 8 cm in   the hepatic dome. Left hepatic lobe granuloma.  BILE DUCTS: Normal caliber.  GALLBLADDER: Within normal limits.  SPLEEN: Within normal limits.  PANCREAS: Within normal limits.  ADRENALS: 1.9 cm left adrenal nodule.    KIDNEYS/URETERS: Left renal cysts. No hydronephrosis.    BLADDER: Within normal limits.  REPRODUCTIVE ORGANS: Distended endometrial cavity measuring up to 2.0 cm.   Small focus of air in the distal portable view of the endocervical canal.    BOWEL: Sigmoid and distal descending diverticulitis is redemonstrated. No   bowel obstruction. Appendix is normal.  PERITONEUM: No ascites.  VESSELS: Filling defects within the portal vein and within the superior   mesenteric vein consistent with thrombosis. Heterogenous appearance of   the inferior mesenteric vein with surrounding fat stranding consistent   with thrombosis and thrombophlebitis.  RETROPERITONEUM/LYMPH NODES: Mesenteric adenopathy along the course of   the IMV measuring up to 2.4 x 2.3 cm (2:97)  ABDOMINAL WALL: Small fat-containing umbilical hernia. Foci of air   droplet in the right anterior abdominal wall.  BONES: Within normal limits.    IMPRESSION:  *  Nonperforated acute diverticulitis, not significantly changed from   prior exam.  *  Nonocclusive clot in the SMV with wall thickening consistent with   thrombophlebitis.  *  Mesenteric adenopathy along the course of the IMV. While possibly   reactive, malignancy is a consideration. Further evaluation with   colonoscopy is therefore recommended once the patient's acute symptoms   have resolved.  *  Thickened endometrial complex, which may be further evaluated with   pelvic ultrasound.    --- End of Report ---    PRESTON NTAHAN MD; Resident Radiologist  This document has been electronically signed.  FEDERICO HERBERT MD; Attending Radiologist  This document has been electronically signed. 2023  9:13AM    < end of copied text >   SABINO PASTRANAANTHONY  72y  Female [id number]    HPI:  71 yo  with PMH of psoriatic arthritis, retinal vein occlusion, hypertension, hyperlipidemia and MEJIA, who originally presented with generalized weakness and decreased appetite x ~ 1wk. Patient states she is currently being treated for diverticulitis and states that her abdominal pain has somewhat improved since the beginning of her admission, she denies nausea/vomiting and is tolerating PO. Pt states she has not seen a gynecologist within the past 10 years and does not remember the name of the last private GYN she saw. She denies any episodes of post menopausal vaginal bleeding. She denies a family history of endometrial, ovarian, or cervical cancer. Denies any unintended weight loss, night sweats, hematochezia, fevers, chills, chest pain, SOB.       Name of GYN Physician: Denies seeing GYN w/in last 10 yrs  OBHx: 1  33 yrs ago  GynHx: Denies fibroids, cysts, endometriosis, STI's, Abnormal pap smears   PMH: HTN, psoriatic arthritis, retinal vein occlusion, hyperlipidemia, MEJIA  PSH: Denies  Meds: Lovenox, augmentin, tylenol, lidocaine patch, senna, miralax, melatonin, pantoprazole  Allx: Patient reports allergy to a substance though no identification of substance in patient's chart and patient does not remember  Social History:  Hx of tobacco use 20 years ago, denies drug use or alcohol use.      Vital Signs Last 24 Hrs  T(C): 36.7 (2023 13:05), Max: 36.7 (2023 13:05)  T(F): 98 (2023 13:05), Max: 98 (2023 13:05)  HR: 79 (2023 13:05) (78 - 88)  BP: 138/65 (2023 13:05) (127/61 - 139/63)  RR: 18 (2023 13:05) (17 - 18)  SpO2: 91% (2023 13:05) (91% - 98%)    Parameters below as of 2023 13:05  Patient On (Oxygen Delivery Method): room air        Physical Exam:   General: sitting comfortably in bed, NAD   HEENT: neck supple, full ROM  CV: RRR  Lungs: CTA b/l, good air flow b/l   Back: No CVA tenderness  Abd: Soft, non-tender, non-distended.  Bowel sounds present.    :  Deferred    LABS:                     10.9   9.49  )-----------( 248      ( 2023 06:48 )             33.9         137  |  100  |  18  ----------------------------<  88  4.0   |  28  |  0.82    Ca    9.8      2023 06:48  Phos  2.8       Mg     1.60         TPro  7.5  /  Alb  2.6<L>  /  TBili  1.2  /  DBili  x   /  AST  45<H>  /  ALT  20  /  AlkPhos  418<H>      I&O's Detail          RADIOLOGY & ADDITIONAL STUDIES:    < from: US Transvaginal (23 @ 12:17) >  ACC: [id number] EXAM:  US TRANSVAGINAL   ORDERED BY: LUZ MARINA WU     PROCEDURE DATE:  2023          INTERPRETATION:  CLINICAL INFORMATION: Distended endometrial cavity.    LMP: Postmenopausal.    COMPARISON: None available.    TECHNIQUE:  Endovaginal and transabdominal pelvic sonogram.    FINDINGS:  Uterus: 8.4 cm x 4.0 cm x 5.2 cm. Within normal limits.  Endometrium: 17 mm. Markedly thickened with cystic change and minimal   internal vascularity.    Right ovary: Not visualized.  Leftovary: Not visualized.    Fluid: None.    IMPRESSION:  Thickening of endometrial cavity with cystic change in this   postmenopausal patient. Neoplasm is possible. Histologic sampling is   advised.        --- End of Report ---      GUADALUPE PERRIN MD;Attending Radiologist  This document has been electronically signed. 2023 12:24PM    < end of copied text >      < from: CT Abdomen and Pelvis w/ IV Cont (23 @ 19:13) >  ACC: [id number] EXAM:  CT ABDOMEN AND PELVIS IC                          PROCEDURE DATE:  2023          INTERPRETATION:  CLINICAL INFORMATION: Follow-up study for IMV thrombosis   versus thrombophlebitis.    COMPARISON: CT abdomen and pelvis 2023    CONTRAST/COMPLICATIONS:  IV Contrast: Omnipaque 350  90 cc administered   10 cc discarded  Oral Contrast: NONE  Complications: None reported at time of study completion    PROCEDURE:  CT of the Abdomen and Pelvis was performed.  Sagittal and coronal reformats were performed.    FINDINGS:  LOWER CHEST: Coronary artery calcifications. Right middle lobe cystic   change. Bibasilar subsegmental atelectasis.    LIVER: Unchanged multiple cysts, with the largest one measuring 8 cm in   the hepatic dome. Left hepatic lobe granuloma.  BILE DUCTS: Normal caliber.  GALLBLADDER: Within normal limits.  SPLEEN: Within normal limits.  PANCREAS: Within normal limits.  ADRENALS: 1.9 cm left adrenal nodule.    KIDNEYS/URETERS: Left renal cysts. No hydronephrosis.    BLADDER: Within normal limits.  REPRODUCTIVE ORGANS: Distended endometrial cavity measuring up to 2.0 cm.   Small focus of air in the distal portable view of the endocervical canal.    BOWEL: Sigmoid and distal descending diverticulitis is redemonstrated. No   bowel obstruction. Appendix is normal.  PERITONEUM: No ascites.  VESSELS: Filling defects within the portal vein and within the superior   mesenteric vein consistent with thrombosis. Heterogenous appearance of   the inferior mesenteric vein with surrounding fat stranding consistent   with thrombosis and thrombophlebitis.  RETROPERITONEUM/LYMPH NODES: Mesenteric adenopathy along the course of   the IMV measuring up to 2.4 x 2.3 cm (2:97)  ABDOMINAL WALL: Small fat-containing umbilical hernia. Foci of air   droplet in the right anterior abdominal wall.  BONES: Within normal limits.    IMPRESSION:  *  Nonperforated acute diverticulitis, not significantly changed from   prior exam.  *  Nonocclusive clot in the SMV with wall thickening consistent with   thrombophlebitis.  *  Mesenteric adenopathy along the course of the IMV. While possibly   reactive, malignancy is a consideration. Further evaluation with   colonoscopy is therefore recommended once the patient's acute symptoms   have resolved.  *  Thickened endometrial complex, which may be further evaluated with   pelvic ultrasound.    --- End of Report ---    PRESTON NATHAN MD; Resident Radiologist  This document has been electronically signed.  FEDERICO HERBERT MD; Attending Radiologist  This document has been electronically signed. 2023  9:13AM    < end of copied text >

## 2023-01-20 NOTE — DIETITIAN INITIAL EVALUATION ADULT - NS FNS DIET ORDER
Diet, DASH/TLC:   Sodium & Cholesterol Restricted  Supplement Feeding Modality:  Oral  Ensure Plus High Protein Cans or Servings Per Day:  1       Frequency:  Three Times a day (01-20-23 @ 14:32)

## 2023-01-20 NOTE — PROGRESS NOTE ADULT - SUBJECTIVE AND OBJECTIVE BOX
PROGRESS NOTE:   Authored by: Bj Park M.D.   Internal Medicine PGY-1  Please Contact Via Teams    Patient is a 72y old  Female who presents with a chief complaint of FTT (18 Jan 2023 19:15)      SUBJECTIVE / OVERNIGHT EVENTS:  Had some torso itching overnight. Received benadryl.    Brief Daily Plan:  - DC planning    ADDITIONAL REVIEW OF SYSTEMS:  Patient denies fevers, chills, chest pain, shortness of breath, nausea, abdominal pain, diarrhea, constipation, dysuria, leg swelling, headache, light headedness.    MEDICATIONS  (STANDING):  acetaminophen   IVPB .. 1000 milliGRAM(s) IV Intermittent once  amoxicillin  875 milliGRAM(s)/clavulanate 1 Tablet(s) Oral two times a day  enoxaparin Injectable 120 milliGRAM(s) SubCutaneous every 12 hours  lidocaine   4% Patch 1 Patch Transdermal daily  lidocaine   4% Patch 1 Patch Transdermal every 24 hours  lidocaine 2% Gel 1 Application(s) Topical two times a day  pantoprazole    Tablet 40 milliGRAM(s) Oral two times a day  polyethylene glycol 3350 17 Gram(s) Oral daily  senna 2 Tablet(s) Oral at bedtime    MEDICATIONS  (PRN):  acetaminophen     Tablet .. 650 milliGRAM(s) Oral every 6 hours PRN Moderate Pain (4 - 6)  melatonin 3 milliGRAM(s) Oral at bedtime PRN Sleep      CAPILLARY BLOOD GLUCOSE        I&O's Summary      PHYSICAL EXAM:  Vital Signs Last 24 Hrs  T(C): 36.2 (20 Jan 2023 04:49), Max: 36.9 (19 Jan 2023 12:35)  T(F): 97.1 (20 Jan 2023 04:49), Max: 98.5 (19 Jan 2023 12:35)  HR: 78 (20 Jan 2023 04:49) (78 - 88)  BP: 127/61 (20 Jan 2023 04:49) (127/61 - 140/70)  BP(mean): --  RR: 18 (20 Jan 2023 04:49) (18 - 18)  SpO2: 98% (20 Jan 2023 04:49) (94% - 98%)    Parameters below as of 20 Jan 2023 04:49  Patient On (Oxygen Delivery Method): room air        CONSTITUTIONAL: NAD, well-developed  RESPIRATORY: Normal respiratory effort; lungs are clear to auscultation bilaterally  CARDIOVASCULAR: Regular rate and rhythm, normal S1 and S2, no murmur/rub/gallop; No lower extremity edema; Peripheral pulses are 2+ bilaterally  ABDOMEN: Nontender to palpation, normoactive bowel sounds, no rebound/guarding; No hepatosplenomegaly  MUSCLOSKELETAL: no clubbing or cyanosis of digits; no joint swelling or tenderness to palpation  PSYCH: A+O to person, place, and time; affect appropriate    LABS:                        10.6   10.31 )-----------( 215      ( 19 Jan 2023 06:40 )             32.7     01-19    136  |  100  |  23  ----------------------------<  100<H>  3.8   |  27  |  0.90    Ca    9.6      19 Jan 2023 06:40  Phos  2.9     01-19  Mg     1.80     01-19    TPro  6.6  /  Alb  2.2<L>  /  TBili  1.4<H>  /  DBili  x   /  AST  27  /  ALT  18  /  AlkPhos  401<H>  01-19                Tele Reviewed:    RADIOLOGY & ADDITIONAL TESTS:  Results Reviewed:   Imaging Personally Reviewed:  Electrocardiogram Personally Reviewed: PROGRESS NOTE:   Authored by: Bj Park M.D.   Internal Medicine PGY-1  Please Contact Via Teams    Patient is a 72y old  Female who presents with a chief complaint of FTT (18 Jan 2023 19:15)      SUBJECTIVE / OVERNIGHT EVENTS:  Had some torso itching overnight. Received benadryl. This morning is feeling well with no complaints. States that she would not be able to give herself lovenox injections, though her daughter is amenable to doing so. Additionally, she states she will not go to rehab unless she knows what's wrong with her right leg, and she is asking for xrays. I reviewed her outpatient rheumatology records. Last visit was 9/15/22. The assessment was that she was having right knee OA rather than psoriatic arthritis, and an MRI of the right knee was ordered. This MRI was never performed.     Brief Daily Plan:  - DC planning    MEDICATIONS  (STANDING):  acetaminophen   IVPB .. 1000 milliGRAM(s) IV Intermittent once  amoxicillin  875 milliGRAM(s)/clavulanate 1 Tablet(s) Oral two times a day  enoxaparin Injectable 120 milliGRAM(s) SubCutaneous every 12 hours  lidocaine   4% Patch 1 Patch Transdermal daily  lidocaine   4% Patch 1 Patch Transdermal every 24 hours  lidocaine 2% Gel 1 Application(s) Topical two times a day  pantoprazole    Tablet 40 milliGRAM(s) Oral two times a day  polyethylene glycol 3350 17 Gram(s) Oral daily  senna 2 Tablet(s) Oral at bedtime    MEDICATIONS  (PRN):  acetaminophen     Tablet .. 650 milliGRAM(s) Oral every 6 hours PRN Moderate Pain (4 - 6)  melatonin 3 milliGRAM(s) Oral at bedtime PRN Sleep      CAPILLARY BLOOD GLUCOSE        I&O's Summary      PHYSICAL EXAM:  Vital Signs Last 24 Hrs  T(C): 36.2 (20 Jan 2023 04:49), Max: 36.9 (19 Jan 2023 12:35)  T(F): 97.1 (20 Jan 2023 04:49), Max: 98.5 (19 Jan 2023 12:35)  HR: 78 (20 Jan 2023 04:49) (78 - 88)  BP: 127/61 (20 Jan 2023 04:49) (127/61 - 140/70)  BP(mean): --  RR: 18 (20 Jan 2023 04:49) (18 - 18)  SpO2: 98% (20 Jan 2023 04:49) (94% - 98%)    Parameters below as of 20 Jan 2023 04:49  Patient On (Oxygen Delivery Method): room air      GENERAL: Obese, comfortable  HEAD:  Atraumatic, Normocephalic  EYES: EOMI, PERRLA, conjunctiva and sclera clear  ENT: Moist mucous membranes  NECK: Supple, No JVD  CHEST/LUNG: Clear to auscultation bilaterally; No wheezing or rhonchi  HEART: Tachycardic; No murmurs.  Peripheral edema: None. Legs large  ABDOMEN: BSx4; Soft, nontender, large  EXTREMITIES:  2+ radial pulses. Calfs nontender non erythematous.  NERVOUS SYSTEM:  A&Ox3, no gross lateralizing deficits   SKIN: Psoriasis    LABS:                        10.6   10.31 )-----------( 215      ( 19 Jan 2023 06:40 )             32.7     01-19    136  |  100  |  23  ----------------------------<  100<H>  3.8   |  27  |  0.90    Ca    9.6      19 Jan 2023 06:40  Phos  2.9     01-19  Mg     1.80     01-19    TPro  6.6  /  Alb  2.2<L>  /  TBili  1.4<H>  /  DBili  x   /  AST  27  /  ALT  18  /  AlkPhos  401<H>  01-19

## 2023-01-20 NOTE — CONSULT NOTE ADULT - CONSULT REASON
Thickened endometrium Thickened endometrium in setting of thrombosis of multiple veins, concern for gyn cancer etiology

## 2023-01-20 NOTE — DIETITIAN INITIAL EVALUATION ADULT - OTHER INFO
72 year old female with a PMH of hypertension, MEJIA, psoriatic arthritis presenting with several days of poor PO intake found to be hyponatremic with JAZMYNE, anemia, uremia, and elevated liver enzymes found to have acute diverticulitis and multiple hepatic cysts per chart.    Patient previously on soft and bite sized, now on regular consistency per chart. Reports poor PO intake r/t previous diet consistency and lack of appetite. Amenable to nutritional supplement. No GI distress reported at this time. No chewing/swallowing reported. Has no food allergies. Reports UBW is 245 lbs. ABW is 253.5 lbs. (1/17) per chart indicating a +3.5% weight gain, will monitor. Noted w/ +1 L/R foot + ankle edema and no pressure injuries per RN flow sheet.    Provided patient low fiber nutrition therapy education and provided low fiber nutrition handout.

## 2023-01-20 NOTE — PROGRESS NOTE ADULT - PROBLEM SELECTOR PLAN 12
Diet: full liquid diet, advance to regular  DVT Proph: Heparin subq  Dispo: PT wants to go home. Will set up with home PT. Can follow with PCP, gyn resident clinic, GI.

## 2023-01-20 NOTE — DIETITIAN INITIAL EVALUATION ADULT - PERTINENT LABORATORY DATA
01-20    137  |  100  |  18  ----------------------------<  88  4.0   |  28  |  0.82    Ca    9.8      20 Jan 2023 06:48  Phos  2.8     01-20  Mg     1.60     01-20    TPro  7.5  /  Alb  2.6<L>  /  TBili  1.2  /  DBili  x   /  AST  45<H>  /  ALT  20  /  AlkPhos  418<H>  01-20

## 2023-01-20 NOTE — PROGRESS NOTE ADULT - PROBLEM SELECTOR PLAN 2
- Thrombosis + thrombphlebitis of IMV  - Filling defects in SMV, portal vein as well  - Concern for malignancy given adenopathy on CT scan. Either uterine or colonic. Pt has not had a colonoscopy.  -TVUS with 17mm stripe. Can f/u outpatient for EMB  - Will need colonoscopy outpatient. Can do hypercoagulable workup if those negative.  - Continue lovenox. Would prefer to keep on lovenox for anticoagulation as opposed to warfarin. No DOAC as per vasc cards.  - F/U echo - Thrombosis + thrombphlebitis of IMV  - Filling defects in SMV, portal vein as well  - Concern for malignancy given adenopathy on CT scan. Either uterine or colonic. Pt has not had a colonoscopy.  -TVUS with 17mm stripe. Can f/u outpatient for EMB  - Will need colonoscopy outpatient. Can do hypercoagulable workup if those negative.  - Continue lovenox. Would prefer to keep on lovenox for anticoagulation as opposed to warfarin, though patient may not be amenable. No DOAC as per vasc cards.  - F/U echo

## 2023-01-20 NOTE — CONSULT NOTE ADULT - ASSESSMENT
***INCOMPLETE*** 71 yo  who originally presented to the ED w/ abdominal pain now being treated for diverticulitis as well as IMV, SMV and portal vein thrombosis. Pt found to have thickened endometrium of 17mm on TVUS, no history of post menopausal vaginal bleeding.    # Thickened Endometrium   - Endometrial biopsy recommended. Pro's and con's of procedure discussed with patient. Offered to perform EMB today but patient not amendable as she states "she has an xray and cannot do too much in one day".   - Patient may do EMB inpatient if agreeable or can follow up with our GYN clinic for outpatient Endometrial Biopsy. ([facility], [address] [place] [address], [place], [phone])    AMANDA Guthrie  d/w GYN Team & Dr. Greenwood     71 yo  who originally presented to the ED w/ abdominal pain now being treated for diverticulitis as well as IMV, SMV and portal vein thrombosis. Pt found to have thickened endometrium of 17mm on TVUS, no history of post menopausal vaginal bleeding. EMB recommended given TVUS findings despite no VB reported.     # Thickened Endometrium   - Endometrial biopsy recommended. Pro's and con's of procedure discussed with patient. Offered to perform EMB today but patient not amendable as she states "she has an xray and cannot do too much in one day".   - Patient may do EMB  inpatient on  if agreeable or can follow up with our GYN clinic for outpatient Endometrial Biopsy. ([facility], [address] [place] [address], [place], [phone])    AMANDA Guthrie  d/w GYN Team & Dr. Krystyna Saenz, PGY2

## 2023-01-21 DIAGNOSIS — M25.561 PAIN IN RIGHT KNEE: ICD-10-CM

## 2023-01-21 LAB
ALBUMIN SERPL ELPH-MCNC: 3 G/DL — LOW (ref 3.3–5)
ALP SERPL-CCNC: 373 U/L — HIGH (ref 40–120)
ALT FLD-CCNC: 22 U/L — SIGNIFICANT CHANGE UP (ref 4–33)
ANION GAP SERPL CALC-SCNC: 11 MMOL/L — SIGNIFICANT CHANGE UP (ref 7–14)
AST SERPL-CCNC: 47 U/L — HIGH (ref 4–32)
BASOPHILS # BLD AUTO: 0.1 K/UL — SIGNIFICANT CHANGE UP (ref 0–0.2)
BASOPHILS NFR BLD AUTO: 1 % — SIGNIFICANT CHANGE UP (ref 0–2)
BILIRUB SERPL-MCNC: 1.1 MG/DL — SIGNIFICANT CHANGE UP (ref 0.2–1.2)
BUN SERPL-MCNC: 16 MG/DL — SIGNIFICANT CHANGE UP (ref 7–23)
CALCIUM SERPL-MCNC: 10.3 MG/DL — SIGNIFICANT CHANGE UP (ref 8.4–10.5)
CHLORIDE SERPL-SCNC: 98 MMOL/L — SIGNIFICANT CHANGE UP (ref 98–107)
CO2 SERPL-SCNC: 28 MMOL/L — SIGNIFICANT CHANGE UP (ref 22–31)
CREAT SERPL-MCNC: 0.79 MG/DL — SIGNIFICANT CHANGE UP (ref 0.5–1.3)
EGFR: 80 ML/MIN/1.73M2 — SIGNIFICANT CHANGE UP
EOSINOPHIL # BLD AUTO: 0.4 K/UL — SIGNIFICANT CHANGE UP (ref 0–0.5)
EOSINOPHIL NFR BLD AUTO: 4.1 % — SIGNIFICANT CHANGE UP (ref 0–6)
GLUCOSE SERPL-MCNC: 86 MG/DL — SIGNIFICANT CHANGE UP (ref 70–99)
HCT VFR BLD CALC: 33.8 % — LOW (ref 34.5–45)
HGB BLD-MCNC: 10.6 G/DL — LOW (ref 11.5–15.5)
IANC: 6.07 K/UL — SIGNIFICANT CHANGE UP (ref 1.8–7.4)
IMM GRANULOCYTES NFR BLD AUTO: 1.5 % — HIGH (ref 0–0.9)
INR BLD: 1.28 RATIO — HIGH (ref 0.88–1.16)
LYMPHOCYTES # BLD AUTO: 2.28 K/UL — SIGNIFICANT CHANGE UP (ref 1–3.3)
LYMPHOCYTES # BLD AUTO: 23.4 % — SIGNIFICANT CHANGE UP (ref 13–44)
MAGNESIUM SERPL-MCNC: 2.2 MG/DL — SIGNIFICANT CHANGE UP (ref 1.6–2.6)
MCHC RBC-ENTMCNC: 26.8 PG — LOW (ref 27–34)
MCHC RBC-ENTMCNC: 31.4 GM/DL — LOW (ref 32–36)
MCV RBC AUTO: 85.6 FL — SIGNIFICANT CHANGE UP (ref 80–100)
MONOCYTES # BLD AUTO: 0.75 K/UL — SIGNIFICANT CHANGE UP (ref 0–0.9)
MONOCYTES NFR BLD AUTO: 7.7 % — SIGNIFICANT CHANGE UP (ref 2–14)
NEUTROPHILS # BLD AUTO: 6.07 K/UL — SIGNIFICANT CHANGE UP (ref 1.8–7.4)
NEUTROPHILS NFR BLD AUTO: 62.3 % — SIGNIFICANT CHANGE UP (ref 43–77)
NRBC # BLD: 0 /100 WBCS — SIGNIFICANT CHANGE UP (ref 0–0)
NRBC # FLD: 0 K/UL — SIGNIFICANT CHANGE UP (ref 0–0)
PHOSPHATE SERPL-MCNC: 3 MG/DL — SIGNIFICANT CHANGE UP (ref 2.5–4.5)
PLATELET # BLD AUTO: 316 K/UL — SIGNIFICANT CHANGE UP (ref 150–400)
POTASSIUM SERPL-MCNC: 4.1 MMOL/L — SIGNIFICANT CHANGE UP (ref 3.5–5.3)
POTASSIUM SERPL-SCNC: 4.1 MMOL/L — SIGNIFICANT CHANGE UP (ref 3.5–5.3)
PROT SERPL-MCNC: 7.9 G/DL — SIGNIFICANT CHANGE UP (ref 6–8.3)
PROTHROM AB SERPL-ACNC: 14.9 SEC — HIGH (ref 10.5–13.4)
RBC # BLD: 3.95 M/UL — SIGNIFICANT CHANGE UP (ref 3.8–5.2)
RBC # FLD: 15.4 % — HIGH (ref 10.3–14.5)
SODIUM SERPL-SCNC: 137 MMOL/L — SIGNIFICANT CHANGE UP (ref 135–145)
WBC # BLD: 9.75 K/UL — SIGNIFICANT CHANGE UP (ref 3.8–10.5)
WBC # FLD AUTO: 9.75 K/UL — SIGNIFICANT CHANGE UP (ref 3.8–10.5)

## 2023-01-21 PROCEDURE — 73600 X-RAY EXAM OF ANKLE: CPT | Mod: 26,RT

## 2023-01-21 PROCEDURE — 99233 SBSQ HOSP IP/OBS HIGH 50: CPT

## 2023-01-21 PROCEDURE — 73502 X-RAY EXAM HIP UNI 2-3 VIEWS: CPT | Mod: 26,RT

## 2023-01-21 PROCEDURE — 73560 X-RAY EXAM OF KNEE 1 OR 2: CPT | Mod: 26,RT

## 2023-01-21 RX ADMIN — Medication 3 MILLIGRAM(S): at 21:12

## 2023-01-21 RX ADMIN — ENOXAPARIN SODIUM 120 MILLIGRAM(S): 100 INJECTION SUBCUTANEOUS at 10:21

## 2023-01-21 RX ADMIN — LIDOCAINE 1 PATCH: 4 CREAM TOPICAL at 13:03

## 2023-01-21 RX ADMIN — Medication 1 TABLET(S): at 06:21

## 2023-01-21 RX ADMIN — Medication 1 APPLICATION(S): at 12:04

## 2023-01-21 RX ADMIN — Medication 650 MILLIGRAM(S): at 06:25

## 2023-01-21 RX ADMIN — Medication 650 MILLIGRAM(S): at 07:00

## 2023-01-21 RX ADMIN — LIDOCAINE 1 PATCH: 4 CREAM TOPICAL at 21:44

## 2023-01-21 RX ADMIN — LIDOCAINE 1 APPLICATION(S): 4 CREAM TOPICAL at 17:27

## 2023-01-21 RX ADMIN — PANTOPRAZOLE SODIUM 40 MILLIGRAM(S): 20 TABLET, DELAYED RELEASE ORAL at 17:54

## 2023-01-21 RX ADMIN — PANTOPRAZOLE SODIUM 40 MILLIGRAM(S): 20 TABLET, DELAYED RELEASE ORAL at 06:21

## 2023-01-21 RX ADMIN — ENOXAPARIN SODIUM 120 MILLIGRAM(S): 100 INJECTION SUBCUTANEOUS at 21:12

## 2023-01-21 NOTE — PROGRESS NOTE ADULT - SUBJECTIVE AND OBJECTIVE BOX
PROGRESS NOTE:   Authored by: Bj Park M.D.   Internal Medicine PGY-1  Please Contact Via Teams    Patient is a 72y old  Female who presents with a chief complaint of Acute renal failure     (20 Jan 2023 15:23)      SUBJECTIVE / OVERNIGHT EVENTS:  No acute events overnight. Refused EMB yesterday.    Brief Daily Plan:      MEDICATIONS  (STANDING):  enoxaparin Injectable 120 milliGRAM(s) SubCutaneous every 12 hours  hydrocortisone 2.5% Cream 1 Application(s) Topical daily  lidocaine   4% Patch 1 Patch Transdermal every 24 hours  lidocaine   4% Patch 1 Patch Transdermal daily  lidocaine 2% Gel 1 Application(s) Topical two times a day  pantoprazole    Tablet 40 milliGRAM(s) Oral two times a day  polyethylene glycol 3350 17 Gram(s) Oral daily  senna 2 Tablet(s) Oral at bedtime    MEDICATIONS  (PRN):  acetaminophen     Tablet .. 650 milliGRAM(s) Oral every 6 hours PRN Moderate Pain (4 - 6)  melatonin 3 milliGRAM(s) Oral at bedtime PRN Sleep      CAPILLARY BLOOD GLUCOSE        I&O's Summary      PHYSICAL EXAM:  Vital Signs Last 24 Hrs  T(C): 36.6 (21 Jan 2023 05:55), Max: 36.7 (20 Jan 2023 13:05)  T(F): 97.8 (21 Jan 2023 05:55), Max: 98 (20 Jan 2023 13:05)  HR: 89 (21 Jan 2023 05:55) (79 - 89)  BP: 114/69 (21 Jan 2023 05:55) (114/69 - 141/70)  BP(mean): --  RR: 17 (21 Jan 2023 05:55) (17 - 18)  SpO2: 95% (21 Jan 2023 05:55) (91% - 97%)    Parameters below as of 21 Jan 2023 05:55  Patient On (Oxygen Delivery Method): room air        CONSTITUTIONAL: NAD, well-developed  RESPIRATORY: Normal respiratory effort; lungs are clear to auscultation bilaterally  CARDIOVASCULAR: Regular rate and rhythm, normal S1 and S2, no murmur/rub/gallop; No lower extremity edema; Peripheral pulses are 2+ bilaterally  ABDOMEN: Nontender to palpation, normoactive bowel sounds, no rebound/guarding; No hepatosplenomegaly  MUSCLOSKELETAL: no clubbing or cyanosis of digits; no joint swelling or tenderness to palpation  PSYCH: A+O to person, place, and time; affect appropriate    LABS:                        10.9   9.49  )-----------( 248      ( 20 Jan 2023 06:48 )             33.9     01-20    137  |  100  |  18  ----------------------------<  88  4.0   |  28  |  0.82    Ca    9.8      20 Jan 2023 06:48  Phos  2.8     01-20  Mg     1.60     01-20    TPro  7.5  /  Alb  2.6<L>  /  TBili  1.2  /  DBili  x   /  AST  45<H>  /  ALT  20  /  AlkPhos  418<H>  01-20                Tele Reviewed:    RADIOLOGY & ADDITIONAL TESTS:  Results Reviewed:   Imaging Personally Reviewed:  Electrocardiogram Personally Reviewed: PROGRESS NOTE:   Authored by: Bj Park M.D.   Internal Medicine PGY-1  Please Contact Via Teams    Patient is a 72y old  Female who presents with a chief complaint of Acute renal failure     (20 Jan 2023 15:23)      SUBJECTIVE / OVERNIGHT EVENTS:  No acute events overnight. Refused EMB yesterday because she had a headache and xray (which was not done). Aware that the biopsy would have taken 5 minutes, and that we has asked the GYN team to come at her request. Discussed with her about rehab again this morning. She states that her and her daughter went over some places in [facility] last night and they all had bad reviews. There was one place in [place] that had mixed reviews which she might consider. She is more inclined to go home and have someone come to her home, but she lacks the insight to understand how she will function until she can get someone to come.    Discussed transition to warfarin as well. She is not able to go the clinic for INR checks weekly but would be amenable if able to be done remotely.     Brief Daily Plan:  - dispo planning with daughter  - xray right lower extremity    MEDICATIONS  (STANDING):  enoxaparin Injectable 120 milliGRAM(s) SubCutaneous every 12 hours  hydrocortisone 2.5% Cream 1 Application(s) Topical daily  lidocaine   4% Patch 1 Patch Transdermal every 24 hours  lidocaine   4% Patch 1 Patch Transdermal daily  lidocaine 2% Gel 1 Application(s) Topical two times a day  pantoprazole    Tablet 40 milliGRAM(s) Oral two times a day  polyethylene glycol 3350 17 Gram(s) Oral daily  senna 2 Tablet(s) Oral at bedtime    MEDICATIONS  (PRN):  acetaminophen     Tablet .. 650 milliGRAM(s) Oral every 6 hours PRN Moderate Pain (4 - 6)  melatonin 3 milliGRAM(s) Oral at bedtime PRN Sleep      CAPILLARY BLOOD GLUCOSE        I&O's Summary      PHYSICAL EXAM:  Vital Signs Last 24 Hrs  T(C): 36.6 (21 Jan 2023 05:55), Max: 36.7 (20 Jan 2023 13:05)  T(F): 97.8 (21 Jan 2023 05:55), Max: 98 (20 Jan 2023 13:05)  HR: 89 (21 Jan 2023 05:55) (79 - 89)  BP: 114/69 (21 Jan 2023 05:55) (114/69 - 141/70)  BP(mean): --  RR: 17 (21 Jan 2023 05:55) (17 - 18)  SpO2: 95% (21 Jan 2023 05:55) (91% - 97%)    Parameters below as of 21 Jan 2023 05:55  Patient On (Oxygen Delivery Method): room air      GENERAL: Obese, comfortable  HEAD:  Atraumatic, Normocephalic  EYES: EOMI, PERRLA, conjunctiva and sclera clear  ENT: Moist mucous membranes  NECK: Supple, No JVD  CHEST/LUNG: Clear to auscultation bilaterally; No wheezing or rhonchi  HEART: RRR. No murmurs.  Peripheral edema: None. Legs large  ABDOMEN: BSx4; Soft, nontender, large  EXTREMITIES:  2+ radial pulses. Calfs nontender non erythematous.  NERVOUS SYSTEM:  A&Ox3, no gross lateralizing deficits   SKIN: Psoriasis    LABS:                        10.9   9.49  )-----------( 248      ( 20 Jan 2023 06:48 )             33.9     01-20    137  |  100  |  18  ----------------------------<  88  4.0   |  28  |  0.82    Ca    9.8      20 Jan 2023 06:48  Phos  2.8     01-20  Mg     1.60     01-20    TPro  7.5  /  Alb  2.6<L>  /  TBili  1.2  /  DBili  x   /  AST  45<H>  /  ALT  20  /  AlkPhos  418<H>  01-20

## 2023-01-21 NOTE — PROGRESS NOTE ADULT - PROBLEM SELECTOR PLAN 7
- Resolving. Likely an ischemic component  - GGT elevated  - CT abdomen with multiple hepatic cysts - Pt taking Otezla. Last dose 2 prior to admission  - Would keep other autoimmune condition on differential given elevated ESR, CRP

## 2023-01-21 NOTE — PROGRESS NOTE ADULT - PROBLEM SELECTOR PLAN 1
- Seen on CT  - Most likely cause of metabolic abnormalities + leukocytosis with left shift  - Zosyn 1/13-1/18  - Augmentin 1/18-  - Advanced diet given reassuring abdominal exam without any abdominal pain, tolerating without issue. Having BMs  - Monitor for perforation, abscess, obstruction, fistula RESOLVED  - Seen on CT  - Zosyn 1/13-1/18  - Augmentin 1/18-1/21  - Advanced diet given reassuring abdominal exam without any abdominal pain, tolerating without issue. Having BMs  - Monitor for perforation, abscess, obstruction, fistula

## 2023-01-21 NOTE — PROGRESS NOTE ADULT - PROBLEM SELECTOR PLAN 5
RESOLVED - GGT elevated  - CT abdomen with multiple hepatic cysts, non-occlusive portal vein thrombus  - Still persistently elevated. Likely cholestasis. CT a/p with no abnormalities other than the liver cysts.

## 2023-01-21 NOTE — CONSULT NOTE ADULT - SUBJECTIVE AND OBJECTIVE BOX
Vascular & Interventional Radiology    HPI: 72y Female with  with PMH of psoriatic arthritis, retinal vein occlusion, hypertension, hyperlipidemia and MEJIA, who originally presented with generalized weakness and decreased appetite x ~ 1wk. Patient states she is currently being treated for diverticulitis and states that her abdominal pain has somewhat improved since the beginning of her admission, she denies nausea/vomiting and is tolerating PO.    CT w/ nonperforated acute diverticulitis, filling defects within the portal vein and within the superior mesenteric vein consistent with thrombosis, heterogenous appearance of the inferior mesenteric vein with surrounding fat stranding consistent with thrombosis and thrombophlebitis, mesenteric adenopathy along the course of the IMV, thickened endometrial complex, which may be further evaluated with pelvic ultrasound.    US: Thickening of endometrial cavity with cystic change in this postmenopausal patient. Neoplasm is possible.    Data:  T(C): 36.5  HR: 87  BP: 147/77  RR: 19  SpO2: 97%    -WBC 9.75 / HgB 10.6 / Hct 33.8 / Plt 316  -Na 137 / Cl 98 / BUN 16 / Glucose 86  -K 4.1 / CO2 28 / Cr 0.79  -ALT 22 / Alk Phos 373 / T.Bili 1.1  -INR1.28    Assessment:   72y Female with hypertension, MEJIA, psoriatic arthritis presenting with several days of poor PO intake found to be hyponatremic with JAZMYNE, anemia, uremia, and elevated liver enzymes found to have acute diverticulitis, mesenteric adenopathy, and partial SMV and IMV thrombosis.    Plan:   1. Mesenteric adenopathy.  - Possible reactive.  - No safe percutaneous window.  - Can re-evaluate with follow-up imaging.    2. Acute diverticulitis.  - Continue abx per primary.  - Colonoscopy once feasible.    3. SMV and IMV thrombosis  - Agree w/ AC recs per vascular cardiology.    4. Thickened endometrium  - GYN following.  - Planned for endometrial biopsy.    5. Right knee pain  - Chronic.  - Prior x-rays w/ osteoarthritis.  - Repeat x-rays pending. If no acute issue, consider outpatient consult w/ IR for geniculate artery embolization.        --  Joe Devries MD, PGY-6  Vascular and Interventional Radiology  Available on [employer] Teams    - Nonemergent consults:  [facility] order "Consult- Interventional Radiology"  - Emergent issues (pager): [facility] [phone]; [facility] [phone]; [phone]  - Scheduling questions: [facility] [phone]; [facility] [phone]  - Clinic/outpatient booking: [facility] [phone]; [facility] [phone]

## 2023-01-21 NOTE — PROGRESS NOTE ADULT - PROBLEM SELECTOR PLAN 8
- 21  - May be elevated ISO JAZMYNE  - CT with acute diverticulitis  - f/up cultures as above Diet: full liquid diet, advance to regular  DVT Proph: Heparin subq  Dispo: PT wants to go home. Will set up with home PT. Can follow with PCP, gyn resident clinic, GI.

## 2023-01-21 NOTE — PROGRESS NOTE ADULT - PROBLEM SELECTOR PLAN 2
- Thrombosis + thrombphlebitis of IMV  - Filling defects in SMV, portal vein as well  - Concern for malignancy given adenopathy on CT scan. Either uterine or colonic. Pt has not had a colonoscopy.  -TVUS with 17mm stripe. Can f/u outpatient for EMB  - Will need colonoscopy outpatient. Can do hypercoagulable workup if those negative.  - Continue lovenox. Would prefer to keep on lovenox for anticoagulation as opposed to warfarin, though patient may not be amenable. No DOAC as per vasc cards.  - F/U echo 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 Either uterine or colonic. Pt has not had a colonoscopy.  - TVUS with 17mm stripe. GYN wanted to do EMB but patient refused. Possibly on 1/23  - Will need colonoscopy outpatient. Can do hypercoagulable workup if those negative.  - Continue lovenox for now. Patient will not administer injections at home. Will need transition to warfarin but need to sort out logistics. No DOAC as per vasc cards.

## 2023-01-21 NOTE — PROGRESS NOTE ADULT - PROBLEM SELECTOR PLAN 6
Pulmonary embolism Bilateral PE Elevated creatinine - GGT elevated  - CT abdomen with multiple hepatic cysts, non-occlusive portal vein thrombus  - Still persistently elevated. Likely cholestasis. CT a/p with no abnormalities other than the liver cysts. - Resolved  - GGT elevated

## 2023-01-21 NOTE — PROGRESS NOTE ADULT - ASSESSMENT
72F with hypertension, MEJIA, psoriatic arthritis presenting with several days of poor PO intake found to be hyponatremic with JAZMYNE, anemia, uremia, and elevated liver enzymes found to have acute diverticulitis and multiple hepatic cysts. F/U CT with contrast showing multiple non-occlusive filling defects. Uremia, liver enzymes improving. Now on anti-coagulation. Persistent alk phos elevation. 72F with hypertension, MEJIA, psoriatic arthritis presenting with several days of poor PO intake found to be hyponatremic with JAZMYNE, anemia, uremia, and elevated liver enzymes found to have acute diverticulitis and multiple hepatic cysts. F/U CT with contrast showing multiple non-occlusive filling defects. JAZMYNE and LFTs resolved. Persistent alk phos elevation. Now on anti-coagulation. Pending dispo planning

## 2023-01-21 NOTE — PROGRESS NOTE ADULT - ATTENDING COMMENTS
72F w/ psoriatic arthritis (apremilast), HTN (lisinopril/chlorthalidone) presenting w/ generalized weakness and found to have multiple metabolic derangements including JAZMYNE, hypokalemia, hyponatremia, transaminitis, AGMA and found to have severe sepsis with CT w/ acute uncomplicated diverticulitis c/b IMV thrombosis and thrombophlebitis and course c/b multiple episode of asymptomatic HoTN, responsive to fluids and now resolved. Overall, patient is improving.    Patient stating that she feel tired this AM. Denies any CP, SOB. Still having some abdominal pain.     #Thrombosis: Continue with Lovenox BID. Tierra (daughter) to have discussion with mother on whether she wants Lovenox injection or coumadin. Patient states that she feels uncomfortable injecting herself as she "is not a nurse or a doctor". Based on discussion can continue with Lovenox or bridge to coumadin   Currently Lovenox for multiple clots not CT Abd/Pelvis with contrast. Uterus noted to thickened endometrium, was offered endometrial biopsy but refused at that time, States that on 1/23 on Monday if agreeable.   - GYN recommending possible IR biopsy given mesenteric adenopathy noted on CT scan. IR consulted Awaiting recs     #RLE pain: pending Xray. Able to move right lower leg w/o difficulty.     Dispo: should go to [facility] (formerly [facility]), patient stating that she has had poor experiences in the past and would not want to go back to a rehab again.

## 2023-01-21 NOTE — CONSULT NOTE ADULT - CONSULT REASON
Lymph node biopsy. Pt with multiple blood clots. Needs EMB for thickened endometrial stripe, On CT there is mesenteric lymphadenopathy near area of diverticulitis. But biopsy could help to identify if malignancy.

## 2023-01-22 LAB
ALBUMIN SERPL ELPH-MCNC: 2.7 G/DL — LOW (ref 3.3–5)
ALP SERPL-CCNC: 289 U/L — HIGH (ref 40–120)
ALT FLD-CCNC: 21 U/L — SIGNIFICANT CHANGE UP (ref 4–33)
ANION GAP SERPL CALC-SCNC: 10 MMOL/L — SIGNIFICANT CHANGE UP (ref 7–14)
APPEARANCE UR: CLEAR — SIGNIFICANT CHANGE UP
AST SERPL-CCNC: 39 U/L — HIGH (ref 4–32)
BACTERIA # UR AUTO: NEGATIVE — SIGNIFICANT CHANGE UP
BASOPHILS # BLD AUTO: 0.1 K/UL — SIGNIFICANT CHANGE UP (ref 0–0.2)
BASOPHILS NFR BLD AUTO: 1.2 % — SIGNIFICANT CHANGE UP (ref 0–2)
BILIRUB SERPL-MCNC: 1 MG/DL — SIGNIFICANT CHANGE UP (ref 0.2–1.2)
BILIRUB UR-MCNC: NEGATIVE — SIGNIFICANT CHANGE UP
BUN SERPL-MCNC: 15 MG/DL — SIGNIFICANT CHANGE UP (ref 7–23)
CALCIUM SERPL-MCNC: 9.9 MG/DL — SIGNIFICANT CHANGE UP (ref 8.4–10.5)
CHLORIDE SERPL-SCNC: 98 MMOL/L — SIGNIFICANT CHANGE UP (ref 98–107)
CO2 SERPL-SCNC: 27 MMOL/L — SIGNIFICANT CHANGE UP (ref 22–31)
COLOR SPEC: YELLOW — SIGNIFICANT CHANGE UP
CREAT SERPL-MCNC: 0.82 MG/DL — SIGNIFICANT CHANGE UP (ref 0.5–1.3)
CULTURE RESULTS: SIGNIFICANT CHANGE UP
DIFF PNL FLD: ABNORMAL
EGFR: 76 ML/MIN/1.73M2 — SIGNIFICANT CHANGE UP
EOSINOPHIL # BLD AUTO: 0.31 K/UL — SIGNIFICANT CHANGE UP (ref 0–0.5)
EOSINOPHIL NFR BLD AUTO: 3.6 % — SIGNIFICANT CHANGE UP (ref 0–6)
EPI CELLS # UR: 2 /HPF — SIGNIFICANT CHANGE UP (ref 0–5)
GLUCOSE SERPL-MCNC: 91 MG/DL — SIGNIFICANT CHANGE UP (ref 70–99)
GLUCOSE UR QL: NEGATIVE — SIGNIFICANT CHANGE UP
HCT VFR BLD CALC: 34.3 % — LOW (ref 34.5–45)
HGB BLD-MCNC: 11 G/DL — LOW (ref 11.5–15.5)
HYALINE CASTS # UR AUTO: 0 /LPF — SIGNIFICANT CHANGE UP (ref 0–7)
IANC: 5.65 K/UL — SIGNIFICANT CHANGE UP (ref 1.8–7.4)
IMM GRANULOCYTES NFR BLD AUTO: 1.3 % — HIGH (ref 0–0.9)
KETONES UR-MCNC: NEGATIVE — SIGNIFICANT CHANGE UP
LEUKOCYTE ESTERASE UR-ACNC: NEGATIVE — SIGNIFICANT CHANGE UP
LYMPHOCYTES # BLD AUTO: 1.78 K/UL — SIGNIFICANT CHANGE UP (ref 1–3.3)
LYMPHOCYTES # BLD AUTO: 20.5 % — SIGNIFICANT CHANGE UP (ref 13–44)
MAGNESIUM SERPL-MCNC: 1.7 MG/DL — SIGNIFICANT CHANGE UP (ref 1.6–2.6)
MCHC RBC-ENTMCNC: 27.5 PG — SIGNIFICANT CHANGE UP (ref 27–34)
MCHC RBC-ENTMCNC: 32.1 GM/DL — SIGNIFICANT CHANGE UP (ref 32–36)
MCV RBC AUTO: 85.8 FL — SIGNIFICANT CHANGE UP (ref 80–100)
MONOCYTES # BLD AUTO: 0.74 K/UL — SIGNIFICANT CHANGE UP (ref 0–0.9)
MONOCYTES NFR BLD AUTO: 8.5 % — SIGNIFICANT CHANGE UP (ref 2–14)
NEUTROPHILS # BLD AUTO: 5.65 K/UL — SIGNIFICANT CHANGE UP (ref 1.8–7.4)
NEUTROPHILS NFR BLD AUTO: 64.9 % — SIGNIFICANT CHANGE UP (ref 43–77)
NITRITE UR-MCNC: NEGATIVE — SIGNIFICANT CHANGE UP
NRBC # BLD: 0 /100 WBCS — SIGNIFICANT CHANGE UP (ref 0–0)
NRBC # FLD: 0 K/UL — SIGNIFICANT CHANGE UP (ref 0–0)
PH UR: 7 — SIGNIFICANT CHANGE UP (ref 5–8)
PHOSPHATE SERPL-MCNC: 3.2 MG/DL — SIGNIFICANT CHANGE UP (ref 2.5–4.5)
PLATELET # BLD AUTO: 297 K/UL — SIGNIFICANT CHANGE UP (ref 150–400)
POTASSIUM SERPL-MCNC: 4.1 MMOL/L — SIGNIFICANT CHANGE UP (ref 3.5–5.3)
POTASSIUM SERPL-SCNC: 4.1 MMOL/L — SIGNIFICANT CHANGE UP (ref 3.5–5.3)
PROT SERPL-MCNC: 7.2 G/DL — SIGNIFICANT CHANGE UP (ref 6–8.3)
PROT UR-MCNC: ABNORMAL
RBC # BLD: 4 M/UL — SIGNIFICANT CHANGE UP (ref 3.8–5.2)
RBC # FLD: 15.7 % — HIGH (ref 10.3–14.5)
RBC CASTS # UR COMP ASSIST: SIGNIFICANT CHANGE UP /HPF (ref 0–4)
SODIUM SERPL-SCNC: 135 MMOL/L — SIGNIFICANT CHANGE UP (ref 135–145)
SP GR SPEC: 1.02 — SIGNIFICANT CHANGE UP (ref 1.01–1.05)
SPECIMEN SOURCE: SIGNIFICANT CHANGE UP
URATE CRY FLD QL MICRO: SIGNIFICANT CHANGE UP
UROBILINOGEN FLD QL: ABNORMAL
WBC # BLD: 8.69 K/UL — SIGNIFICANT CHANGE UP (ref 3.8–10.5)
WBC # FLD AUTO: 8.69 K/UL — SIGNIFICANT CHANGE UP (ref 3.8–10.5)
WBC UR QL: 1 /HPF — SIGNIFICANT CHANGE UP (ref 0–5)

## 2023-01-22 PROCEDURE — 99233 SBSQ HOSP IP/OBS HIGH 50: CPT

## 2023-01-22 RX ORDER — MAGNESIUM OXIDE 400 MG ORAL TABLET 241.3 MG
400 TABLET ORAL ONCE
Refills: 0 | Status: COMPLETED | OUTPATIENT
Start: 2023-01-22 | End: 2023-01-22

## 2023-01-22 RX ADMIN — ENOXAPARIN SODIUM 120 MILLIGRAM(S): 100 INJECTION SUBCUTANEOUS at 21:58

## 2023-01-22 RX ADMIN — LIDOCAINE 1 PATCH: 4 CREAM TOPICAL at 00:00

## 2023-01-22 RX ADMIN — Medication 1 APPLICATION(S): at 11:39

## 2023-01-22 RX ADMIN — PANTOPRAZOLE SODIUM 40 MILLIGRAM(S): 20 TABLET, DELAYED RELEASE ORAL at 05:49

## 2023-01-22 RX ADMIN — SENNA PLUS 2 TABLET(S): 8.6 TABLET ORAL at 21:58

## 2023-01-22 RX ADMIN — Medication 3 MILLIGRAM(S): at 21:58

## 2023-01-22 RX ADMIN — ENOXAPARIN SODIUM 120 MILLIGRAM(S): 100 INJECTION SUBCUTANEOUS at 11:38

## 2023-01-22 RX ADMIN — PANTOPRAZOLE SODIUM 40 MILLIGRAM(S): 20 TABLET, DELAYED RELEASE ORAL at 17:39

## 2023-01-22 RX ADMIN — LIDOCAINE 1 PATCH: 4 CREAM TOPICAL at 12:48

## 2023-01-22 RX ADMIN — LIDOCAINE 1 APPLICATION(S): 4 CREAM TOPICAL at 17:38

## 2023-01-22 RX ADMIN — LIDOCAINE 1 PATCH: 4 CREAM TOPICAL at 19:00

## 2023-01-22 RX ADMIN — MAGNESIUM OXIDE 400 MG ORAL TABLET 400 MILLIGRAM(S): 241.3 TABLET ORAL at 11:38

## 2023-01-22 NOTE — PROGRESS NOTE ADULT - PROBLEM SELECTOR PLAN 2
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 Either uterine or colonic. Pt has not had a colonoscopy.  - TVUS with 17mm stripe. GYN wanted to do EMB but patient refused. Possibly on 1/23  - Will need colonoscopy outpatient. Can do hypercoagulable workup if those negative.  - Continue lovenox for now. Patient will not administer injections at home. Will need transition to warfarin but need to sort out logistics. No DOAC as per vasc cards. 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 Either uterine or colonic. Pt has not had a colonoscopy.  - TVUS with 17mm stripe. GYN wanted to do EMB but patient refused. Possibly on 1/23  - Will need colonoscopy outpatient. Can do hypercoagulable workup if those negative.  - Continue lovenox for now. Patient will not administer injections at home. Will need transition to warfarin but need to sort out logistics. She may choose to stay with lovenox. No DOAC as per vasc cards. 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 Either uterine or colonic. Pt has not had a colonoscopy.  - TVUS with 17mm stripe. GYN wanted to do EMB but patient refused. Possibly on 1/23  - Will need colonoscopy outpatient. Can do hypercoagulable workup if those negative.  - IR with no safe percutaneous window to take a biopsy  - Continue lovenox for now. Patient will not administer injections at home. Will need transition to warfarin but need to sort out logistics. She may choose to stay with lovenox. No DOAC as per vasc cards.

## 2023-01-22 NOTE — PROGRESS NOTE ADULT - SUBJECTIVE AND OBJECTIVE BOX
PROGRESS NOTE:   Authored by: Bj Park M.D.   Internal Medicine PGY-1  Please Contact Via Teams    Patient is a 72y old  Female who presents with a chief complaint of Acute renal failure     (20 Jan 2023 15:23)      SUBJECTIVE / OVERNIGHT EVENTS:  No acute events overnight.     Brief Daily Plan:    ADDITIONAL REVIEW OF SYSTEMS:  Patient denies fevers, chills, chest pain, shortness of breath, nausea, abdominal pain, diarrhea, constipation, dysuria, leg swelling, headache, light headedness.    MEDICATIONS  (STANDING):  enoxaparin Injectable 120 milliGRAM(s) SubCutaneous every 12 hours  hydrocortisone 2.5% Cream 1 Application(s) Topical daily  lidocaine   4% Patch 1 Patch Transdermal every 24 hours  lidocaine   4% Patch 1 Patch Transdermal daily  lidocaine 2% Gel 1 Application(s) Topical two times a day  pantoprazole    Tablet 40 milliGRAM(s) Oral two times a day  polyethylene glycol 3350 17 Gram(s) Oral daily  senna 2 Tablet(s) Oral at bedtime    MEDICATIONS  (PRN):  acetaminophen     Tablet .. 650 milliGRAM(s) Oral every 6 hours PRN Moderate Pain (4 - 6)  melatonin 3 milliGRAM(s) Oral at bedtime PRN Sleep      CAPILLARY BLOOD GLUCOSE        I&O's Summary      PHYSICAL EXAM:  Vital Signs Last 24 Hrs  T(C): 36.4 (22 Jan 2023 05:14), Max: 36.5 (21 Jan 2023 12:55)  T(F): 97.5 (22 Jan 2023 05:14), Max: 97.7 (21 Jan 2023 12:55)  HR: 95 (22 Jan 2023 05:14) (50 - 95)  BP: 112/57 (22 Jan 2023 05:14) (112/57 - 147/77)  BP(mean): --  RR: 17 (22 Jan 2023 05:14) (17 - 19)  SpO2: 96% (22 Jan 2023 05:14) (96% - 100%)    Parameters below as of 22 Jan 2023 05:14  Patient On (Oxygen Delivery Method): room air        CONSTITUTIONAL: NAD, well-developed  RESPIRATORY: Normal respiratory effort; lungs are clear to auscultation bilaterally  CARDIOVASCULAR: Regular rate and rhythm, normal S1 and S2, no murmur/rub/gallop; No lower extremity edema; Peripheral pulses are 2+ bilaterally  ABDOMEN: Nontender to palpation, normoactive bowel sounds, no rebound/guarding; No hepatosplenomegaly  MUSCLOSKELETAL: no clubbing or cyanosis of digits; no joint swelling or tenderness to palpation  PSYCH: A+O to person, place, and time; affect appropriate    LABS:                        11.0   8.69  )-----------( 297      ( 22 Jan 2023 05:30 )             34.3     01-21    137  |  98  |  16  ----------------------------<  86  4.1   |  28  |  0.79    Ca    10.3      21 Jan 2023 07:00  Phos  3.0     01-21  Mg     2.20     01-21    TPro  7.9  /  Alb  3.0<L>  /  TBili  1.1  /  DBili  x   /  AST  47<H>  /  ALT  22  /  AlkPhos  373<H>  01-21    PT/INR - ( 21 Jan 2023 07:00 )   PT: 14.9 sec;   INR: 1.28 ratio                     Tele Reviewed:    RADIOLOGY & ADDITIONAL TESTS:  Results Reviewed:   Imaging Personally Reviewed:  Electrocardiogram Personally Reviewed: PROGRESS NOTE:   Authored by: Bj Park M.D.   Internal Medicine PGY-1  Please Contact Via Teams    Patient is a 72y old  Female who presents with a chief complaint of Acute renal failure     (20 Jan 2023 15:23)      SUBJECTIVE / OVERNIGHT EVENTS:  No acute events overnight. Patient feeling well this morning. Mentions some lovenox injection site bruising. Has not yet decided on her disposition. Tried to call daughter Mg morejon but no answer      MEDICATIONS  (STANDING):  enoxaparin Injectable 120 milliGRAM(s) SubCutaneous every 12 hours  hydrocortisone 2.5% Cream 1 Application(s) Topical daily  lidocaine   4% Patch 1 Patch Transdermal every 24 hours  lidocaine   4% Patch 1 Patch Transdermal daily  lidocaine 2% Gel 1 Application(s) Topical two times a day  pantoprazole    Tablet 40 milliGRAM(s) Oral two times a day  polyethylene glycol 3350 17 Gram(s) Oral daily  senna 2 Tablet(s) Oral at bedtime    MEDICATIONS  (PRN):  acetaminophen     Tablet .. 650 milliGRAM(s) Oral every 6 hours PRN Moderate Pain (4 - 6)  melatonin 3 milliGRAM(s) Oral at bedtime PRN Sleep      CAPILLARY BLOOD GLUCOSE        I&O's Summary      PHYSICAL EXAM:  Vital Signs Last 24 Hrs  T(C): 36.4 (22 Jan 2023 05:14), Max: 36.5 (21 Jan 2023 12:55)  T(F): 97.5 (22 Jan 2023 05:14), Max: 97.7 (21 Jan 2023 12:55)  HR: 95 (22 Jan 2023 05:14) (50 - 95)  BP: 112/57 (22 Jan 2023 05:14) (112/57 - 147/77)  BP(mean): --  RR: 17 (22 Jan 2023 05:14) (17 - 19)  SpO2: 96% (22 Jan 2023 05:14) (96% - 100%)    Parameters below as of 22 Jan 2023 05:14  Patient On (Oxygen Delivery Method): room air      GENERAL: Obese, comfortable  HEAD:  Atraumatic, Normocephalic  EYES: EOMI, PERRLA, conjunctiva and sclera clear  ENT: Moist mucous membranes  NECK: Supple, No JVD  CHEST/LUNG: Clear to auscultation bilaterally; No wheezing or rhonchi  HEART: RRR. No murmurs.  Peripheral edema: None. Legs large  ABDOMEN: BSx4; Soft, nontender, large  EXTREMITIES:  2+ radial pulses. Calfs nontender non erythematous.  NERVOUS SYSTEM:  A&Ox3, no gross lateralizing deficits   SKIN: Psoriasis, bruising at lovenox injection sites.    LABS:                        11.0   8.69  )-----------( 297      ( 22 Jan 2023 05:30 )             34.3     01-21    137  |  98  |  16  ----------------------------<  86  4.1   |  28  |  0.79    Ca    10.3      21 Jan 2023 07:00  Phos  3.0     01-21  Mg     2.20     01-21    TPro  7.9  /  Alb  3.0<L>  /  TBili  1.1  /  DBili  x   /  AST  47<H>  /  ALT  22  /  AlkPhos  373<H>  01-21    PT/INR - ( 21 Jan 2023 07:00 )   PT: 14.9 sec;   INR: 1.28 ratio        < from: Xray Knee 1 or 2 Views, Right (01.21.23 @ 20:04) >  IMPRESSION: No acute fracture or dislocation. Moderate tricompartmental   osteoarthritic changes similar to prior. No significant joint effusion.    < end of copied text >  < from: Xray Ankle 2 Views, Right (01.21.23 @ 20:03) >  IMPRESSION: No acute fractures or dislocation. Old chip fracture lateral   malleolus. Mildspurring medial malleolus. Ankle mortise intact. The soft   tissue swelling.      < end of copied text >  < from: Xray Hip w/ Pelvis 2 or 3 Views, Right (01.21.23 @ 20:01) >    IMPRESSION: No acute displaced fracture or dislocation. The hip joint   preserved.    < end of copied text >

## 2023-01-22 NOTE — PROGRESS NOTE ADULT - ATTENDING COMMENTS
72F w/ psoriatic arthritis (apremilast), HTN (lisinopril/chlorthalidone) presenting w/ generalized weakness and found to have multiple metabolic derangements including JAZMYNE, hypokalemia, hyponatremia, transaminitis, AGMA and found to have severe sepsis with CT w/ acute uncomplicated diverticulitis c/b IMV thrombosis and thrombophlebitis and course c/b multiple episode of asymptomatic HoTN, responsive to fluids and now resolved. Overall, patient is improving.    Patient feeling better this AM. Denies any chest pain, fevers or chills.    #Thrombosis: Continue with Lovenox BID. Dr. Barreto had family discussion with patient and daughter to discuss options Tierra (daughter) to have discussion with mother on whether she wants Lovenox injection or coumadin. Patient states that she feels uncomfortable injecting herself as she "is not a nurse or a doctor". However she states, that she does not want to have to get INR checks every week. Family still having discussions on which AC to choose. Will continue with Lovenox 120mg BID.   Currently Lovenox for multiple clots seen on CT Abd/Pelvis with contrast. Uterus noted to thickened endometrium, was offered endometrial biopsy but refused at that time, States that on 1/23 on Monday if agreeable.   - GYN planned for endometrial biopsy for Monday 1/23/2022. NPO at midnight.     #RLE pain: Xray without osteoarthritis, no fractures or dislocations.     Dispo: should go to [facility], patient stating that she has had poor experiences in the past and would not want to go back to a rehab again.

## 2023-01-22 NOTE — PROGRESS NOTE ADULT - PROBLEM SELECTOR PLAN 8
Diet: full liquid diet, advance to regular  DVT Proph: Heparin subq  Dispo: PT wants to go home. Will set up with home PT. Can follow with PCP, gyn resident clinic, GI. Diet: full liquid diet, advance to regular  DVT Proph: Heparin subq  Dispo: PT wants to go home but we have strongly recommended to her and her daughter that she go to rehab. She is still undecided. Can follow with PCP, gyn resident clinic, GI.

## 2023-01-22 NOTE — PROGRESS NOTE ADULT - ASSESSMENT
72F with hypertension, MEJIA, psoriatic arthritis presenting with several days of poor PO intake found to be hyponatremic with JAZMYNE, anemia, uremia, and elevated liver enzymes found to have acute diverticulitis and multiple hepatic cysts. F/U CT with contrast showing multiple non-occlusive filling defects. JAZMYNE and LFTs resolved. Persistent alk phos elevation. Now on anti-coagulation. Pending dispo planning

## 2023-01-22 NOTE — PROGRESS NOTE ADULT - PROBLEM SELECTOR PLAN 1
RESOLVED  - Seen on CT  - Zosyn 1/13-1/18  - Augmentin 1/18-1/21  - Advanced diet given reassuring abdominal exam without any abdominal pain, tolerating without issue. Having BMs  - Monitor for perforation, abscess, obstruction, fistula

## 2023-01-23 LAB
ALBUMIN SERPL ELPH-MCNC: 2.5 G/DL — LOW (ref 3.3–5)
ALP SERPL-CCNC: 240 U/L — HIGH (ref 40–120)
ALT FLD-CCNC: 16 U/L — SIGNIFICANT CHANGE UP (ref 4–33)
ANION GAP SERPL CALC-SCNC: 11 MMOL/L — SIGNIFICANT CHANGE UP (ref 7–14)
AST SERPL-CCNC: 32 U/L — SIGNIFICANT CHANGE UP (ref 4–32)
BASOPHILS # BLD AUTO: 0.08 K/UL — SIGNIFICANT CHANGE UP (ref 0–0.2)
BASOPHILS NFR BLD AUTO: 1.2 % — SIGNIFICANT CHANGE UP (ref 0–2)
BILIRUB SERPL-MCNC: 0.7 MG/DL — SIGNIFICANT CHANGE UP (ref 0.2–1.2)
BUN SERPL-MCNC: 13 MG/DL — SIGNIFICANT CHANGE UP (ref 7–23)
CALCIUM SERPL-MCNC: 9.7 MG/DL — SIGNIFICANT CHANGE UP (ref 8.4–10.5)
CHLORIDE SERPL-SCNC: 97 MMOL/L — LOW (ref 98–107)
CO2 SERPL-SCNC: 26 MMOL/L — SIGNIFICANT CHANGE UP (ref 22–31)
CREAT SERPL-MCNC: 0.88 MG/DL — SIGNIFICANT CHANGE UP (ref 0.5–1.3)
EGFR: 70 ML/MIN/1.73M2 — SIGNIFICANT CHANGE UP
EOSINOPHIL # BLD AUTO: 0.18 K/UL — SIGNIFICANT CHANGE UP (ref 0–0.5)
EOSINOPHIL NFR BLD AUTO: 2.6 % — SIGNIFICANT CHANGE UP (ref 0–6)
GLUCOSE SERPL-MCNC: 84 MG/DL — SIGNIFICANT CHANGE UP (ref 70–99)
HCT VFR BLD CALC: 30.3 % — LOW (ref 34.5–45)
HGB BLD-MCNC: 9.6 G/DL — LOW (ref 11.5–15.5)
IANC: 4.33 K/UL — SIGNIFICANT CHANGE UP (ref 1.8–7.4)
IMM GRANULOCYTES NFR BLD AUTO: 0.9 % — SIGNIFICANT CHANGE UP (ref 0–0.9)
LYMPHOCYTES # BLD AUTO: 1.43 K/UL — SIGNIFICANT CHANGE UP (ref 1–3.3)
LYMPHOCYTES # BLD AUTO: 20.9 % — SIGNIFICANT CHANGE UP (ref 13–44)
MAGNESIUM SERPL-MCNC: 1.7 MG/DL — SIGNIFICANT CHANGE UP (ref 1.6–2.6)
MCHC RBC-ENTMCNC: 27.2 PG — SIGNIFICANT CHANGE UP (ref 27–34)
MCHC RBC-ENTMCNC: 31.7 GM/DL — LOW (ref 32–36)
MCV RBC AUTO: 85.8 FL — SIGNIFICANT CHANGE UP (ref 80–100)
MONOCYTES # BLD AUTO: 0.77 K/UL — SIGNIFICANT CHANGE UP (ref 0–0.9)
MONOCYTES NFR BLD AUTO: 11.2 % — SIGNIFICANT CHANGE UP (ref 2–14)
NEUTROPHILS # BLD AUTO: 4.33 K/UL — SIGNIFICANT CHANGE UP (ref 1.8–7.4)
NEUTROPHILS NFR BLD AUTO: 63.2 % — SIGNIFICANT CHANGE UP (ref 43–77)
NRBC # BLD: 0 /100 WBCS — SIGNIFICANT CHANGE UP (ref 0–0)
NRBC # FLD: 0 K/UL — SIGNIFICANT CHANGE UP (ref 0–0)
PHOSPHATE SERPL-MCNC: 2.8 MG/DL — SIGNIFICANT CHANGE UP (ref 2.5–4.5)
PLATELET # BLD AUTO: 317 K/UL — SIGNIFICANT CHANGE UP (ref 150–400)
POTASSIUM SERPL-MCNC: 4.2 MMOL/L — SIGNIFICANT CHANGE UP (ref 3.5–5.3)
POTASSIUM SERPL-SCNC: 4.2 MMOL/L — SIGNIFICANT CHANGE UP (ref 3.5–5.3)
PROT SERPL-MCNC: 7.2 G/DL — SIGNIFICANT CHANGE UP (ref 6–8.3)
RBC # BLD: 3.53 M/UL — LOW (ref 3.8–5.2)
RBC # FLD: 16 % — HIGH (ref 10.3–14.5)
SODIUM SERPL-SCNC: 134 MMOL/L — LOW (ref 135–145)
WBC # BLD: 6.85 K/UL — SIGNIFICANT CHANGE UP (ref 3.8–10.5)
WBC # FLD AUTO: 6.85 K/UL — SIGNIFICANT CHANGE UP (ref 3.8–10.5)

## 2023-01-23 PROCEDURE — 99233 SBSQ HOSP IP/OBS HIGH 50: CPT

## 2023-01-23 PROCEDURE — 99222 1ST HOSP IP/OBS MODERATE 55: CPT | Mod: GC

## 2023-01-23 PROCEDURE — 58100 BIOPSY OF UTERUS LINING: CPT | Mod: GC

## 2023-01-23 PROCEDURE — 88305 TISSUE EXAM BY PATHOLOGIST: CPT | Mod: 26

## 2023-01-23 RX ORDER — IBUPROFEN 200 MG
400 TABLET ORAL EVERY 8 HOURS
Refills: 0 | Status: COMPLETED | OUTPATIENT
Start: 2023-01-23 | End: 2023-01-25

## 2023-01-23 RX ORDER — IBUPROFEN 200 MG
400 TABLET ORAL ONCE
Refills: 0 | Status: DISCONTINUED | OUTPATIENT
Start: 2023-01-23 | End: 2023-01-27

## 2023-01-23 RX ORDER — ENOXAPARIN SODIUM 100 MG/ML
120 INJECTION SUBCUTANEOUS
Qty: 48 | Refills: 2
Start: 2023-01-23 | End: 2023-04-22

## 2023-01-23 RX ORDER — IBUPROFEN 200 MG
200 TABLET ORAL ONCE
Refills: 0 | Status: DISCONTINUED | OUTPATIENT
Start: 2023-01-23 | End: 2023-01-23

## 2023-01-23 RX ORDER — IBUPROFEN 200 MG
200 TABLET ORAL EVERY 8 HOURS
Refills: 0 | Status: COMPLETED | OUTPATIENT
Start: 2023-01-23 | End: 2023-01-25

## 2023-01-23 RX ADMIN — LIDOCAINE 1 APPLICATION(S): 4 CREAM TOPICAL at 05:37

## 2023-01-23 RX ADMIN — Medication 400 MILLIGRAM(S): at 14:39

## 2023-01-23 RX ADMIN — LIDOCAINE 1 PATCH: 4 CREAM TOPICAL at 11:56

## 2023-01-23 RX ADMIN — Medication 400 MILLIGRAM(S): at 22:44

## 2023-01-23 RX ADMIN — Medication 200 MILLIGRAM(S): at 22:45

## 2023-01-23 RX ADMIN — Medication 3 MILLIGRAM(S): at 22:36

## 2023-01-23 RX ADMIN — PANTOPRAZOLE SODIUM 40 MILLIGRAM(S): 20 TABLET, DELAYED RELEASE ORAL at 05:31

## 2023-01-23 RX ADMIN — SENNA PLUS 2 TABLET(S): 8.6 TABLET ORAL at 22:35

## 2023-01-23 RX ADMIN — LIDOCAINE 1 PATCH: 4 CREAM TOPICAL at 19:29

## 2023-01-23 RX ADMIN — Medication 200 MILLIGRAM(S): at 23:30

## 2023-01-23 RX ADMIN — ENOXAPARIN SODIUM 120 MILLIGRAM(S): 100 INJECTION SUBCUTANEOUS at 22:34

## 2023-01-23 RX ADMIN — Medication 400 MILLIGRAM(S): at 23:25

## 2023-01-23 RX ADMIN — ENOXAPARIN SODIUM 120 MILLIGRAM(S): 100 INJECTION SUBCUTANEOUS at 10:33

## 2023-01-23 RX ADMIN — PANTOPRAZOLE SODIUM 40 MILLIGRAM(S): 20 TABLET, DELAYED RELEASE ORAL at 17:30

## 2023-01-23 NOTE — CONSULT NOTE ADULT - CONSULT REQUESTED DATE/TIME
14-Jan-2023 10:51
23-Jan-2023 13:20
18-Jan-2023 19:15
21-Jan-2023
14-Jan-2023 12:36
20-Jan-2023 14:43

## 2023-01-23 NOTE — PROCEDURE NOTE - ADDITIONAL PROCEDURE DETAILS
Procedure was explained in detail. Risks and indications for the procedure detailed. Pt states verbal agreement and written consent was signed.     Pt was placed on a bed pan. Legs were elevated to resemble lithotomy. Metal speculum was placed. Cervical os visualized and cleaned with iodine solution.   ECC preformed and placed in formalin solution. Cervix was then grasped with a single tooth tenaculum. Attempted to dilated cervix with os finder. Procedure limited by body habitus and sub-optimal positioning. Endometrial biopsy preformed. Suspicion for inadequate tissue sampling for appropriate pathological assessment. Minimal bleeding.     Preformed with Dr. Tovar PGY-4 and Dr. Martinez ([facility] attending)   Era Burgess, PGY-2 Procedure was explained in detail. Risks and indications for the procedure detailed. Pt states verbal agreement and written consent was signed.     Pt was placed on a bed pan. Legs were elevated to resemble lithotomy. Metal speculum was placed. Cervical os visualized and cleaned with iodine solution.   ECC preformed and placed in formalin solution. Cervix was then grasped with a single tooth tenaculum. Attempted to dilated cervix with os finder. Procedure limited by body habitus and sub-optimal positioning. Endometrial biopsy preformed. Had difficulty sampling endometrium due to cervical stenosis despite giving pt cytotec pre-procedure.  Minimal bleeding.     Preformed with Dr. Tovar PGY-4 and Dr. Martinez (C attending)   Era Burgess, PGY-2

## 2023-01-23 NOTE — PROGRESS NOTE ADULT - PROBLEM SELECTOR PLAN 2
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 Either uterine or colonic. Pt has not had a colonoscopy.  - TVUS with 17mm stripe. GYN wanted to do EMB but patient refused. Possibly on 1/23  - Will need colonoscopy outpatient. Can do hypercoagulable workup if those negative.  - IR with no safe percutaneous window to take a biopsy  - Continue lovenox for now. Patient will not administer injections at home. Will need transition to warfarin but need to sort out logistics. She may choose to stay with lovenox. No DOAC as per vasc cards. 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 Either uterine or colonic. Pt has not had a colonoscopy.  - TVUS with 17mm stripe. GYN wanted to do EMB but patient refused. Possibly on 1/23  - Will need colonoscopy outpatient. Can do hypercoagulable workup if those negative.  - IR with no safe percutaneous window to take a biopsy  - Continue lovenox for now.  - $0 copay, patient will administer lovenox.

## 2023-01-23 NOTE — CONSULT NOTE ADULT - ASSESSMENT
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  -patient currently of Otezla due to   -patient has had prior physical exams with dactylitis while not on any medication  -however acute stress can cause a flare of crystalline arthropathy    ##PsA/PsO on Otezla  -agree with holding Otezla for now   -agree with topical steroids for cutaneous flare    ##Inferior Mesenteric Vein Thrombosis  -concern for malignancy given adenopathy on CT scan  -TUVS with 17mm stribe  -pending endometrial biopsy with GYN  -currently on lovenox    PLAN  - 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 no erythema or warmth noted  -patient has had prior physical exams with dactylitis while not on any medication    ##PsA/PsO on Otezla  -can resume Otezla  -agree with topical steroids for cutaneous flare    ##Inferior Mesenteric Vein Thrombosis  -concern for malignancy given adenopathy on CT scan  -TUVS with 17mm stribe  -pending endometrial biopsy with GYN  -currently on lovenox  -will defer APLS work up until after malignancy evaluate is complete    ##right knee pain  -daughter concerned after patient fall  -if feasible can order MR of right knee to evaluate for structural disease    PLAN  -resume patient's home Otezla  -deferring APLS work up until malignancy evaluation is complete  --if feasible, order MR of right kne    case d/w Dr. Matamoros

## 2023-01-23 NOTE — CONSULT NOTE ADULT - ATTENDING COMMENTS
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     The presented to the hospital with some abn pain, dehydration and decreased PO. Patient found to have diverticulitis as well possible IMV thrombosis on CT scan. MICU was consulted for hypotension which improved with additional IVF. Furthermore asymptomatic with low BP and lactate improved during this time.     # Hypotension  # Diverticulitis  # IMV thrombosis  - Improved with IVF, check cortisol, c/w IVF as needed  - Agree with abx, f/u culture and surgery recs  - Consider vascular consult to assess need for A/C given findings IMV thrombosis  - Patient is not a MICU candidate at this time. Please call back if clinical course changes or with any further questions.     D/W patient and MICU resident at bedside.
as above    Dr. Macey Matamoros  Rheumatology Attending  [phone]  [email or web address]
73 y/o admitted with superior and inferior mesenteric vein and portal vein thromboses, incidentally found thickened endometrium.  No h/o of postmenopausal bleeding.   Recommend EMB to r/o malignancy.  Patient seen at bedside and declining EMB at this time on multiple attempts.  Will re-attempt at a later time, no further GYN interventions at this time.    Calvin Greenwood MD  Covering GYN [facility] Attending

## 2023-01-23 NOTE — PROGRESS NOTE ADULT - ASSESSMENT
72F with hypertension, MEJIA, psoriatic arthritis presenting with several days of poor PO intake found to be hyponatremic with JAZMYNE, anemia, uremia, and elevated liver enzymes found to have acute diverticulitis and multiple hepatic cysts. F/U CT with contrast showing multiple non-occlusive filling defects. JAZMYNE and LFTs resolved. Persistent alk phos elevation. Now on anti-coagulation. Pending dispo planning 72F with hypertension, MEJIA, psoriatic arthritis presenting with several days of poor PO intake found to be hyponatremic with JAZMYNE, anemia, uremia, and elevated liver enzymes found to have acute diverticulitis and multiple hepatic cysts. F/U CT with contrast showing multiple non-occlusive filling defects. JAZMYNE and LFTs resolved. Persistent alk phos elevation. Now on anti-coagulation. Pending EMB with GYN. Pending dispo planning

## 2023-01-23 NOTE — CONSULT NOTE ADULT - SUBJECTIVE AND OBJECTIVE BOX
***consult has been received. note is in progress and incomplete without attending attestation***    Fidencio Raygoza MD, PGY-4  Rheumatology Fellow  Reachable on TEAMS    SABINO TOMLINSON  [id number]    HISTORY OF PRESENT ILLNESS:  72-year-old female with history of psoriatic arthritis, retinal vein occlusion, hypertension, hyperlipidemia, MEJIA, obesity, psoriasis, presenting with generalized weakness and decreased appetite x ~ 1wk. States that she has had decreased urine output, feels dehydrated, otherwise has had some constipation but denies fevers, cough, chest/abdominal pain, n/v, SOB, dysuria.     ED Course:  Initial vitals 98.2 HR 72 102/53 RR16 100% RA  Given 3L IVF for dehydration    On my exam in the ED patient was very anxious. She began shivering during exam, stating that this happens at times and asking for heating packs. She endorses anxiety as the triggering factor. Recent vitals were normal.    She states multiple times that she does not want to die.    Given drop on HGB, she denies dark or bloody stools. She also denies abdominal/postprandial pain. Other ROS difficult to obtain given anxiety. (2023 07:16)    Rhuematology consulted for toe swelling    PsA History  dx with [facility] in , managed with topical steroids, phototherapy, however she had progression of disease. The patient complained of persistent joint pain and was eventually seen by Rheumatology and diagnosed with PsA in . The patient has had extensive plaque PsO and PsA with dactylitis in the past.  She has been approved for Tremfya, however pt is apprehensive of needles. MTX was not started d/t concern for hepatosteatosis, Eventually the patient initiated Otzela in  with improvement in her symptoms. The patient also has co-morbid OA in b/l hands, knees, and feet. The patient has had a history of a Baker's cyst in 2022 and is s/p steroid injection which helped      Rheum ROS    Denies fevers/chills/weight loss/night sweats/alopecia/sinus disease/asthma history/oral ulcers/dysphagia/vision changes/Raynauds/VTE/miscarraiges/sicca symptoms/urinary changes/edema/SOB/joint pain/swelling/jaw claudication/vision changes/rash/photosensitivity/morning stiffness    Endorses fevers/chills/weight loss/night sweats/alopecia/sinus disease/asthma history/oral ulcers/dysphagia/vision changes/Raynauds/VTE/miscarraiges/sicca symptoms/urinary changes/edema/SOB/joint pain/swelling/jaw claudication/vision changes/rash/photosensitivity/morning stiffness      MEDICATIONS  (STANDING):  enoxaparin Injectable 120 milliGRAM(s) SubCutaneous every 12 hours  hydrocortisone 2.5% Cream 1 Application(s) Topical daily  ibuprofen  Tablet. 200 milliGRAM(s) Oral every 8 hours  ibuprofen  Tablet. 400 milliGRAM(s) Oral every 8 hours  ibuprofen  Tablet. 400 milliGRAM(s) Oral once  lidocaine   4% Patch 1 Patch Transdermal every 24 hours  lidocaine   4% Patch 1 Patch Transdermal daily  lidocaine 2% Gel 1 Application(s) Topical two times a day  misoprostol 600 MICROGram(s) Buccal once  pantoprazole    Tablet 40 milliGRAM(s) Oral two times a day  senna 2 Tablet(s) Oral at bedtime    MEDICATIONS  (PRN):  acetaminophen     Tablet .. 650 milliGRAM(s) Oral every 6 hours PRN Moderate Pain (4 - 6)  melatonin 3 milliGRAM(s) Oral at bedtime PRN Sleep      Allergies    No Known Allergies    Intolerances        PERTINENT MEDICATION HISTORY:    SOCIAL HISTORY:  OCCUPATION:  TRAVEL HISTORY:    FAMILY HISTORY:      Vital Signs Last 24 Hrs  T(C): 37.1 (2023 12:36), Max: 37.2 (2023 21:03)  T(F): 98.8 (2023 12:36), Max: 98.9 (2023 21:03)  HR: 91 (2023 12:36) (91 - 96)  BP: 134/62 (2023 12:36) (132/62 - 134/62)  BP(mean): --  RR: 18 (2023 12:36) (17 - 18)  SpO2: 95% (2023 12:36) (95% - 96%)    Parameters below as of 2023 12:36  Patient On (Oxygen Delivery Method): room air        Physical Exam:  General: No apparent distress  HEENT: EOMI, MMM  CVS: +S1/S2, RRR, no murmurs/rubs/gallops  Resp: CTA b/l. No crackles/wheezing  GI: Soft, NT/ND +BS  MSK: no swelling/warmth/erythema of the joints of the UE/LE  Neuro: AAOx3  Skin: no visible rashes    LABS:                        9.6    6.85  )-----------( 317      ( 2023 06:21 )             30.3         134<L>  |  97<L>  |  13  ----------------------------<  84  4.2   |  26  |  0.88    Ca    9.7      2023 06:21  Phos  2.8       Mg     1.70         TPro  7.2  /  Alb  2.5<L>  /  TBili  0.7  /  DBili  x   /  AST  32  /  ALT  16  /  AlkPhos  240<H>        Urinalysis Basic - ( 2023 13:12 )    Color: Yellow / Appearance: Clear / S.020 / pH: x  Gluc: x / Ketone: Negative  / Bili: Negative / Urobili: 6 mg/dL   Blood: x / Protein: Trace / Nitrite: Negative   Leuk Esterase: Negative / RBC: 4-6 /HPF / WBC 1 /HPF   Sq Epi: x / Non Sq Epi: 2 /HPF / Bacteria: Negative    Uric Acid Crystals: OCCASSIONAL (23 @ 13:12)    Rheumatology Work Up    Protein/Creatinine Ratio Calculation: 0.3 Ratio *H* [0.0 - 0.2] (23 @ 19:30)  Creatine Kinase, Serum: 400 U/L *H* [25 - 170] (23 @ 07:30)  C-Reactive Protein, Serum: 250.1 mg/L *H* (23 @ 07:30)  Sedimentation Rate, Erythrocyte: 86 mm/hr *H* [4 - 25] (23 @ 07:30)      RADIOLOGY & ADDITIONAL STUDIES:  < from: CT Abdomen and Pelvis w/ IV Cont (23 @ 19:13) >  IMPRESSION:  *  Nonperforated acute diverticulitis, not significantly changed from   prior exam.  *  Nonocclusive clot in the SMV with wall thickening consistent with   thrombophlebitis.  *  Mesenteric adenopathy along the course of the IMV. While possibly   reactive, malignancy is a consideration. Further evaluation with   colonoscopy is therefore recommended once the patient's acute symptoms   have resolved.  *  Thickened endometrial complex, which may be further evaluated with   pelvic ultrasound.    < end of copied text >    < from: US Transvaginal (23 @ 12:17) >  IMPRESSION:  Thickening of endometrial cavity with cystic change in this   postmenopausal patient. Neoplasm is possible. Histologic sampling is   advised.    < end of copied text >      < from: Xray Knee 1 or 2 Views, Right (23 @ 20:04) >  INTERPRETATION:  Fall.    3 views right knee.    Prior 2021.    IMPRESSION: No acute fracture or dislocation. Moderate tricompartmental   osteoarthritic changes similar to prior. No significant joint effusion.    --- End of Report ---      < end of copied text >    < from: Xray Ankle 2 Views, Right (23 @ 20:03) >  INTERPRETATION:  Fall    3 views right ankle.    IMPRESSION: No acute fractures or dislocation. Old chip fracture lateral   malleolus. Mildspurring medial malleolus. Ankle mortise intact. The soft   tissue swelling.    --- End of Report ---      < end of copied text >    < from: Xray Hip w/ Pelvis 2 or 3 Views, Right (23 @ 20:01) >  INTERPRETATION:  Fall.    2 views right hip.    IMPRESSION: No acute displaced fracture or dislocation. The hip joint   preserved.    --- End of Report ---      < end of copied text >     Fidencio Raygoza MD, PGY-4  Rheumatology Fellow  Reachable on TEAMS    SABINO TOMLINSON  [id number]    HISTORY OF PRESENT ILLNESS:  72-year-old female with history of psoriatic arthritis, retinal vein occlusion, hypertension, hyperlipidemia, MEJIA, obesity, psoriasis, presenting with generalized weakness and decreased appetite x ~ 1wk. States that she has had decreased urine output, feels dehydrated, otherwise has had some constipation but denies fevers, cough, chest/abdominal pain, n/v, SOB, dysuria.     ED Course:  Initial vitals 98.2 HR 72 102/53 RR16 100% RA  Given 3L IVF for dehydration    On my exam in the ED patient was very anxious. She began shivering during exam, stating that this happens at times and asking for heating packs. She endorses anxiety as the triggering factor. Recent vitals were normal.    She states multiple times that she does not want to die.    Given drop on HGB, she denies dark or bloody stools. She also denies abdominal/postprandial pain. Other ROS difficult to obtain given anxiety. (2023 07:16)    Rhuematology consulted for toe swelling. The patient is followed by Dr Matamoros for PsA and has been on Otezla. The patient came to the hospital for abdominal pain and was found to have acute diverticulitis and received an antibiotic course. The patient was also found to have partial IMV thrombosis and LAD along with abnormal thickening in pelvic ultrasound and is undergoing malignancy work up. The patient has had her Otezla held with flare up in her skin symptoms along with worsening of her toe pain    PsA History  dx with [facility] in , managed with topical steroids, phototherapy, however she had progression of disease. The patient complained of persistent joint pain and was eventually seen by Rheumatology and diagnosed with PsA in . The patient has had extensive plaque PsO and PsA with dactylitis in the past.  She has been approved for Tremfya, however pt is apprehensive of needles. MTX was not started d/t concern for hepatosteatosis, Eventually the patient initiated Otzela in  with improvement in her symptoms. The patient also has co-morbid OA in b/l hands, knees, and feet. The patient has had a history of a Baker's cyst in 2022 and is s/p steroid injection which helped        MEDICATIONS  (STANDING):  enoxaparin Injectable 120 milliGRAM(s) SubCutaneous every 12 hours  hydrocortisone 2.5% Cream 1 Application(s) Topical daily  ibuprofen  Tablet. 200 milliGRAM(s) Oral every 8 hours  ibuprofen  Tablet. 400 milliGRAM(s) Oral every 8 hours  ibuprofen  Tablet. 400 milliGRAM(s) Oral once  lidocaine   4% Patch 1 Patch Transdermal every 24 hours  lidocaine   4% Patch 1 Patch Transdermal daily  lidocaine 2% Gel 1 Application(s) Topical two times a day  misoprostol 600 MICROGram(s) Buccal once  pantoprazole    Tablet 40 milliGRAM(s) Oral two times a day  senna 2 Tablet(s) Oral at bedtime    MEDICATIONS  (PRN):  acetaminophen     Tablet .. 650 milliGRAM(s) Oral every 6 hours PRN Moderate Pain (4 - 6)  melatonin 3 milliGRAM(s) Oral at bedtime PRN Sleep      Allergies    No Known Allergies    Intolerances        Vital Signs Last 24 Hrs  T(C): 37.1 (2023 12:36), Max: 37.2 (2023 21:03)  T(F): 98.8 (2023 12:36), Max: 98.9 (2023 21:03)  HR: 91 (2023 12:36) (91 - 96)  BP: 134/62 (2023 12:36) (132/62 - 134/62)  BP(mean): --  RR: 18 (2023 12:36) (17 - 18)  SpO2: 95% (2023 12:36) (95% - 96%)    Parameters below as of 2023 12:36  Patient On (Oxygen Delivery Method): room air    Physical Exam:  General: No apparent distress  HEENT: EOMI, MMM  CVS: +S1/S2, RRR, no murmurs/rubs/gallops  Resp: CTA b/l. No crackles/wheezing  GI: Soft, NT/ND +BS  MSK: no swelling/warmth/erythema of the joints of the UE. lower right toes with dactylitis. no erythema or warmth noted  Neuro: AAOx3  Skin: no visible rashes    LABS:                        9.6    6.85  )-----------( 317      ( 2023 06:21 )             30.3         134<L>  |  97<L>  |  13  ----------------------------<  84  4.2   |  26  |  0.88    Ca    9.7      2023 06:21  Phos  2.8       Mg     1.70         TPro  7.2  /  Alb  2.5<L>  /  TBili  0.7  /  DBili  x   /  AST  32  /  ALT  16  /  AlkPhos  240<H>        Urinalysis Basic - ( 2023 13:12 )    Color: Yellow / Appearance: Clear / S.020 / pH: x  Gluc: x / Ketone: Negative  / Bili: Negative / Urobili: 6 mg/dL   Blood: x / Protein: Trace / Nitrite: Negative   Leuk Esterase: Negative / RBC: 4-6 /HPF / WBC 1 /HPF   Sq Epi: x / Non Sq Epi: 2 /HPF / Bacteria: Negative    Uric Acid Crystals: OCCASSIONAL (23 @ 13:12)    Rheumatology Work Up    Protein/Creatinine Ratio Calculation: 0.3 Ratio *H* [0.0 - 0.2] (23 @ 19:30)  Creatine Kinase, Serum: 400 U/L *H* [25 - 170] (23 @ 07:30)  C-Reactive Protein, Serum: 250.1 mg/L *H* (23 @ 07:30)  Sedimentation Rate, Erythrocyte: 86 mm/hr *H* [4 - 25] (23 @ 07:30)      RADIOLOGY & ADDITIONAL STUDIES:  < from: CT Abdomen and Pelvis w/ IV Cont (23 @ 19:13) >  IMPRESSION:  *  Nonperforated acute diverticulitis, not significantly changed from   prior exam.  *  Nonocclusive clot in the SMV with wall thickening consistent with   thrombophlebitis.  *  Mesenteric adenopathy along the course of the IMV. While possibly   reactive, malignancy is a consideration. Further evaluation with   colonoscopy is therefore recommended once the patient's acute symptoms   have resolved.  *  Thickened endometrial complex, which may be further evaluated with   pelvic ultrasound.    < end of copied text >    < from: US Transvaginal (23 @ 12:17) >  IMPRESSION:  Thickening of endometrial cavity with cystic change in this   postmenopausal patient. Neoplasm is possible. Histologic sampling is   advised.    < end of copied text >      < from: Xray Knee 1 or 2 Views, Right (23 @ 20:04) >  INTERPRETATION:  Fall.    3 views right knee.    Prior 2021.    IMPRESSION: No acute fracture or dislocation. Moderate tricompartmental   osteoarthritic changes similar to prior. No significant joint effusion.    --- End of Report ---      < end of copied text >    < from: Xray Ankle 2 Views, Right (23 @ 20:03) >  INTERPRETATION:  Fall    3 views right ankle.    IMPRESSION: No acute fractures or dislocation. Old chip fracture lateral   malleolus. Mildspurring medial malleolus. Ankle mortise intact. The soft   tissue swelling.    --- End of Report ---      < end of copied text >    < from: Xray Hip w/ Pelvis 2 or 3 Views, Right (23 @ 20:01) >  INTERPRETATION:  Fall.    2 views right hip.    IMPRESSION: No acute displaced fracture or dislocation. The hip joint   preserved.    --- End of Report ---      < end of copied text >

## 2023-01-23 NOTE — PROGRESS NOTE ADULT - PROBLEM SELECTOR PLAN 8
Diet: full liquid diet, advance to regular  DVT Proph: Heparin subq  Dispo: PT wants to go home but we have strongly recommended to her and her daughter that she go to rehab. She is still undecided. Can follow with PCP, gyn resident clinic, GI. Diet: full liquid diet, advance to regular  DVT Proph: Heparin subq  Dispo: [facility]  Can follow with PCP, gyn resident clinic, GI.

## 2023-01-23 NOTE — PROGRESS NOTE ADULT - SUBJECTIVE AND OBJECTIVE BOX
PROGRESS NOTE:   Authored by J Luis Smith MD   Patient is a 72y old  Female who presents with a chief complaint of Acute renal failure     (2023 15:23)      SUBJECTIVE / OVERNIGHT EVENTS:    ADDITIONAL REVIEW OF SYSTEMS:    MEDICATIONS  (STANDING):  enoxaparin Injectable 120 milliGRAM(s) SubCutaneous every 12 hours  hydrocortisone 2.5% Cream 1 Application(s) Topical daily  lidocaine   4% Patch 1 Patch Transdermal every 24 hours  lidocaine   4% Patch 1 Patch Transdermal daily  lidocaine 2% Gel 1 Application(s) Topical two times a day  pantoprazole    Tablet 40 milliGRAM(s) Oral two times a day  senna 2 Tablet(s) Oral at bedtime    MEDICATIONS  (PRN):  acetaminophen     Tablet .. 650 milliGRAM(s) Oral every 6 hours PRN Moderate Pain (4 - 6)  melatonin 3 milliGRAM(s) Oral at bedtime PRN Sleep      CAPILLARY BLOOD GLUCOSE        I&O's Summary      PHYSICAL EXAM:  Vital Signs Last 24 Hrs  T(C): 36.7 (2023 05:59), Max: 37.2 (2023 21:03)  T(F): 98 (2023 05:59), Max: 98.9 (2023 21:03)  HR: 91 (2023 05:59) (86 - 96)  BP: 132/62 (2023 05:59) (132/62 - 133/60)  BP(mean): --  RR: 17 (2023 05:59) (17 - 19)  SpO2: 96% (2023 21:03) (95% - 96%)    Parameters below as of 2023 21:03  Patient On (Oxygen Delivery Method): room air        GENERAL: Obese, comfortable  HEAD:  Atraumatic, Normocephalic  EYES: EOMI, PERRLA, conjunctiva and sclera clear  ENT: Moist mucous membranes  NECK: Supple, No JVD  CHEST/LUNG: Clear to auscultation bilaterally; No wheezing or rhonchi  HEART: RRR. No murmurs.  Peripheral edema: None. Legs large  ABDOMEN: BSx4; Soft, nontender, large  EXTREMITIES:  2+ radial pulses. Calfs nontender non erythematous.  NERVOUS SYSTEM:  A&Ox3, no gross lateralizing deficits   SKIN: Psoriasis, bruising at lovenox injection sites.      LABS:                        11.0   8.69  )-----------( 297      ( 2023 05:30 )             34.3         135  |  98  |  15  ----------------------------<  91  4.1   |  27  |  0.82    Ca    9.9      2023 05:30  Phos  3.2       Mg     1.70         TPro  7.2  /  Alb  2.7<L>  /  TBili  1.0  /  DBili  x   /  AST  39<H>  /  ALT  21  /  AlkPhos  289<H>            Urinalysis Basic - ( 2023 13:12 )    Color: Yellow / Appearance: Clear / S.020 / pH: x  Gluc: x / Ketone: Negative  / Bili: Negative / Urobili: 6 mg/dL   Blood: x / Protein: Trace / Nitrite: Negative   Leuk Esterase: Negative / RBC: 4-6 /HPF / WBC 1 /HPF   Sq Epi: x / Non Sq Epi: 2 /HPF / Bacteria: Negative          RADIOLOGY & ADDITIONAL TESTS:  Lab Results Reviewed   Imaging Reviewed  Electrocardiogram Reviewed   PROGRESS NOTE:   Authored by J Luis Smith MD   Patient is a 72y old  Female who presents with a chief complaint of Acute renal failure     (2023 15:23)      SUBJECTIVE / OVERNIGHT EVENTS:  No acute events overnight.  Patient complaining of bilateral foot pain.  Agreeable to EMB this AM.    ADDITIONAL REVIEW OF SYSTEMS:    MEDICATIONS  (STANDING):  enoxaparin Injectable 120 milliGRAM(s) SubCutaneous every 12 hours  hydrocortisone 2.5% Cream 1 Application(s) Topical daily  lidocaine   4% Patch 1 Patch Transdermal every 24 hours  lidocaine   4% Patch 1 Patch Transdermal daily  lidocaine 2% Gel 1 Application(s) Topical two times a day  pantoprazole    Tablet 40 milliGRAM(s) Oral two times a day  senna 2 Tablet(s) Oral at bedtime    MEDICATIONS  (PRN):  acetaminophen     Tablet .. 650 milliGRAM(s) Oral every 6 hours PRN Moderate Pain (4 - 6)  melatonin 3 milliGRAM(s) Oral at bedtime PRN Sleep      CAPILLARY BLOOD GLUCOSE        I&O's Summary      PHYSICAL EXAM:  Vital Signs Last 24 Hrs  T(C): 36.7 (2023 05:59), Max: 37.2 (2023 21:03)  T(F): 98 (2023 05:59), Max: 98.9 (2023 21:03)  HR: 91 (2023 05:59) (86 - 96)  BP: 132/62 (2023 05:59) (132/62 - 133/60)  BP(mean): --  RR: 17 (2023 05:59) (17 - 19)  SpO2: 96% (2023 21:03) (95% - 96%)    Parameters below as of 2023 21:03  Patient On (Oxygen Delivery Method): room air        GENERAL: NAD  HEAD:  Atraumatic, Normocephalic  EYES: EOMI, PERRLA, conjunctiva and sclera clear  ENT: Moist mucous membranes  NECK: Supple, No elevated JVP.   CHEST/LUNG: Clear to auscultation bilaterally; No wheezing or rhonchi  HEART: RRR. No MRG. S1 and S2 normal.   Peripheral edema: None.  ABDOMEN: BSx4; Soft, nontender. Bruising at low anterior abdomen.   EXTREMITIES:  2+ radial pulses. Calfs nontender non erythematous.  NERVOUS SYSTEM:  A&Ox3, no gross lateralizing deficits   SKIN: Psoriasis.       LABS:                        11.0   8.69  )-----------( 297      ( 2023 05:30 )             34.3         135  |  98  |  15  ----------------------------<  91  4.1   |  27  |  0.82    Ca    9.9      2023 05:30  Phos  3.2       Mg     1.70         TPro  7.2  /  Alb  2.7<L>  /  TBili  1.0  /  DBili  x   /  AST  39<H>  /  ALT  21  /  AlkPhos  289<H>            Urinalysis Basic - ( 2023 13:12 )    Color: Yellow / Appearance: Clear / S.020 / pH: x  Gluc: x / Ketone: Negative  / Bili: Negative / Urobili: 6 mg/dL   Blood: x / Protein: Trace / Nitrite: Negative   Leuk Esterase: Negative / RBC: 4-6 /HPF / WBC 1 /HPF   Sq Epi: x / Non Sq Epi: 2 /HPF / Bacteria: Negative          RADIOLOGY & ADDITIONAL TESTS:  Lab Results Reviewed   Imaging Reviewed  Electrocardiogram Reviewed

## 2023-01-23 NOTE — PROGRESS NOTE ADULT - ATTENDING COMMENTS
72F w/ psoriatic arthritis (apremilast), HTN (lisinopril/chlorthalidone) presenting w/ generalized weakness and found to have multiple metabolic derangements including JAZMYNE, hypokalemia, hyponatremia, transaminitis, AGMA and found to have severe sepsis with CT w/ acute uncomplicated diverticulitis c/b IMV thrombosis and thrombophlebitis .     # Thrombosis of portal vein, SMV, and IMV thromboses - Vascular cardiology consult appreciated. Continue with therapeutic Lovenox, patient and family agreeable with injections for now. Will need teaching   # Endometrial thickening- Uterus noted to thickened endometrium, patient now agreeable to endometrial biopsy. GYN planning for today.   # RLE pain - likely dactylitis in setting of known psoriatic arthritis, rheum consult apprecated. Will f/u recs.     Dispo: agreeable to [facility] pending w/u

## 2023-01-24 LAB
ALBUMIN SERPL ELPH-MCNC: 2.7 G/DL — LOW (ref 3.3–5)
ALP SERPL-CCNC: 198 U/L — HIGH (ref 40–120)
ALT FLD-CCNC: 15 U/L — SIGNIFICANT CHANGE UP (ref 4–33)
ANION GAP SERPL CALC-SCNC: 12 MMOL/L — SIGNIFICANT CHANGE UP (ref 7–14)
AST SERPL-CCNC: 26 U/L — SIGNIFICANT CHANGE UP (ref 4–32)
BILIRUB SERPL-MCNC: 0.6 MG/DL — SIGNIFICANT CHANGE UP (ref 0.2–1.2)
BUN SERPL-MCNC: 15 MG/DL — SIGNIFICANT CHANGE UP (ref 7–23)
CALCIUM SERPL-MCNC: 9.7 MG/DL — SIGNIFICANT CHANGE UP (ref 8.4–10.5)
CHLORIDE SERPL-SCNC: 101 MMOL/L — SIGNIFICANT CHANGE UP (ref 98–107)
CO2 SERPL-SCNC: 25 MMOL/L — SIGNIFICANT CHANGE UP (ref 22–31)
CREAT SERPL-MCNC: 0.98 MG/DL — SIGNIFICANT CHANGE UP (ref 0.5–1.3)
EGFR: 62 ML/MIN/1.73M2 — SIGNIFICANT CHANGE UP
GLUCOSE SERPL-MCNC: 93 MG/DL — SIGNIFICANT CHANGE UP (ref 70–99)
HCT VFR BLD CALC: 30 % — LOW (ref 34.5–45)
HGB BLD-MCNC: 9.4 G/DL — LOW (ref 11.5–15.5)
MAGNESIUM SERPL-MCNC: 1.7 MG/DL — SIGNIFICANT CHANGE UP (ref 1.6–2.6)
MCHC RBC-ENTMCNC: 27.2 PG — SIGNIFICANT CHANGE UP (ref 27–34)
MCHC RBC-ENTMCNC: 31.3 GM/DL — LOW (ref 32–36)
MCV RBC AUTO: 87 FL — SIGNIFICANT CHANGE UP (ref 80–100)
NRBC # BLD: 0 /100 WBCS — SIGNIFICANT CHANGE UP (ref 0–0)
NRBC # FLD: 0 K/UL — SIGNIFICANT CHANGE UP (ref 0–0)
PHOSPHATE SERPL-MCNC: 3.4 MG/DL — SIGNIFICANT CHANGE UP (ref 2.5–4.5)
PLATELET # BLD AUTO: 304 K/UL — SIGNIFICANT CHANGE UP (ref 150–400)
POTASSIUM SERPL-MCNC: 3.8 MMOL/L — SIGNIFICANT CHANGE UP (ref 3.5–5.3)
POTASSIUM SERPL-SCNC: 3.8 MMOL/L — SIGNIFICANT CHANGE UP (ref 3.5–5.3)
PROT SERPL-MCNC: 7.4 G/DL — SIGNIFICANT CHANGE UP (ref 6–8.3)
RBC # BLD: 3.45 M/UL — LOW (ref 3.8–5.2)
RBC # FLD: 16.2 % — HIGH (ref 10.3–14.5)
SODIUM SERPL-SCNC: 138 MMOL/L — SIGNIFICANT CHANGE UP (ref 135–145)
WBC # BLD: 4.78 K/UL — SIGNIFICANT CHANGE UP (ref 3.8–10.5)
WBC # FLD AUTO: 4.78 K/UL — SIGNIFICANT CHANGE UP (ref 3.8–10.5)

## 2023-01-24 PROCEDURE — 99232 SBSQ HOSP IP/OBS MODERATE 35: CPT | Mod: GC

## 2023-01-24 RX ORDER — APREMILAST 10-20-30MG
30 KIT ORAL
Refills: 0 | Status: DISCONTINUED | OUTPATIENT
Start: 2023-01-24 | End: 2023-01-26

## 2023-01-24 RX ORDER — LANOLIN ALCOHOL/MO/W.PET/CERES
3 CREAM (GRAM) TOPICAL AT BEDTIME
Refills: 0 | Status: DISCONTINUED | OUTPATIENT
Start: 2023-01-24 | End: 2023-01-27

## 2023-01-24 RX ADMIN — Medication 200 MILLIGRAM(S): at 06:13

## 2023-01-24 RX ADMIN — Medication 200 MILLIGRAM(S): at 06:23

## 2023-01-24 RX ADMIN — LIDOCAINE 1 PATCH: 4 CREAM TOPICAL at 13:19

## 2023-01-24 RX ADMIN — Medication 400 MILLIGRAM(S): at 21:44

## 2023-01-24 RX ADMIN — Medication 3 MILLIGRAM(S): at 21:44

## 2023-01-24 RX ADMIN — LIDOCAINE 1 PATCH: 4 CREAM TOPICAL at 00:12

## 2023-01-24 RX ADMIN — PANTOPRAZOLE SODIUM 40 MILLIGRAM(S): 20 TABLET, DELAYED RELEASE ORAL at 06:12

## 2023-01-24 RX ADMIN — Medication 400 MILLIGRAM(S): at 06:23

## 2023-01-24 RX ADMIN — Medication 200 MILLIGRAM(S): at 13:18

## 2023-01-24 RX ADMIN — ENOXAPARIN SODIUM 120 MILLIGRAM(S): 100 INJECTION SUBCUTANEOUS at 10:29

## 2023-01-24 RX ADMIN — LIDOCAINE 1 PATCH: 4 CREAM TOPICAL at 13:20

## 2023-01-24 RX ADMIN — Medication 400 MILLIGRAM(S): at 06:12

## 2023-01-24 RX ADMIN — Medication 3 MILLIGRAM(S): at 01:57

## 2023-01-24 RX ADMIN — LIDOCAINE 1 APPLICATION(S): 4 CREAM TOPICAL at 17:50

## 2023-01-24 RX ADMIN — Medication 400 MILLIGRAM(S): at 13:18

## 2023-01-24 RX ADMIN — ENOXAPARIN SODIUM 120 MILLIGRAM(S): 100 INJECTION SUBCUTANEOUS at 21:46

## 2023-01-24 RX ADMIN — LIDOCAINE 1 APPLICATION(S): 4 CREAM TOPICAL at 06:13

## 2023-01-24 RX ADMIN — PANTOPRAZOLE SODIUM 40 MILLIGRAM(S): 20 TABLET, DELAYED RELEASE ORAL at 17:51

## 2023-01-24 RX ADMIN — Medication 200 MILLIGRAM(S): at 21:45

## 2023-01-24 NOTE — PROGRESS NOTE ADULT - PROBLEM SELECTOR PLAN 8
Diet: full liquid diet, advance to regular  DVT Proph: Heparin subq  Dispo: [facility]  Can follow with PCP, gyn resident clinic, GI. Diet: DASH/TLC  DVT Proph: lovenox sq  Dispo: [facility]  Can follow with PCP, gyn resident clinic, GI.

## 2023-01-24 NOTE — PROGRESS NOTE ADULT - SUBJECTIVE AND OBJECTIVE BOX
PROGRESS NOTE:   Authored by J Luis Smith MD   Patient is a 72y old  Female who presents with a chief complaint of Acute renal failure     (2023 15:23)      SUBJECTIVE / OVERNIGHT EVENTS:      ADDITIONAL REVIEW OF SYSTEMS:    MEDICATIONS  (STANDING):  enoxaparin Injectable 120 milliGRAM(s) SubCutaneous every 12 hours  hydrocortisone 2.5% Cream 1 Application(s) Topical daily  ibuprofen  Tablet. 200 milliGRAM(s) Oral every 8 hours  ibuprofen  Tablet. 400 milliGRAM(s) Oral every 8 hours  ibuprofen  Tablet. 400 milliGRAM(s) Oral once  lidocaine   4% Patch 1 Patch Transdermal every 24 hours  lidocaine   4% Patch 1 Patch Transdermal daily  lidocaine 2% Gel 1 Application(s) Topical two times a day  melatonin 3 milliGRAM(s) Oral at bedtime  pantoprazole    Tablet 40 milliGRAM(s) Oral two times a day  senna 2 Tablet(s) Oral at bedtime    MEDICATIONS  (PRN):  acetaminophen     Tablet .. 650 milliGRAM(s) Oral every 6 hours PRN Moderate Pain (4 - 6)  melatonin 3 milliGRAM(s) Oral at bedtime PRN Sleep      CAPILLARY BLOOD GLUCOSE        I&O's Summary      PHYSICAL EXAM:  Vital Signs Last 24 Hrs  T(C): 36.4 (2023 06:22), Max: 37.1 (2023 12:36)  T(F): 97.6 (2023 06:22), Max: 98.8 (2023 12:36)  HR: 95 (2023 06:22) (91 - 98)  BP: 110/62 (2023 06:22) (110/62 - 134/62)  BP(mean): --  RR: 18 (2023 06:22) (18 - 19)  SpO2: 95% (2023 06:22) (95% - 97%)    Parameters below as of 2023 06:22  Patient On (Oxygen Delivery Method): room air          GENERAL: NAD  HEAD:  Atraumatic, Normocephalic  EYES: EOMI, PERRLA, conjunctiva and sclera clear  ENT: Moist mucous membranes  NECK: Supple, No elevated JVP.   CHEST/LUNG: Clear to auscultation bilaterally; No wheezing or rhonchi  HEART: RRR. No MRG. S1 and S2 normal.   Peripheral edema: None.  ABDOMEN: BSx4; Soft, nontender. Bruising at low anterior abdomen.   EXTREMITIES:  2+ radial pulses. Calfs nontender non erythematous.  NERVOUS SYSTEM:  A&Ox3, no gross lateralizing deficits   SKIN: Psoriasis.       LABS:                        9.6    6.85  )-----------( 317      ( 2023 06:21 )             30.3         134<L>  |  97<L>  |  13  ----------------------------<  84  4.2   |  26  |  0.88    Ca    9.7      2023 06:21  Phos  2.8       Mg     1.70         TPro  7.2  /  Alb  2.5<L>  /  TBili  0.7  /  DBili  x   /  AST  32  /  ALT  16  /  AlkPhos  240<H>            Urinalysis Basic - ( 2023 13:12 )    Color: Yellow / Appearance: Clear / S.020 / pH: x  Gluc: x / Ketone: Negative  / Bili: Negative / Urobili: 6 mg/dL   Blood: x / Protein: Trace / Nitrite: Negative   Leuk Esterase: Negative / RBC: 4-6 /HPF / WBC 1 /HPF   Sq Epi: x / Non Sq Epi: 2 /HPF / Bacteria: Negative          RADIOLOGY & ADDITIONAL TESTS:  Lab Results Reviewed   Imaging Reviewed  Electrocardiogram Reviewed   PROGRESS NOTE:   Authored by J Luis Smith MD   Patient is a 72y old  Female who presents with a chief complaint of Acute renal failure     (2023 15:23)      SUBJECTIVE / OVERNIGHT EVENTS:  Patient reporting improvement in left foot pain.       ADDITIONAL REVIEW OF SYSTEMS:    MEDICATIONS  (STANDING):  enoxaparin Injectable 120 milliGRAM(s) SubCutaneous every 12 hours  hydrocortisone 2.5% Cream 1 Application(s) Topical daily  ibuprofen  Tablet. 200 milliGRAM(s) Oral every 8 hours  ibuprofen  Tablet. 400 milliGRAM(s) Oral every 8 hours  ibuprofen  Tablet. 400 milliGRAM(s) Oral once  lidocaine   4% Patch 1 Patch Transdermal every 24 hours  lidocaine   4% Patch 1 Patch Transdermal daily  lidocaine 2% Gel 1 Application(s) Topical two times a day  melatonin 3 milliGRAM(s) Oral at bedtime  pantoprazole    Tablet 40 milliGRAM(s) Oral two times a day  senna 2 Tablet(s) Oral at bedtime    MEDICATIONS  (PRN):  acetaminophen     Tablet .. 650 milliGRAM(s) Oral every 6 hours PRN Moderate Pain (4 - 6)  melatonin 3 milliGRAM(s) Oral at bedtime PRN Sleep      CAPILLARY BLOOD GLUCOSE        I&O's Summary      PHYSICAL EXAM:  Vital Signs Last 24 Hrs  T(C): 36.4 (2023 06:22), Max: 37.1 (2023 12:36)  T(F): 97.6 (2023 06:22), Max: 98.8 (2023 12:36)  HR: 95 (2023 06:22) (91 - 98)  BP: 110/62 (2023 06:22) (110/62 - 134/62)  BP(mean): --  RR: 18 (2023 06:22) (18 - 19)  SpO2: 95% (2023 06:22) (95% - 97%)    Parameters below as of 2023 06:22  Patient On (Oxygen Delivery Method): room air          GENERAL: NAD  HEAD:  Atraumatic, Normocephalic  EYES: EOMI, PERRLA, conjunctiva and sclera clear  ENT: Moist mucous membranes  NECK: Supple, No elevated JVP.   CHEST/LUNG: Clear to auscultation bilaterally; No wheezing or rhonchi  HEART: RRR. No MRG. S1 and S2 normal.   Peripheral edema: None.  ABDOMEN: BSx4; Soft, nontender. Bruising at low anterior abdomen.   EXTREMITIES:  2+ radial pulses. Calfs nontender non erythematous. 1st metatarsal head of left foot with improvement in redness/swelling.   NERVOUS SYSTEM:  A&Ox3, no gross lateralizing deficits   SKIN: Psoriasis.       LABS:                        9.6    6.85  )-----------( 317      ( 2023 06:21 )             30.3         134<L>  |  97<L>  |  13  ----------------------------<  84  4.2   |  26  |  0.88    Ca    9.7      2023 06:21  Phos  2.8       Mg     1.70         TPro  7.2  /  Alb  2.5<L>  /  TBili  0.7  /  DBili  x   /  AST  32  /  ALT  16  /  AlkPhos  240<H>            Urinalysis Basic - ( 2023 13:12 )    Color: Yellow / Appearance: Clear / S.020 / pH: x  Gluc: x / Ketone: Negative  / Bili: Negative / Urobili: 6 mg/dL   Blood: x / Protein: Trace / Nitrite: Negative   Leuk Esterase: Negative / RBC: 4-6 /HPF / WBC 1 /HPF   Sq Epi: x / Non Sq Epi: 2 /HPF / Bacteria: Negative          RADIOLOGY & ADDITIONAL TESTS:  Lab Results Reviewed   Imaging Reviewed  Electrocardiogram Reviewed   PROGRESS NOTE:   Authored by J Luis Smith MD   Patient is a 72y old  Female who presents with a chief complaint of Acute renal failure     (2023 15:23)      SUBJECTIVE / OVERNIGHT EVENTS:  Patient reporting improvement in left foot pain.   No chest pain, abdominal pain, nausea, vomiting or diarrhea.     ADDITIONAL REVIEW OF SYSTEMS:    MEDICATIONS  (STANDING):  enoxaparin Injectable 120 milliGRAM(s) SubCutaneous every 12 hours  hydrocortisone 2.5% Cream 1 Application(s) Topical daily  ibuprofen  Tablet. 200 milliGRAM(s) Oral every 8 hours  ibuprofen  Tablet. 400 milliGRAM(s) Oral every 8 hours  ibuprofen  Tablet. 400 milliGRAM(s) Oral once  lidocaine   4% Patch 1 Patch Transdermal every 24 hours  lidocaine   4% Patch 1 Patch Transdermal daily  lidocaine 2% Gel 1 Application(s) Topical two times a day  melatonin 3 milliGRAM(s) Oral at bedtime  pantoprazole    Tablet 40 milliGRAM(s) Oral two times a day  senna 2 Tablet(s) Oral at bedtime    MEDICATIONS  (PRN):  acetaminophen     Tablet .. 650 milliGRAM(s) Oral every 6 hours PRN Moderate Pain (4 - 6)  melatonin 3 milliGRAM(s) Oral at bedtime PRN Sleep      CAPILLARY BLOOD GLUCOSE        I&O's Summary      PHYSICAL EXAM:  Vital Signs Last 24 Hrs  T(C): 36.4 (2023 06:22), Max: 37.1 (2023 12:36)  T(F): 97.6 (2023 06:22), Max: 98.8 (2023 12:36)  HR: 95 (2023 06:22) (91 - 98)  BP: 110/62 (2023 06:22) (110/62 - 134/62)  BP(mean): --  RR: 18 (2023 06:22) (18 - 19)  SpO2: 95% (2023 06:22) (95% - 97%)    Parameters below as of 2023 06:22  Patient On (Oxygen Delivery Method): room air          GENERAL: NAD  HEAD:  Atraumatic, Normocephalic  EYES: EOMI, PERRLA, conjunctiva and sclera clear  ENT: Moist mucous membranes  NECK: Supple, No elevated JVP.   CHEST/LUNG: Clear to auscultation bilaterally; No wheezing or rhonchi  HEART: RRR. No MRG. S1 and S2 normal.   Peripheral edema: None.  ABDOMEN: BSx4; Soft, nontender. Bruising at low anterior abdomen.   EXTREMITIES:  2+ radial pulses. Calfs nontender non erythematous. 1st metatarsal head of left foot with improvement in redness/swelling.   NERVOUS SYSTEM:  A&Ox3, no gross lateralizing deficits   SKIN: No visible anterior rash.       LABS:                        9.6    6.85  )-----------( 317      ( 2023 06:21 )             30.3         134<L>  |  97<L>  |  13  ----------------------------<  84  4.2   |  26  |  0.88    Ca    9.7      2023 06:21  Phos  2.8       Mg     1.70         TPro  7.2  /  Alb  2.5<L>  /  TBili  0.7  /  DBili  x   /  AST  32  /  ALT  16  /  AlkPhos  240<H>            Urinalysis Basic - ( 2023 13:12 )    Color: Yellow / Appearance: Clear / S.020 / pH: x  Gluc: x / Ketone: Negative  / Bili: Negative / Urobili: 6 mg/dL   Blood: x / Protein: Trace / Nitrite: Negative   Leuk Esterase: Negative / RBC: 4-6 /HPF / WBC 1 /HPF   Sq Epi: x / Non Sq Epi: 2 /HPF / Bacteria: Negative          RADIOLOGY & ADDITIONAL TESTS:  Lab Results Reviewed   Imaging Reviewed  Electrocardiogram Reviewed

## 2023-01-24 NOTE — PROGRESS NOTE ADULT - PROBLEM SELECTOR PLAN 2
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 Either uterine or colonic. Pt has not had a colonoscopy.  - TVUS with 17mm stripe. GYN wanted to do EMB but patient refused. Possibly on 1/23  - Will need colonoscopy outpatient. Can do hypercoagulable workup if those negative.  - IR with no safe percutaneous window to take a biopsy  - Continue lovenox for now.  - $0 copay, patient will administer lovenox.

## 2023-01-24 NOTE — PROGRESS NOTE ADULT - ASSESSMENT
72F with hypertension, MEJIA, psoriatic arthritis presenting with several days of poor PO intake found to be hyponatremic with JAZMYNE, anemia, uremia, and elevated liver enzymes found to have acute diverticulitis and multiple hepatic cysts. F/U CT with contrast showing multiple non-occlusive filling defects. JAZMYNE and LFTs resolved. Persistent alk phos elevation. Now on anti-coagulation. Pending EMB with GYN. Pending dispo planning 72F with hypertension, MEJIA, psoriatic arthritis presenting with several days of poor PO intake found to be hyponatremic with JAZMYNE, anemia, uremia, and elevated liver enzymes found to have acute diverticulitis and multiple hepatic cysts. F/U CT with contrast showing multiple non-occlusive filling defects. JAZMYNE and LFTs resolved. Persistent alk phos elevation. Now on anti-coagulation. s/p EMB 1/24/23. Pending [facility].

## 2023-01-24 NOTE — PROGRESS NOTE ADULT - PROBLEM SELECTOR PLAN 7
- Pt taking Otezla. Last dose 2 prior to admission  - Would keep other autoimmune condition on differential given elevated ESR, CRP - Pt taking Otezla. Last dose 2 prior to admission  - Would keep other autoimmune condition on differential given elevated ESR, CRP  - restarting Otezla.

## 2023-01-24 NOTE — PROGRESS NOTE ADULT - ATTENDING COMMENTS
72F w/ psoriatic arthritis (apremilast), HTN (lisinopril/chlorthalidone) presenting w/ generalized weakness and found to have multiple metabolic derangements including JAZMYNE, hypokalemia, hyponatremia, transaminitis, AGMA and found to have severe sepsis with CT w/ acute uncomplicated diverticulitis c/b IMV thrombosis and thrombophlebitis .     # Thrombosis of portal vein, SMV, and IMV thromboses - Vascular cardiology consult appreciated. Continue with therapeutic Lovenox, patient and family agreeable with injections for now. Will need teaching prior to d/c.  # Endometrial thickening- Uterus noted to thickened endometrium, s/p endometrial biopsy on 1/23. f/u OP with GYN for path.    # RLE pain - likely dactylitis in setting of known psoriatic arthritis, rheum consult appreciated. Ordered MRI knee, however can be done OP if needed.     Dispo: patient/ family agreeable to [facility] pending w/u

## 2023-01-25 PROCEDURE — 99232 SBSQ HOSP IP/OBS MODERATE 35: CPT | Mod: GC

## 2023-01-25 RX ADMIN — LIDOCAINE 1 PATCH: 4 CREAM TOPICAL at 19:00

## 2023-01-25 RX ADMIN — Medication 650 MILLIGRAM(S): at 21:27

## 2023-01-25 RX ADMIN — Medication 650 MILLIGRAM(S): at 22:00

## 2023-01-25 RX ADMIN — LIDOCAINE 1 PATCH: 4 CREAM TOPICAL at 01:03

## 2023-01-25 RX ADMIN — LIDOCAINE 1 PATCH: 4 CREAM TOPICAL at 01:04

## 2023-01-25 RX ADMIN — LIDOCAINE 1 APPLICATION(S): 4 CREAM TOPICAL at 17:57

## 2023-01-25 RX ADMIN — PANTOPRAZOLE SODIUM 40 MILLIGRAM(S): 20 TABLET, DELAYED RELEASE ORAL at 05:53

## 2023-01-25 RX ADMIN — APREMILAST 30 MILLIGRAM(S): KIT ORAL at 22:12

## 2023-01-25 RX ADMIN — Medication 200 MILLIGRAM(S): at 05:52

## 2023-01-25 RX ADMIN — SENNA PLUS 2 TABLET(S): 8.6 TABLET ORAL at 21:27

## 2023-01-25 RX ADMIN — PANTOPRAZOLE SODIUM 40 MILLIGRAM(S): 20 TABLET, DELAYED RELEASE ORAL at 17:56

## 2023-01-25 RX ADMIN — Medication 3 MILLIGRAM(S): at 21:27

## 2023-01-25 RX ADMIN — LIDOCAINE 1 PATCH: 4 CREAM TOPICAL at 12:45

## 2023-01-25 RX ADMIN — Medication 400 MILLIGRAM(S): at 05:52

## 2023-01-25 RX ADMIN — ENOXAPARIN SODIUM 120 MILLIGRAM(S): 100 INJECTION SUBCUTANEOUS at 21:27

## 2023-01-25 RX ADMIN — ENOXAPARIN SODIUM 120 MILLIGRAM(S): 100 INJECTION SUBCUTANEOUS at 09:25

## 2023-01-25 RX ADMIN — Medication 1 APPLICATION(S): at 12:46

## 2023-01-25 NOTE — PROGRESS NOTE ADULT - ATTENDING COMMENTS
72F w/ psoriatic arthritis (apremilast), HTN (lisinopril/chlorthalidone) presenting w/ generalized weakness and found to have multiple metabolic derangements including JAZMYNE, hypokalemia, hyponatremia, transaminitis, AGMA and found to have severe sepsis with CT w/ acute uncomplicated diverticulitis c/b IMV thrombosis and thrombophlebitis .     # Thrombosis of portal vein, SMV, and IMV thromboses - Vascular cardiology consult appreciated. Continue with therapeutic Lovenox, patient and family agreeable with injections for now.   # Endometrial thickening- Uterus noted to thickened endometrium, s/p endometrial biopsy on 1/23. f/u OP with GYN for path.    # RLE pain - likely dactylitis in setting of known psoriatic arthritis, rheum consult appreciated. Ordered MRI knee, can be done OP if needed.     Dispo: patient/ family agreeable to [facility] pending w/u .

## 2023-01-25 NOTE — PROGRESS NOTE ADULT - ASSESSMENT
72F with hypertension, MEJIA, psoriatic arthritis presenting with several days of poor PO intake found to be hyponatremic with JAZMYNE, anemia, uremia, and elevated liver enzymes found to have acute diverticulitis and multiple hepatic cysts. F/U CT with contrast showing multiple non-occlusive filling defects. JAZMYNE and LFTs resolved. Persistent alk phos elevation. Now on anti-coagulation. s/p EMB 1/24/23. Pending [facility].

## 2023-01-25 NOTE — PROGRESS NOTE ADULT - PROBLEM SELECTOR PLAN 7
- Pt taking Otezla. Last dose 2 prior to admission  - Would keep other autoimmune condition on differential given elevated ESR, CRP  - restarting Otezla.

## 2023-01-25 NOTE — PROGRESS NOTE ADULT - SUBJECTIVE AND OBJECTIVE BOX
PROGRESS NOTE:   Authored by J Luis Smith MD   Patient is a 72y old  Female who presents with a chief complaint of Acute renal failure     (20 Jan 2023 15:23)      SUBJECTIVE / OVERNIGHT EVENTS:      ADDITIONAL REVIEW OF SYSTEMS:    MEDICATIONS  (STANDING):  apremilast 30 milliGRAM(s) Oral two times a day  enoxaparin Injectable 120 milliGRAM(s) SubCutaneous every 12 hours  hydrocortisone 2.5% Cream 1 Application(s) Topical daily  ibuprofen  Tablet. 400 milliGRAM(s) Oral once  lidocaine   4% Patch 1 Patch Transdermal every 24 hours  lidocaine   4% Patch 1 Patch Transdermal daily  lidocaine 2% Gel 1 Application(s) Topical two times a day  melatonin 3 milliGRAM(s) Oral at bedtime  pantoprazole    Tablet 40 milliGRAM(s) Oral two times a day  senna 2 Tablet(s) Oral at bedtime    MEDICATIONS  (PRN):  acetaminophen     Tablet .. 650 milliGRAM(s) Oral every 6 hours PRN Moderate Pain (4 - 6)  melatonin 3 milliGRAM(s) Oral at bedtime PRN Sleep      CAPILLARY BLOOD GLUCOSE        I&O's Summary      PHYSICAL EXAM:  Vital Signs Last 24 Hrs  T(C): 36.3 (25 Jan 2023 06:23), Max: 36.9 (24 Jan 2023 13:00)  T(F): 97.3 (25 Jan 2023 06:23), Max: 98.5 (24 Jan 2023 13:00)  HR: 80 (25 Jan 2023 06:23) (80 - 85)  BP: 120/66 (25 Jan 2023 06:23) (113/57 - 139/62)  BP(mean): --  RR: 18 (25 Jan 2023 06:23) (18 - 18)  SpO2: 95% (25 Jan 2023 06:23) (94% - 95%)    Parameters below as of 25 Jan 2023 06:23  Patient On (Oxygen Delivery Method): room air        GENERAL: NAD  HEAD:  Atraumatic, Normocephalic  EYES: EOMI, PERRLA, conjunctiva and sclera clear  ENT: Moist mucous membranes  NECK: Supple, No elevated JVP.   CHEST/LUNG: Clear to auscultation bilaterally; No wheezing or rhonchi  HEART: RRR. No MRG. S1 and S2 normal.   Peripheral edema: None.  ABDOMEN: BSx4; Soft, nontender. Bruising at low anterior abdomen.   EXTREMITIES:  2+ radial pulses. Calfs nontender non erythematous. 1st metatarsal head of left foot with improvement in redness/swelling.   NERVOUS SYSTEM:  A&Ox3, no gross lateralizing deficits   SKIN: No visible anterior rash.       LABS:                        9.4    4.78  )-----------( 304      ( 24 Jan 2023 07:25 )             30.0     01-24    138  |  101  |  15  ----------------------------<  93  3.8   |  25  |  0.98    Ca    9.7      24 Jan 2023 07:25  Phos  3.4     01-24  Mg     1.70     01-24    TPro  7.4  /  Alb  2.7<L>  /  TBili  0.6  /  DBili  x   /  AST  26  /  ALT  15  /  AlkPhos  198<H>  01-24                RADIOLOGY & ADDITIONAL TESTS:  Lab Results Reviewed   Imaging Reviewed  Electrocardiogram Reviewed   PROGRESS NOTE:   Authored by J Luis Smith MD   Patient is a 72y old  Female who presents with a chief complaint of Acute renal failure     (20 Jan 2023 15:23)      SUBJECTIVE / OVERNIGHT EVENTS:  Pt reporting foot pain about the same.  No nausea, vomiting, diarrhea, chest pain, or dyspnea.    ADDITIONAL REVIEW OF SYSTEMS:    MEDICATIONS  (STANDING):  apremilast 30 milliGRAM(s) Oral two times a day  enoxaparin Injectable 120 milliGRAM(s) SubCutaneous every 12 hours  hydrocortisone 2.5% Cream 1 Application(s) Topical daily  ibuprofen  Tablet. 400 milliGRAM(s) Oral once  lidocaine   4% Patch 1 Patch Transdermal every 24 hours  lidocaine   4% Patch 1 Patch Transdermal daily  lidocaine 2% Gel 1 Application(s) Topical two times a day  melatonin 3 milliGRAM(s) Oral at bedtime  pantoprazole    Tablet 40 milliGRAM(s) Oral two times a day  senna 2 Tablet(s) Oral at bedtime    MEDICATIONS  (PRN):  acetaminophen     Tablet .. 650 milliGRAM(s) Oral every 6 hours PRN Moderate Pain (4 - 6)  melatonin 3 milliGRAM(s) Oral at bedtime PRN Sleep      CAPILLARY BLOOD GLUCOSE        I&O's Summary      PHYSICAL EXAM:  Vital Signs Last 24 Hrs  T(C): 36.3 (25 Jan 2023 06:23), Max: 36.9 (24 Jan 2023 13:00)  T(F): 97.3 (25 Jan 2023 06:23), Max: 98.5 (24 Jan 2023 13:00)  HR: 80 (25 Jan 2023 06:23) (80 - 85)  BP: 120/66 (25 Jan 2023 06:23) (113/57 - 139/62)  BP(mean): --  RR: 18 (25 Jan 2023 06:23) (18 - 18)  SpO2: 95% (25 Jan 2023 06:23) (94% - 95%)    Parameters below as of 25 Jan 2023 06:23  Patient On (Oxygen Delivery Method): room air        GENERAL: NAD  HEAD:  Atraumatic, Normocephalic  EYES: EOMI, PERRLA, conjunctiva and sclera clear  ENT: Moist mucous membranes  NECK: Supple, No elevated JVP.   CHEST/LUNG: Clear to auscultation bilaterally; No wheezing or rhonchi  HEART: RRR. No MRG. S1 and S2 normal.   Peripheral edema: None.  ABDOMEN: BSx4; Soft, nontender. Bruising at low anterior abdomen.   EXTREMITIES:  2+ radial pulses. Calfs nontender non erythematous. 1st metatarsal head of left foot with improvement in redness/swelling.   NERVOUS SYSTEM:  A&Ox3, no gross lateralizing deficits   SKIN: No visible anterior rash.       LABS:                        9.4    4.78  )-----------( 304      ( 24 Jan 2023 07:25 )             30.0     01-24    138  |  101  |  15  ----------------------------<  93  3.8   |  25  |  0.98    Ca    9.7      24 Jan 2023 07:25  Phos  3.4     01-24  Mg     1.70     01-24    TPro  7.4  /  Alb  2.7<L>  /  TBili  0.6  /  DBili  x   /  AST  26  /  ALT  15  /  AlkPhos  198<H>  01-24                RADIOLOGY & ADDITIONAL TESTS:  Lab Results Reviewed   Imaging Reviewed  Electrocardiogram Reviewed

## 2023-01-26 ENCOUNTER — APPOINTMENT (OUTPATIENT)
Dept: OPHTHALMOLOGY | Facility: CLINIC | Age: 73
End: 2023-01-26

## 2023-01-26 DIAGNOSIS — U07.1 COVID-19: ICD-10-CM

## 2023-01-26 LAB
ALBUMIN SERPL ELPH-MCNC: 2.9 G/DL — LOW (ref 3.3–5)
ALP SERPL-CCNC: 161 U/L — HIGH (ref 40–120)
ALT FLD-CCNC: 15 U/L — SIGNIFICANT CHANGE UP (ref 4–33)
ANION GAP SERPL CALC-SCNC: 9 MMOL/L — SIGNIFICANT CHANGE UP (ref 7–14)
APPEARANCE UR: ABNORMAL
AST SERPL-CCNC: 24 U/L — SIGNIFICANT CHANGE UP (ref 4–32)
BACTERIA # UR AUTO: ABNORMAL
BASOPHILS # BLD AUTO: 0.05 K/UL — SIGNIFICANT CHANGE UP (ref 0–0.2)
BASOPHILS NFR BLD AUTO: 1.5 % — SIGNIFICANT CHANGE UP (ref 0–2)
BILIRUB SERPL-MCNC: 0.5 MG/DL — SIGNIFICANT CHANGE UP (ref 0.2–1.2)
BILIRUB UR-MCNC: NEGATIVE — SIGNIFICANT CHANGE UP
BUN SERPL-MCNC: 17 MG/DL — SIGNIFICANT CHANGE UP (ref 7–23)
CALCIUM SERPL-MCNC: 9.8 MG/DL — SIGNIFICANT CHANGE UP (ref 8.4–10.5)
CHLORIDE SERPL-SCNC: 99 MMOL/L — SIGNIFICANT CHANGE UP (ref 98–107)
CO2 SERPL-SCNC: 27 MMOL/L — SIGNIFICANT CHANGE UP (ref 22–31)
COLOR SPEC: ABNORMAL
CREAT SERPL-MCNC: 0.86 MG/DL — SIGNIFICANT CHANGE UP (ref 0.5–1.3)
DIFF PNL FLD: ABNORMAL
EGFR: 72 ML/MIN/1.73M2 — SIGNIFICANT CHANGE UP
EOSINOPHIL # BLD AUTO: 0.16 K/UL — SIGNIFICANT CHANGE UP (ref 0–0.5)
EOSINOPHIL NFR BLD AUTO: 4.8 % — SIGNIFICANT CHANGE UP (ref 0–6)
EPI CELLS # UR: 2 /HPF — SIGNIFICANT CHANGE UP (ref 0–5)
GLUCOSE SERPL-MCNC: 85 MG/DL — SIGNIFICANT CHANGE UP (ref 70–99)
GLUCOSE UR QL: NEGATIVE — SIGNIFICANT CHANGE UP
HCT VFR BLD CALC: 35.7 % — SIGNIFICANT CHANGE UP (ref 34.5–45)
HGB BLD-MCNC: 10.9 G/DL — LOW (ref 11.5–15.5)
HYALINE CASTS # UR AUTO: 9 /LPF — HIGH (ref 0–7)
IANC: 1.39 K/UL — LOW (ref 1.8–7.4)
IMM GRANULOCYTES NFR BLD AUTO: 0.3 % — SIGNIFICANT CHANGE UP (ref 0–0.9)
KETONES UR-MCNC: NEGATIVE — SIGNIFICANT CHANGE UP
LEUKOCYTE ESTERASE UR-ACNC: ABNORMAL
LYMPHOCYTES # BLD AUTO: 1.31 K/UL — SIGNIFICANT CHANGE UP (ref 1–3.3)
LYMPHOCYTES # BLD AUTO: 39.7 % — SIGNIFICANT CHANGE UP (ref 13–44)
MAGNESIUM SERPL-MCNC: 1.6 MG/DL — SIGNIFICANT CHANGE UP (ref 1.6–2.6)
MCHC RBC-ENTMCNC: 27.3 PG — SIGNIFICANT CHANGE UP (ref 27–34)
MCHC RBC-ENTMCNC: 30.5 GM/DL — LOW (ref 32–36)
MCV RBC AUTO: 89.3 FL — SIGNIFICANT CHANGE UP (ref 80–100)
MONOCYTES # BLD AUTO: 0.38 K/UL — SIGNIFICANT CHANGE UP (ref 0–0.9)
MONOCYTES NFR BLD AUTO: 11.5 % — SIGNIFICANT CHANGE UP (ref 2–14)
NEUTROPHILS # BLD AUTO: 1.39 K/UL — LOW (ref 1.8–7.4)
NEUTROPHILS NFR BLD AUTO: 42.2 % — LOW (ref 43–77)
NITRITE UR-MCNC: NEGATIVE — SIGNIFICANT CHANGE UP
NRBC # BLD: 0 /100 WBCS — SIGNIFICANT CHANGE UP (ref 0–0)
NRBC # FLD: 0 K/UL — SIGNIFICANT CHANGE UP (ref 0–0)
PH UR: 6.5 — SIGNIFICANT CHANGE UP (ref 5–8)
PHOSPHATE SERPL-MCNC: 2.9 MG/DL — SIGNIFICANT CHANGE UP (ref 2.5–4.5)
PLATELET # BLD AUTO: 334 K/UL — SIGNIFICANT CHANGE UP (ref 150–400)
POTASSIUM SERPL-MCNC: 3.7 MMOL/L — SIGNIFICANT CHANGE UP (ref 3.5–5.3)
POTASSIUM SERPL-SCNC: 3.7 MMOL/L — SIGNIFICANT CHANGE UP (ref 3.5–5.3)
PROT SERPL-MCNC: 7.6 G/DL — SIGNIFICANT CHANGE UP (ref 6–8.3)
PROT UR-MCNC: ABNORMAL
RBC # BLD: 4 M/UL — SIGNIFICANT CHANGE UP (ref 3.8–5.2)
RBC # FLD: 16.5 % — HIGH (ref 10.3–14.5)
RBC CASTS # UR COMP ASSIST: >720 /HPF — HIGH (ref 0–4)
SARS-COV-2 RNA SPEC QL NAA+PROBE: DETECTED
SODIUM SERPL-SCNC: 135 MMOL/L — SIGNIFICANT CHANGE UP (ref 135–145)
SP GR SPEC: 1.02 — SIGNIFICANT CHANGE UP (ref 1.01–1.05)
UROBILINOGEN FLD QL: SIGNIFICANT CHANGE UP
WBC # BLD: 3.3 K/UL — LOW (ref 3.8–10.5)
WBC # FLD AUTO: 3.3 K/UL — LOW (ref 3.8–10.5)
WBC UR QL: 9 /HPF — HIGH (ref 0–5)

## 2023-01-26 PROCEDURE — 99232 SBSQ HOSP IP/OBS MODERATE 35: CPT | Mod: GC

## 2023-01-26 RX ORDER — ENOXAPARIN SODIUM 100 MG/ML
100 INJECTION SUBCUTANEOUS EVERY 12 HOURS
Refills: 0 | Status: DISCONTINUED | OUTPATIENT
Start: 2023-01-26 | End: 2023-02-01

## 2023-01-26 RX ORDER — WARFARIN SODIUM 2.5 MG/1
5 TABLET ORAL ONCE
Refills: 0 | Status: DISCONTINUED | OUTPATIENT
Start: 2023-01-26 | End: 2023-01-26

## 2023-01-26 RX ADMIN — PANTOPRAZOLE SODIUM 40 MILLIGRAM(S): 20 TABLET, DELAYED RELEASE ORAL at 18:03

## 2023-01-26 RX ADMIN — ENOXAPARIN SODIUM 100 MILLIGRAM(S): 100 INJECTION SUBCUTANEOUS at 18:03

## 2023-01-26 RX ADMIN — LIDOCAINE 1 PATCH: 4 CREAM TOPICAL at 00:00

## 2023-01-26 RX ADMIN — PANTOPRAZOLE SODIUM 40 MILLIGRAM(S): 20 TABLET, DELAYED RELEASE ORAL at 05:30

## 2023-01-26 RX ADMIN — Medication 1 APPLICATION(S): at 12:08

## 2023-01-26 RX ADMIN — Medication 650 MILLIGRAM(S): at 18:03

## 2023-01-26 RX ADMIN — ENOXAPARIN SODIUM 120 MILLIGRAM(S): 100 INJECTION SUBCUTANEOUS at 10:17

## 2023-01-26 RX ADMIN — Medication 3 MILLIGRAM(S): at 22:19

## 2023-01-26 RX ADMIN — LIDOCAINE 1 PATCH: 4 CREAM TOPICAL at 23:50

## 2023-01-26 RX ADMIN — LIDOCAINE 1 PATCH: 4 CREAM TOPICAL at 22:01

## 2023-01-26 RX ADMIN — LIDOCAINE 1 PATCH: 4 CREAM TOPICAL at 12:11

## 2023-01-26 RX ADMIN — APREMILAST 30 MILLIGRAM(S): KIT ORAL at 10:12

## 2023-01-26 NOTE — PROVIDER CONTACT NOTE (OTHER) - REASON
Patient's BP 92/74 HR 76
Patient's /48
hypotension
manual BP 78/38
Manual BP 92/48
hypotension
Blood Cultures Growth in anaerbic bottle in gram positive cocci in clusters from cultures collected in 1/13/2023
Patient complaining of bleeding noted from the vagina and urethra
Patient's BP 96/49
Patient's BP 87/48

## 2023-01-26 NOTE — PROVIDER CONTACT NOTE (OTHER) - SITUATION
Blood Cultures Growth in anaerobic bottle in gram positive cocci in clusters from cultures collected in 1/13/2023
Patient hypotensive, BP 87/48
Patient complaining of bleeding noted from the vagina and urethra
Patient hypotensive, BP 92/74.
Patient hypotensive; 85/52
Pt restless and anxious throughout shift. Pt c/o heart palpitations. Manual BP 92/48. Pt receiving NS at 100cc/hr.
manual BP 78/38
patient hypotensive
Patient hypotensive, /48
Patient hypotensive, BP 96/49

## 2023-01-26 NOTE — PROGRESS NOTE ADULT - PROBLEM SELECTOR PLAN 8
Diet: DASH/TLC  DVT Proph: lovenox sq  Dispo: [facility]  Can follow with PCP, gyn resident clinic, GI. - Pt taking Otezla. Last dose 2 prior to admission  - Would keep other autoimmune condition on differential given elevated ESR, CRP  - restarting Otezla.

## 2023-01-26 NOTE — PROVIDER CONTACT NOTE (OTHER) - NAME OF MD/NP/PA/DO NOTIFIED:
Bj Park
Bj Park MD
[place]
Dr. Barreto
jitendra barker
team 3 Kathrine Villareal
Team 3 Kathrine Villareal
MD Cal Barreto
Regina Galeano MD
Bj Park

## 2023-01-26 NOTE — PROGRESS NOTE ADULT - PROBLEM SELECTOR PLAN 5
- GGT elevated  - CT abdomen with multiple hepatic cysts, non-occlusive portal vein thrombus  - Still persistently elevated. Likely cholestasis. CT a/p with no abnormalities other than the liver cysts. RESOLVED  - Seen on CT  - Zosyn 1/13-1/18  - Augmentin 1/18-1/21  - Advanced diet given reassuring abdominal exam without any abdominal pain, tolerating without issue. Having BMs  - Monitor for perforation, abscess, obstruction, fistula

## 2023-01-26 NOTE — PROVIDER CONTACT NOTE (OTHER) - RECOMMENDATIONS
Increase rate of maintenance IVF, xanax
Notify MD
Notify MD Cal Barreto
Continue to monitor patient.
Notify MD
Fluids, for MD to come assess

## 2023-01-26 NOTE — PROGRESS NOTE ADULT - SUBJECTIVE AND OBJECTIVE BOX
PROGRESS NOTE:   Authored by J Luis Smith MD   Patient is a 72y old  Female who presents with a chief complaint of Acute renal failure     (20 Jan 2023 15:23)      SUBJECTIVE / OVERNIGHT EVENTS:  No acute events overnight.     ADDITIONAL REVIEW OF SYSTEMS:    MEDICATIONS  (STANDING):  apremilast 30 milliGRAM(s) Oral two times a day  enoxaparin Injectable 120 milliGRAM(s) SubCutaneous every 12 hours  hydrocortisone 2.5% Cream 1 Application(s) Topical daily  ibuprofen  Tablet. 400 milliGRAM(s) Oral once  lidocaine   4% Patch 1 Patch Transdermal every 24 hours  lidocaine   4% Patch 1 Patch Transdermal daily  lidocaine 2% Gel 1 Application(s) Topical two times a day  melatonin 3 milliGRAM(s) Oral at bedtime  pantoprazole    Tablet 40 milliGRAM(s) Oral two times a day  senna 2 Tablet(s) Oral at bedtime    MEDICATIONS  (PRN):  acetaminophen     Tablet .. 650 milliGRAM(s) Oral every 6 hours PRN Moderate Pain (4 - 6)  LORazepam     Tablet 0.5 milliGRAM(s) Oral once PRN Please give 15 minutes before MRI  melatonin 3 milliGRAM(s) Oral at bedtime PRN Sleep      CAPILLARY BLOOD GLUCOSE        I&O's Summary      PHYSICAL EXAM:  Vital Signs Last 24 Hrs  T(C): 36.8 (26 Jan 2023 05:57), Max: 36.9 (25 Jan 2023 21:28)  T(F): 98.2 (26 Jan 2023 05:57), Max: 98.5 (25 Jan 2023 21:28)  HR: 84 (26 Jan 2023 05:57) (75 - 84)  BP: 149/61 (26 Jan 2023 05:57) (117/56 - 149/61)  BP(mean): --  RR: 17 (26 Jan 2023 05:57) (17 - 17)  SpO2: 95% (26 Jan 2023 05:57) (95% - 96%)    Parameters below as of 26 Jan 2023 05:57  Patient On (Oxygen Delivery Method): room air          GENERAL: NAD  HEAD:  Atraumatic, Normocephalic  EYES: EOMI, PERRLA, conjunctiva and sclera clear  ENT: Moist mucous membranes  NECK: Supple, No elevated JVP.   CHEST/LUNG: Clear to auscultation bilaterally; No wheezing or rhonchi  HEART: RRR. No MRG. S1 and S2 normal.   Peripheral edema: None.  ABDOMEN: BSx4; Soft, nontender. Bruising at low anterior abdomen.   EXTREMITIES:  2+ radial pulses. Calfs nontender non erythematous. 1st metatarsal head of left foot with improvement in redness/swelling.   NERVOUS SYSTEM:  A&Ox3, no gross lateralizing deficits   SKIN: No visible anterior rash.       LABS:                        9.4    4.78  )-----------( 304      ( 24 Jan 2023 07:25 )             30.0     01-24    138  |  101  |  15  ----------------------------<  93  3.8   |  25  |  0.98    Ca    9.7      24 Jan 2023 07:25  Phos  3.4     01-24  Mg     1.70     01-24    TPro  7.4  /  Alb  2.7<L>  /  TBili  0.6  /  DBili  x   /  AST  26  /  ALT  15  /  AlkPhos  198<H>  01-24                RADIOLOGY & ADDITIONAL TESTS:  Lab Results Reviewed   Imaging Reviewed  Electrocardiogram Reviewed   PROGRESS NOTE:   Authored by J Luis Smith MD   Patient is a 72y old  Female who presents with a chief complaint of Acute renal failure     (20 Jan 2023 15:23)      SUBJECTIVE / OVERNIGHT EVENTS:  No acute events overnight.     Patient refusing knee MRI because of enclosure, requires open MRI not present. Patient will obtain as an outpatient.    No sore throat, rhinorrhea, cough, sputum production, chest pain, dyspnea, nausea, vomiting, abdominal pain or diarrhea.    Tested positive for COVID.  Urine appearing more red today, will f/u UA.      ADDITIONAL REVIEW OF SYSTEMS:    MEDICATIONS  (STANDING):  apremilast 30 milliGRAM(s) Oral two times a day  enoxaparin Injectable 120 milliGRAM(s) SubCutaneous every 12 hours  hydrocortisone 2.5% Cream 1 Application(s) Topical daily  ibuprofen  Tablet. 400 milliGRAM(s) Oral once  lidocaine   4% Patch 1 Patch Transdermal every 24 hours  lidocaine   4% Patch 1 Patch Transdermal daily  lidocaine 2% Gel 1 Application(s) Topical two times a day  melatonin 3 milliGRAM(s) Oral at bedtime  pantoprazole    Tablet 40 milliGRAM(s) Oral two times a day  senna 2 Tablet(s) Oral at bedtime    MEDICATIONS  (PRN):  acetaminophen     Tablet .. 650 milliGRAM(s) Oral every 6 hours PRN Moderate Pain (4 - 6)  LORazepam     Tablet 0.5 milliGRAM(s) Oral once PRN Please give 15 minutes before MRI  melatonin 3 milliGRAM(s) Oral at bedtime PRN Sleep      CAPILLARY BLOOD GLUCOSE        I&O's Summary      PHYSICAL EXAM:  Vital Signs Last 24 Hrs  T(C): 36.8 (26 Jan 2023 05:57), Max: 36.9 (25 Jan 2023 21:28)  T(F): 98.2 (26 Jan 2023 05:57), Max: 98.5 (25 Jan 2023 21:28)  HR: 84 (26 Jan 2023 05:57) (75 - 84)  BP: 149/61 (26 Jan 2023 05:57) (117/56 - 149/61)  BP(mean): --  RR: 17 (26 Jan 2023 05:57) (17 - 17)  SpO2: 95% (26 Jan 2023 05:57) (95% - 96%)    Parameters below as of 26 Jan 2023 05:57  Patient On (Oxygen Delivery Method): room air          GENERAL: NAD  HEAD:  Atraumatic, Normocephalic  EYES: EOMI, PERRLA, conjunctiva and sclera clear  ENT: Moist mucous membranes  NECK: Supple, No elevated JVP.   CHEST/LUNG: Clear to auscultation bilaterally; No wheezing or rhonchi  HEART: RRR. No MRG. S1 and S2 normal.   Peripheral edema: None.  ABDOMEN: BSx4; Soft, nontender. Bruising at low anterior abdomen.   EXTREMITIES:  2+ radial pulses. Calfs nontender non erythematous. 1st metatarsal head of left foot with improvement in redness/swelling.   NERVOUS SYSTEM:  A&Ox3, no gross lateralizing deficits   SKIN: No visible anterior rash.       LABS:                        9.4    4.78  )-----------( 304      ( 24 Jan 2023 07:25 )             30.0     01-24    138  |  101  |  15  ----------------------------<  93  3.8   |  25  |  0.98    Ca    9.7      24 Jan 2023 07:25  Phos  3.4     01-24  Mg     1.70     01-24    TPro  7.4  /  Alb  2.7<L>  /  TBili  0.6  /  DBili  x   /  AST  26  /  ALT  15  /  AlkPhos  198<H>  01-24                RADIOLOGY & ADDITIONAL TESTS:  Lab Results Reviewed   Imaging Reviewed  Electrocardiogram Reviewed

## 2023-01-26 NOTE — CHART NOTE - NSCHARTNOTEFT_GEN_A_CORE
Notified by RN staff that patient with gross hematuria in urinary cannister. Briefly patient is here for sepsis 2/2 diverticulitis s/p 10d course of antibiotics with incidental findings of multiple venous thromboses in abdominal vasculature as well as thickened endometrium s/p EMB 1/23. She is on therapeutic lovenox for anticoagulation.     Patient examined at bedside with SIENA Rainey. Patient reweighed in bed with weight ~105kg. Gross hematuria noted in urinary cannister. Patient has been with external urinary device due to deconditioning and difficulty ambulating to the restroom. Verbal consent obtained for pelvic exam. Chaperone SIENA Rainey present during entirety of exam. Dried/ old blood noted around vaginal introitus. Suspect external urinary device may be removing blood from vagina rather than blood coming from urinary tract. Given change in weight from patient's admission will lower lovenox appropriately. Lovenox will change from 120mg BID to 100mg BID. Type and screen and CBC ordered. Will try and obtain repeat UA from urethra alone to determine urinary vs pelvic source. Patient hemodynamically stable at this time. If determined that blood is urinary in origin patient will need evaluation from Urology. Will continue to monitor.    AS, pgy3

## 2023-01-26 NOTE — PROGRESS NOTE ADULT - PROBLEM SELECTOR PLAN 7
- Pt taking Otezla. Last dose 2 prior to admission  - Would keep other autoimmune condition on differential given elevated ESR, CRP  - restarting Otezla. - Resolved  - GGT elevated

## 2023-01-26 NOTE — PROVIDER CONTACT NOTE (OTHER) - BACKGROUND
Patient admitted for acute renal failure
Patient admitted for acute renal failure
Patient admitted for renal failure
Patient admitted with JAZMYNE, PMH of Psoriatic arthtitis, hypotension, anxiety.
Pt w/ JAZMYNE. Diverticulitis shown on CT scan. On zosyn and IVF.
patient AOx4, admitted for JAZMYNE, increased weakness
Patient admitted for acute renal failure
s/p 1000cc bolus at 3am for hypotension.

## 2023-01-26 NOTE — PROGRESS NOTE ADULT - ATTENDING COMMENTS
72F w/ psoriatic arthritis (apremilast), HTN (lisinopril/chlorthalidone) presenting w/ generalized weakness and found to have multiple metabolic derangements including JAZMYNE, hypokalemia, hyponatremia, transaminitis, HAGMA and found to have severe sepsis with CT w/ acute uncomplicated diverticulitis c/b IMV thrombosis and thrombophlebitis.     Seen this morning, states was unable to tolerated MR due to claustrophobia despite anxiolytics. Agrees that open-MRI would be in her best interest as OP.     # Thrombosis of portal vein, SMV, and IMV thromboses - Vascular cardiology consult appreciated. Continue with therapeutic Lovenox for now given COVID diagnosis.  # COVID-19 - currently remains asymptomatic, will resend PCR for confirmation   # Endometrial thickening- Uterus noted to thickened endometrium, s/p endometrial biopsy on 1/23. f/u OP with GYN for path.    # RLE pain - likely dactylitis in setting of known psoriatic arthritis, rheum consult appreciated. MRI knee as outpatient with open-MRI    Dispo: patient/ family agreeable to [facility]

## 2023-01-26 NOTE — PROGRESS NOTE ADULT - PROBLEM SELECTOR PLAN 1
RESOLVED  - Seen on CT  - Zosyn 1/13-1/18  - Augmentin 1/18-1/21  - Advanced diet given reassuring abdominal exam without any abdominal pain, tolerating without issue. Having BMs  - Monitor for perforation, abscess, obstruction, fistula Asymptomatic, patient tested positive 1/26/23  - Will CTM  - Was going to bridge to warfarin from lovenox  - will continue with lovenox at this time.

## 2023-01-26 NOTE — PROGRESS NOTE ADULT - PROBLEM SELECTOR PLAN 6
- Resolved  - GGT elevated - GGT elevated  - CT abdomen with multiple hepatic cysts, non-occlusive portal vein thrombus  - Still persistently elevated. Likely cholestasis. CT a/p with no abnormalities other than the liver cysts.

## 2023-01-26 NOTE — PROVIDER CONTACT NOTE (OTHER) - DATE AND TIME:
13-Jan-2023 15:00
14-Jan-2023 06:00
13-Jan-2023 15:59
14-Jan-2023 09:15
15-Nawaf-2023 05:33
14-Jan-2023 03:03
14-Jan-2023 13:20
26-Jan-2023 17:00
14-Jan-2023 17:30
16-Jan-2023 13:50

## 2023-01-26 NOTE — PROVIDER CONTACT NOTE (OTHER) - ACTION/TREATMENT ORDERED:
MD Galeano made aware, no interventions needed at this time.   Nursing care continued
MD Lorenzo made aware, states no interventions needed at this. Nursing care continued
MD Cal Barreto notified, pt on abx.
Urine collected from patient. MD Barreto notified and aware. Will continue to monitor patient.
team 3 notified. 1000cc NS bolus ordered. FS ordered. MICU to be consulted.
MD Seals made aware, no interventions needed at this time.   Nursing care continued
Provider at bedside assessing patient, no further interventions at this time
Bolus of fluids; reassess
MD Lorenzo made aware, states patient will be started on fluids  Nursing care continued
Team 3 notified. 1000cc bolus ordered.

## 2023-01-27 DIAGNOSIS — N89.8 OTHER SPECIFIED NONINFLAMMATORY DISORDERS OF VAGINA: ICD-10-CM

## 2023-01-27 LAB
A1C WITH ESTIMATED AVERAGE GLUCOSE RESULT: 5.8 % — HIGH (ref 4–5.6)
ALBUMIN SERPL ELPH-MCNC: 2.8 G/DL — LOW (ref 3.3–5)
ALP SERPL-CCNC: 139 U/L — HIGH (ref 40–120)
ALT FLD-CCNC: 7 U/L — SIGNIFICANT CHANGE UP (ref 4–33)
ANION GAP SERPL CALC-SCNC: 9 MMOL/L — SIGNIFICANT CHANGE UP (ref 7–14)
APPEARANCE UR: CLEAR — SIGNIFICANT CHANGE UP
APTT BLD: 37.4 SEC — HIGH (ref 27–36.3)
AST SERPL-CCNC: 27 U/L — SIGNIFICANT CHANGE UP (ref 4–32)
BACTERIA # UR AUTO: NEGATIVE — SIGNIFICANT CHANGE UP
BASOPHILS # BLD AUTO: 0.05 K/UL — SIGNIFICANT CHANGE UP (ref 0–0.2)
BASOPHILS NFR BLD AUTO: 1.6 % — SIGNIFICANT CHANGE UP (ref 0–2)
BILIRUB SERPL-MCNC: 0.6 MG/DL — SIGNIFICANT CHANGE UP (ref 0.2–1.2)
BILIRUB UR-MCNC: NEGATIVE — SIGNIFICANT CHANGE UP
BLD GP AB SCN SERPL QL: NEGATIVE — SIGNIFICANT CHANGE UP
BUN SERPL-MCNC: 12 MG/DL — SIGNIFICANT CHANGE UP (ref 7–23)
CALCIUM SERPL-MCNC: 9.5 MG/DL — SIGNIFICANT CHANGE UP (ref 8.4–10.5)
CHLORIDE SERPL-SCNC: 101 MMOL/L — SIGNIFICANT CHANGE UP (ref 98–107)
CO2 SERPL-SCNC: 26 MMOL/L — SIGNIFICANT CHANGE UP (ref 22–31)
COLOR SPEC: YELLOW — SIGNIFICANT CHANGE UP
CREAT SERPL-MCNC: 0.77 MG/DL — SIGNIFICANT CHANGE UP (ref 0.5–1.3)
DIFF PNL FLD: ABNORMAL
EGFR: 82 ML/MIN/1.73M2 — SIGNIFICANT CHANGE UP
EOSINOPHIL # BLD AUTO: 0.07 K/UL — SIGNIFICANT CHANGE UP (ref 0–0.5)
EOSINOPHIL NFR BLD AUTO: 2.3 % — SIGNIFICANT CHANGE UP (ref 0–6)
EPI CELLS # UR: 2 /HPF — SIGNIFICANT CHANGE UP (ref 0–5)
ESTIMATED AVERAGE GLUCOSE: 120 — SIGNIFICANT CHANGE UP
GLUCOSE SERPL-MCNC: 91 MG/DL — SIGNIFICANT CHANGE UP (ref 70–99)
GLUCOSE UR QL: NEGATIVE — SIGNIFICANT CHANGE UP
HCT VFR BLD CALC: 31.1 % — LOW (ref 34.5–45)
HGB BLD-MCNC: 9.6 G/DL — LOW (ref 11.5–15.5)
HYALINE CASTS # UR AUTO: 2 /LPF — SIGNIFICANT CHANGE UP (ref 0–7)
IANC: 1.19 K/UL — LOW (ref 1.8–7.4)
IMM GRANULOCYTES NFR BLD AUTO: 0.3 % — SIGNIFICANT CHANGE UP (ref 0–0.9)
INR BLD: 1.26 RATIO — HIGH (ref 0.88–1.16)
KETONES UR-MCNC: NEGATIVE — SIGNIFICANT CHANGE UP
LEUKOCYTE ESTERASE UR-ACNC: NEGATIVE — SIGNIFICANT CHANGE UP
LYMPHOCYTES # BLD AUTO: 1.37 K/UL — SIGNIFICANT CHANGE UP (ref 1–3.3)
LYMPHOCYTES # BLD AUTO: 44.6 % — HIGH (ref 13–44)
MCHC RBC-ENTMCNC: 27 PG — SIGNIFICANT CHANGE UP (ref 27–34)
MCHC RBC-ENTMCNC: 30.9 GM/DL — LOW (ref 32–36)
MCV RBC AUTO: 87.6 FL — SIGNIFICANT CHANGE UP (ref 80–100)
MONOCYTES # BLD AUTO: 0.38 K/UL — SIGNIFICANT CHANGE UP (ref 0–0.9)
MONOCYTES NFR BLD AUTO: 12.4 % — SIGNIFICANT CHANGE UP (ref 2–14)
NEUTROPHILS # BLD AUTO: 1.19 K/UL — LOW (ref 1.8–7.4)
NEUTROPHILS NFR BLD AUTO: 38.8 % — LOW (ref 43–77)
NITRITE UR-MCNC: NEGATIVE — SIGNIFICANT CHANGE UP
NRBC # BLD: 0 /100 WBCS — SIGNIFICANT CHANGE UP (ref 0–0)
NRBC # FLD: 0 K/UL — SIGNIFICANT CHANGE UP (ref 0–0)
PH UR: 6.5 — SIGNIFICANT CHANGE UP (ref 5–8)
PLATELET # BLD AUTO: 334 K/UL — SIGNIFICANT CHANGE UP (ref 150–400)
POTASSIUM SERPL-MCNC: 3.8 MMOL/L — SIGNIFICANT CHANGE UP (ref 3.5–5.3)
POTASSIUM SERPL-SCNC: 3.8 MMOL/L — SIGNIFICANT CHANGE UP (ref 3.5–5.3)
PROT SERPL-MCNC: 7.3 G/DL — SIGNIFICANT CHANGE UP (ref 6–8.3)
PROT UR-MCNC: ABNORMAL
PROTHROM AB SERPL-ACNC: 14.7 SEC — HIGH (ref 10.5–13.4)
RBC # BLD: 3.55 M/UL — LOW (ref 3.8–5.2)
RBC # FLD: 16.4 % — HIGH (ref 10.3–14.5)
RBC CASTS # UR COMP ASSIST: 2 /HPF — SIGNIFICANT CHANGE UP (ref 0–4)
RH IG SCN BLD-IMP: POSITIVE — SIGNIFICANT CHANGE UP
SODIUM SERPL-SCNC: 136 MMOL/L — SIGNIFICANT CHANGE UP (ref 135–145)
SP GR SPEC: 1.02 — SIGNIFICANT CHANGE UP (ref 1.01–1.05)
SURGICAL PATHOLOGY STUDY: SIGNIFICANT CHANGE UP
UROBILINOGEN FLD QL: SIGNIFICANT CHANGE UP
WBC # BLD: 3.07 K/UL — LOW (ref 3.8–10.5)
WBC # FLD AUTO: 3.07 K/UL — LOW (ref 3.8–10.5)
WBC UR QL: 2 /HPF — SIGNIFICANT CHANGE UP (ref 0–5)

## 2023-01-27 PROCEDURE — 99232 SBSQ HOSP IP/OBS MODERATE 35: CPT | Mod: GC

## 2023-01-27 RX ORDER — LANOLIN ALCOHOL/MO/W.PET/CERES
3 CREAM (GRAM) TOPICAL AT BEDTIME
Refills: 0 | Status: DISCONTINUED | OUTPATIENT
Start: 2023-01-27 | End: 2023-01-30

## 2023-01-27 RX ADMIN — LIDOCAINE 1 APPLICATION(S): 4 CREAM TOPICAL at 17:38

## 2023-01-27 RX ADMIN — PANTOPRAZOLE SODIUM 40 MILLIGRAM(S): 20 TABLET, DELAYED RELEASE ORAL at 18:20

## 2023-01-27 RX ADMIN — ENOXAPARIN SODIUM 100 MILLIGRAM(S): 100 INJECTION SUBCUTANEOUS at 17:38

## 2023-01-27 RX ADMIN — Medication 650 MILLIGRAM(S): at 05:57

## 2023-01-27 RX ADMIN — PANTOPRAZOLE SODIUM 40 MILLIGRAM(S): 20 TABLET, DELAYED RELEASE ORAL at 05:44

## 2023-01-27 RX ADMIN — Medication 650 MILLIGRAM(S): at 06:40

## 2023-01-27 RX ADMIN — SENNA PLUS 2 TABLET(S): 8.6 TABLET ORAL at 21:09

## 2023-01-27 RX ADMIN — ENOXAPARIN SODIUM 100 MILLIGRAM(S): 100 INJECTION SUBCUTANEOUS at 05:44

## 2023-01-27 RX ADMIN — LIDOCAINE 1 PATCH: 4 CREAM TOPICAL at 19:30

## 2023-01-27 RX ADMIN — Medication 3 MILLIGRAM(S): at 21:08

## 2023-01-27 RX ADMIN — LIDOCAINE 1 PATCH: 4 CREAM TOPICAL at 11:41

## 2023-01-27 RX ADMIN — Medication 1 APPLICATION(S): at 11:40

## 2023-01-27 RX ADMIN — LIDOCAINE 1 PATCH: 4 CREAM TOPICAL at 23:00

## 2023-01-27 NOTE — PROGRESS NOTE ADULT - PROBLEM SELECTOR PLAN 5
RESOLVED  - Seen on CT  - Zosyn 1/13-1/18  - Augmentin 1/18-1/21  - Advanced diet given reassuring abdominal exam without any abdominal pain, tolerating without issue. Having BMs  - Monitor for perforation, abscess, obstruction, fistula RESOLVED, was most likely ATN

## 2023-01-27 NOTE — PROGRESS NOTE ADULT - SUBJECTIVE AND OBJECTIVE BOX
----- Message from Abigail Goff MA sent at 9/5/2018  1:57 PM CDT -----  Contact: pt  [phone]      Pt would like u to call her regarding the side affects from her medication    Random tongue movement and hands shaking   PROGRESS NOTE:   Authored by J Luis Smith MD   Patient is a 72y old  Female who presents with a chief complaint of Acute renal failure     (2023 15:23)      SUBJECTIVE / OVERNIGHT EVENTS:  No acute events overnight.     ADDITIONAL REVIEW OF SYSTEMS:    MEDICATIONS  (STANDING):  enoxaparin Injectable 100 milliGRAM(s) SubCutaneous every 12 hours  hydrocortisone 2.5% Cream 1 Application(s) Topical daily  ibuprofen  Tablet. 400 milliGRAM(s) Oral once  lidocaine   4% Patch 1 Patch Transdermal daily  lidocaine   4% Patch 1 Patch Transdermal every 24 hours  lidocaine 2% Gel 1 Application(s) Topical two times a day  melatonin 3 milliGRAM(s) Oral at bedtime  pantoprazole    Tablet 40 milliGRAM(s) Oral two times a day  senna 2 Tablet(s) Oral at bedtime    MEDICATIONS  (PRN):  acetaminophen     Tablet .. 650 milliGRAM(s) Oral every 6 hours PRN Moderate Pain (4 - 6)      CAPILLARY BLOOD GLUCOSE        I&O's Summary    2023 07:01  -  2023 07:00  --------------------------------------------------------  IN: 0 mL / OUT: 500 mL / NET: -500 mL        PHYSICAL EXAM:  Vital Signs Last 24 Hrs  T(C): 36.6 (2023 05:43), Max: 37.1 (2023 13:07)  T(F): 97.9 (2023 05:43), Max: 98.7 (2023 13:07)  HR: 79 (2023 05:43) (79 - 89)  BP: 115/59 (2023 05:43) (110/67 - 140/60)  BP(mean): --  RR: 17 (2023 05:43) (17 - 18)  SpO2: 95% (2023 05:43) (94% - 95%)    Parameters below as of 2023 05:43  Patient On (Oxygen Delivery Method): room air        GENERAL: NAD  HEAD:  Atraumatic, Normocephalic  EYES: EOMI, PERRLA, conjunctiva and sclera clear  ENT: Moist mucous membranes  NECK: Supple, No elevated JVP.   CHEST/LUNG: Clear to auscultation bilaterally; No wheezing or rhonchi  HEART: RRR. No MRG. S1 and S2 normal.   Peripheral edema: None.  ABDOMEN: BSx4; Soft, nontender. Bruising at low anterior abdomen.   EXTREMITIES:  2+ radial pulses. Calfs nontender non erythematous. 1st metatarsal head of left foot with improvement in redness/swelling.   NERVOUS SYSTEM:  A&Ox3, no gross lateralizing deficits   SKIN: No visible anterior rash.       LABS:                        10.9   3.30  )-----------( 334      ( 2023 07:00 )             35.7         135  |  99  |  17  ----------------------------<  85  3.7   |  27  |  0.86    Ca    9.8      2023 07:00  Phos  2.9       Mg     1.60         TPro  7.6  /  Alb  2.9<L>  /  TBili  0.5  /  DBili  x   /  AST  24  /  ALT  15  /  AlkPhos  161<H>            Urinalysis Basic - ( 2023 16:00 )    Color: Red / Appearance: Turbid / S.020 / pH: x  Gluc: x / Ketone: Negative  / Bili: Negative / Urobili: <2 mg/dL   Blood: x / Protein: 100 mg/dL / Nitrite: Negative   Leuk Esterase: Moderate / RBC: >720 /HPF / WBC 9 /HPF   Sq Epi: x / Non Sq Epi: 2 /HPF / Bacteria: Moderate          RADIOLOGY & ADDITIONAL TESTS:  Lab Results Reviewed   Imaging Reviewed  Electrocardiogram Reviewed   PROGRESS NOTE:   Authored by J Luis Smith MD   Patient is a 72y old  Female who presents with a chief complaint of Acute renal failure     (2023 15:23)      SUBJECTIVE / OVERNIGHT EVENTS:  Overnight, patient with reporting hematuria.  This AM, gross hematuria noted at collection bottle for primafit/external urinary catheter.  Patient straight catheterized and minimal RBCs noted.  Likely etiology is vaginal/uterine iso AC use/recent biopsy/EM thickening  Hgb this AM 9.6, stable.        ADDITIONAL REVIEW OF SYSTEMS:    MEDICATIONS  (STANDING):  enoxaparin Injectable 100 milliGRAM(s) SubCutaneous every 12 hours  hydrocortisone 2.5% Cream 1 Application(s) Topical daily  ibuprofen  Tablet. 400 milliGRAM(s) Oral once  lidocaine   4% Patch 1 Patch Transdermal daily  lidocaine   4% Patch 1 Patch Transdermal every 24 hours  lidocaine 2% Gel 1 Application(s) Topical two times a day  melatonin 3 milliGRAM(s) Oral at bedtime  pantoprazole    Tablet 40 milliGRAM(s) Oral two times a day  senna 2 Tablet(s) Oral at bedtime    MEDICATIONS  (PRN):  acetaminophen     Tablet .. 650 milliGRAM(s) Oral every 6 hours PRN Moderate Pain (4 - 6)      CAPILLARY BLOOD GLUCOSE        I&O's Summary    2023 07:01  -  2023 07:00  --------------------------------------------------------  IN: 0 mL / OUT: 500 mL / NET: -500 mL        PHYSICAL EXAM:  Vital Signs Last 24 Hrs  T(C): 36.6 (2023 05:43), Max: 37.1 (2023 13:07)  T(F): 97.9 (2023 05:43), Max: 98.7 (2023 13:07)  HR: 79 (2023 05:43) (79 - 89)  BP: 115/59 (2023 05:43) (110/67 - 140/60)  BP(mean): --  RR: 17 (2023 05:43) (17 - 18)  SpO2: 95% (2023 05:43) (94% - 95%)    Parameters below as of 2023 05:43  Patient On (Oxygen Delivery Method): room air        GENERAL: NAD  HEAD:  Atraumatic, Normocephalic  EYES: EOMI, PERRLA, conjunctiva and sclera clear  ENT: Moist mucous membranes  NECK: Supple, No elevated JVP.   CHEST/LUNG: Clear to auscultation bilaterally; No wheezing or rhonchi  HEART: RRR. No MRG. S1 and S2 normal.   Peripheral edema: None.  ABDOMEN: BSx4; Soft, nontender. Bruising at low anterior abdomen.   EXTREMITIES:  2+ radial pulses. Calfs nontender non erythematous. 1st metatarsal head of left foot with improvement in redness/swelling.   NERVOUS SYSTEM:  A&Ox3, no gross lateralizing deficits   SKIN: No visible anterior rash.       LABS:                        10.9   3.30  )-----------( 334      ( 2023 07:00 )             35.7         135  |  99  |  17  ----------------------------<  85  3.7   |  27  |  0.86    Ca    9.8      2023 07:00  Phos  2.9       Mg     1.60         TPro  7.6  /  Alb  2.9<L>  /  TBili  0.5  /  DBili  x   /  AST  24  /  ALT  15  /  AlkPhos  161<H>            Urinalysis Basic - ( 2023 16:00 )    Color: Red / Appearance: Turbid / S.020 / pH: x  Gluc: x / Ketone: Negative  / Bili: Negative / Urobili: <2 mg/dL   Blood: x / Protein: 100 mg/dL / Nitrite: Negative   Leuk Esterase: Moderate / RBC: >720 /HPF / WBC 9 /HPF   Sq Epi: x / Non Sq Epi: 2 /HPF / Bacteria: Moderate          RADIOLOGY & ADDITIONAL TESTS:  Lab Results Reviewed   Imaging Reviewed  Electrocardiogram Reviewed

## 2023-01-27 NOTE — PROGRESS NOTE ADULT - PROBLEM SELECTOR PLAN 1
Asymptomatic, patient tested positive 1/26/23  - Will CTM  - Was going to bridge to warfarin from lovenox  - will continue with lovenox at this time. 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 Either uterine or colonic. Pt has not had a colonoscopy.  - TVUS with 17mm stripe. GYN wanted to do EMB but patient refused. Possibly on 1/23  - Will need colonoscopy outpatient. Can do hypercoagulable workup if those negative.  - IR with no safe percutaneous window to take a biopsy  - Continue lovenox for now.  - $0 copay, patient will administer lovenox. 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 Either uterine or colonic. Pt has not had a colonoscopy.  - TVUS with 17mm stripe. GYN wanted to do EMB but patient refused.  - Will need colonoscopy outpatient. Can do hypercoagulable workup if those negative.  - IR with no safe percutaneous window to take a biopsy  - Continue lovenox for now.  - $0 copay, patient will administer lovenox.  - EMB on 1/23, without sufficient sampling.

## 2023-01-27 NOTE — PROGRESS NOTE ADULT - PROBLEM SELECTOR PLAN 2
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 Either uterine or colonic. Pt has not had a colonoscopy.  - TVUS with 17mm stripe. GYN wanted to do EMB but patient refused. Possibly on 1/23  - Will need colonoscopy outpatient. Can do hypercoagulable workup if those negative.  - IR with no safe percutaneous window to take a biopsy  - Continue lovenox for now.  - $0 copay, patient will administer lovenox. Asymptomatic, patient tested positive 1/26/23  - Will CTM  - Was going to bridge to warfarin from lovenox  - will continue with lovenox at this time.

## 2023-01-27 NOTE — CHART NOTE - NSCHARTNOTEFT_GEN_A_CORE
Discussion with patient and daughter at bedside about endometrial biopsy results preformed on 1/23. Details below.   Discussed the results with the patient who understands that this endometrial biopsy was insufficient. Likely 2/2 to cervical stenosis and poor tolerance. Pt aware that she will need sampling of her endometrium, likely with a D and C under anesthesia. This should be preformed when the patient is medically optimized.  Pt with follow up appointment on 02/21.   All questions answered.     DW: Dr. Michelle Burgess, PGY-2    Surgical Pathology Report:   Collected Date/Time: 1/23/2023 16:30 EST     Surgical Pathology Report - Auth (Verified)   Specimen(s) Submitted   1-Endometrial biopsy   2-Endocervical curetage   Final Diagnosis   1. Endometrial biopsy   - Extremely scant inactive endometrium, insufficient for evaluation   of the endometrium   - Fragments of benign endocervical glands   - Strips of benign squamous epithelium   2. Endocervical curetage   - Scant strips of benign squamous epithelium   Verified by: Viri Dutta MD

## 2023-01-27 NOTE — PROGRESS NOTE ADULT - PROBLEM SELECTOR PLAN 4
RESOLVED, was most likely ATN - Baseline hgb 14 in 9/22  - New anemia with no obvious signs of bleeding.  - Hemodynamically stable  - CT abdomen with diverticulitis  - Tsat 24% and ferritin 1339 consistent with anemia of chronic disease

## 2023-01-27 NOTE — PROGRESS NOTE ADULT - PROBLEM SELECTOR PLAN 3
- Baseline hgb 14 in 9/22  - New anemia with no obvious signs of bleeding.  - Hemodynamically stable  - CT abdomen with diverticulitis  - Tsat 24% and ferritin 1339 consistent with anemia of chronic disease - Patient with UA positive for > 720 RBCs obtained from bottle from external urinary catheter  - Pelvic exam performed showing blood from vaginal canal.  - UA negative for significant RBCs after straight catheterization, indicating origin of blood in bottle is likely vaginal/uterine in nature  - Patient with hyperplasia vs malignancy of endometrium on AC, likely source of bleeding.  - Hgb 9.6, stable given previous hgb from previous 2 days.  - CTM

## 2023-01-27 NOTE — PROGRESS NOTE ADULT - ATTENDING COMMENTS
72F w/ psoriatic arthritis (apremilast), HTN (lisinopril/chlorthalidone) presenting w/ generalized weakness and found to have multiple metabolic derangements including JAZMYNE, hypokalemia, hyponatremia, transaminitis, HAGMA and sepsis with CT w/ acute uncomplicated diverticulitis c/b IMV thrombosis and thrombophlebitis now on lovenox. Course complicated by nosocomial COVID, currently remains asymptomatic.     Seen this morning, reporting hematuria. reassured her that straigh cath was normal and bleeding is coming from vagina which is normal s/p endometrial biopsy.     # Thrombosis of portal vein, SMV, and IMV thromboses - Vascular cardiology consult appreciated. Continue with therapeutic Lovenox for now given COVID diagnosis can transition to coumadin in [facility] should she desire.   # COVID-19 - currently remains asymptomatic, will resend PCR for confirmation   # Endometrial thickening- Uterus noted to thickened endometrium, s/p endometrial biopsy on 1/23. now with some post procedure bleeding. f/u OP with GYN for path.    # RLE pain - likely dactylitis in setting of known psoriatic arthritis, rheum consult appreciated. MRI knee as outpatient with open-MRI.   # Psoriatic arthritis - holding otezla in setting of COVID    Dispo: accepted to [facility], pending placement.

## 2023-01-27 NOTE — PROGRESS NOTE ADULT - ASSESSMENT
72F with hypertension, MEJIA, psoriatic arthritis presenting with several days of poor PO intake found to be hyponatremic with JAZMYNE, anemia, uremia, and elevated liver enzymes found to have acute diverticulitis and multiple hepatic cysts. F/U CT with contrast showing multiple non-occlusive filling defects. JAZMYNE and LFTs resolved. Persistent alk phos elevation. Now on anti-coagulation. s/p EMB 1/24/23. Pending [facility]. Tested COVID positive 1/26/23

## 2023-01-28 LAB
ALBUMIN SERPL ELPH-MCNC: 2.6 G/DL — LOW (ref 3.3–5)
ALP SERPL-CCNC: 128 U/L — HIGH (ref 40–120)
ALT FLD-CCNC: 12 U/L — SIGNIFICANT CHANGE UP (ref 4–33)
ANION GAP SERPL CALC-SCNC: 8 MMOL/L — SIGNIFICANT CHANGE UP (ref 7–14)
APTT BLD: 37.8 SEC — HIGH (ref 27–36.3)
AST SERPL-CCNC: 19 U/L — SIGNIFICANT CHANGE UP (ref 4–32)
BASOPHILS # BLD AUTO: 0.04 K/UL — SIGNIFICANT CHANGE UP (ref 0–0.2)
BASOPHILS NFR BLD AUTO: 1.3 % — SIGNIFICANT CHANGE UP (ref 0–2)
BILIRUB SERPL-MCNC: 0.5 MG/DL — SIGNIFICANT CHANGE UP (ref 0.2–1.2)
BUN SERPL-MCNC: 11 MG/DL — SIGNIFICANT CHANGE UP (ref 7–23)
CALCIUM SERPL-MCNC: 9.7 MG/DL — SIGNIFICANT CHANGE UP (ref 8.4–10.5)
CHLORIDE SERPL-SCNC: 103 MMOL/L — SIGNIFICANT CHANGE UP (ref 98–107)
CO2 SERPL-SCNC: 26 MMOL/L — SIGNIFICANT CHANGE UP (ref 22–31)
CREAT SERPL-MCNC: 0.74 MG/DL — SIGNIFICANT CHANGE UP (ref 0.5–1.3)
D DIMER BLD IA.RAPID-MCNC: 172 NG/ML DDU — SIGNIFICANT CHANGE UP
EGFR: 86 ML/MIN/1.73M2 — SIGNIFICANT CHANGE UP
EOSINOPHIL # BLD AUTO: 0.14 K/UL — SIGNIFICANT CHANGE UP (ref 0–0.5)
EOSINOPHIL NFR BLD AUTO: 4.7 % — SIGNIFICANT CHANGE UP (ref 0–6)
GLUCOSE SERPL-MCNC: 88 MG/DL — SIGNIFICANT CHANGE UP (ref 70–99)
HCT VFR BLD CALC: 31.2 % — LOW (ref 34.5–45)
HGB BLD-MCNC: 9.7 G/DL — LOW (ref 11.5–15.5)
IANC: 1.2 K/UL — LOW (ref 1.8–7.4)
IMM GRANULOCYTES NFR BLD AUTO: 0.7 % — SIGNIFICANT CHANGE UP (ref 0–0.9)
INR BLD: 1.3 RATIO — HIGH (ref 0.88–1.16)
LYMPHOCYTES # BLD AUTO: 1.26 K/UL — SIGNIFICANT CHANGE UP (ref 1–3.3)
LYMPHOCYTES # BLD AUTO: 42.1 % — SIGNIFICANT CHANGE UP (ref 13–44)
MAGNESIUM SERPL-MCNC: 1.7 MG/DL — SIGNIFICANT CHANGE UP (ref 1.6–2.6)
MCHC RBC-ENTMCNC: 27.6 PG — SIGNIFICANT CHANGE UP (ref 27–34)
MCHC RBC-ENTMCNC: 31.1 GM/DL — LOW (ref 32–36)
MCV RBC AUTO: 88.9 FL — SIGNIFICANT CHANGE UP (ref 80–100)
MONOCYTES # BLD AUTO: 0.33 K/UL — SIGNIFICANT CHANGE UP (ref 0–0.9)
MONOCYTES NFR BLD AUTO: 11 % — SIGNIFICANT CHANGE UP (ref 2–14)
NEUTROPHILS # BLD AUTO: 1.2 K/UL — LOW (ref 1.8–7.4)
NEUTROPHILS NFR BLD AUTO: 40.2 % — LOW (ref 43–77)
NRBC # BLD: 0 /100 WBCS — SIGNIFICANT CHANGE UP (ref 0–0)
NRBC # FLD: 0 K/UL — SIGNIFICANT CHANGE UP (ref 0–0)
PHOSPHATE SERPL-MCNC: 2.7 MG/DL — SIGNIFICANT CHANGE UP (ref 2.5–4.5)
PLATELET # BLD AUTO: 289 K/UL — SIGNIFICANT CHANGE UP (ref 150–400)
POTASSIUM SERPL-MCNC: 3.5 MMOL/L — SIGNIFICANT CHANGE UP (ref 3.5–5.3)
POTASSIUM SERPL-SCNC: 3.5 MMOL/L — SIGNIFICANT CHANGE UP (ref 3.5–5.3)
PROT SERPL-MCNC: 7 G/DL — SIGNIFICANT CHANGE UP (ref 6–8.3)
PROTHROM AB SERPL-ACNC: 15.1 SEC — HIGH (ref 10.5–13.4)
RBC # BLD: 3.51 M/UL — LOW (ref 3.8–5.2)
RBC # FLD: 16.7 % — HIGH (ref 10.3–14.5)
SODIUM SERPL-SCNC: 137 MMOL/L — SIGNIFICANT CHANGE UP (ref 135–145)
WBC # BLD: 2.99 K/UL — LOW (ref 3.8–10.5)
WBC # FLD AUTO: 2.99 K/UL — LOW (ref 3.8–10.5)

## 2023-01-28 PROCEDURE — 99232 SBSQ HOSP IP/OBS MODERATE 35: CPT

## 2023-01-28 RX ADMIN — PANTOPRAZOLE SODIUM 40 MILLIGRAM(S): 20 TABLET, DELAYED RELEASE ORAL at 06:04

## 2023-01-28 RX ADMIN — Medication 1 APPLICATION(S): at 11:12

## 2023-01-28 RX ADMIN — LIDOCAINE 1 PATCH: 4 CREAM TOPICAL at 23:00

## 2023-01-28 RX ADMIN — ENOXAPARIN SODIUM 100 MILLIGRAM(S): 100 INJECTION SUBCUTANEOUS at 17:35

## 2023-01-28 RX ADMIN — LIDOCAINE 1 APPLICATION(S): 4 CREAM TOPICAL at 11:48

## 2023-01-28 RX ADMIN — LIDOCAINE 1 PATCH: 4 CREAM TOPICAL at 18:58

## 2023-01-28 RX ADMIN — LIDOCAINE 1 PATCH: 4 CREAM TOPICAL at 11:11

## 2023-01-28 RX ADMIN — ENOXAPARIN SODIUM 100 MILLIGRAM(S): 100 INJECTION SUBCUTANEOUS at 06:05

## 2023-01-28 RX ADMIN — PANTOPRAZOLE SODIUM 40 MILLIGRAM(S): 20 TABLET, DELAYED RELEASE ORAL at 17:35

## 2023-01-28 RX ADMIN — Medication 3 MILLIGRAM(S): at 21:37

## 2023-01-28 NOTE — PROGRESS NOTE ADULT - PROBLEM SELECTOR PLAN 3
- Patient with UA positive for > 720 RBCs obtained from bottle from external urinary catheter  - Pelvic exam performed showing blood from vaginal canal.  - UA negative for significant RBCs after straight catheterization, indicating origin of blood in bottle is likely vaginal/uterine in nature  - Patient with hyperplasia vs malignancy of endometrium on AC, likely source of bleeding.  - Hgb 9.6, stable given previous hgb from previous 2 days.  - CTM

## 2023-01-28 NOTE — PROGRESS NOTE ADULT - PROBLEM SELECTOR PLAN 2
Asymptomatic, patient tested positive 1/26/23  - Will CTM  - Was going to bridge to warfarin from lovenox  - will continue with lovenox at this time.

## 2023-01-28 NOTE — PROGRESS NOTE ADULT - SUBJECTIVE AND OBJECTIVE BOX
Patient is a 72y old  Female who presents with a chief complaint of Acute renal failure     (2023 15:23)      SUBJECTIVE / OVERNIGHT EVENTS: Patient seen and examined at bedside.    MEDICATIONS  (STANDING):  enoxaparin Injectable 100 milliGRAM(s) SubCutaneous every 12 hours  hydrocortisone 2.5% Cream 1 Application(s) Topical daily  lidocaine   4% Patch 1 Patch Transdermal daily  lidocaine   4% Patch 1 Patch Transdermal every 24 hours  lidocaine 2% Gel 1 Application(s) Topical two times a day  melatonin 3 milliGRAM(s) Oral at bedtime  pantoprazole    Tablet 40 milliGRAM(s) Oral two times a day  senna 2 Tablet(s) Oral at bedtime    MEDICATIONS  (PRN):  acetaminophen     Tablet .. 650 milliGRAM(s) Oral every 6 hours PRN Moderate Pain (4 - 6)      Vital Signs Last 24 Hrs  T(C): 36.5 (2023 05:55), Max: 37 (2023 21:48)  T(F): 97.7 (2023 05:55), Max: 98.6 (2023 21:48)  HR: 77 (2023 05:55) (76 - 88)  BP: 147/74 (2023 05:55) (127/64 - 147/74)  BP(mean): --  RR: 17 (2023 05:55) (17 - 18)  SpO2: 94% (2023 05:55) (94% - 100%)    Parameters below as of 2023 05:55  Patient On (Oxygen Delivery Method): room air      CAPILLARY BLOOD GLUCOSE        I&O's Summary      PHYSICAL EXAM:      LABS:                        9.6    3.07  )-----------( 334      ( 2023 06:00 )             31.1         136  |  101  |  12  ----------------------------<  91  3.8   |  26  |  0.77    Ca    9.5      2023 06:00    TPro  7.3  /  Alb  2.8<L>  /  TBili  0.6  /  DBili  x   /  AST  27  /  ALT  7   /  AlkPhos  139<H>      PT/INR - ( 2023 06:00 )   PT: 14.7 sec;   INR: 1.26 ratio         PTT - ( 2023 06:00 )  PTT:37.4 sec      Urinalysis Basic - ( 2023 08:30 )    Color: Yellow / Appearance: Clear / S.017 / pH: x  Gluc: x / Ketone: Negative  / Bili: Negative / Urobili: <2 mg/dL   Blood: x / Protein: Trace / Nitrite: Negative   Leuk Esterase: Negative / RBC: 2 /HPF / WBC 2 /HPF   Sq Epi: x / Non Sq Epi: 2 /HPF / Bacteria: Negative        RADIOLOGY & ADDITIONAL TESTS:    Imaging Personally Reviewed:    Consultant(s) Notes Reviewed:      Care Discussed with Consultants/Other Providers:    Staci Borges, PGY-2; [facility] Pager: [phone]; [facility] Pager: [phone]   Patient is a 72y old  Female who presents with a chief complaint of Acute renal failure     (2023 15:23)      SUBJECTIVE / OVERNIGHT EVENTS: Patient seen and examined at bedside. Pt has no acute complaints or concerns She denies cough, SOB, abdominal pain, chest pain, or hematuria     MEDICATIONS  (STANDING):  enoxaparin Injectable 100 milliGRAM(s) SubCutaneous every 12 hours  hydrocortisone 2.5% Cream 1 Application(s) Topical daily  lidocaine   4% Patch 1 Patch Transdermal daily  lidocaine   4% Patch 1 Patch Transdermal every 24 hours  lidocaine 2% Gel 1 Application(s) Topical two times a day  melatonin 3 milliGRAM(s) Oral at bedtime  pantoprazole    Tablet 40 milliGRAM(s) Oral two times a day  senna 2 Tablet(s) Oral at bedtime    MEDICATIONS  (PRN):  acetaminophen     Tablet .. 650 milliGRAM(s) Oral every 6 hours PRN Moderate Pain (4 - 6)      Vital Signs Last 24 Hrs  T(C): 36.5 (2023 05:55), Max: 37 (2023 21:48)  T(F): 97.7 (2023 05:55), Max: 98.6 (2023 21:48)  HR: 77 (2023 05:55) (76 - 88)  BP: 147/74 (2023 05:55) (127/64 - 147/74)  BP(mean): --  RR: 17 (2023 05:55) (17 - 18)  SpO2: 94% (2023 05:55) (94% - 100%)    Parameters below as of 2023 05:55  Patient On (Oxygen Delivery Method): room air      CAPILLARY BLOOD GLUCOSE        I&O's Summary      PHYSICAL EXAM:  GENERAL APPEARANCE: Well developed, NAD  HEENT:  PERRL, EOMI. hearing grossly intact.  CARDIAC: Normal S1 and S2. no mrg. RRR  LUNGS: Clear to auscultation B/L, no rales, rhonchi, or wheezing  ABDOMEN: Soft , NTND, bowel sounds normal. No guarding or rebound.   MUSCULOSKELETAL: ROM intact.   EXTREMITIES: No edema. Peripheral pulses intact.   NEUROLOGICAL: Non focal. Strength and sensation symmetric and intact throughout.   SKIN: Warm and dry , Well perfused  PSYCHIATRIC: AOx3 , Normal mood and affect      LABS:                        9.6    3.07  )-----------( 334      ( 2023 06:00 )             31.1         136  |  101  |  12  ----------------------------<  91  3.8   |  26  |  0.77    Ca    9.5      2023 06:00    TPro  7.3  /  Alb  2.8<L>  /  TBili  0.6  /  DBili  x   /  AST    /  ALT  7   /  AlkPhos  139<H>      PT/INR - ( 2023 06:00 )   PT: 14.7 sec;   INR: 1.26 ratio         PTT - ( 2023 06:00 )  PTT:37.4 sec      Urinalysis Basic - ( 2023 08:30 )    Color: Yellow / Appearance: Clear / S.017 / pH: x  Gluc: x / Ketone: Negative  / Bili: Negative / Urobili: <2 mg/dL   Blood: x / Protein: Trace / Nitrite: Negative   Leuk Esterase: Negative / RBC: 2 /HPF / WBC 2 /HPF   Sq Epi: x / Non Sq Epi: 2 /HPF / Bacteria: Negative        RADIOLOGY & ADDITIONAL TESTS:    Imaging Personally Reviewed:    Consultant(s) Notes Reviewed:      Care Discussed with Consultants/Other Providers:    Staci Borges, PGY-2; [facility] Pager: [phone]; [facility] Pager: [phone]

## 2023-01-28 NOTE — PROGRESS NOTE ADULT - ATTENDING COMMENTS
72F w/ psoriatic arthritis (apremilast), HTN (lisinopril/chlorthalidone) presenting w/ generalized weakness and found to have multiple metabolic derangements including JAZMYNE, hypokalemia, hyponatremia, transaminitis, HAGMA and sepsis with CT w/ acute uncomplicated diverticulitis c/b IMV thrombosis and thrombophlebitis now on lovenox. Course complicated by nosocomial COVID, currently remains asymptomatic.    Ongoing trace hematuria (lamberto colored), bleeding is coming from vagina which is normal s/p endometrial biopsy. Pt in NAD, was being cleaned up, had en episode of stool incontinence.    # Thrombosis of portal vein, SMV, and IMV thromboses - Vascular cardiology consult appreciated. Continue with therapeutic Lovenox for now given COVID diagnosis can transition to coumadin in [facility] should she desire.   # COVID-19 - currently remains asymptomatic, will resend PCR for confirmation   # Endometrial thickening- Uterus noted to thickened endometrium, s/p endometrial biopsy on 1/23. now with some post procedure bleeding. f/u OP with GYN for path.    # RLE pain - likely dactylitis in setting of known psoriatic arthritis, rheum consult appreciated. MRI knee as outpatient with open-MRI.   # Psoriatic arthritis - holding otezla in setting of COVID    Dispo: accepted to [facility], pending placement.

## 2023-01-29 LAB
ALBUMIN SERPL ELPH-MCNC: 2.9 G/DL — LOW (ref 3.3–5)
ALP SERPL-CCNC: 129 U/L — HIGH (ref 40–120)
ALT FLD-CCNC: 7 U/L — SIGNIFICANT CHANGE UP (ref 4–33)
ANION GAP SERPL CALC-SCNC: 9 MMOL/L — SIGNIFICANT CHANGE UP (ref 7–14)
APTT BLD: 33.4 SEC — SIGNIFICANT CHANGE UP (ref 27–36.3)
AST SERPL-CCNC: 21 U/L — SIGNIFICANT CHANGE UP (ref 4–32)
BASOPHILS # BLD AUTO: 0.08 K/UL — SIGNIFICANT CHANGE UP (ref 0–0.2)
BASOPHILS NFR BLD AUTO: 2.5 % — HIGH (ref 0–2)
BILIRUB SERPL-MCNC: 0.6 MG/DL — SIGNIFICANT CHANGE UP (ref 0.2–1.2)
BLD GP AB SCN SERPL QL: NEGATIVE — SIGNIFICANT CHANGE UP
BUN SERPL-MCNC: 10 MG/DL — SIGNIFICANT CHANGE UP (ref 7–23)
CALCIUM SERPL-MCNC: 9.9 MG/DL — SIGNIFICANT CHANGE UP (ref 8.4–10.5)
CHLORIDE SERPL-SCNC: 100 MMOL/L — SIGNIFICANT CHANGE UP (ref 98–107)
CO2 SERPL-SCNC: 25 MMOL/L — SIGNIFICANT CHANGE UP (ref 22–31)
CREAT SERPL-MCNC: 0.81 MG/DL — SIGNIFICANT CHANGE UP (ref 0.5–1.3)
EGFR: 78 ML/MIN/1.73M2 — SIGNIFICANT CHANGE UP
EOSINOPHIL # BLD AUTO: 0.09 K/UL — SIGNIFICANT CHANGE UP (ref 0–0.5)
EOSINOPHIL NFR BLD AUTO: 2.8 % — SIGNIFICANT CHANGE UP (ref 0–6)
GLUCOSE SERPL-MCNC: 88 MG/DL — SIGNIFICANT CHANGE UP (ref 70–99)
HCT VFR BLD CALC: 32.3 % — LOW (ref 34.5–45)
HGB BLD-MCNC: 9.8 G/DL — LOW (ref 11.5–15.5)
IANC: 1.29 K/UL — LOW (ref 1.8–7.4)
IMM GRANULOCYTES NFR BLD AUTO: 0.6 % — SIGNIFICANT CHANGE UP (ref 0–0.9)
INR BLD: 1.23 RATIO — HIGH (ref 0.88–1.16)
LYMPHOCYTES # BLD AUTO: 1.29 K/UL — SIGNIFICANT CHANGE UP (ref 1–3.3)
LYMPHOCYTES # BLD AUTO: 39.8 % — SIGNIFICANT CHANGE UP (ref 13–44)
MAGNESIUM SERPL-MCNC: 1.8 MG/DL — SIGNIFICANT CHANGE UP (ref 1.6–2.6)
MCHC RBC-ENTMCNC: 26.4 PG — LOW (ref 27–34)
MCHC RBC-ENTMCNC: 30.3 GM/DL — LOW (ref 32–36)
MCV RBC AUTO: 87.1 FL — SIGNIFICANT CHANGE UP (ref 80–100)
MONOCYTES # BLD AUTO: 0.47 K/UL — SIGNIFICANT CHANGE UP (ref 0–0.9)
MONOCYTES NFR BLD AUTO: 14.5 % — HIGH (ref 2–14)
NEUTROPHILS # BLD AUTO: 1.29 K/UL — LOW (ref 1.8–7.4)
NEUTROPHILS NFR BLD AUTO: 39.8 % — LOW (ref 43–77)
NRBC # BLD: 0 /100 WBCS — SIGNIFICANT CHANGE UP (ref 0–0)
NRBC # FLD: 0 K/UL — SIGNIFICANT CHANGE UP (ref 0–0)
PHOSPHATE SERPL-MCNC: 2.8 MG/DL — SIGNIFICANT CHANGE UP (ref 2.5–4.5)
PLATELET # BLD AUTO: 299 K/UL — SIGNIFICANT CHANGE UP (ref 150–400)
POTASSIUM SERPL-MCNC: 3.8 MMOL/L — SIGNIFICANT CHANGE UP (ref 3.5–5.3)
POTASSIUM SERPL-SCNC: 3.8 MMOL/L — SIGNIFICANT CHANGE UP (ref 3.5–5.3)
PROT SERPL-MCNC: 7.4 G/DL — SIGNIFICANT CHANGE UP (ref 6–8.3)
PROTHROM AB SERPL-ACNC: 14.3 SEC — HIGH (ref 10.5–13.4)
RBC # BLD: 3.71 M/UL — LOW (ref 3.8–5.2)
RBC # FLD: 16.6 % — HIGH (ref 10.3–14.5)
RH IG SCN BLD-IMP: POSITIVE — SIGNIFICANT CHANGE UP
SODIUM SERPL-SCNC: 134 MMOL/L — LOW (ref 135–145)
WBC # BLD: 3.24 K/UL — LOW (ref 3.8–10.5)
WBC # FLD AUTO: 3.24 K/UL — LOW (ref 3.8–10.5)

## 2023-01-29 PROCEDURE — 99232 SBSQ HOSP IP/OBS MODERATE 35: CPT

## 2023-01-29 RX ADMIN — PANTOPRAZOLE SODIUM 40 MILLIGRAM(S): 20 TABLET, DELAYED RELEASE ORAL at 17:59

## 2023-01-29 RX ADMIN — ENOXAPARIN SODIUM 100 MILLIGRAM(S): 100 INJECTION SUBCUTANEOUS at 17:59

## 2023-01-29 RX ADMIN — LIDOCAINE 1 APPLICATION(S): 4 CREAM TOPICAL at 12:18

## 2023-01-29 RX ADMIN — Medication 1 APPLICATION(S): at 12:17

## 2023-01-29 RX ADMIN — PANTOPRAZOLE SODIUM 40 MILLIGRAM(S): 20 TABLET, DELAYED RELEASE ORAL at 06:02

## 2023-01-29 RX ADMIN — Medication 650 MILLIGRAM(S): at 04:39

## 2023-01-29 RX ADMIN — Medication 3 MILLIGRAM(S): at 21:58

## 2023-01-29 RX ADMIN — Medication 650 MILLIGRAM(S): at 05:39

## 2023-01-29 RX ADMIN — ENOXAPARIN SODIUM 100 MILLIGRAM(S): 100 INJECTION SUBCUTANEOUS at 06:03

## 2023-01-29 NOTE — PROGRESS NOTE ADULT - PROBLEM SELECTOR PLAN 9
- Pt taking Otezla. Last dose 2 prior to admission  - Would keep other autoimmune condition on differential given elevated ESR, CRP  - restarting Otezla. - Pt taking Otezla. Last dose 2 prior to admission  - Would keep other autoimmune condition on differential given elevated ESR, CRP  - Holding iso COVID

## 2023-01-29 NOTE — PROGRESS NOTE ADULT - PROBLEM SELECTOR PLAN 3
- Patient with UA positive for > 720 RBCs obtained from bottle from external urinary catheter  - Pelvic exam performed showing blood from vaginal canal.  - UA negative for significant RBCs after straight catheterization, indicating origin of blood in bottle is likely vaginal/uterine in nature  - Patient with hyperplasia vs malignancy of endometrium on AC, likely source of bleeding.  - Hgb 9.6, stable given previous hgb from previous 2 days.  - CTM - Patient with UA positive for > 720 RBCs obtained from bottle from external urinary catheter  - Pelvic exam performed showing blood from vaginal canal.  - UA negative for significant RBCs after straight catheterization, indicating origin of blood in bottle is likely vaginal/uterine in nature  - Patient with hyperplasia vs malignancy of endometrium on AC, likely source of bleeding.  - Hgb 9.6, stable  - Urine in collection bottle not grossly bloody.

## 2023-01-29 NOTE — PROGRESS NOTE ADULT - PROBLEM SELECTOR PLAN 1
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 Either uterine or colonic. Pt has not had a colonoscopy.  - TVUS with 17mm stripe. GYN wanted to do EMB but patient refused.  - Will need colonoscopy outpatient. Can do hypercoagulable workup if those negative.  - IR with no safe percutaneous window to take a biopsy  - Continue lovenox for now.  - $0 copay, patient will administer lovenox.  - EMB on 1/23, without sufficient sampling.

## 2023-01-29 NOTE — PROGRESS NOTE ADULT - ATTENDING COMMENTS
72F w/ psoriatic arthritis (apremilast), HTN (lisinopril/chlorthalidone) presenting w/ generalized weakness and found to have multiple metabolic derangements including JAZMYNE, hypokalemia, hyponatremia, transaminitis, HAGMA and sepsis with CT w/ acute uncomplicated diverticulitis c/b IMV thrombosis and thrombophlebitis now on lovenox. Course complicated by nosocomial COVID, currently remains asymptomatic.    # Thrombosis of portal vein, SMV, and IMV thromboses - Vascular cardiology consult appreciated. Continue with therapeutic Lovenox for now given COVID diagnosis can transition to coumadin in [facility] should she desire.   # COVID-19 - currently remains asymptomatic, will resend PCR for confirmation   # Endometrial thickening- Uterus noted to thickened endometrium, s/p endometrial biopsy on 1/23 with inconclusive results, needs repeat as per GYN. now with some post procedure bleeding.   # RLE pain - likely dactylitis in setting of known psoriatic arthritis, rheum consult appreciated. MRI knee as outpatient with open-MRI.   # Psoriatic arthritis - holding otezla in setting of COVID    Dispo: accepted to [facility], pending placement.

## 2023-01-29 NOTE — PROGRESS NOTE ADULT - SUBJECTIVE AND OBJECTIVE BOX
PROGRESS NOTE:   Authored by J Luis Smith MD   Patient is a 72y old  Female who presents with a chief complaint of Acute renal failure     (2023 15:23)      SUBJECTIVE / OVERNIGHT EVENTS:  No acute events overnight.     ADDITIONAL REVIEW OF SYSTEMS:    MEDICATIONS  (STANDING):  enoxaparin Injectable 100 milliGRAM(s) SubCutaneous every 12 hours  hydrocortisone 2.5% Cream 1 Application(s) Topical daily  lidocaine   4% Patch 1 Patch Transdermal every 24 hours  lidocaine   4% Patch 1 Patch Transdermal daily  lidocaine 2% Gel 1 Application(s) Topical two times a day  melatonin 3 milliGRAM(s) Oral at bedtime  pantoprazole    Tablet 40 milliGRAM(s) Oral two times a day  senna 2 Tablet(s) Oral at bedtime    MEDICATIONS  (PRN):  acetaminophen     Tablet .. 650 milliGRAM(s) Oral every 6 hours PRN Moderate Pain (4 - 6)      CAPILLARY BLOOD GLUCOSE        I&O's Summary      PHYSICAL EXAM:  Vital Signs Last 24 Hrs  T(C): 36.7 (2023 06:01), Max: 36.9 (2023 20:50)  T(F): 98 (2023 06:01), Max: 98.4 (2023 20:50)  HR: 76 (2023 06:01) (76 - 94)  BP: 141/78 (2023 06:01) (137/70 - 141/78)  BP(mean): --  RR: 18 (2023 06:01) (18 - 18)  SpO2: 94% (2023 06:01) (94% - 96%)    Parameters below as of 2023 06:01  Patient On (Oxygen Delivery Method): room air        GENERAL APPEARANCE: Well developed, NAD  HEENT:  PERRL, EOMI. hearing grossly intact.  CARDIAC: Normal S1 and S2. no mrg. RRR  LUNGS: Clear to auscultation B/L, no rales, rhonchi, or wheezing  ABDOMEN: Soft , NTND, bowel sounds normal. No guarding or rebound.   MUSCULOSKELETAL: ROM intact.   EXTREMITIES: No edema. Peripheral pulses intact.   NEUROLOGICAL: Non focal. Strength and sensation symmetric and intact throughout.   SKIN: Warm and dry , Well perfused  PSYCHIATRIC: AOx3 , Normal mood and affect      LABS:                        9.7    2.99  )-----------( 289      ( 2023 07:24 )             31.2         137  |  103  |  11  ----------------------------<  88  3.5   |  26  |  0.74    Ca    9.7      2023 07:24  Phos  2.7       Mg     1.70         TPro  7.0  /  Alb  2.6<L>  /  TBili  0.5  /  DBili  x   /  AST  19  /  ALT  12  /  AlkPhos  128<H>      PT/INR - ( 2023 07:24 )   PT: 15.1 sec;   INR: 1.30 ratio         PTT - ( 2023 07:24 )  PTT:37.8 sec      Urinalysis Basic - ( 2023 08:30 )    Color: Yellow / Appearance: Clear / S.017 / pH: x  Gluc: x / Ketone: Negative  / Bili: Negative / Urobili: <2 mg/dL   Blood: x / Protein: Trace / Nitrite: Negative   Leuk Esterase: Negative / RBC: 2 /HPF / WBC 2 /HPF   Sq Epi: x / Non Sq Epi: 2 /HPF / Bacteria: Negative          RADIOLOGY & ADDITIONAL TESTS:  Lab Results Reviewed   Imaging Reviewed  Electrocardiogram Reviewed   PROGRESS NOTE:   Authored by J Luis Smith MD   Patient is a 72y old  Female who presents with a chief complaint of Acute renal failure     (2023 15:23)      SUBJECTIVE / OVERNIGHT EVENTS:  No acute events overnight.    Urine appearing orange with slight pink tinge, far improved from .     No abdominal pain, nausea, vomiting, dyspnea, chest pain, rhinorrhea, sore throat, or cough.  ADDITIONAL REVIEW OF SYSTEMS:    MEDICATIONS  (STANDING):  enoxaparin Injectable 100 milliGRAM(s) SubCutaneous every 12 hours  hydrocortisone 2.5% Cream 1 Application(s) Topical daily  lidocaine   4% Patch 1 Patch Transdermal every 24 hours  lidocaine   4% Patch 1 Patch Transdermal daily  lidocaine 2% Gel 1 Application(s) Topical two times a day  melatonin 3 milliGRAM(s) Oral at bedtime  pantoprazole    Tablet 40 milliGRAM(s) Oral two times a day  senna 2 Tablet(s) Oral at bedtime    MEDICATIONS  (PRN):  acetaminophen     Tablet .. 650 milliGRAM(s) Oral every 6 hours PRN Moderate Pain (4 - 6)      CAPILLARY BLOOD GLUCOSE        I&O's Summary      PHYSICAL EXAM:  Vital Signs Last 24 Hrs  T(C): 36.7 (2023 06:01), Max: 36.9 (2023 20:50)  T(F): 98 (2023 06:01), Max: 98.4 (2023 20:50)  HR: 76 (2023 06:01) (76 - 94)  BP: 141/78 (2023 06:01) (137/70 - 141/78)  BP(mean): --  RR: 18 (2023 06:01) (18 - 18)  SpO2: 94% (2023 06:01) (94% - 96%)    Parameters below as of 2023 06:01  Patient On (Oxygen Delivery Method): room air        GENERAL APPEARANCE: Well developed, NAD  HEENT:  PERRL, EOMI. hearing grossly intact.  CARDIAC: Normal S1 and S2. no mrg. RRR  LUNGS: Clear to auscultation B/L, no rales, rhonchi, or wheezing  ABDOMEN: Soft , NTND, bowel sounds normal. No guarding or rebound.   MUSCULOSKELETAL: ROM intact.   EXTREMITIES: No edema. Peripheral pulses intact.   NEUROLOGICAL: Non focal. Strength and sensation symmetric and intact throughout.   SKIN: Warm and dry , Well perfused  PSYCHIATRIC: AOx3 , Normal mood and affect      LABS:                        9.7    2.99  )-----------( 289      ( 2023 07:24 )             31.2         137  |  103  |  11  ----------------------------<  88  3.5   |  26  |  0.74    Ca    9.7      2023 07:24  Phos  2.7       Mg     1.70         TPro  7.0  /  Alb  2.6<L>  /  TBili  0.5  /  DBili  x   /  AST  19  /  ALT  12  /  AlkPhos  128<H>      PT/INR - ( 2023 07:24 )   PT: 15.1 sec;   INR: 1.30 ratio         PTT - ( 2023 07:24 )  PTT:37.8 sec      Urinalysis Basic - ( 2023 08:30 )    Color: Yellow / Appearance: Clear / S.017 / pH: x  Gluc: x / Ketone: Negative  / Bili: Negative / Urobili: <2 mg/dL   Blood: x / Protein: Trace / Nitrite: Negative   Leuk Esterase: Negative / RBC: 2 /HPF / WBC 2 /HPF   Sq Epi: x / Non Sq Epi: 2 /HPF / Bacteria: Negative          RADIOLOGY & ADDITIONAL TESTS:  Lab Results Reviewed   Imaging Reviewed  Electrocardiogram Reviewed

## 2023-01-30 ENCOUNTER — NON-APPOINTMENT (OUTPATIENT)
Age: 73
End: 2023-01-30

## 2023-01-30 LAB
ANION GAP SERPL CALC-SCNC: 9 MMOL/L — SIGNIFICANT CHANGE UP (ref 7–14)
BUN SERPL-MCNC: 11 MG/DL — SIGNIFICANT CHANGE UP (ref 7–23)
CALCIUM SERPL-MCNC: 10 MG/DL — SIGNIFICANT CHANGE UP (ref 8.4–10.5)
CHLORIDE SERPL-SCNC: 99 MMOL/L — SIGNIFICANT CHANGE UP (ref 98–107)
CO2 SERPL-SCNC: 26 MMOL/L — SIGNIFICANT CHANGE UP (ref 22–31)
CREAT SERPL-MCNC: 0.79 MG/DL — SIGNIFICANT CHANGE UP (ref 0.5–1.3)
EGFR: 80 ML/MIN/1.73M2 — SIGNIFICANT CHANGE UP
GLUCOSE SERPL-MCNC: 97 MG/DL — SIGNIFICANT CHANGE UP (ref 70–99)
HCT VFR BLD CALC: 32.9 % — LOW (ref 34.5–45)
HGB BLD-MCNC: 10.2 G/DL — LOW (ref 11.5–15.5)
MAGNESIUM SERPL-MCNC: 1.6 MG/DL — SIGNIFICANT CHANGE UP (ref 1.6–2.6)
MCHC RBC-ENTMCNC: 27.3 PG — SIGNIFICANT CHANGE UP (ref 27–34)
MCHC RBC-ENTMCNC: 31 GM/DL — LOW (ref 32–36)
MCV RBC AUTO: 88 FL — SIGNIFICANT CHANGE UP (ref 80–100)
NRBC # BLD: 0 /100 WBCS — SIGNIFICANT CHANGE UP (ref 0–0)
NRBC # FLD: 0 K/UL — SIGNIFICANT CHANGE UP (ref 0–0)
PHOSPHATE SERPL-MCNC: 2.7 MG/DL — SIGNIFICANT CHANGE UP (ref 2.5–4.5)
PLATELET # BLD AUTO: 312 K/UL — SIGNIFICANT CHANGE UP (ref 150–400)
POTASSIUM SERPL-MCNC: 3.9 MMOL/L — SIGNIFICANT CHANGE UP (ref 3.5–5.3)
POTASSIUM SERPL-SCNC: 3.9 MMOL/L — SIGNIFICANT CHANGE UP (ref 3.5–5.3)
RBC # BLD: 3.74 M/UL — LOW (ref 3.8–5.2)
RBC # FLD: 16.8 % — HIGH (ref 10.3–14.5)
SODIUM SERPL-SCNC: 134 MMOL/L — LOW (ref 135–145)
WBC # BLD: 4.28 K/UL — SIGNIFICANT CHANGE UP (ref 3.8–10.5)
WBC # FLD AUTO: 4.28 K/UL — SIGNIFICANT CHANGE UP (ref 3.8–10.5)

## 2023-01-30 PROCEDURE — 99232 SBSQ HOSP IP/OBS MODERATE 35: CPT | Mod: GC

## 2023-01-30 RX ORDER — LIDOCAINE 4 G/100G
1 CREAM TOPICAL ONCE
Refills: 0 | Status: COMPLETED | OUTPATIENT
Start: 2023-01-30 | End: 2023-01-30

## 2023-01-30 RX ORDER — LIDOCAINE 4 G/100G
1 CREAM TOPICAL EVERY 24 HOURS
Refills: 0 | Status: DISCONTINUED | OUTPATIENT
Start: 2023-01-30 | End: 2023-02-01

## 2023-01-30 RX ORDER — LANOLIN ALCOHOL/MO/W.PET/CERES
3 CREAM (GRAM) TOPICAL ONCE
Refills: 0 | Status: COMPLETED | OUTPATIENT
Start: 2023-01-30 | End: 2023-01-30

## 2023-01-30 RX ORDER — LANOLIN ALCOHOL/MO/W.PET/CERES
6 CREAM (GRAM) TOPICAL AT BEDTIME
Refills: 0 | Status: DISCONTINUED | OUTPATIENT
Start: 2023-01-30 | End: 2023-02-01

## 2023-01-30 RX ORDER — IBUPROFEN 200 MG
200 TABLET ORAL ONCE
Refills: 0 | Status: COMPLETED | OUTPATIENT
Start: 2023-01-30 | End: 2023-01-30

## 2023-01-30 RX ADMIN — PANTOPRAZOLE SODIUM 40 MILLIGRAM(S): 20 TABLET, DELAYED RELEASE ORAL at 06:27

## 2023-01-30 RX ADMIN — LIDOCAINE 1 PATCH: 4 CREAM TOPICAL at 17:45

## 2023-01-30 RX ADMIN — LIDOCAINE 1 PATCH: 4 CREAM TOPICAL at 11:46

## 2023-01-30 RX ADMIN — Medication 650 MILLIGRAM(S): at 02:47

## 2023-01-30 RX ADMIN — Medication 1 APPLICATION(S): at 11:45

## 2023-01-30 RX ADMIN — Medication 6 MILLIGRAM(S): at 23:57

## 2023-01-30 RX ADMIN — LIDOCAINE 1 PATCH: 4 CREAM TOPICAL at 11:54

## 2023-01-30 RX ADMIN — Medication 650 MILLIGRAM(S): at 03:47

## 2023-01-30 RX ADMIN — ENOXAPARIN SODIUM 100 MILLIGRAM(S): 100 INJECTION SUBCUTANEOUS at 17:33

## 2023-01-30 RX ADMIN — PANTOPRAZOLE SODIUM 40 MILLIGRAM(S): 20 TABLET, DELAYED RELEASE ORAL at 17:34

## 2023-01-30 RX ADMIN — LIDOCAINE 1 PATCH: 4 CREAM TOPICAL at 07:55

## 2023-01-30 RX ADMIN — LIDOCAINE 1 PATCH: 4 CREAM TOPICAL at 18:03

## 2023-01-30 RX ADMIN — Medication 3 MILLIGRAM(S): at 02:47

## 2023-01-30 RX ADMIN — Medication 200 MILLIGRAM(S): at 11:24

## 2023-01-30 RX ADMIN — Medication 200 MILLIGRAM(S): at 10:06

## 2023-01-30 RX ADMIN — LIDOCAINE 1 PATCH: 4 CREAM TOPICAL at 06:27

## 2023-01-30 RX ADMIN — LIDOCAINE 1 APPLICATION(S): 4 CREAM TOPICAL at 17:35

## 2023-01-30 RX ADMIN — Medication 650 MILLIGRAM(S): at 23:18

## 2023-01-30 RX ADMIN — SENNA PLUS 2 TABLET(S): 8.6 TABLET ORAL at 22:46

## 2023-01-30 RX ADMIN — LIDOCAINE 1 PATCH: 4 CREAM TOPICAL at 02:46

## 2023-01-30 RX ADMIN — ENOXAPARIN SODIUM 100 MILLIGRAM(S): 100 INJECTION SUBCUTANEOUS at 06:27

## 2023-01-30 NOTE — PROGRESS NOTE ADULT - SUBJECTIVE AND OBJECTIVE BOX
PROGRESS NOTE:   Authored by J Luis Smith MD   Patient is a 72y old  Female who presents with a chief complaint of Acute renal failure     (20 Jan 2023 15:23)      SUBJECTIVE / OVERNIGHT EVENTS:  No acute events overnight.     ADDITIONAL REVIEW OF SYSTEMS:    MEDICATIONS  (STANDING):  enoxaparin Injectable 100 milliGRAM(s) SubCutaneous every 12 hours  hydrocortisone 2.5% Cream 1 Application(s) Topical daily  lidocaine   4% Patch 1 Patch Transdermal daily  lidocaine   4% Patch 1 Patch Transdermal every 24 hours  lidocaine 2% Gel 1 Application(s) Topical two times a day  pantoprazole    Tablet 40 milliGRAM(s) Oral two times a day  senna 2 Tablet(s) Oral at bedtime    MEDICATIONS  (PRN):  acetaminophen     Tablet .. 650 milliGRAM(s) Oral every 6 hours PRN Moderate Pain (4 - 6)      CAPILLARY BLOOD GLUCOSE        I&O's Summary    29 Jan 2023 07:01  -  30 Jan 2023 07:00  --------------------------------------------------------  IN: 0 mL / OUT: 950 mL / NET: -950 mL        PHYSICAL EXAM:  Vital Signs Last 24 Hrs  T(C): 37.2 (30 Jan 2023 06:20), Max: 37.3 (29 Jan 2023 22:20)  T(F): 98.9 (30 Jan 2023 06:20), Max: 99.1 (29 Jan 2023 22:20)  HR: 89 (30 Jan 2023 06:20) (78 - 94)  BP: 138/80 (30 Jan 2023 06:20) (126/66 - 142/78)  BP(mean): --  RR: 18 (30 Jan 2023 06:20) (17 - 18)  SpO2: 93% (30 Jan 2023 06:20) (93% - 100%)    Parameters below as of 30 Jan 2023 06:20  Patient On (Oxygen Delivery Method): room air        GENERAL APPEARANCE: Well developed, NAD  HEENT:  PERRL, EOMI. hearing grossly intact.  CARDIAC: Normal S1 and S2. no mrg. RRR  LUNGS: Clear to auscultation B/L, no rales, rhonchi, or wheezing  ABDOMEN: Soft , NTND, bowel sounds normal. No guarding or rebound.   MUSCULOSKELETAL: ROM intact.   EXTREMITIES: No edema. Peripheral pulses intact.   NEUROLOGICAL: Non focal. Strength and sensation symmetric and intact throughout.   SKIN: Warm and dry , Well perfused  PSYCHIATRIC: AOx3 , Normal mood and affect    LABS:                        9.8    3.24  )-----------( 299      ( 29 Jan 2023 06:17 )             32.3     01-29    134<L>  |  100  |  10  ----------------------------<  88  3.8   |  25  |  0.81    Ca    9.9      29 Jan 2023 06:17  Phos  2.8     01-29  Mg     1.80     01-29    TPro  7.4  /  Alb  2.9<L>  /  TBili  0.6  /  DBili  x   /  AST  21  /  ALT  7   /  AlkPhos  129<H>  01-29    PT/INR - ( 29 Jan 2023 06:17 )   PT: 14.3 sec;   INR: 1.23 ratio         PTT - ( 29 Jan 2023 06:17 )  PTT:33.4 sec            RADIOLOGY & ADDITIONAL TESTS:  Lab Results Reviewed   Imaging Reviewed  Electrocardiogram Reviewed   PROGRESS NOTE:   Authored by J Luis Smith MD   Patient is a 72y old  Female who presents with a chief complaint of Acute renal failure     (20 Jan 2023 15:23)      SUBJECTIVE / OVERNIGHT EVENTS:  No acute events overnight.     Patient complaining of b/l foot pain this AM.     ADDITIONAL REVIEW OF SYSTEMS:    MEDICATIONS  (STANDING):  enoxaparin Injectable 100 milliGRAM(s) SubCutaneous every 12 hours  hydrocortisone 2.5% Cream 1 Application(s) Topical daily  lidocaine   4% Patch 1 Patch Transdermal daily  lidocaine   4% Patch 1 Patch Transdermal every 24 hours  lidocaine 2% Gel 1 Application(s) Topical two times a day  pantoprazole    Tablet 40 milliGRAM(s) Oral two times a day  senna 2 Tablet(s) Oral at bedtime    MEDICATIONS  (PRN):  acetaminophen     Tablet .. 650 milliGRAM(s) Oral every 6 hours PRN Moderate Pain (4 - 6)      CAPILLARY BLOOD GLUCOSE        I&O's Summary    29 Jan 2023 07:01  -  30 Jan 2023 07:00  --------------------------------------------------------  IN: 0 mL / OUT: 950 mL / NET: -950 mL        PHYSICAL EXAM:  Vital Signs Last 24 Hrs  T(C): 37.2 (30 Jan 2023 06:20), Max: 37.3 (29 Jan 2023 22:20)  T(F): 98.9 (30 Jan 2023 06:20), Max: 99.1 (29 Jan 2023 22:20)  HR: 89 (30 Jan 2023 06:20) (78 - 94)  BP: 138/80 (30 Jan 2023 06:20) (126/66 - 142/78)  BP(mean): --  RR: 18 (30 Jan 2023 06:20) (17 - 18)  SpO2: 93% (30 Jan 2023 06:20) (93% - 100%)    Parameters below as of 30 Jan 2023 06:20  Patient On (Oxygen Delivery Method): room air        GENERAL APPEARANCE: Well developed, NAD  HEENT:  PERRL, EOMI. hearing grossly intact.  CARDIAC: Normal S1 and S2. no mrg. RRR  LUNGS: Clear to auscultation B/L, no rales, rhonchi, or wheezing  ABDOMEN: Soft , NTND, bowel sounds normal. No guarding or rebound.   MUSCULOSKELETAL: ROM intact.   EXTREMITIES: No edema. Peripheral pulses intact.   NEUROLOGICAL: Non focal. Strength and sensation symmetric and intact throughout.   SKIN: Warm and dry , Well perfused  PSYCHIATRIC: AOx3 , Normal mood and affect    LABS:                        9.8    3.24  )-----------( 299      ( 29 Jan 2023 06:17 )             32.3     01-29    134<L>  |  100  |  10  ----------------------------<  88  3.8   |  25  |  0.81    Ca    9.9      29 Jan 2023 06:17  Phos  2.8     01-29  Mg     1.80     01-29    TPro  7.4  /  Alb  2.9<L>  /  TBili  0.6  /  DBili  x   /  AST  21  /  ALT  7   /  AlkPhos  129<H>  01-29    PT/INR - ( 29 Jan 2023 06:17 )   PT: 14.3 sec;   INR: 1.23 ratio         PTT - ( 29 Jan 2023 06:17 )  PTT:33.4 sec            RADIOLOGY & ADDITIONAL TESTS:  Lab Results Reviewed   Imaging Reviewed  Electrocardiogram Reviewed

## 2023-01-30 NOTE — PROGRESS NOTE ADULT - PROBLEM SELECTOR PLAN 9
- Pt taking Otezla. Last dose 2 prior to admission  - Would keep other autoimmune condition on differential given elevated ESR, CRP  - Holding iso COVID

## 2023-01-30 NOTE — CHART NOTE - NSCHARTNOTEFT_GEN_A_CORE
Source: Patient [x ]    Family [ ]     other [x ] chart review    Patient seen for nutrition f/u. 72 year old female with a PMH of hypertension, MEJIA presenting with several days of poor PO intake. Pending STUART per chart.    Patient reports decreased appetite w/ nausea. Noted w/ intakes 0-75% and 1x % per RN flow sheet. Reports drinking half of ensure supplement because it's too heavy for her. Other supplements explored and food preferences reviewed. No chewing/swallowing difficulties reported. Noted w/ +2 L/R ankle + foot edema and no pressure injuries per RN flow sheet.    Diet : Diet, DASH/TLC:   Sodium & Cholesterol Restricted  Supplement Feeding Modality:  Oral  Ensure Plus High Protein Cans or Servings Per Day:  1       Frequency:  Three Times a day (01-23-23 @ 12:32)    Current Weight: Weight (kg): 105 kg (1/26)  115 kg (1/17)  Weight Change: -8.7% weight loss x 9 days. Patient w/ fluid shift changes, which may be reason for weight change, will monitor.    Pertinent Medications: MEDICATIONS  (STANDING):  enoxaparin Injectable 100 milliGRAM(s) SubCutaneous every 12 hours  hydrocortisone 2.5% Cream 1 Application(s) Topical daily  lidocaine   4% Patch 1 Patch Transdermal daily  lidocaine   4% Patch 1 Patch Transdermal every 24 hours  lidocaine 2% Gel 1 Application(s) Topical two times a day  pantoprazole    Tablet 40 milliGRAM(s) Oral two times a day  senna 2 Tablet(s) Oral at bedtime    MEDICATIONS  (PRN):  acetaminophen     Tablet .. 650 milliGRAM(s) Oral every 6 hours PRN Moderate Pain (4 - 6)    Pertinent Labs:  01-30 Na134 mmol/L<L> Glu 97 mg/dL K+ 3.9 mmol/L Cr  0.79 mg/dL BUN 11 mg/dL 01-30 Phos 2.7 mg/dL 01-29 Alb 2.9 g/dL<L>    Estimated Needs:   [x ] no change since previous assessment  [ ] recalculated:     Previous Nutrition Diagnosis: Inadequate oral intake    Nutrition Diagnosis is [ x] ongoing  [ ] resolved [ ] not applicable     Recommend  - continue diet as ordered  - adjust supplement to ensure compact TID (660 kcal, 27 g pro)  - will provide magic cup 1x daily (290 kcal, 9 g pro)  - obtain weekly weight and document PO intake to monitor trend    Monitoring and Evaluation:   [x ] PO intake [x ] Tolerance to diet prescription [x ] weights [x ] follow up per protocol

## 2023-01-30 NOTE — PROGRESS NOTE ADULT - ASSESSMENT
72F with hypertension, MEIJA, psoriatic arthritis presenting with several days of poor PO intake found to be hyponatremic with JAZMYNE, anemia, uremia, and elevated liver enzymes found to have acute diverticulitis and multiple hepatic cysts. F/U CT with contrast showing multiple non-occlusive filling defects. JAZMYNE and LFTs resolved. Persistent alk phos elevation. Now on anti-coagulation. s/p EMB 1/24/23. Pending STUART. Tested COVID positive 1/26/23

## 2023-01-30 NOTE — CHART NOTE - NSCHARTNOTEFT_GEN_A_CORE
otezla being held in setting of covid. Pt planned to go to [facility]. Can follow up with her Rheumatologist Dr Matamoros on discharge.     Rheumatology will sign off. Please Call/TEAMS if any questions    Discussed with Attending Dr. Kristin Harrell DO  Rheumatology Fellow  Pager: [phone]  Available on TEAMS otezla being held in setting of covid. Pt planned to go to [facility]. Can follow up with her Rheumatologist Dr Matamoros on discharge.     Rheumatology will sign off. Please Call/TEAMS if any questions    Discussed with Attending Dr. Kristin Harrell DO  Rheumatology Fellow  Pager: [phone]  Available on TEAMS    Agree with above  Gabi Foster MD

## 2023-01-30 NOTE — PROGRESS NOTE ADULT - PROBLEM SELECTOR PLAN 3
- Patient with UA positive for > 720 RBCs obtained from bottle from external urinary catheter  - Pelvic exam performed showing blood from vaginal canal.  - UA negative for significant RBCs after straight catheterization, indicating origin of blood in bottle is likely vaginal/uterine in nature  - Patient with hyperplasia vs malignancy of endometrium on AC, likely source of bleeding.  - Hgb 9.6, stable  - Urine in collection bottle not grossly bloody.

## 2023-01-30 NOTE — PROGRESS NOTE ADULT - ATTENDING COMMENTS
72F w/ psoriatic arthritis (apremilast), HTN (lisinopril/chlorthalidone) presenting w/ generalized weakness and found to have multiple metabolic derangements including JAZMYNE, hypokalemia, hyponatremia, transaminitis, HAGMA and sepsis with CT w/ acute uncomplicated diverticulitis c/b IMV thrombosis and thrombophlebitis now on lovenox. Course complicated by nosocomial COVID, currently remains asymptomatic.     Seen this morning, reporting hematuria. reassured her that straigh cath was normal and bleeding is coming from vagina which is normal s/p endometrial biopsy.     # Thrombosis of portal vein, SMV, and IMV thromboses - Vascular cardiology consult appreciated. Continue with therapeutic Lovenox for now given COVID diagnosis can transition to coumadin in [facility] should she desire.   # COVID-19 - currently remains asymptomatic, will resend PCR for confirmation   # Endometrial thickening- Uterus noted to thickened endometrium, s/p endometrial biopsy on 1/23 with inconclusive results, repeat as OP per GYN.   # RLE pain - likely dactylitis in setting of known psoriatic arthritis, rheum consult appreciated. MRI knee as outpatient with open-MRI. can use ibuprofen PRN    # Psoriatic arthritis - holding otezla in setting of COVID    Dispo: accepted to [facility], pending placement.

## 2023-01-31 LAB — SARS-COV-2 RNA SPEC QL NAA+PROBE: DETECTED

## 2023-01-31 PROCEDURE — 99232 SBSQ HOSP IP/OBS MODERATE 35: CPT | Mod: GC

## 2023-01-31 RX ADMIN — LIDOCAINE 1 PATCH: 4 CREAM TOPICAL at 11:37

## 2023-01-31 RX ADMIN — ENOXAPARIN SODIUM 100 MILLIGRAM(S): 100 INJECTION SUBCUTANEOUS at 18:02

## 2023-01-31 RX ADMIN — LIDOCAINE 1 PATCH: 4 CREAM TOPICAL at 11:35

## 2023-01-31 RX ADMIN — Medication 600 MILLIGRAM(S): at 07:26

## 2023-01-31 RX ADMIN — LIDOCAINE 1 PATCH: 4 CREAM TOPICAL at 23:09

## 2023-01-31 RX ADMIN — LIDOCAINE 1 PATCH: 4 CREAM TOPICAL at 19:09

## 2023-01-31 RX ADMIN — Medication 650 MILLIGRAM(S): at 21:36

## 2023-01-31 RX ADMIN — LIDOCAINE 1 PATCH: 4 CREAM TOPICAL at 00:02

## 2023-01-31 RX ADMIN — LIDOCAINE 1 PATCH: 4 CREAM TOPICAL at 08:54

## 2023-01-31 RX ADMIN — LIDOCAINE 1 APPLICATION(S): 4 CREAM TOPICAL at 07:35

## 2023-01-31 RX ADMIN — LIDOCAINE 1 APPLICATION(S): 4 CREAM TOPICAL at 18:03

## 2023-01-31 RX ADMIN — Medication 1 APPLICATION(S): at 11:38

## 2023-01-31 RX ADMIN — Medication 650 MILLIGRAM(S): at 00:00

## 2023-01-31 RX ADMIN — Medication 6 MILLIGRAM(S): at 21:30

## 2023-01-31 RX ADMIN — ENOXAPARIN SODIUM 100 MILLIGRAM(S): 100 INJECTION SUBCUTANEOUS at 07:25

## 2023-01-31 RX ADMIN — Medication 650 MILLIGRAM(S): at 22:36

## 2023-01-31 RX ADMIN — Medication 600 MILLIGRAM(S): at 18:06

## 2023-01-31 RX ADMIN — PANTOPRAZOLE SODIUM 40 MILLIGRAM(S): 20 TABLET, DELAYED RELEASE ORAL at 07:26

## 2023-01-31 RX ADMIN — PANTOPRAZOLE SODIUM 40 MILLIGRAM(S): 20 TABLET, DELAYED RELEASE ORAL at 18:02

## 2023-01-31 RX ADMIN — LIDOCAINE 1 PATCH: 4 CREAM TOPICAL at 02:02

## 2023-01-31 RX ADMIN — SENNA PLUS 2 TABLET(S): 8.6 TABLET ORAL at 21:30

## 2023-01-31 NOTE — PROGRESS NOTE ADULT - SUBJECTIVE AND OBJECTIVE BOX
PROGRESS NOTE:   Authored by J Luis Smith MD   Patient is a 72y old  Female who presents with a chief complaint of Acute renal failure     (20 Jan 2023 15:23)      SUBJECTIVE / OVERNIGHT EVENTS:  No acute events overnight.     ADDITIONAL REVIEW OF SYSTEMS:    MEDICATIONS  (STANDING):  enoxaparin Injectable 100 milliGRAM(s) SubCutaneous every 12 hours  guaiFENesin  milliGRAM(s) Oral every 12 hours  hydrocortisone 2.5% Cream 1 Application(s) Topical daily  lidocaine   4% Patch 1 Patch Transdermal daily  lidocaine   4% Patch 1 Patch Transdermal every 24 hours  lidocaine 2% Gel 1 Application(s) Topical two times a day  melatonin 6 milliGRAM(s) Oral at bedtime  pantoprazole    Tablet 40 milliGRAM(s) Oral two times a day  senna 2 Tablet(s) Oral at bedtime    MEDICATIONS  (PRN):  acetaminophen     Tablet .. 650 milliGRAM(s) Oral every 6 hours PRN Moderate Pain (4 - 6)      CAPILLARY BLOOD GLUCOSE        I&O's Summary      PHYSICAL EXAM:  Vital Signs Last 24 Hrs  T(C): 36.5 (31 Jan 2023 06:54), Max: 37.3 (30 Jan 2023 12:34)  T(F): 97.7 (31 Jan 2023 06:54), Max: 99.2 (30 Jan 2023 22:15)  HR: 87 (31 Jan 2023 06:54) (86 - 95)  BP: 127/72 (31 Jan 2023 06:54) (127/72 - 134/72)  BP(mean): --  RR: 19 (31 Jan 2023 06:54) (19 - 19)  SpO2: 94% (31 Jan 2023 06:54) (94% - 95%)    Parameters below as of 31 Jan 2023 06:54  Patient On (Oxygen Delivery Method): room air      GENERAL APPEARANCE: Well developed, NAD  HEENT:  PERRL, EOMI. hearing grossly intact.  CARDIAC: Normal S1 and S2. no mrg. RRR  LUNGS: Clear to auscultation B/L, no rales, rhonchi, or wheezing  ABDOMEN: Soft , NTND, bowel sounds normal. No guarding or rebound.   MUSCULOSKELETAL: ROM intact.   EXTREMITIES: No edema. Peripheral pulses intact.   NEUROLOGICAL: Non focal. Strength and sensation symmetric and intact throughout.   SKIN: Warm and dry , Well perfused  PSYCHIATRIC: AOx3 , Normal mood and affect      LABS:                        10.2   4.28  )-----------( 312      ( 30 Jan 2023 06:56 )             32.9     01-30    134<L>  |  99  |  11  ----------------------------<  97  3.9   |  26  |  0.79    Ca    10.0      30 Jan 2023 06:56  Phos  2.7     01-30  Mg     1.60     01-30                  RADIOLOGY & ADDITIONAL TESTS:  Lab Results Reviewed   Imaging Reviewed  Electrocardiogram Reviewed

## 2023-01-31 NOTE — PROGRESS NOTE ADULT - ATTENDING COMMENTS
72F w/ psoriatic arthritis (apremilast), HTN (lisinopril/chlorthalidone) presenting w/ generalized weakness and found to have multiple metabolic derangements including JAZMYNE, hypokalemia, hyponatremia, transaminitis, HAGMA and sepsis with CT w/ acute uncomplicated diverticulitis c/b IMV thrombosis and thrombophlebitis now on lovenox. Course complicated by nosocomial COVID, currently remains asymptomatic.     # Thrombosis of portal vein, SMV, and IMV thromboses - Vascular cardiology consult appreciated. Continue with therapeutic Lovenox for now given COVID diagnosis can transition to coumadin in [facility] should she desire.   # COVID-19 - currently remains asymptomatic, will resend PCR for confirmation   # Endometrial thickening- Uterus noted to thickened endometrium, s/p endometrial biopsy on 1/23 with inconclusive results, repeat as OP per GYN.   # RLE pain - likely dactylitis in setting of known psoriatic arthritis, rheum consult appreciated. MRI knee as outpatient with open-MRI. can use ibuprofen PRN    # Psoriatic arthritis - holding otezla in setting of COVID; f/u OP with Rheum to resume     Dispo: accepted to [facility], pending placement, possible today per SW.

## 2023-02-01 ENCOUNTER — TRANSCRIPTION ENCOUNTER (OUTPATIENT)
Age: 73
End: 2023-02-01

## 2023-02-01 ENCOUNTER — APPOINTMENT (OUTPATIENT)
Dept: INTERNAL MEDICINE | Facility: CLINIC | Age: 73
End: 2023-02-01

## 2023-02-01 VITALS
DIASTOLIC BLOOD PRESSURE: 66 MMHG | OXYGEN SATURATION: 98 % | SYSTOLIC BLOOD PRESSURE: 114 MMHG | HEART RATE: 87 BPM | TEMPERATURE: 100 F | RESPIRATION RATE: 18 BRPM

## 2023-02-01 PROCEDURE — 99239 HOSP IP/OBS DSCHRG MGMT >30: CPT | Mod: GC

## 2023-02-01 RX ORDER — LANOLIN ALCOHOL/MO/W.PET/CERES
2 CREAM (GRAM) TOPICAL
Qty: 0 | Refills: 0 | DISCHARGE
Start: 2023-02-01

## 2023-02-01 RX ORDER — ACETAMINOPHEN 500 MG
2 TABLET ORAL
Qty: 0 | Refills: 0 | DISCHARGE
Start: 2023-02-01

## 2023-02-01 RX ORDER — CHLORTHALIDONE 50 MG
1 TABLET ORAL
Qty: 0 | Refills: 0 | DISCHARGE

## 2023-02-01 RX ORDER — LISINOPRIL 2.5 MG/1
1 TABLET ORAL
Qty: 0 | Refills: 0 | DISCHARGE

## 2023-02-01 RX ORDER — PANTOPRAZOLE SODIUM 20 MG/1
1 TABLET, DELAYED RELEASE ORAL
Qty: 0 | Refills: 0 | DISCHARGE
Start: 2023-02-01

## 2023-02-01 RX ORDER — APREMILAST 10-20-30MG
1 KIT ORAL
Qty: 0 | Refills: 0 | DISCHARGE

## 2023-02-01 RX ORDER — ENOXAPARIN SODIUM 100 MG/ML
100 INJECTION SUBCUTANEOUS
Qty: 0 | Refills: 0 | DISCHARGE
Start: 2023-02-01

## 2023-02-01 RX ORDER — METOPROLOL TARTRATE 50 MG
1 TABLET ORAL
Qty: 0 | Refills: 0 | DISCHARGE

## 2023-02-01 RX ORDER — LIDOCAINE 4 G/100G
1 CREAM TOPICAL
Qty: 0 | Refills: 0 | DISCHARGE
Start: 2023-02-01

## 2023-02-01 RX ORDER — APREMILAST 10-20-30MG
1 KIT ORAL
Qty: 60 | Refills: 0
Start: 2023-02-01 | End: 2023-03-02

## 2023-02-01 RX ADMIN — Medication 600 MILLIGRAM(S): at 05:34

## 2023-02-01 RX ADMIN — ENOXAPARIN SODIUM 100 MILLIGRAM(S): 100 INJECTION SUBCUTANEOUS at 05:34

## 2023-02-01 RX ADMIN — LIDOCAINE 1 APPLICATION(S): 4 CREAM TOPICAL at 05:36

## 2023-02-01 RX ADMIN — PANTOPRAZOLE SODIUM 40 MILLIGRAM(S): 20 TABLET, DELAYED RELEASE ORAL at 05:34

## 2023-02-01 RX ADMIN — LIDOCAINE 1 PATCH: 4 CREAM TOPICAL at 07:56

## 2023-02-01 RX ADMIN — LIDOCAINE 1 PATCH: 4 CREAM TOPICAL at 01:39

## 2023-02-01 NOTE — PROGRESS NOTE ADULT - ATTENDING COMMENTS
72F w/ psoriatic arthritis (apremilast), HTN (lisinopril/chlorthalidone) presenting w/ generalized weakness and found to have multiple metabolic derangements including JAZMYNE, hypokalemia, hyponatremia, transaminitis, HAGMA and sepsis with CT w/ acute uncomplicated diverticulitis c/b IMV thrombosis and thrombophlebitis now on lovenox. Course complicated by nosocomial COVID, currently remains asymptomatic.     # Thrombosis of portal vein, SMV, and IMV thromboses - Vascular cardiology consult appreciated. Continue with therapeutic Lovenox for now given COVID diagnosis can transition to coumadin in [facility] should she desire.   # COVID-19 - currently remains asymptomatic, will resend PCR for confirmation   # Endometrial thickening- Uterus noted to thickened endometrium, s/p endometrial biopsy on 1/23 with inconclusive results, repeat as OP per GYN.   # RLE pain - likely dactylitis in setting of known psoriatic arthritis, rheum consult appreciated. MRI knee as outpatient with open-MRI. can use ibuprofen PRN    # Psoriatic arthritis - holding otezla in setting of COVID; f/u OP with Rheum to resume     Dispo: accepted to [facility], d/c today. d/c planning 50 min coordinating care. POC discussed with daughter at bedside extensively.

## 2023-02-01 NOTE — DISCHARGE NOTE NURSING/CASE MANAGEMENT/SOCIAL WORK - PATIENT PORTAL LINK FT
You can access the FollowMyHealth Patient Portal offered by [facility] by registering at the following website: [email or web address]. By joining [facility]’s FollowMyHealth portal, you will also be able to view your health information using other applications (apps) compatible with our system.

## 2023-02-01 NOTE — PROGRESS NOTE ADULT - SUBJECTIVE AND OBJECTIVE BOX
PROGRESS NOTE:   Authored by J Luis Smith MD   Patient is a 72y old  Female who presents with a chief complaint of Acute renal failure     (20 Jan 2023 15:23)      SUBJECTIVE / OVERNIGHT EVENTS:  No acute events overnight.     ADDITIONAL REVIEW OF SYSTEMS:    MEDICATIONS  (STANDING):  enoxaparin Injectable 100 milliGRAM(s) SubCutaneous every 12 hours  guaiFENesin  milliGRAM(s) Oral every 12 hours  hydrocortisone 2.5% Cream 1 Application(s) Topical daily  lidocaine   4% Patch 1 Patch Transdermal every 24 hours  lidocaine   4% Patch 1 Patch Transdermal daily  lidocaine 2% Gel 1 Application(s) Topical two times a day  melatonin 6 milliGRAM(s) Oral at bedtime  pantoprazole    Tablet 40 milliGRAM(s) Oral two times a day  senna 2 Tablet(s) Oral at bedtime    MEDICATIONS  (PRN):  acetaminophen     Tablet .. 650 milliGRAM(s) Oral every 6 hours PRN Moderate Pain (4 - 6)      CAPILLARY BLOOD GLUCOSE        I&O's Summary      PHYSICAL EXAM:  Vital Signs Last 24 Hrs  T(C): 37.6 (01 Feb 2023 05:26), Max: 37.6 (01 Feb 2023 05:26)  T(F): 99.6 (01 Feb 2023 05:26), Max: 99.6 (01 Feb 2023 05:26)  HR: 87 (01 Feb 2023 05:26) (84 - 95)  BP: 114/66 (01 Feb 2023 05:26) (114/66 - 143/67)  BP(mean): --  RR: 18 (01 Feb 2023 05:26) (18 - 18)  SpO2: 98% (01 Feb 2023 05:26) (93% - 100%)    Parameters below as of 01 Feb 2023 05:26  Patient On (Oxygen Delivery Method): room air        GENERAL APPEARANCE: Well developed, NAD  HEENT:  PERRL, EOMI. hearing grossly intact.  CARDIAC: Normal S1 and S2. no mrg. RRR  LUNGS: Clear to auscultation B/L, no rales, rhonchi, or wheezing  ABDOMEN: Soft , NTND, bowel sounds normal. No guarding or rebound.   MUSCULOSKELETAL: ROM intact.   EXTREMITIES: No edema. Peripheral pulses intact.   NEUROLOGICAL: Non focal. Strength and sensation symmetric and intact throughout.   SKIN: Warm and dry , Well perfused  PSYCHIATRIC: AOx3 , Normal mood and affect      LABS:                      RADIOLOGY & ADDITIONAL TESTS:  Lab Results Reviewed   Imaging Reviewed  Electrocardiogram Reviewed

## 2023-02-01 NOTE — PROGRESS NOTE ADULT - PROVIDER SPECIALTY LIST ADULT
Colorectal Surgery
Internal Medicine

## 2023-02-21 ENCOUNTER — RESULT REVIEW (OUTPATIENT)
Age: 73
End: 2023-02-21

## 2023-02-21 ENCOUNTER — OUTPATIENT (OUTPATIENT)
Dept: OUTPATIENT SERVICES | Facility: HOSPITAL | Age: 73
LOS: 1 days | End: 2023-02-21
Payer: MEDICARE

## 2023-02-21 ENCOUNTER — APPOINTMENT (OUTPATIENT)
Dept: OBGYN | Facility: HOSPITAL | Age: 73
End: 2023-02-21
Payer: MEDICARE

## 2023-02-21 VITALS
DIASTOLIC BLOOD PRESSURE: 83 MMHG | TEMPERATURE: 97.3 F | HEART RATE: 136 BPM | HEIGHT: 63 IN | SYSTOLIC BLOOD PRESSURE: 96 MMHG | WEIGHT: 214 LBS | BODY MASS INDEX: 37.92 KG/M2

## 2023-02-21 PROCEDURE — 58100 BIOPSY OF UTERUS LINING: CPT | Mod: GC

## 2023-02-21 PROCEDURE — 99213 OFFICE O/P EST LOW 20 MIN: CPT | Mod: GC,25

## 2023-02-21 PROCEDURE — 88305 TISSUE EXAM BY PATHOLOGIST: CPT | Mod: 26

## 2023-02-21 NOTE — PHYSICAL EXAM
[Chaperone Present] : A chaperone was present in the examining room during all aspects of the physical examination [Alert] : alert [No Acute Distress] : no acute distress [Soft] : soft [Oriented x3] : oriented x3

## 2023-02-22 LAB — HPV HIGH+LOW RISK DNA PNL CVX: SIGNIFICANT CHANGE UP

## 2023-02-22 NOTE — REVIEW OF SYSTEMS
[Anxiety] : anxiety [Palpitations] : palpitations [Fever] : no fever [Chills] : no chills [Night Sweats] : no night sweats [Recent Weight Gain (___ Lbs)] : no recent weight gain [Recent Weight Loss (___ Lbs)] : no recent weight loss [Cough] : no cough [Chest Pain] : no chest pain [Abdominal Pain] : no abdominal pain [Constipation] : no constipation [Vomiting] : no vomiting [Bloating] : no bloating [Nausea] : no nausea [Incontinence] : no incontinence [Dysuria] : no dysuria [Dizziness] : no dizziness

## 2023-02-22 NOTE — DISCUSSION/SUMMARY
[FreeTextEntry1] : 72 yof, postmenopausal for over 20 years, who presents to clinic after incidental finding of thickened EM lining and insufficient sampling from inpatient EMBx.\par \par - EMBx performed today, consents signed with patient\par - Pap smear also repeated today as patient does not recall the last time she was tested\par \par patient to RTC in 3 weeks to discuss results\par Seen with Dr. Greenwood\par Sierra Graham, PGY1

## 2023-02-22 NOTE — PROCEDURE
[Endometrial Biopsy] : Endometrial biopsy [Thickened Endometrium] : thickened endometrium [Risks] : risks [Benefits] : benefits [Alternatives] : alternatives [Patient] : patient [Infection] : infection [Bleeding] : bleeding [N/A] : pregnancy test not applicable [No Premedication] : No premedication [Betadine] : Betadine [Ring Forceps] : Ring forceps [Scant] : scant [Specimen Collected] : collected [Sent to Pathology] : placed in buffered formalin and sent for pathology [Sent for Cultures] : sent for cultures [Tolerated Well] : Patient tolerated the procedure well [No Complications] : No complications [de-identified] : utilized os finder

## 2023-02-22 NOTE — HISTORY OF PRESENT ILLNESS
[FreeTextEntry1] : 72 yof, HTN, MEJIA, psoriatic arthritis who was recently admitted for acute diverticulitis. During admission, had incidental finding of thickened EM lining on CT. TVUS confirmed finding. EMBx attempted inpatient and resulted with insufficient sampling. Patient states she had vaginal bleeding for about 6 days after the EMBx was performed in the hospital. Denies any prior episodes of vag bleeding before this episode and none since then.\par \par \par Per inpatient gyn consult note:\par Pt states she has not seen a gynecologist within the past 10 years and does not remember the name of the last private GYN she saw. She denies any episodes of post menopausal vaginal bleeding. She denies a family history of endometrial, ovarian, cervical cancer or breast cancer. Denies any unintended weight loss, night sweats, hematochezia, fevers, chills, chest pain, SOB.\par \par \par TVUS23:  Uterus: 8.4 cm x 4.0 cm x 5.2 cm. Within normal limits.\par Endometrium: 17 mm. Markedly thickened with cystic change and minimal internal vascularity.\par Right ovary: Not visualized.\par Left ovary: Not visualized.\par Fluid: None.\par \par Name of GYN Physician: Denies seeing GYN w/in last 10 yrs\par OBHx: 1  33 yrs ago\par GynHx: Denies fibroids, cysts, endometriosis, STI's, Abnormal pap smears. Menopause approx 20 years\par PMH: HTN, psoriatic arthritis, retinal vein occlusion, hyperlipidemia, MEJIA\par PSH: Denies\par Meds: Lovenox, augmentin, tylenol, lidocaine patch, senna, miralax, melatonin, pantoprazole\par Social History:  Hx of tobacco use 20 years ago, denies drug use or alcohol use.  \par \par Pap smear: patient does not remember\par Mammogram: patient does not remember\par Colonoscopy: never had one

## 2023-02-22 NOTE — END OF VISIT
[] : Resident [FreeTextEntry3] : EMB performed for PMB in setting of thickened endometrium\par Patient tolerated procedure well

## 2023-02-23 DIAGNOSIS — R93.89 ABNORMAL FINDINGS ON DIAGNOSTIC IMAGING OF OTHER SPECIFIED BODY STRUCTURES: ICD-10-CM

## 2023-02-24 LAB — CYTOLOGY SPEC DOC CYTO: SIGNIFICANT CHANGE UP

## 2023-02-27 ENCOUNTER — APPOINTMENT (OUTPATIENT)
Dept: OPHTHALMOLOGY | Facility: CLINIC | Age: 73
End: 2023-02-27

## 2023-03-02 LAB — SURGICAL PATHOLOGY STUDY: SIGNIFICANT CHANGE UP

## 2023-03-14 ENCOUNTER — NON-APPOINTMENT (OUTPATIENT)
Age: 73
End: 2023-03-14

## 2023-03-15 ENCOUNTER — LABORATORY RESULT (OUTPATIENT)
Age: 73
End: 2023-03-15

## 2023-03-16 ENCOUNTER — NON-APPOINTMENT (OUTPATIENT)
Age: 73
End: 2023-03-16

## 2023-03-17 ENCOUNTER — APPOINTMENT (OUTPATIENT)
Dept: INTERNAL MEDICINE | Facility: CLINIC | Age: 73
End: 2023-03-17
Payer: MEDICARE

## 2023-03-17 ENCOUNTER — OUTPATIENT (OUTPATIENT)
Dept: OUTPATIENT SERVICES | Facility: HOSPITAL | Age: 73
LOS: 1 days | End: 2023-03-17

## 2023-03-17 ENCOUNTER — NON-APPOINTMENT (OUTPATIENT)
Age: 73
End: 2023-03-17

## 2023-03-17 DIAGNOSIS — L40.50 ARTHROPATHIC PSORIASIS, UNSPECIFIED: ICD-10-CM

## 2023-03-17 DIAGNOSIS — I82.90 ACUTE EMBOLISM AND THROMBOSIS OF UNSPECIFIED VEIN: ICD-10-CM

## 2023-03-17 DIAGNOSIS — R32 UNSPECIFIED URINARY INCONTINENCE: ICD-10-CM

## 2023-03-17 DIAGNOSIS — R93.89 ABNORMAL FINDINGS ON DIAGNOSTIC IMAGING OF OTHER SPECIFIED BODY STRUCTURES: ICD-10-CM

## 2023-03-17 DIAGNOSIS — K57.32 DIVERTICULITIS OF LARGE INTESTINE WITHOUT PERFORATION OR ABSCESS WITHOUT BLEEDING: ICD-10-CM

## 2023-03-17 PROCEDURE — 99443: CPT

## 2023-03-17 NOTE — PHYSICAL EXAM
[Normal Affect] : the affect was normal [Normal Mood] : the mood was normal [Normal Insight/Judgement] : insight and judgment were intact

## 2023-03-21 ENCOUNTER — LABORATORY RESULT (OUTPATIENT)
Age: 73
End: 2023-03-21

## 2023-03-22 ENCOUNTER — LABORATORY RESULT (OUTPATIENT)
Age: 73
End: 2023-03-22

## 2023-03-23 ENCOUNTER — NON-APPOINTMENT (OUTPATIENT)
Age: 73
End: 2023-03-23

## 2023-03-29 ENCOUNTER — LABORATORY RESULT (OUTPATIENT)
Age: 73
End: 2023-03-29

## 2023-03-30 ENCOUNTER — NON-APPOINTMENT (OUTPATIENT)
Age: 73
End: 2023-03-30

## 2023-03-31 DIAGNOSIS — K59.00 CONSTIPATION, UNSPECIFIED: ICD-10-CM

## 2023-04-05 ENCOUNTER — LABORATORY RESULT (OUTPATIENT)
Age: 73
End: 2023-04-05

## 2023-04-06 ENCOUNTER — NON-APPOINTMENT (OUTPATIENT)
Age: 73
End: 2023-04-06

## 2023-04-11 ENCOUNTER — NON-APPOINTMENT (OUTPATIENT)
Age: 73
End: 2023-04-11

## 2023-04-11 DIAGNOSIS — R39.81 FUNCTIONAL URINARY INCONTINENCE: ICD-10-CM

## 2023-04-12 ENCOUNTER — APPOINTMENT (OUTPATIENT)
Dept: OBGYN | Facility: HOSPITAL | Age: 73
End: 2023-04-12

## 2023-04-12 ENCOUNTER — LABORATORY RESULT (OUTPATIENT)
Age: 73
End: 2023-04-12

## 2023-04-13 ENCOUNTER — NON-APPOINTMENT (OUTPATIENT)
Age: 73
End: 2023-04-13

## 2023-04-15 NOTE — HISTORY OF PRESENT ILLNESS
[Post-hospitalization from ___ Hospital] : Post-hospitalization from [unfilled] Hospital [Admitted on: ___] : The patient was admitted on [unfilled] [Discharged on ___] : discharged on [unfilled] [FreeTextEntry2] : Ms. Howard is a 72F with psoriatic arthritis, retinal vein occlusion, hypertension, HLD, MEJIA, obesity, psoriasis who presented to [facility] for generalized weakness and decreased appetite. She was found to have acute diverticulitis,  non-occlusive clots in the IMV, SMV and portal vein, started on anticoagualtion (Lovenox inpatient, now transitioned to warfarin). Inpatient course also complicated by vaginal bleeding, found to have thickened endometrial stripe. Patient was discharged to rehab, now home. Patient reports continued joint pain making ambulation difficult. Reports that PT has been set up but requires a prescription. She has an aide at  home. She had an endometrial biopsy with GYN which showed "inactive endometrium" and has not had further vaginal bleeding. She has not yet followed with GI or with further hypercoaguable workup, but has been taking coumadin (INR this week in range). Review of system otherwise negative.

## 2023-04-15 NOTE — ASSESSMENT
[FreeTextEntry1] : Patient prefers to call clinic to schedule in person follow up visit within 5 weeks. \par \par Discussed with Dr. Burnett

## 2023-04-18 ENCOUNTER — LABORATORY RESULT (OUTPATIENT)
Age: 73
End: 2023-04-18

## 2023-04-20 ENCOUNTER — NON-APPOINTMENT (OUTPATIENT)
Age: 73
End: 2023-04-20

## 2023-04-26 ENCOUNTER — LABORATORY RESULT (OUTPATIENT)
Age: 73
End: 2023-04-26

## 2023-04-27 ENCOUNTER — NON-APPOINTMENT (OUTPATIENT)
Age: 73
End: 2023-04-27

## 2023-05-01 ENCOUNTER — APPOINTMENT (OUTPATIENT)
Dept: OPHTHALMOLOGY | Facility: CLINIC | Age: 73
End: 2023-05-01

## 2023-05-03 ENCOUNTER — LABORATORY RESULT (OUTPATIENT)
Age: 73
End: 2023-05-03

## 2023-05-04 ENCOUNTER — NON-APPOINTMENT (OUTPATIENT)
Age: 73
End: 2023-05-04

## 2023-05-10 ENCOUNTER — LABORATORY RESULT (OUTPATIENT)
Age: 73
End: 2023-05-10

## 2023-05-11 ENCOUNTER — NON-APPOINTMENT (OUTPATIENT)
Age: 73
End: 2023-05-11

## 2023-05-12 ENCOUNTER — APPOINTMENT (OUTPATIENT)
Dept: INTERNAL MEDICINE | Facility: CLINIC | Age: 73
End: 2023-05-12
Payer: MEDICARE

## 2023-05-12 ENCOUNTER — OUTPATIENT (OUTPATIENT)
Dept: OUTPATIENT SERVICES | Facility: HOSPITAL | Age: 73
LOS: 1 days | End: 2023-05-12

## 2023-05-12 VITALS
HEART RATE: 110 BPM | DIASTOLIC BLOOD PRESSURE: 67 MMHG | HEIGHT: 63 IN | SYSTOLIC BLOOD PRESSURE: 130 MMHG | BODY MASS INDEX: 29.68 KG/M2 | TEMPERATURE: 97.5 F | OXYGEN SATURATION: 97 % | RESPIRATION RATE: 20 BRPM | WEIGHT: 167.5 LBS

## 2023-05-12 PROCEDURE — 99214 OFFICE O/P EST MOD 30 MIN: CPT | Mod: GC

## 2023-05-12 NOTE — ASSESSMENT
[FreeTextEntry1] : RTC in 6 months with [facility]\par \par Case discussed with Dr. Magallon \par \par Vicente Bhatia, PGY-2\par [facility]

## 2023-05-12 NOTE — PHYSICAL EXAM
[No Acute Distress] : no acute distress [Well-Appearing] : well-appearing [Normal Sclera/Conjunctiva] : normal sclera/conjunctiva [No Respiratory Distress] : no respiratory distress  [Clear to Auscultation] : lungs were clear to auscultation bilaterally [Normal Rate] : normal rate  [Regular Rhythm] : with a regular rhythm [Soft] : abdomen soft [Non Tender] : non-tender [Non-distended] : non-distended [No Joint Swelling] : no joint swelling [FreeTextEntry1] : + hemorrhoids  [de-identified] : psoriatic rash

## 2023-05-12 NOTE — END OF VISIT
[] : Resident [FreeTextEntry3] : On exam, pt wit diffuse rash c/w psoriasis; Several non-inflammed, non-tender hemorrhoids at anal opening, no other perineal lesions seen.

## 2023-05-12 NOTE — HISTORY OF PRESENT ILLNESS
[FreeTextEntry1] : follow up  [de-identified] : 72F with psoriatic arthritis, retinal vein occlusion, hypertension, HLD, MEJIA, obesity, psoriasis recent hospitilization at [facility] for cute diverticulitis, non-occlusive clots in the IMV, SMV and portal vein, started on anticoagualtion (Lovenox inpatient, now transitioned to warfarin), vaginal bleeding found to have thickened endometrial stripe (biopsy showed inactive endometrium). Hypercoagulable workup negative except for low Protein C. Pt states that she feels a bulge around her rectum, denies any pain or bleeding.

## 2023-05-12 NOTE — REVIEW OF SYSTEMS
[Fever] : no fever [Chills] : no chills [Chest Pain] : no chest pain [Palpitations] : no palpitations [Lower Ext Edema] : no lower extremity edema [Shortness Of Breath] : no shortness of breath [Wheezing] : no wheezing [Cough] : no cough [Dyspnea on Exertion] : no dyspnea on exertion [Abdominal Pain] : no abdominal pain [Nausea] : no nausea [Vomiting] : no vomiting [Hematuria] : no hematuria [Joint Pain] : no joint pain [Headache] : no headache [Dizziness] : no dizziness [Fainting] : no fainting

## 2023-05-16 ENCOUNTER — LABORATORY RESULT (OUTPATIENT)
Age: 73
End: 2023-05-16

## 2023-05-16 DIAGNOSIS — R32 UNSPECIFIED URINARY INCONTINENCE: ICD-10-CM

## 2023-05-16 DIAGNOSIS — K57.32 DIVERTICULITIS OF LARGE INTESTINE WITHOUT PERFORATION OR ABSCESS WITHOUT BLEEDING: ICD-10-CM

## 2023-05-16 DIAGNOSIS — L40.50 ARTHROPATHIC PSORIASIS, UNSPECIFIED: ICD-10-CM

## 2023-05-16 DIAGNOSIS — K64.9 UNSPECIFIED HEMORRHOIDS: ICD-10-CM

## 2023-05-18 ENCOUNTER — NON-APPOINTMENT (OUTPATIENT)
Age: 73
End: 2023-05-18

## 2023-05-24 ENCOUNTER — LABORATORY RESULT (OUTPATIENT)
Age: 73
End: 2023-05-24

## 2023-05-25 ENCOUNTER — NON-APPOINTMENT (OUTPATIENT)
Age: 73
End: 2023-05-25

## 2023-05-26 ENCOUNTER — RX RENEWAL (OUTPATIENT)
Age: 73
End: 2023-05-26

## 2023-05-30 ENCOUNTER — NON-APPOINTMENT (OUTPATIENT)
Age: 73
End: 2023-05-30

## 2023-05-31 ENCOUNTER — LABORATORY RESULT (OUTPATIENT)
Age: 73
End: 2023-05-31

## 2023-06-01 ENCOUNTER — NON-APPOINTMENT (OUTPATIENT)
Age: 73
End: 2023-06-01

## 2023-06-05 ENCOUNTER — NON-APPOINTMENT (OUTPATIENT)
Age: 73
End: 2023-06-05

## 2023-06-06 ENCOUNTER — LABORATORY RESULT (OUTPATIENT)
Age: 73
End: 2023-06-06

## 2023-06-07 ENCOUNTER — NON-APPOINTMENT (OUTPATIENT)
Age: 73
End: 2023-06-07

## 2023-06-07 ENCOUNTER — APPOINTMENT (OUTPATIENT)
Dept: OPHTHALMOLOGY | Facility: CLINIC | Age: 73
End: 2023-06-07
Payer: MEDICARE

## 2023-06-07 PROCEDURE — 92014 COMPRE OPH EXAM EST PT 1/>: CPT

## 2023-06-07 PROCEDURE — 92134 CPTRZ OPH DX IMG PST SGM RTA: CPT

## 2023-06-14 ENCOUNTER — LABORATORY RESULT (OUTPATIENT)
Age: 73
End: 2023-06-14

## 2023-06-14 ENCOUNTER — NON-APPOINTMENT (OUTPATIENT)
Age: 73
End: 2023-06-14

## 2023-06-15 ENCOUNTER — NON-APPOINTMENT (OUTPATIENT)
Age: 73
End: 2023-06-15

## 2023-06-19 ENCOUNTER — RX RENEWAL (OUTPATIENT)
Age: 73
End: 2023-06-19

## 2023-06-20 ENCOUNTER — LABORATORY RESULT (OUTPATIENT)
Age: 73
End: 2023-06-20

## 2023-06-22 ENCOUNTER — NON-APPOINTMENT (OUTPATIENT)
Age: 73
End: 2023-06-22

## 2023-06-23 ENCOUNTER — NON-APPOINTMENT (OUTPATIENT)
Age: 73
End: 2023-06-23

## 2023-06-28 ENCOUNTER — NON-APPOINTMENT (OUTPATIENT)
Age: 73
End: 2023-06-28

## 2023-06-28 ENCOUNTER — LABORATORY RESULT (OUTPATIENT)
Age: 73
End: 2023-06-28

## 2023-07-05 ENCOUNTER — LABORATORY RESULT (OUTPATIENT)
Age: 73
End: 2023-07-05

## 2023-07-06 RX ORDER — WARFARIN 3 MG/1
3 TABLET ORAL DAILY
Qty: 90 | Refills: 1 | Status: DISCONTINUED | COMMUNITY
Start: 2023-03-31 | End: 2023-07-06

## 2023-07-12 ENCOUNTER — NON-APPOINTMENT (OUTPATIENT)
Age: 73
End: 2023-07-12

## 2023-07-12 ENCOUNTER — LABORATORY RESULT (OUTPATIENT)
Age: 73
End: 2023-07-12

## 2023-07-19 ENCOUNTER — LABORATORY RESULT (OUTPATIENT)
Age: 73
End: 2023-07-19

## 2023-07-19 ENCOUNTER — NON-APPOINTMENT (OUTPATIENT)
Age: 73
End: 2023-07-19

## 2023-07-19 ENCOUNTER — APPOINTMENT (OUTPATIENT)
Dept: OPHTHALMOLOGY | Facility: CLINIC | Age: 73
End: 2023-07-19
Payer: MEDICARE

## 2023-07-19 PROCEDURE — 67028 INJECTION EYE DRUG: CPT | Mod: RT

## 2023-07-19 PROCEDURE — 92012 INTRM OPH EXAM EST PATIENT: CPT | Mod: 25

## 2023-07-19 PROCEDURE — 92250 FUNDUS PHOTOGRAPHY W/I&R: CPT

## 2023-07-21 ENCOUNTER — NON-APPOINTMENT (OUTPATIENT)
Age: 73
End: 2023-07-21

## 2023-07-26 ENCOUNTER — LABORATORY RESULT (OUTPATIENT)
Age: 73
End: 2023-07-26

## 2023-07-27 ENCOUNTER — NON-APPOINTMENT (OUTPATIENT)
Age: 73
End: 2023-07-27

## 2023-07-28 ENCOUNTER — NON-APPOINTMENT (OUTPATIENT)
Age: 73
End: 2023-07-28

## 2023-08-02 ENCOUNTER — LABORATORY RESULT (OUTPATIENT)
Age: 73
End: 2023-08-02

## 2023-08-04 ENCOUNTER — RX RENEWAL (OUTPATIENT)
Age: 73
End: 2023-08-04

## 2023-08-07 ENCOUNTER — NON-APPOINTMENT (OUTPATIENT)
Age: 73
End: 2023-08-07

## 2023-08-09 ENCOUNTER — LABORATORY RESULT (OUTPATIENT)
Age: 73
End: 2023-08-09

## 2023-08-10 ENCOUNTER — NON-APPOINTMENT (OUTPATIENT)
Age: 73
End: 2023-08-10

## 2023-08-11 ENCOUNTER — RX RENEWAL (OUTPATIENT)
Age: 73
End: 2023-08-11

## 2023-08-16 ENCOUNTER — LABORATORY RESULT (OUTPATIENT)
Age: 73
End: 2023-08-16

## 2023-08-17 ENCOUNTER — NON-APPOINTMENT (OUTPATIENT)
Age: 73
End: 2023-08-17

## 2023-08-18 DIAGNOSIS — K55.069 ACUTE INFARCTION OF INTESTINE, PART AND EXTENT UNSPECIFIED: ICD-10-CM

## 2023-08-19 ENCOUNTER — NON-APPOINTMENT (OUTPATIENT)
Age: 73
End: 2023-08-19

## 2023-08-23 ENCOUNTER — LABORATORY RESULT (OUTPATIENT)
Age: 73
End: 2023-08-23

## 2023-08-24 ENCOUNTER — APPOINTMENT (OUTPATIENT)
Dept: GASTROENTEROLOGY | Facility: CLINIC | Age: 73
End: 2023-08-24
Payer: MEDICARE

## 2023-08-24 ENCOUNTER — OUTPATIENT (OUTPATIENT)
Dept: OUTPATIENT SERVICES | Facility: HOSPITAL | Age: 73
LOS: 1 days | End: 2023-08-24

## 2023-08-24 VITALS
WEIGHT: 180 LBS | SYSTOLIC BLOOD PRESSURE: 157 MMHG | BODY MASS INDEX: 31.89 KG/M2 | HEART RATE: 80 BPM | OXYGEN SATURATION: 99 % | HEIGHT: 63 IN | DIASTOLIC BLOOD PRESSURE: 75 MMHG

## 2023-08-24 PROCEDURE — 99204 OFFICE O/P NEW MOD 45 MIN: CPT | Mod: GC

## 2023-08-24 NOTE — END OF VISIT
[] : Fellow [FreeTextEntry3] : # Diverticulitis  # PVT, SMV and IMV thrombosis on Coumadin # Vaginal bleeding, endometrial thickening # Mesenteric adenopathy  --Colonoscopy warranted given recent episode of acute diverticulitis to rule out underlying malignancy, especially given additional imaging findings of lymphadenopathy and hypercoagulable state. Due to medical comorbidities and limited support at home as outlined above, suggest inpatient colonoscopy. Patient agreeable, but wants her daughter to be available when going to hospital (daughter is leaving country for the next week, but patient states she will present to ER for further workup upon her return)  Additional recommendations as outlined above

## 2023-08-24 NOTE — ASSESSMENT
[FreeTextEntry1] : 71 yo F with fatty liver, HTN, psoriasis, and recent diverticulitis on 1/23 involving the sigmoid/descending colon complicated by adenopathy and PV/IMV/SMV thrombosis on warfarin here to arrange colonoscopy.  #Diverticulitis, no past Colonoscopy #PV/SMV/IMV thrombosis on Coumadin #Adenopathy, no safe window to biopsy per IR   Patient needs Colonoscopy to rule out malignancy given overall clinical presentation on January. Patient reports overwhelm at home due to limit social support and sick . Patient needs to use a walker to ambulate and lives in a 2 story apt (bathroom 2nd floor) thus also making it very challenging for her. Additionally, she would not have family member to take her to the appointment. Because of her clot burden, she will most likely need heparin bridge (does not want lovenox, no family to administer it to her) and still has not seen hematology. Due to all the barriers noted above, we discussed Inpatient admission for expedited work up.   Plan: -Rec going to the ER for expedited inpatient work up with Colonoscopy and management of her thrombosis  -Patient plan to go in 1 week once her daughter is in the country -GI team will evaluate once patient arrives to [facility] or [facility]  Jez Luu MD GI Fellow

## 2023-08-24 NOTE — HISTORY OF PRESENT ILLNESS
[FreeTextEntry1] : 73 yo F with fatty liver, HTN, psoriasis, and recent diverticulitis on 1/23 involving the sigmoid/descending colon complicated by adenopathy and PV/IMV/SMV thrombosis on warfarin here to arrange colonoscopy.  Patient reports feeling okay from a GI perspective but reports feeling overwhelm at home due to limit support and sick . ROS as noted below

## 2023-08-25 DIAGNOSIS — E66.9 OBESITY, UNSPECIFIED: ICD-10-CM

## 2023-08-25 DIAGNOSIS — K57.32 DIVERTICULITIS OF LARGE INTESTINE WITHOUT PERFORATION OR ABSCESS WITHOUT BLEEDING: ICD-10-CM

## 2023-08-25 DIAGNOSIS — I82.90 ACUTE EMBOLISM AND THROMBOSIS OF UNSPECIFIED VEIN: ICD-10-CM

## 2023-08-30 ENCOUNTER — LABORATORY RESULT (OUTPATIENT)
Age: 73
End: 2023-08-30

## 2023-08-31 ENCOUNTER — NON-APPOINTMENT (OUTPATIENT)
Age: 73
End: 2023-08-31

## 2023-09-01 ENCOUNTER — NON-APPOINTMENT (OUTPATIENT)
Age: 73
End: 2023-09-01

## 2023-09-02 ENCOUNTER — INPATIENT (INPATIENT)
Facility: HOSPITAL | Age: 73
LOS: 14 days | Discharge: HOME | DRG: 391 | End: 2023-09-17
Attending: STUDENT IN AN ORGANIZED HEALTH CARE EDUCATION/TRAINING PROGRAM | Admitting: STUDENT IN AN ORGANIZED HEALTH CARE EDUCATION/TRAINING PROGRAM
Payer: MEDICARE

## 2023-09-02 VITALS
TEMPERATURE: 98 F | DIASTOLIC BLOOD PRESSURE: 87 MMHG | RESPIRATION RATE: 20 BRPM | OXYGEN SATURATION: 98 % | HEART RATE: 105 BPM | WEIGHT: 179.9 LBS | HEIGHT: 63 IN | SYSTOLIC BLOOD PRESSURE: 145 MMHG

## 2023-09-02 DIAGNOSIS — I81 PORTAL VEIN THROMBOSIS: ICD-10-CM

## 2023-09-02 DIAGNOSIS — Z29.9 ENCOUNTER FOR PROPHYLACTIC MEASURES, UNSPECIFIED: ICD-10-CM

## 2023-09-02 DIAGNOSIS — I10 ESSENTIAL (PRIMARY) HYPERTENSION: ICD-10-CM

## 2023-09-02 DIAGNOSIS — R10.9 UNSPECIFIED ABDOMINAL PAIN: ICD-10-CM

## 2023-09-02 DIAGNOSIS — Z12.11 ENCOUNTER FOR SCREENING FOR MALIGNANT NEOPLASM OF COLON: ICD-10-CM

## 2023-09-02 DIAGNOSIS — L40.50 ARTHROPATHIC PSORIASIS, UNSPECIFIED: ICD-10-CM

## 2023-09-02 LAB
ALBUMIN SERPL ELPH-MCNC: 4 G/DL — SIGNIFICANT CHANGE UP (ref 3.3–5)
ALP SERPL-CCNC: 101 U/L — SIGNIFICANT CHANGE UP (ref 40–120)
ALT FLD-CCNC: 11 U/L — SIGNIFICANT CHANGE UP (ref 10–45)
ANION GAP SERPL CALC-SCNC: 12 MMOL/L — SIGNIFICANT CHANGE UP (ref 5–17)
APTT BLD: 39.8 SEC — HIGH (ref 24.5–35.6)
AST SERPL-CCNC: 23 U/L — SIGNIFICANT CHANGE UP (ref 10–40)
BASOPHILS # BLD AUTO: 0.1 K/UL — SIGNIFICANT CHANGE UP (ref 0–0.2)
BASOPHILS NFR BLD AUTO: 1.2 % — SIGNIFICANT CHANGE UP (ref 0–2)
BILIRUB SERPL-MCNC: 0.4 MG/DL — SIGNIFICANT CHANGE UP (ref 0.2–1.2)
BUN SERPL-MCNC: 20 MG/DL — SIGNIFICANT CHANGE UP (ref 7–23)
CALCIUM SERPL-MCNC: 10.7 MG/DL — HIGH (ref 8.4–10.5)
CHLORIDE SERPL-SCNC: 103 MMOL/L — SIGNIFICANT CHANGE UP (ref 96–108)
CO2 SERPL-SCNC: 24 MMOL/L — SIGNIFICANT CHANGE UP (ref 22–31)
CREAT SERPL-MCNC: 0.9 MG/DL — SIGNIFICANT CHANGE UP (ref 0.5–1.3)
EGFR: 68 ML/MIN/1.73M2 — SIGNIFICANT CHANGE UP
EOSINOPHIL # BLD AUTO: 0.19 K/UL — SIGNIFICANT CHANGE UP (ref 0–0.5)
EOSINOPHIL NFR BLD AUTO: 2.3 % — SIGNIFICANT CHANGE UP (ref 0–6)
GLUCOSE BLDC GLUCOMTR-MCNC: 97 MG/DL — SIGNIFICANT CHANGE UP (ref 70–99)
GLUCOSE SERPL-MCNC: 93 MG/DL — SIGNIFICANT CHANGE UP (ref 70–99)
HCT VFR BLD CALC: 46.9 % — HIGH (ref 34.5–45)
HGB BLD-MCNC: 14.9 G/DL — SIGNIFICANT CHANGE UP (ref 11.5–15.5)
IMM GRANULOCYTES NFR BLD AUTO: 0.2 % — SIGNIFICANT CHANGE UP (ref 0–0.9)
INR BLD: 2.67 RATIO — HIGH (ref 0.85–1.18)
LYMPHOCYTES # BLD AUTO: 2.13 K/UL — SIGNIFICANT CHANGE UP (ref 1–3.3)
LYMPHOCYTES # BLD AUTO: 25.3 % — SIGNIFICANT CHANGE UP (ref 13–44)
MAGNESIUM SERPL-MCNC: 2.1 MG/DL — SIGNIFICANT CHANGE UP (ref 1.6–2.6)
MCHC RBC-ENTMCNC: 28.5 PG — SIGNIFICANT CHANGE UP (ref 27–34)
MCHC RBC-ENTMCNC: 31.8 GM/DL — LOW (ref 32–36)
MCV RBC AUTO: 89.8 FL — SIGNIFICANT CHANGE UP (ref 80–100)
MONOCYTES # BLD AUTO: 0.51 K/UL — SIGNIFICANT CHANGE UP (ref 0–0.9)
MONOCYTES NFR BLD AUTO: 6 % — SIGNIFICANT CHANGE UP (ref 2–14)
NEUTROPHILS # BLD AUTO: 5.48 K/UL — SIGNIFICANT CHANGE UP (ref 1.8–7.4)
NEUTROPHILS NFR BLD AUTO: 65 % — SIGNIFICANT CHANGE UP (ref 43–77)
NRBC # BLD: 0 /100 WBCS — SIGNIFICANT CHANGE UP (ref 0–0)
PHOSPHATE SERPL-MCNC: 3 MG/DL — SIGNIFICANT CHANGE UP (ref 2.5–4.5)
PLATELET # BLD AUTO: 316 K/UL — SIGNIFICANT CHANGE UP (ref 150–400)
POTASSIUM SERPL-MCNC: 5.3 MMOL/L — SIGNIFICANT CHANGE UP (ref 3.5–5.3)
POTASSIUM SERPL-SCNC: 5.3 MMOL/L — SIGNIFICANT CHANGE UP (ref 3.5–5.3)
PROT SERPL-MCNC: 8.2 G/DL — SIGNIFICANT CHANGE UP (ref 6–8.3)
PROTHROM AB SERPL-ACNC: 27.2 SEC — HIGH (ref 9.5–13)
RBC # BLD: 5.22 M/UL — HIGH (ref 3.8–5.2)
RBC # FLD: 13.8 % — SIGNIFICANT CHANGE UP (ref 10.3–14.5)
SODIUM SERPL-SCNC: 139 MMOL/L — SIGNIFICANT CHANGE UP (ref 135–145)
WBC # BLD: 8.43 K/UL — SIGNIFICANT CHANGE UP (ref 3.8–10.5)
WBC # FLD AUTO: 8.43 K/UL — SIGNIFICANT CHANGE UP (ref 3.8–10.5)

## 2023-09-02 PROCEDURE — 99222 1ST HOSP IP/OBS MODERATE 55: CPT

## 2023-09-02 PROCEDURE — 99285 EMERGENCY DEPT VISIT HI MDM: CPT

## 2023-09-02 RX ORDER — WARFARIN SODIUM 2.5 MG/1
1 TABLET ORAL
Refills: 0 | DISCHARGE

## 2023-09-02 RX ORDER — HEPARIN SODIUM 5000 [USP'U]/ML
6500 INJECTION INTRAVENOUS; SUBCUTANEOUS EVERY 6 HOURS
Refills: 0 | Status: DISCONTINUED | OUTPATIENT
Start: 2023-09-02 | End: 2023-09-08

## 2023-09-02 RX ORDER — LISINOPRIL 2.5 MG/1
1 TABLET ORAL
Refills: 0 | DISCHARGE

## 2023-09-02 RX ORDER — LISINOPRIL 2.5 MG/1
40 TABLET ORAL DAILY
Refills: 0 | Status: DISCONTINUED | OUTPATIENT
Start: 2023-09-02 | End: 2023-09-17

## 2023-09-02 RX ORDER — ATORVASTATIN CALCIUM 80 MG/1
20 TABLET, FILM COATED ORAL AT BEDTIME
Refills: 0 | Status: DISCONTINUED | OUTPATIENT
Start: 2023-09-02 | End: 2023-09-17

## 2023-09-02 RX ORDER — INFLUENZA VIRUS VACCINE 15; 15; 15; 15 UG/.5ML; UG/.5ML; UG/.5ML; UG/.5ML
0.7 SUSPENSION INTRAMUSCULAR ONCE
Refills: 0 | Status: COMPLETED | OUTPATIENT
Start: 2023-09-02 | End: 2023-09-02

## 2023-09-02 RX ORDER — HEPARIN SODIUM 5000 [USP'U]/ML
3000 INJECTION INTRAVENOUS; SUBCUTANEOUS EVERY 6 HOURS
Refills: 0 | Status: DISCONTINUED | OUTPATIENT
Start: 2023-09-02 | End: 2023-09-08

## 2023-09-02 RX ORDER — HEPARIN SODIUM 5000 [USP'U]/ML
INJECTION INTRAVENOUS; SUBCUTANEOUS
Qty: 25000 | Refills: 0 | Status: DISCONTINUED | OUTPATIENT
Start: 2023-09-02 | End: 2023-09-08

## 2023-09-02 RX ADMIN — ATORVASTATIN CALCIUM 20 MILLIGRAM(S): 80 TABLET, FILM COATED ORAL at 22:53

## 2023-09-02 NOTE — ED PROVIDER NOTE - CLINICAL SUMMARY MEDICAL DECISION MAKING FREE TEXT BOX
72-year-old female with past medical history of hypertension, MEJIA, psoriatic arthritis, presenting with admission for colonoscopy. Vitals stable. Physical exam with heart regular rate and rhythm, lungs clear to auscultation, abdomen soft non tender. Will get labs and to be admitted to the hospital.

## 2023-09-02 NOTE — H&P ADULT - NSHPLABSRESULTS_GEN_ALL_CORE
09-02    139  |  103  |  20  ----------------------------<  93  5.3   |  24  |  0.90    Ca    10.7<H>      02 Sep 2023 17:35  Phos  3.0     09-02  Mg     2.1     09-02    TPro  8.2  /  Alb  4.0  /  TBili  0.4  /  DBili  x   /  AST  23  /  ALT  11  /  AlkPhos  101  09-02    Magnesium: 2.1 mg/dL (09-02-23 @ 17:35)    Phosphorus: 3.0 mg/dL (09-02-23 @ 17:35)      PT/INR - ( 02 Sep 2023 17:35 )   PT: 27.2 sec;   INR: 2.67 ratio         PTT - ( 02 Sep 2023 17:35 )  PTT:39.8 sec              Urinalysis Basic - ( 02 Sep 2023 17:35 )    Color: x / Appearance: x / SG: x / pH: x  Gluc: 93 mg/dL / Ketone: x  / Bili: x / Urobili: x   Blood: x / Protein: x / Nitrite: x   Leuk Esterase: x / RBC: x / WBC x   Sq Epi: x / Non Sq Epi: x / Bacteria: x                              14.9   8.43  )-----------( 316      ( 02 Sep 2023 17:35 )             46.9     CAPILLARY BLOOD GLUCOSE

## 2023-09-02 NOTE — H&P ADULT - PROBLEM SELECTOR PLAN 2
Hx thromboses in PV/IMV/SMV in setting of diverticulitis. Was initially on lovenox but switched to warfarin due to patient preference.   - Hold warfarin, repeat INR  - Start heparin gtt

## 2023-09-02 NOTE — H&P ADULT - PROBLEM SELECTOR PLAN 4
On Otezla (apremilast) for her psoriatic arthritis. Currently with minimal joint pains. Rash noted on thigh, abdomen, and trunk. Symptoms manageable at this time.   - Ordered triamcinolone cream

## 2023-09-02 NOTE — ED ADULT NURSE NOTE - OBJECTIVE STATEMENT
72 year old female coming in to get a colonoscopy. Patient was seen by her GI MD who saw diverticulitis  a few months ago. Patient currently take a blood thinner for "clots in her abdomen" Patient was told she needed to come into the hospital to be prepared for the colonoscopy. Patient denies any symptoms at this time. No blood in stool, no abdominal pain, no nausea or vomiting.

## 2023-09-02 NOTE — H&P ADULT - HISTORY OF PRESENT ILLNESS
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 Pt recently saw GI outpatient who recommended inpatient admission for expedited workup. Given patient's last episode of diverticulitis with complication, warrants colonoscopy for malignancy evaluation. Pt on warfarin for AC for her thromboses but does not want lovenox for bridging hence she recommended to come to hospital for heparin bridge in preparation for colonoscopy.  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 Pt recently saw GI outpatient who recommended inpatient admission for expedited workup. Given patient's last episode of diverticulitis with complication, warrants colonoscopy for malignancy evaluation. Pt on warfarin for AC for her thromboses but does not want lovenox for bridging hence she recommended to come to hospital for heparin bridge in preparation for colonoscopy. Patient accompanied by daughter, currently without any symptoms or complaints. Denies fevers, chills, recent illness. No abdominal or GI symptoms. No abnormal bleeding noted at home. Pt is able to ambulate independently. Sometimes uses cane. Pt was switched to warfarin because she does not want to self-inject lovenox and has no one who is readily available who can help her with injections (daughter does not live with her).

## 2023-09-02 NOTE — H&P ADULT - NSHPSOCIALHISTORY_GEN_ALL_CORE
Social History:    Marital Status:  (   )    (   ) Single    (   )    (  )   Occupation:   Lives with: (  ) alone  (  ) children   (  ) spouse   (  ) parents  (  ) other    Substance Use (street drugs): (  ) never used  (  ) other:  Tobacco Usage:  (   ) never smoked   (   ) former smoker   (   ) current smoker  (     ) pack year  (        ) last cigarette date  Alcohol Usage:  Sexual History: Social History:    Marital Status:  (   )    (   ) Single    (   )    (  )   Occupation:   Lives with: ( X ) alone  (  ) children   (  ) spouse   (  ) parents  (  ) other    Substance Use (street drugs): ( X ) never used  (  ) other:  Tobacco Usage:  (   ) never smoked   ( X  ) former smoker   (   ) current smoker  (     ) pack year  (        ) last cigarette date  Alcohol Usage: none

## 2023-09-02 NOTE — PATIENT PROFILE ADULT - FALL HARM RISK - HARM RISK INTERVENTIONS

## 2023-09-02 NOTE — ED PROVIDER NOTE - OBJECTIVE STATEMENT
72-year-old female with past medical history of hypertension, MEJIA, psoriatic arthritis, presenting with admission for colonoscopy.  Patient was sent in by her gastroenterologist to be hospitalized for bridging to heparin to facilitate colonoscopy.  Patient had diverticulitis a few months ago.  Patient denies nausea, vomiting, abdominal pain, diarrhea.

## 2023-09-02 NOTE — H&P ADULT - NSHPREVIEWOFSYSTEMS_GEN_ALL_CORE
Review of Systems:   CONSTITUTIONAL: No fever, weight loss  EYES: No eye pain, visual disturbances, or discharge  ENMT:  No difficulty hearing, tinnitus, vertigo; No sinus or throat pain  RESPIRATORY: No SOB. No cough, wheezing, chills or hemoptysis  CARDIOVASCULAR: No chest pain, palpitations, dizziness, or leg swelling  GASTROINTESTINAL: No abdominal or epigastric pain. No nausea, vomiting, or hematemesis; No diarrhea or constipation. No melena or hematochezia.  GENITOURINARY: No dysuria, frequency, hematuria, or incontinence  NEUROLOGICAL: No headaches, memory loss, loss of strength, numbness, or tremors  SKIN: No itching, burning, rashes, or lesions   LYMPH NODES: No enlarged glands  ENDOCRINE: No heat or cold intolerance; No hair loss  MUSCULOSKELETAL: No joint pain or swelling; No muscle, back pain  PSYCHIATRIC: No depression, anxiety, mood swings, or difficulty sleeping  HEME/LYMPH: No easy bruising, or bleeding gums Review of Systems:   CONSTITUTIONAL: No fever, weight loss  EYES: No eye pain, visual disturbances, or discharge  ENMT:  No difficulty hearing, tinnitus, vertigo; No sinus or throat pain  RESPIRATORY: No SOB. No cough, wheezing, chills or hemoptysis  CARDIOVASCULAR: No chest pain, palpitations, dizziness, or leg swelling  GASTROINTESTINAL: No abdominal or epigastric pain. No nausea, vomiting, or hematemesis; No diarrhea or constipation. No melena or hematochezia.  GENITOURINARY: No dysuria, frequency, hematuria, or incontinence  NEUROLOGICAL: No headaches, memory loss, loss of strength, numbness, or tremors  SKIN: Psoriasis, +pruritis  LYMPH NODES: No enlarged glands  MUSCULOSKELETAL: +knee pain, no swelling; No muscle, back pain  HEME/LYMPH: No easy bruising, or bleeding gums

## 2023-09-02 NOTE — ED ADULT NURSE NOTE - NSFALLRISKASMTTYPE_ED_ALL_ED
- Symptoms are currently stable on medication regimen  - Continue Sinemet 25/100 (2 tablets), Nuplazid 34mg QD, Memantine 10mg and Rivastigmine 4.5mg  - Ambulatory referral to physical therapy   - Remains on Rotigotine patch 2mg QD, wife states that she has a few months left, unsure of affordability after samples obtained from outside neurologist run out. Can consider increasing Sinemet at next appointment.    Initial (On Arrival)

## 2023-09-02 NOTE — ED ADULT NURSE NOTE - NSFALLRISKINTERV_ED_ALL_ED
within normal limits Assistance OOB with selected safe patient handling equipment if applicable/Communicate fall risk and risk factors to all staff, patient, and family/Monitor gait and stability/Provide patient with walking aids/Provide visual cue: yellow wristband, yellow gown, etc/Reinforce activity limits and safety measures with patient and family/Call bell, personal items and telephone in reach/Instruct patient to call for assistance before getting out of bed/chair/stretcher/Non-slip footwear applied when patient is off stretcher/[place] to call system/Physically safe environment - no spills, clutter or unnecessary equipment/Purposeful Proactive Rounding/Room/bathroom lighting operational, light cord in reach

## 2023-09-02 NOTE — H&P ADULT - PROBLEM SELECTOR PLAN 1
Given pt's history of diverticulitis with complication of PV/IMV/SMV thrombosis, screening colonoscopy recommended to evaluate for malignancy. Last diverticulitis flare in Jan 2023, no other flares since then, Currently no GI symptoms.  - GI emailed for consult  - Regular diet for now. Switch to clear liquid once procedure date determined.

## 2023-09-02 NOTE — H&P ADULT - ASSESSMENT
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 Pt without any symptoms or complaints. Pt admitted for bridging to heparin gtt in preparation for colonoscopy.

## 2023-09-02 NOTE — PATIENT PROFILE ADULT - MEDICATIONS/VISITS
----- Message from Susi Ivey MD sent at 8/16/2022 11:57 AM CDT -----  Please call her.  Ultrasound of her leg shows just soft tissue swelling.  Probably a deep bruise.  Nothing needs to be done about it.  It should go away on its own.  
Called patient with results/recommendations stated below.  Patient verbalized understanding.  
no

## 2023-09-02 NOTE — ED PROVIDER NOTE - ATTENDING CONTRIBUTION TO CARE
This is a 72-year-old female who was seen last week by gastroenterology after diverticulitis a few months ago and he recommended that the patient be admitted to the hospital for bridging to heparin in order to facilitate a colonoscopy as well as GI work-up.  I reviewed his chart note from 24 August which staged as such before.  The patient states that she is feeling fine and has no new symptoms.  Nontender abdomen  Will admit to the hospital for further work-up.  Basic blood work including coags we will check her weight and start a heparin infusion.

## 2023-09-02 NOTE — H&P ADULT - NSHPPHYSICALEXAM_GEN_ALL_CORE
Vital Signs Last 24 Hrs  T(C): 36.8 (02 Sep 2023 20:27), Max: 36.9 (02 Sep 2023 15:54)  T(F): 98.2 (02 Sep 2023 20:27), Max: 98.5 (02 Sep 2023 15:54)  HR: 64 (02 Sep 2023 20:27) (64 - 105)  BP: 153/77 (02 Sep 2023 20:27) (145/87 - 153/77)  BP(mean): --  RR: 18 (02 Sep 2023 20:27) (18 - 20)  SpO2: 95% (02 Sep 2023 20:27) (95% - 98%)    Parameters below as of 02 Sep 2023 20:27  Patient On (Oxygen Delivery Method): room air        CONSTITUTIONAL: Well-groomed, in no apparent distress  EYES: No conjunctival or scleral injection, non-icteric; PERRLA and symmetric  ENMT: No external nasal lesions; nasal mucosa not inflamed; normal dentition; no pharyngeal injection or exudates, oral mucosa with moist membranes  NECK: Trachea midline without palpable neck mass; thyroid not enlarged and non-tender  RESPIRATORY: Breathing comfortably; no dullness to percussion; lungs CTA without wheeze/rhonchi/rales  CARDIOVASCULAR: +S1S2, RRR, no M/G/R; no carotid bruits; pedal pulses full and symmetric; no lower extremity edema  CHEST/BREAST: Breasts are symmetric in appearance; no palpable masses or lumps  GASTROINTESTINAL: No palpable masses or tenderness, +BS throughout, no rebound/guarding; no hepatosplenomegaly; no hernia palpated  GENITOURINARY:       MALE: Normal appearing external genitalia, no penile lesion; no palpable testicular or scrotal mass; prostate not enlarged and without palpable nodule       FEMALE: Normal appearing external genitalia, no vaginal discharge or lesion noted; examination of urethra WNL; bladder without fullness or tenderness on palpation; palpation of uterus and adnexa without tenderness or mass  LYMPHATIC: No cervical LAD or tenderness; no axillary LAD or tenderness; no inguinal LAD or tenderness  MUSCULOSKELETAL: Normal gait and station; no digital clubbing or cyanosis; no paraspinal tenderness; examination of the  (head/neck, spine/ribs/pelvis, RUE, LUE, RLE, LLE) without misalignment, normal strength and tone of extremities  SKIN: No rashes or ulcers noted; no subcutaneous nodules or induration palpable  NEUROLOGIC: CN II-XII intact; normal reflexes in upper and lower extremities; sensation intact in LEs b/l to light touch  PSYCHIATRIC: A+O x 3; mood and affect appropriate; appropriate insight and judgment Vital Signs Last 24 Hrs  T(C): 36.8 (02 Sep 2023 20:27), Max: 36.9 (02 Sep 2023 15:54)  T(F): 98.2 (02 Sep 2023 20:27), Max: 98.5 (02 Sep 2023 15:54)  HR: 64 (02 Sep 2023 20:27) (64 - 105)  BP: 153/77 (02 Sep 2023 20:27) (145/87 - 153/77)  BP(mean): --  RR: 18 (02 Sep 2023 20:27) (18 - 20)  SpO2: 95% (02 Sep 2023 20:27) (95% - 98%)    Parameters below as of 02 Sep 2023 20:27  Patient On (Oxygen Delivery Method): room air        CONSTITUTIONAL: Well-groomed, in no apparent distress  EYES: No conjunctival or scleral injection, non-icteric; PERRLA and symmetric  ENMT: No external nasal lesions; nasal mucosa not inflamed; poor dentition; no pharyngeal injection or exudates, oral mucosa with moist membranes  RESPIRATORY: Breathing comfortably; no dullness to percussion; lungs CTA without wheeze/rhonchi/rales  CARDIOVASCULAR: +S1S2, RRR, no M/G/R; no carotid bruits; pedal pulses full and symmetric; trace lower extremity edema  GASTROINTESTINAL: No palpable masses or tenderness, +BS throughout, no rebound/guarding; no hepatosplenomegaly; no hernia palpated  LYMPHATIC: No cervical LAD or tenderness  MUSCULOSKELETAL: no malalignment of spine or extremities, no joint swelling or tenderness  SKIN: Scattered raised erythematous plaques/macules over thigh and trunk with mild scaling.   NEUROLOGIC: CN grossly intact; sensation intact in LEs b/l to light touch; strength and tone normal  PSYCHIATRIC: A+O x 3

## 2023-09-03 LAB
ANION GAP SERPL CALC-SCNC: 11 MMOL/L — SIGNIFICANT CHANGE UP (ref 5–17)
APTT BLD: 130.9 SEC — CRITICAL HIGH (ref 24.5–35.6)
APTT BLD: 38.1 SEC — HIGH (ref 24.5–35.6)
APTT BLD: 95.1 SEC — HIGH (ref 24.5–35.6)
APTT BLD: 99.5 SEC — HIGH (ref 24.5–35.6)
BASOPHILS # BLD AUTO: 0.07 K/UL — SIGNIFICANT CHANGE UP (ref 0–0.2)
BASOPHILS NFR BLD AUTO: 1.1 % — SIGNIFICANT CHANGE UP (ref 0–2)
BUN SERPL-MCNC: 21 MG/DL — SIGNIFICANT CHANGE UP (ref 7–23)
CALCIUM SERPL-MCNC: 10.6 MG/DL — HIGH (ref 8.4–10.5)
CHLORIDE SERPL-SCNC: 106 MMOL/L — SIGNIFICANT CHANGE UP (ref 96–108)
CO2 SERPL-SCNC: 26 MMOL/L — SIGNIFICANT CHANGE UP (ref 22–31)
CREAT SERPL-MCNC: 0.96 MG/DL — SIGNIFICANT CHANGE UP (ref 0.5–1.3)
EGFR: 63 ML/MIN/1.73M2 — SIGNIFICANT CHANGE UP
EOSINOPHIL # BLD AUTO: 0.24 K/UL — SIGNIFICANT CHANGE UP (ref 0–0.5)
EOSINOPHIL NFR BLD AUTO: 3.6 % — SIGNIFICANT CHANGE UP (ref 0–6)
GLUCOSE SERPL-MCNC: 83 MG/DL — SIGNIFICANT CHANGE UP (ref 70–99)
HCT VFR BLD CALC: 44.3 % — SIGNIFICANT CHANGE UP (ref 34.5–45)
HGB BLD-MCNC: 13.9 G/DL — SIGNIFICANT CHANGE UP (ref 11.5–15.5)
IMM GRANULOCYTES NFR BLD AUTO: 0.3 % — SIGNIFICANT CHANGE UP (ref 0–0.9)
INR BLD: 3.03 RATIO — HIGH (ref 0.85–1.18)
LYMPHOCYTES # BLD AUTO: 2.31 K/UL — SIGNIFICANT CHANGE UP (ref 1–3.3)
LYMPHOCYTES # BLD AUTO: 34.8 % — SIGNIFICANT CHANGE UP (ref 13–44)
MAGNESIUM SERPL-MCNC: 2.2 MG/DL — SIGNIFICANT CHANGE UP (ref 1.6–2.6)
MCHC RBC-ENTMCNC: 28.4 PG — SIGNIFICANT CHANGE UP (ref 27–34)
MCHC RBC-ENTMCNC: 31.4 GM/DL — LOW (ref 32–36)
MCV RBC AUTO: 90.6 FL — SIGNIFICANT CHANGE UP (ref 80–100)
MONOCYTES # BLD AUTO: 0.57 K/UL — SIGNIFICANT CHANGE UP (ref 0–0.9)
MONOCYTES NFR BLD AUTO: 8.6 % — SIGNIFICANT CHANGE UP (ref 2–14)
NEUTROPHILS # BLD AUTO: 3.42 K/UL — SIGNIFICANT CHANGE UP (ref 1.8–7.4)
NEUTROPHILS NFR BLD AUTO: 51.6 % — SIGNIFICANT CHANGE UP (ref 43–77)
NRBC # BLD: 0 /100 WBCS — SIGNIFICANT CHANGE UP (ref 0–0)
PHOSPHATE SERPL-MCNC: 3.4 MG/DL — SIGNIFICANT CHANGE UP (ref 2.5–4.5)
PLATELET # BLD AUTO: 295 K/UL — SIGNIFICANT CHANGE UP (ref 150–400)
POTASSIUM SERPL-MCNC: 3.8 MMOL/L — SIGNIFICANT CHANGE UP (ref 3.5–5.3)
POTASSIUM SERPL-SCNC: 3.8 MMOL/L — SIGNIFICANT CHANGE UP (ref 3.5–5.3)
PROTHROM AB SERPL-ACNC: 30.8 SEC — HIGH (ref 9.5–13)
RBC # BLD: 4.89 M/UL — SIGNIFICANT CHANGE UP (ref 3.8–5.2)
RBC # FLD: 14 % — SIGNIFICANT CHANGE UP (ref 10.3–14.5)
SODIUM SERPL-SCNC: 143 MMOL/L — SIGNIFICANT CHANGE UP (ref 135–145)
WBC # BLD: 6.63 K/UL — SIGNIFICANT CHANGE UP (ref 3.8–10.5)
WBC # FLD AUTO: 6.63 K/UL — SIGNIFICANT CHANGE UP (ref 3.8–10.5)

## 2023-09-03 PROCEDURE — 99221 1ST HOSP IP/OBS SF/LOW 40: CPT | Mod: GC

## 2023-09-03 PROCEDURE — 99232 SBSQ HOSP IP/OBS MODERATE 35: CPT

## 2023-09-03 RX ORDER — DIPHENHYDRAMINE HCL 50 MG
25 CAPSULE ORAL EVERY 6 HOURS
Refills: 0 | Status: DISCONTINUED | OUTPATIENT
Start: 2023-09-03 | End: 2023-09-03

## 2023-09-03 RX ADMIN — LISINOPRIL 40 MILLIGRAM(S): 2.5 TABLET ORAL at 05:20

## 2023-09-03 RX ADMIN — ATORVASTATIN CALCIUM 20 MILLIGRAM(S): 80 TABLET, FILM COATED ORAL at 21:04

## 2023-09-03 RX ADMIN — HEPARIN SODIUM 1200 UNIT(S)/HR: 5000 INJECTION INTRAVENOUS; SUBCUTANEOUS at 08:52

## 2023-09-03 RX ADMIN — HEPARIN SODIUM 1500 UNIT(S)/HR: 5000 INJECTION INTRAVENOUS; SUBCUTANEOUS at 07:13

## 2023-09-03 RX ADMIN — HEPARIN SODIUM 1000 UNIT(S)/HR: 5000 INJECTION INTRAVENOUS; SUBCUTANEOUS at 18:51

## 2023-09-03 RX ADMIN — Medication 1 APPLICATION(S): at 05:40

## 2023-09-03 RX ADMIN — HEPARIN SODIUM 1000 UNIT(S)/HR: 5000 INJECTION INTRAVENOUS; SUBCUTANEOUS at 23:22

## 2023-09-03 RX ADMIN — Medication 1 APPLICATION(S): at 17:21

## 2023-09-03 RX ADMIN — HEPARIN SODIUM 0 UNIT(S)/HR: 5000 INJECTION INTRAVENOUS; SUBCUTANEOUS at 07:43

## 2023-09-03 RX ADMIN — HEPARIN SODIUM 1000 UNIT(S)/HR: 5000 INJECTION INTRAVENOUS; SUBCUTANEOUS at 16:48

## 2023-09-03 RX ADMIN — HEPARIN SODIUM 1500 UNIT(S)/HR: 5000 INJECTION INTRAVENOUS; SUBCUTANEOUS at 00:29

## 2023-09-03 NOTE — CONSULT NOTE ADULT - PROVIDER SPECIALTY LIST ADULT
The patient is Stable - Low risk of patient condition declining or worsening    Shift Goals  Clinical Goals: pt will not sustain a fall this shift. pt will remain at stated comfort goal of 6/10 pain this shift.  Patient Goals: sleep comfortably  Family Goals: no family present    Progress made toward(s) clinical / shift goals:      Pt placed on low fall precautions. No falls this shift.    PRN dilaudid and MS contin allow pt to remain at 6/10 pain.    Problem: Pain - Standard  Goal: Alleviation of pain or a reduction in pain to the patient’s comfort goal  Outcome: Progressing     Problem: Knowledge Deficit - Standard  Goal: Patient and family/care givers will demonstrate understanding of plan of care, disease process/condition, diagnostic tests and medications  Outcome: Progressing     Problem: Risk for Aspiration  Goal: Patient's risk for aspiration will be absent or decrease  Outcome: Progressing     Problem: Psychosocial  Goal: Patient's ability to identify and develop effective coping behaviors will improve  Outcome: Progressing       Patient is not progressing towards the following goals:       Gastroenterology

## 2023-09-03 NOTE — PHYSICAL THERAPY INITIAL EVALUATION ADULT - GAIT DISTANCE, PT EVAL
3 steps, refused to ambulate further secondary to IV although PT offered to bring IV, continued to refuse. 50 ft while holding IV pole/10 feet

## 2023-09-03 NOTE — PHYSICAL THERAPY INITIAL EVALUATION ADULT - PERTINENT HX OF CURRENT PROBLEM, REHAB EVAL
Pt is 72 y.o. 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 Pt without any symptoms or complaints. Pt admitted for bridging to heparin gtt in preparation for colonoscopy.

## 2023-09-03 NOTE — PROGRESS NOTE ADULT - PROBLEM SELECTOR PLAN 2
Hx thromboses in PV/IMV/SMV in setting of diverticulitis. Was initially on lovenox but switched to warfarin due to patient preference.   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 Was initially on lovenox but switched to warfarin due to patient preference.   - Hold warfarin, trend INR  - c/w heparin gtt

## 2023-09-03 NOTE — CONSULT NOTE ADULT - ASSESSMENT
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     #colon cancer [id number]: requiring colon ca screen given severe diverticulitis, multiple clots, psoriasis outbreak- could be indication of colon cancer and patient will need screning. Would be too hard to prep at home per discussion with Dr. Yancey and patient/family. She also requires bridging with heparin     Recommendations:  - will tentatively plan to do colonoscopy Wednesday   - CLD on Tuesday and will prep Tuesday night   - c/w heparin for AC    Pushpa Cox MD  Gastroenterology/Hepatology Fellow, PGY-4  Please contact via TEAMS    NON-URGENT CONSULTS:  Please email [email or web address]  AT NIGHT AND ON WEEKENDS:  Contact on-call GI fellow via answering service ([phone]) from 5pm-8am and on weekends/holidays  MONDAY-FRIDAY 8AM-5PM:  Pager# [phone]

## 2023-09-03 NOTE — PROGRESS NOTE ADULT - ASSESSMENT
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 Pt without any symptoms or complaints. Pt admitted for bridging to heparin gtt in preparation for colonoscopy.

## 2023-09-03 NOTE — PROGRESS NOTE ADULT - SUBJECTIVE AND OBJECTIVE BOX
[facility] Division of Hospital Medicine  Juan Shankar MD  Available via MS Teams    PROGRESS NOTE:     Patient is a 72y old  Female who presents with a chief complaint of Colonoscopy (03 Sep 2023 13:07)    SUBJECTIVE / OVERNIGHT EVENTS:  No acute events overnight. Patient seen and evaluated at bedside. No fever/chills.   Denies SOB at rest, chest pain, palpitations, abdominal pain, nausea/vomiting, melena     MEDICATIONS  (STANDING):  atorvastatin 20 milliGRAM(s) Oral at bedtime  heparin  Infusion.  Unit(s)/Hr (15 mL/Hr) IV Continuous <Continuous>  influenza  Vaccine (HIGH DOSE) 0.7 milliLiter(s) IntraMuscular once  lisinopril 40 milliGRAM(s) Oral daily  triamcinolone 0.1% Cream 1 Application(s) Topical every 12 hours    MEDICATIONS  (PRN):  heparin   Injectable 6500 Unit(s) IV Push every 6 hours PRN For aPTT less than 40  heparin   Injectable 3000 Unit(s) IV Push every 6 hours PRN For aPTT between 40 - 57    CAPILLARY BLOOD GLUCOSE  POCT Blood Glucose.: 97 mg/dL (02 Sep 2023 22:13)    I&O's Summary    02 Sep 2023 07:01  -  03 Sep 2023 07:00  --------------------------------------------------------  IN: 0 mL / OUT: 1 mL / NET: -1 mL    PHYSICAL EXAM:  Vital Signs Last 24 Hrs  T(C): 36.4 (03 Sep 2023 04:23), Max: 36.9 (02 Sep 2023 15:54)  T(F): 97.6 (03 Sep 2023 04:23), Max: 98.5 (02 Sep 2023 15:54)  HR: 57 (03 Sep 2023 04:23) (57 - 105)  BP: 112/70 (03 Sep 2023 04:23) (112/70 - 153/77)  RR: 18 (03 Sep 2023 04:23) (18 - 20)  SpO2: 95% (03 Sep 2023 04:23) (95% - 98%)    Parameters below as of 03 Sep 2023 04:23  Patient On (Oxygen Delivery Method): room air    CONSTITUTIONAL: NAD  RESPIRATORY: Normal respiratory effort; lungs are clear to auscultation bilaterally  CARDIOVASCULAR: Regular rate and rhythm, normal S1/S2, no murmurs. No lower extremity edema, No JVD   ABDOMEN: Nontender to palpation, normoactive bowel sounds, no rebound/guarding; No hepatosplenomegaly  MUSCLOSKELETAL: no clubbing or cyanosis of digits; no joint swelling or tenderness to palpation  NEURO: No focal deficits, upper/lower motor strength grossly intact bilaterally   PSYCH: A+O to person, place, and time; affect appropriate    LABS:                        13.9   6.63  )-----------( 295      ( 03 Sep 2023 06:39 )             44.3     09-03    143  |  106  |  21  ----------------------------<  83  3.8   |  26  |  0.96    Ca    10.6<H>      03 Sep 2023 06:39  Phos  3.4     09-03  Mg     2.2     09-03    TPro  8.2  /  Alb  4.0  /  TBili  0.4  /  DBili  x   /  AST  23  /  ALT  11  /  AlkPhos  101  09-02    PT/INR - ( 03 Sep 2023 06:39 )   PT: 30.8 sec;   INR: 3.03 ratio    PTT - ( 03 Sep 2023 06:39 )  PTT:130.9 sec

## 2023-09-03 NOTE — CONSULT NOTE ADULT - SUBJECTIVE AND OBJECTIVE BOX
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 Pt recently saw GI outpatient who recommended inpatient admission for expedited workup. Given patient's last episode of diverticulitis with complication, warrants colonoscopy for malignancy evaluation. Pt on warfarin for AC for her thromboses but does not want lovenox for bridging hence she recommended to come to hospital for heparin bridge in preparation for colonoscopy.    PMH/PSH:  PAST MEDICAL & SURGICAL HISTORY:  HTN (hypertension)      HLD (hyperlipidemia)      Psoriatic arthritis      H/O retinal vein occlusion      Osteoarthritis      No significant past surgical history        FH:  FAMILY HISTORY:      MEDS:  MEDICATIONS  (STANDING):  atorvastatin 20 milliGRAM(s) Oral at bedtime  heparin  Infusion.  Unit(s)/Hr (15 mL/Hr) IV Continuous <Continuous>  influenza  Vaccine (HIGH DOSE) 0.7 milliLiter(s) IntraMuscular once  lisinopril 40 milliGRAM(s) Oral daily  triamcinolone 0.1% Cream 1 Application(s) Topical every 12 hours    MEDICATIONS  (PRN):  heparin   Injectable 6500 Unit(s) IV Push every 6 hours PRN For aPTT less than 40  heparin   Injectable 3000 Unit(s) IV Push every 6 hours PRN For aPTT between 40 - 57    Allergies    cephalexin (Unknown)    Intolerances        CONSTITUTIONAL:  No weight loss, fever, chills, weakness or fatigue.  HEENT:  Eyes:  No visual loss, blurred vision, double vision or yellow sclerae. Ears, Nose, Throat:  No hearing loss, sneezing, congestion, runny nose or sore throat.  SKIN:  No rash or itching.  CARDIOVASCULAR:  No chest pain, chest pressure or chest discomfort. No palpitations or edema.  RESPIRATORY:  No shortness of breath, cough or sputum.  GASTROINTESTINAL:  SEE HPI  GENITOURINARY:  No dysuria, hematuria, urinary frequency  NEUROLOGICAL:  No headache, dizziness, syncope, paralysis, ataxia, numbness or tingling in the extremities. No change in bowel or bladder control.  MUSCULOSKELETAL:  No muscle, back pain, joint pain or stiffness.  HEMATOLOGIC:  No anemia, bleeding or bruising.  LYMPHATICS:  No enlarged nodes. No history of splenectomy.  PSYCHIATRIC:  No history of depression or anxiety.  ENDOCRINOLOGIC:  No reports of sweating, cold or heat intolerance. No polyuria or polydipsia.      ______________________________________________________________________  PHYSICAL EXAM:  T(C): 36.4 (09-03-23 @ 04:23), Max: 36.9 (09-02-23 @ 15:54)  HR: 57 (09-03-23 @ 04:23)  BP: 112/70 (09-03-23 @ 04:23)  RR: 18 (09-03-23 @ 04:23)  SpO2: 95% (09-03-23 @ 04:23)  Wt(kg): --    09-02 - 09-03  --------------------------------------------------------  IN:  Total IN: 0 mL    OUT:    Voided (mL): 1 mL  Total OUT: 1 mL    Total NET: -1 mL          GEN: NAD, normocephalic  CVS: S1S2+  CHEST: clear to auscultation  ABD: soft , nontender, nondistended, bowel sounds present  EXTR: no cyanosis, no clubbing, no edema  NEURO: Awake and alert; oriented .....  SKIN:  warm;  non icteric    ______________________________________________________________________  LABS:                        13.9   6.63  )-----------( 295      ( 03 Sep 2023 06:39 )             44.3     09-03    143  |  106  |  21  ----------------------------<  83  3.8   |  26  |  0.96    Ca    10.6<H>      03 Sep 2023 06:39  Phos  3.4     09-03  Mg     2.2     09-03    TPro  8.2  /  Alb  4.0  /  TBili  0.4  /  DBili  x   /  AST  23  /  ALT  11  /  AlkPhos  101  09-02    LIVER FUNCTIONS - ( 02 Sep 2023 17:35 )  Alb: 4.0 g/dL / Pro: 8.2 g/dL / ALK PHOS: 101 U/L / ALT: 11 U/L / AST: 23 U/L / GGT: x           PT/INR - ( 03 Sep 2023 06:39 )   PT: 30.8 sec;   INR: 3.03 ratio         PTT - ( 03 Sep 2023 06:39 )  PTT:130.9 sec  ____________________________________________    IMAGING:    ______________________________________________________________________  ASSESSMENT:  72y Female    PLAN:            [facility] GI CONSULTATION    Patient is a 72y old  Female who presents with a chief complaint of Colonoscopy (02 Sep 2023 21:33)    HPI:  72 F with hx of HTN, MEJIA, Psoriatic arthritis, hx of diverticulitis c/b adenopathy and 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 Pt recently saw GI outpatient who recommended inpatient admission for expedited workup. Given patient's last episode of diverticulitis with complication, warrants colonoscopy for malignancy evaluation. Pt on warfarin for AC for her thromboses but does not want lovenox for bridging hence she recommended to come to hospital for heparin bridge in preparation for colonoscopy. She follows outpatient with Dr. Yancey. Today, she is feeling well but very nervous about the colonoscopy. She reports feeling abdominal pain intermittently which started in January. At this time she was admitted to [facility] for 5 weeks with diverticulitis. Over the last few months the patient has lost about 60 pounds. She reports soft brown stool without melena and blood. She has a family history of lung cancer in her father. She denies family hx of colon or breast cancer. Patient is a previous smoker of about 40 years, of alcohol use.     PMH/PSH:  PAST MEDICAL & SURGICAL HISTORY:  HTN (hypertension)      HLD (hyperlipidemia)      Psoriatic arthritis      H/O retinal vein occlusion      Osteoarthritis      No significant past surgical history        FH:  FAMILY HISTORY:      MEDS:  MEDICATIONS  (STANDING):  atorvastatin 20 milliGRAM(s) Oral at bedtime  heparin  Infusion.  Unit(s)/Hr (15 mL/Hr) IV Continuous <Continuous>  influenza  Vaccine (HIGH DOSE) 0.7 milliLiter(s) IntraMuscular once  lisinopril 40 milliGRAM(s) Oral daily  triamcinolone 0.1% Cream 1 Application(s) Topical every 12 hours    MEDICATIONS  (PRN):  heparin   Injectable 6500 Unit(s) IV Push every 6 hours PRN For aPTT less than 40  heparin   Injectable 3000 Unit(s) IV Push every 6 hours PRN For aPTT between 40 - 57    Allergies    cephalexin (Unknown)    Intolerances        CONSTITUTIONAL: + fatigue and wt loss   HEENT:  Eyes:  No visual loss, blurred vision, double vision or yellow sclerae. Ears, Nose, Throat:  No hearing loss, sneezing, congestion, runny nose or sore throat.  SKIN:  No rash or itching.  CARDIOVASCULAR:  No chest pain, chest pressure or chest discomfort. No palpitations or edema.  RESPIRATORY:  No shortness of breath, cough or sputum.  GASTROINTESTINAL:  intermittent abd pain, no nauses and vomiting   GENITOURINARY:  No dysuria, hematuria, urinary frequency  NEUROLOGICAL:  No headache, dizziness, syncope, paralysis, ataxia, numbness or tingling in the extremities. No change in bowel or bladder control.  MUSCULOSKELETAL:  No muscle, back pain, joint pain or stiffness.  HEMATOLOGIC:  No anemia, bleeding or bruising.  LYMPHATICS:  No enlarged nodes. No history of splenectomy.  PSYCHIATRIC:  No history of depression or anxiety.  ENDOCRINOLOGIC:  No reports of sweating, cold or heat intolerance. No polyuria or polydipsia.      ______________________________________________________________________  PHYSICAL EXAM:  T(C): 36.4 (09-03-23 @ 04:23), Max: 36.9 (09-02-23 @ 15:54)  HR: 57 (09-03-23 @ 04:23)  BP: 112/70 (09-03-23 @ 04:23)  RR: 18 (09-03-23 @ 04:23)  SpO2: 95% (09-03-23 @ 04:23)  Wt(kg): --    09-02 - 09-03  --------------------------------------------------------  IN:  Total IN: 0 mL    OUT:    Voided (mL): 1 mL  Total OUT: 1 mL    Total NET: -1 mL          GEN: NAD, normocephalic  CVS: S1S2+  CHEST: clear to auscultation  ABD: soft , nontender, nondistended, bowel sounds present  EXTR: no cyanosis, no clubbing, no edema  NEURO: Awake and alert; oriented x3  SKIN:  psoriasis covering abdomen     ______________________________________________________________________  LABS:                        13.9   6.63  )-----------( 295      ( 03 Sep 2023 06:39 )             44.3     09-03    143  |  106  |  21  ----------------------------<  83  3.8   |  26  |  0.96    Ca    10.6<H>      03 Sep 2023 06:39  Phos  3.4     09-03  Mg     2.2     09-03    TPro  8.2  /  Alb  4.0  /  TBili  0.4  /  DBili  x   /  AST  23  /  ALT  11  /  AlkPhos  101  09-02    LIVER FUNCTIONS - ( 02 Sep 2023 17:35 )  Alb: 4.0 g/dL / Pro: 8.2 g/dL / ALK PHOS: 101 U/L / ALT: 11 U/L / AST: 23 U/L / GGT: x           PT/INR - ( 03 Sep 2023 06:39 )   PT: 30.8 sec;   INR: 3.03 ratio         PTT - ( 03 Sep 2023 06:39 )  PTT:130.9 sec

## 2023-09-03 NOTE — CONSULT NOTE ADULT - ATTENDING COMMENTS
Seen and examined. 72F on AC presents for colonoscopy, will require heparin gtt bridge. Colonoscopy tentatively planned for Wednesday, CLD on Tuesday, low residue diet until then.

## 2023-09-03 NOTE — PHYSICAL THERAPY INITIAL EVALUATION ADULT - ADDITIONAL COMMENTS
Pt states she lives with  in 2 floor co-op, ramp entrance, full flight to 2nd floor, uses rollator. States she was just recent at [facility] and was ambulating, then said she used w/c to get around home, unable to clarify if able to negotiate steps as pt became agitated the more PT asked questions.

## 2023-09-04 LAB
ANION GAP SERPL CALC-SCNC: 10 MMOL/L — SIGNIFICANT CHANGE UP (ref 5–17)
APTT BLD: 88.6 SEC — HIGH (ref 24.5–35.6)
BUN SERPL-MCNC: 22 MG/DL — SIGNIFICANT CHANGE UP (ref 7–23)
CALCIUM SERPL-MCNC: 10.2 MG/DL — SIGNIFICANT CHANGE UP (ref 8.4–10.5)
CHLORIDE SERPL-SCNC: 107 MMOL/L — SIGNIFICANT CHANGE UP (ref 96–108)
CO2 SERPL-SCNC: 23 MMOL/L — SIGNIFICANT CHANGE UP (ref 22–31)
CREAT SERPL-MCNC: 1.04 MG/DL — SIGNIFICANT CHANGE UP (ref 0.5–1.3)
EGFR: 57 ML/MIN/1.73M2 — LOW
GLUCOSE SERPL-MCNC: 92 MG/DL — SIGNIFICANT CHANGE UP (ref 70–99)
HCT VFR BLD CALC: 41.2 % — SIGNIFICANT CHANGE UP (ref 34.5–45)
HGB BLD-MCNC: 13.2 G/DL — SIGNIFICANT CHANGE UP (ref 11.5–15.5)
INR BLD: 3 RATIO — HIGH (ref 0.85–1.18)
MCHC RBC-ENTMCNC: 29.2 PG — SIGNIFICANT CHANGE UP (ref 27–34)
MCHC RBC-ENTMCNC: 32 GM/DL — SIGNIFICANT CHANGE UP (ref 32–36)
MCV RBC AUTO: 91.2 FL — SIGNIFICANT CHANGE UP (ref 80–100)
NRBC # BLD: 0 /100 WBCS — SIGNIFICANT CHANGE UP (ref 0–0)
PLATELET # BLD AUTO: 262 K/UL — SIGNIFICANT CHANGE UP (ref 150–400)
POTASSIUM SERPL-MCNC: 3.9 MMOL/L — SIGNIFICANT CHANGE UP (ref 3.5–5.3)
POTASSIUM SERPL-SCNC: 3.9 MMOL/L — SIGNIFICANT CHANGE UP (ref 3.5–5.3)
PROTHROM AB SERPL-ACNC: 32 SEC — HIGH (ref 9.5–13)
RBC # BLD: 4.52 M/UL — SIGNIFICANT CHANGE UP (ref 3.8–5.2)
RBC # FLD: 13.9 % — SIGNIFICANT CHANGE UP (ref 10.3–14.5)
SODIUM SERPL-SCNC: 140 MMOL/L — SIGNIFICANT CHANGE UP (ref 135–145)
WBC # BLD: 6.4 K/UL — SIGNIFICANT CHANGE UP (ref 3.8–10.5)
WBC # FLD AUTO: 6.4 K/UL — SIGNIFICANT CHANGE UP (ref 3.8–10.5)

## 2023-09-04 PROCEDURE — 99232 SBSQ HOSP IP/OBS MODERATE 35: CPT

## 2023-09-04 PROCEDURE — 99231 SBSQ HOSP IP/OBS SF/LOW 25: CPT

## 2023-09-04 RX ADMIN — ATORVASTATIN CALCIUM 20 MILLIGRAM(S): 80 TABLET, FILM COATED ORAL at 21:18

## 2023-09-04 RX ADMIN — HEPARIN SODIUM 1000 UNIT(S)/HR: 5000 INJECTION INTRAVENOUS; SUBCUTANEOUS at 07:09

## 2023-09-04 RX ADMIN — Medication 1 APPLICATION(S): at 06:27

## 2023-09-04 RX ADMIN — LISINOPRIL 40 MILLIGRAM(S): 2.5 TABLET ORAL at 06:16

## 2023-09-04 RX ADMIN — Medication 1 APPLICATION(S): at 21:19

## 2023-09-04 RX ADMIN — HEPARIN SODIUM 1000 UNIT(S)/HR: 5000 INJECTION INTRAVENOUS; SUBCUTANEOUS at 19:38

## 2023-09-04 RX ADMIN — HEPARIN SODIUM 1000 UNIT(S)/HR: 5000 INJECTION INTRAVENOUS; SUBCUTANEOUS at 07:04

## 2023-09-04 RX ADMIN — HEPARIN SODIUM 1000 UNIT(S)/HR: 5000 INJECTION INTRAVENOUS; SUBCUTANEOUS at 19:41

## 2023-09-04 NOTE — CHART NOTE - NSCHARTNOTEFT_GEN_A_CORE
Called  by  RN that  pt is  requesting  a meal  at  this  time  and  states  she  will  not  go  to  have  colonoscopy  until  Friday.    Explained  to  pt  that GI  Attending  documented  that  she  will  probably  have  it  done  on  Wednesday.  Pt  states that  she  refuses.   Explained  to  pt  that by  doing  this  will delay  treatment  .  Pt  verbalized  understanding  at  this  time.    Provider  to  RN  was  placed  to  give  pt  a  sandwich.   Pt  is  placed  on  a  regular  diet  at  this  time.

## 2023-09-04 NOTE — PROGRESS NOTE ADULT - SUBJECTIVE AND OBJECTIVE BOX
[facility] Division of Hospital Medicine  Juan Shankar MD  Available via MS Teams    PROGRESS NOTE:     Patient is a 72y old  Female who presents with a chief complaint of Colonoscopy (03 Sep 2023 14:03)    SUBJECTIVE / OVERNIGHT EVENTS:  No acute events overnight. Patient seen and evaluated at bedside. No fever/chills.    Denies SOB at rest, chest pain, palpitations, abdominal pain, nausea/vomiting    MEDICATIONS  (STANDING):  atorvastatin 20 milliGRAM(s) Oral at bedtime  heparin  Infusion.  Unit(s)/Hr (15 mL/Hr) IV Continuous <Continuous>  influenza  Vaccine (HIGH DOSE) 0.7 milliLiter(s) IntraMuscular once  lisinopril 40 milliGRAM(s) Oral daily  triamcinolone 0.1% Cream 1 Application(s) Topical every 12 hours    MEDICATIONS  (PRN):  heparin   Injectable 6500 Unit(s) IV Push every 6 hours PRN For aPTT less than 40  heparin   Injectable 3000 Unit(s) IV Push every 6 hours PRN For aPTT between 40 - 57    PHYSICAL EXAM:  Vital Signs Last 24 Hrs  T(C): 36.3 (04 Sep 2023 04:14), Max: 36.8 (03 Sep 2023 15:03)  T(F): 97.4 (04 Sep 2023 04:14), Max: 98.2 (03 Sep 2023 15:03)  HR: 72 (04 Sep 2023 04:14) (62 - 72)  BP: 109/65 (04 Sep 2023 04:14) (109/65 - 131/73)  RR: 18 (04 Sep 2023 04:14) (18 - 18)  SpO2: 96% (04 Sep 2023 04:14) (96% - 97%)    Parameters below as of 04 Sep 2023 04:14  Patient On (Oxygen Delivery Method): room air    CONSTITUTIONAL: NAD  RESPIRATORY: Normal respiratory effort; lungs are clear to auscultation bilaterally  CARDIOVASCULAR: Regular rate and rhythm, normal S1/S2, no murmurs. No lower extremity edema, No JVD   ABDOMEN: Nontender to palpation, normoactive bowel sounds, no rebound/guarding; No hepatosplenomegaly  MUSCLOSKELETAL: no clubbing or cyanosis of digits; no joint swelling or tenderness to palpation  NEURO: No focal deficits, upper/lower motor strength grossly intact bilaterally   EXT: Warm, well perfused   PSYCH: A+O to person, place, and time; affect appropriate    LABS:                        13.2   6.40  )-----------( 262      ( 04 Sep 2023 06:33 )             41.2     09-04    140  |  107  |  22  ----------------------------<  92  3.9   |  23  |  1.04    Ca    10.2      04 Sep 2023 06:33  Phos  3.4     09-03  Mg     2.2     09-03    TPro  8.2  /  Alb  4.0  /  TBili  0.4  /  DBili  x   /  AST  23  /  ALT  11  /  AlkPhos  101  09-02    PT/INR - ( 04 Sep 2023 06:33 )   PT: 32.0 sec;   INR: 3.00 ratio    PTT - ( 04 Sep 2023 06:33 )  PTT:88.6 sec

## 2023-09-04 NOTE — PROGRESS NOTE ADULT - ASSESSMENT
Impression:  1. History of diverticulitis  2. 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

## 2023-09-04 NOTE — PROGRESS NOTE ADULT - SUBJECTIVE AND OBJECTIVE BOX
Chief Complaint:  Patient is a 72y old  Female who presents with a chief complaint of Colonoscopy (04 Sep 2023 12:13)      Interval Events: "The other doctor told me to eat regular food tonight." She reports her daughter cannot come in until end of the week, thus it's more convenient for her to have colonoscopy Friday rather than Wednesday which I offered her. No diarrhea, no blood in stool.    Allergies:  cephalexin (Unknown)      Hospital Medications:  atorvastatin 20 milliGRAM(s) Oral at bedtime  heparin   Injectable 6500 Unit(s) IV Push every 6 hours PRN  heparin   Injectable 3000 Unit(s) IV Push every 6 hours PRN  heparin  Infusion.  Unit(s)/Hr IV Continuous <Continuous>  influenza  Vaccine (HIGH DOSE) 0.7 milliLiter(s) IntraMuscular once  lisinopril 40 milliGRAM(s) Oral daily  triamcinolone 0.1% Cream 1 Application(s) Topical every 12 hours      PMHX/PSHX:  HTN (hypertension)    HLD (hyperlipidemia)    Psoriatic arthritis    H/O retinal vein occlusion    Osteoarthritis    No significant past surgical history        Family history:      ROS:     General:  No wt loss, fevers, chills, night sweats, fatigue,   Eyes:  Good vision, no reported pain  ENT:  No sore throat, pain, runny nose, dysphagia  CV:  No pain, palpitations, hypo/hypertension  Resp:  No dyspnea, cough, tachypnea, wheezing  GI:  See HPI  :  No pain, bleeding, incontinence, nocturia  Muscle:  No pain, weakness  Neuro:  No weakness, tingling, memory problems  Skin:  No rash, edema      PHYSICAL EXAM:   Vital Signs:  Vital Signs Last 24 Hrs  T(C): 36.8 (04 Sep 2023 15:42), Max: 36.8 (04 Sep 2023 12:25)  T(F): 98.2 (04 Sep 2023 15:42), Max: 98.2 (04 Sep 2023 12:25)  HR: 78 (04 Sep 2023 15:42) (62 - 78)  BP: 135/78 (04 Sep 2023 15:42) (95/57 - 135/78)  BP(mean): --  RR: 18 (04 Sep 2023 15:42) (18 - 18)  SpO2: 98% (04 Sep 2023 15:42) (96% - 98%)    Parameters below as of 04 Sep 2023 15:42  Patient On (Oxygen Delivery Method): room air      Daily     Daily     GENERAL:  Appears stated age, well-groomed, well-nourished, no distress  HEENT:  NC/AT,  conjunctivae clear, sclera -anicteric  CHEST:  Full & symmetric excursion, no increased effort, breath sounds clear  HEART:  Regular rhythm, S1, S2  ABDOMEN:  Soft, non-tender, non-distended, normoactive bowel sounds  EXTREMITIES:  no cyanosis,clubbing or edema  SKIN:  No rash/erythema  NEURO:  Alert, oriented    LABS:                        13.2   6.40  )-----------( 262      ( 04 Sep 2023 06:33 )             41.2     09-04    140  |  107  |  22  ----------------------------<  92  3.9   |  23  |  1.04    Ca    10.2      04 Sep 2023 06:33  Phos  3.4     09-03  Mg     2.2     09-03    TPro  8.2  /  Alb  4.0  /  TBili  0.4  /  DBili  x   /  AST  23  /  ALT  11  /  AlkPhos  101  09-02    LIVER FUNCTIONS - ( 02 Sep 2023 17:35 )  Alb: 4.0 g/dL / Pro: 8.2 g/dL / ALK PHOS: 101 U/L / ALT: 11 U/L / AST: 23 U/L / GGT: x           PT/INR - ( 04 Sep 2023 06:33 )   PT: 32.0 sec;   INR: 3.00 ratio         PTT - ( 04 Sep 2023 06:33 )  PTT:88.6 sec  Urinalysis Basic - ( 04 Sep 2023 06:33 )    Color: x / Appearance: x / SG: x / pH: x  Gluc: 92 mg/dL / Ketone: x  / Bili: x / Urobili: x   Blood: x / Protein: x / Nitrite: x   Leuk Esterase: x / RBC: x / WBC x   Sq Epi: x / Non Sq Epi: x / Bacteria: x

## 2023-09-05 DIAGNOSIS — Z87.19 PERSONAL HISTORY OF OTHER DISEASES OF THE DIGESTIVE SYSTEM: ICD-10-CM

## 2023-09-05 LAB
APTT BLD: 69.8 SEC — HIGH (ref 24.5–35.6)
HCT VFR BLD CALC: 41.6 % — SIGNIFICANT CHANGE UP (ref 34.5–45)
HGB BLD-MCNC: 13.2 G/DL — SIGNIFICANT CHANGE UP (ref 11.5–15.5)
INR BLD: 2.34 RATIO — HIGH (ref 0.85–1.18)
MCHC RBC-ENTMCNC: 28.6 PG — SIGNIFICANT CHANGE UP (ref 27–34)
MCHC RBC-ENTMCNC: 31.7 GM/DL — LOW (ref 32–36)
MCV RBC AUTO: 90 FL — SIGNIFICANT CHANGE UP (ref 80–100)
NRBC # BLD: 0 /100 WBCS — SIGNIFICANT CHANGE UP (ref 0–0)
PLATELET # BLD AUTO: 251 K/UL — SIGNIFICANT CHANGE UP (ref 150–400)
PROTHROM AB SERPL-ACNC: 25.1 SEC — HIGH (ref 9.5–13)
RBC # BLD: 4.62 M/UL — SIGNIFICANT CHANGE UP (ref 3.8–5.2)
RBC # FLD: 14 % — SIGNIFICANT CHANGE UP (ref 10.3–14.5)
WBC # BLD: 6.44 K/UL — SIGNIFICANT CHANGE UP (ref 3.8–10.5)
WBC # FLD AUTO: 6.44 K/UL — SIGNIFICANT CHANGE UP (ref 3.8–10.5)

## 2023-09-05 PROCEDURE — 99231 SBSQ HOSP IP/OBS SF/LOW 25: CPT

## 2023-09-05 PROCEDURE — 99232 SBSQ HOSP IP/OBS MODERATE 35: CPT

## 2023-09-05 RX ADMIN — HEPARIN SODIUM 1000 UNIT(S)/HR: 5000 INJECTION INTRAVENOUS; SUBCUTANEOUS at 19:49

## 2023-09-05 RX ADMIN — LISINOPRIL 40 MILLIGRAM(S): 2.5 TABLET ORAL at 05:19

## 2023-09-05 RX ADMIN — Medication 2 APPLICATION(S): at 18:49

## 2023-09-05 RX ADMIN — ATORVASTATIN CALCIUM 20 MILLIGRAM(S): 80 TABLET, FILM COATED ORAL at 21:06

## 2023-09-05 RX ADMIN — HEPARIN SODIUM 1000 UNIT(S)/HR: 5000 INJECTION INTRAVENOUS; SUBCUTANEOUS at 07:12

## 2023-09-05 RX ADMIN — HEPARIN SODIUM 1000 UNIT(S)/HR: 5000 INJECTION INTRAVENOUS; SUBCUTANEOUS at 07:39

## 2023-09-05 RX ADMIN — Medication 1 APPLICATION(S): at 05:19

## 2023-09-05 NOTE — PROGRESS NOTE ADULT - SUBJECTIVE AND OBJECTIVE BOX
[facility] Division of Hospital Medicine  Diallo Carter DO  Pager (M-F, 8A-5P):  MS Teams PREFERRED        SUBJECTIVE / OVERNIGHT EVENTS:   Patient evaluated at the bedside, states that she was told she has colonoscopy on Friday and will not do prep for Wednesday. It was described to patient that will speak to GI regarding date of colonoscopy. Denies any melena at this time.       MEDICATIONS  (STANDING):  atorvastatin 20 milliGRAM(s) Oral at bedtime  heparin  Infusion.  Unit(s)/Hr (15 mL/Hr) IV Continuous <Continuous>  influenza  Vaccine (HIGH DOSE) 0.7 milliLiter(s) IntraMuscular once  lisinopril 40 milliGRAM(s) Oral daily  triamcinolone 0.1% Cream 2 Application(s) Topical every 12 hours    MEDICATIONS  (PRN):  heparin   Injectable 6500 Unit(s) IV Push every 6 hours PRN For aPTT less than 40  heparin   Injectable 3000 Unit(s) IV Push every 6 hours PRN For aPTT between 40 - 57      I&O's Summary    04 Sep 2023 07:01  -  05 Sep 2023 07:00  --------------------------------------------------------  IN: 960 mL / OUT: 0 mL / NET: 960 mL    05 Sep 2023 07:01  -  05 Sep 2023 17:10  --------------------------------------------------------  IN: 480 mL / OUT: 0 mL / NET: 480 mL        PHYSICAL EXAM:  Vital Signs Last 24 Hrs  T(C): 36.4 (05 Sep 2023 12:07), Max: 37 (05 Sep 2023 04:50)  T(F): 97.6 (05 Sep 2023 12:07), Max: 98.6 (05 Sep 2023 04:50)  HR: 76 (05 Sep 2023 12:07) (68 - 76)  BP: 104/67 (05 Sep 2023 12:07) (104/67 - 117/72)  BP(mean): --  RR: 18 (05 Sep 2023 12:07) (18 - 18)  SpO2: 97% (05 Sep 2023 12:07) (96% - 99%)    Parameters below as of 05 Sep 2023 12:07  Patient On (Oxygen Delivery Method): room air      CONSTITUTIONAL: NAD  RESPIRATORY: Normal respiratory effort; lungs are clear to auscultation bilaterally  CARDIOVASCULAR: Regular rate and rhythm, normal S1/S2, no murmurs. No lower extremity edema, No JVD   ABDOMEN: Nontender to palpation, normoactive bowel sounds, no rebound/guarding; No hepatosplenomegaly  MUSCLOSKELETAL: no clubbing or cyanosis of digits; no joint swelling or tenderness to palpation  NEURO: No focal deficits, upper/lower motor strength grossly intact bilaterally   EXT: Warm, well perfused   PSYCH: A+O to person, place, and time; affect appropriate      LABS:                        13.2   6.44  )-----------( 251      ( 05 Sep 2023 06:58 )             41.6     09-04    140  |  107  |  22  ----------------------------<  92  3.9   |  23  |  1.04    Ca    10.2      04 Sep 2023 06:33      PT/INR - ( 05 Sep 2023 06:58 )   PT: 25.1 sec;   INR: 2.34 ratio         PTT - ( 05 Sep 2023 06:58 )  PTT:69.8 sec      Urinalysis Basic - ( 04 Sep 2023 06:33 )    Color: x / Appearance: x / SG: x / pH: x  Gluc: 92 mg/dL / Ketone: x  / Bili: x / Urobili: x   Blood: x / Protein: x / Nitrite: x   Leuk Esterase: x / RBC: x / WBC x   Sq Epi: x / Non Sq Epi: x / Bacteria: x          RADIOLOGY & ADDITIONAL TESTS:  Results Reviewed: CBC/CMP personally reviewed by me Hgb 13.2, WBC 6.44, Cr 1.04, Na 140, K 3.9  Imaging Personally Reviewed:  Electrocardiogram Personally Reviewed:    COORDINATION OF CARE:  Care Discussed with Consultants/Other Providers [Y/N]: Y   Prior or Outpatient Records Reviewed [Y/N]: Y

## 2023-09-05 NOTE — PROGRESS NOTE ADULT - SUBJECTIVE AND OBJECTIVE BOX
Chief Complaint:  Patient is a 72y old  Female who presents with a chief complaint of Colonoscopy (04 Sep 2023 16:34)      Interval Events: No overnight events. No bleeding    Allergies:  cephalexin (Unknown)      Hospital Medications:  atorvastatin 20 milliGRAM(s) Oral at bedtime  heparin   Injectable 6500 Unit(s) IV Push every 6 hours PRN  heparin   Injectable 3000 Unit(s) IV Push every 6 hours PRN  heparin  Infusion.  Unit(s)/Hr IV Continuous <Continuous>  influenza  Vaccine (HIGH DOSE) 0.7 milliLiter(s) IntraMuscular once  lisinopril 40 milliGRAM(s) Oral daily  triamcinolone 0.1% Cream 2 Application(s) Topical every 12 hours      PMHX/PSHX:  HTN (hypertension)    HLD (hyperlipidemia)    Psoriatic arthritis    H/O retinal vein occlusion    Osteoarthritis    No significant past surgical history        Family history:      ROS:     General:  No wt loss, fevers, chills, night sweats, fatigue,   Eyes:  Good vision, no reported pain  ENT:  No sore throat, pain, runny nose, dysphagia  CV:  No pain, palpitations, hypo/hypertension  Resp:  No dyspnea, cough, tachypnea, wheezing  GI:  See HPI  :  No pain, bleeding, incontinence, nocturia  Muscle:  No pain, weakness  Neuro:  No weakness, tingling, memory problems  Psych:  No fatigue, insomnia, mood problems, depression  Endocrine:  No polyuria, polydipsia, cold/heat intolerance  Heme:  No petechiae, ecchymosis, easy bruisability  Skin:  No rash, edema      PHYSICAL EXAM:   Vital Signs:  Vital Signs Last 24 Hrs  T(C): 36.4 (05 Sep 2023 12:07), Max: 37 (05 Sep 2023 04:50)  T(F): 97.6 (05 Sep 2023 12:07), Max: 98.6 (05 Sep 2023 04:50)  HR: 76 (05 Sep 2023 12:07) (68 - 78)  BP: 104/67 (05 Sep 2023 12:07) (104/67 - 135/78)  BP(mean): --  RR: 18 (05 Sep 2023 12:07) (18 - 18)  SpO2: 97% (05 Sep 2023 12:07) (96% - 99%)    Parameters below as of 05 Sep 2023 12:07  Patient On (Oxygen Delivery Method): room air      Daily     Daily     GENERAL:  Appears stated age, well-groomed, well-nourished, no distress  HEENT:  NC/AT,  conjunctivae clear, sclera -anicteric  CHEST:  Full & symmetric excursion, no increased effort, breath sounds clear  HEART:  Regular rhythm, S1, S2  ABDOMEN:  Soft, non-tender, non-distended  EXTREMITIES:  no cyanosis,clubbing or edema  SKIN:  No rash/erythema  NEURO:  Alert, oriented,     LABS:                        13.2   6.44  )-----------( 251      ( 05 Sep 2023 06:58 )             41.6     09-04    140  |  107  |  22  ----------------------------<  92  3.9   |  23  |  1.04    Ca    10.2      04 Sep 2023 06:33        PT/INR - ( 05 Sep 2023 06:58 )   PT: 25.1 sec;   INR: 2.34 ratio         PTT - ( 05 Sep 2023 06:58 )  PTT:69.8 sec  Urinalysis Basic - ( 04 Sep 2023 06:33 )    Color: x / Appearance: x / SG: x / pH: x  Gluc: 92 mg/dL / Ketone: x  / Bili: x / Urobili: x   Blood: x / Protein: x / Nitrite: x   Leuk Esterase: x / RBC: x / WBC x   Sq Epi: x / Non Sq Epi: x / Bacteria: x

## 2023-09-05 NOTE — PROGRESS NOTE ADULT - PROBLEM SELECTOR PLAN 2
Recently diagnosed thromboses in PV/IMV/SMV in setting of diverticulitis, actively being treated with anticoagulation. Was initially on lovenox but switched to warfarin due to patient preference.   - Hold warfarin, trend INR  - Continue  heparin gtt

## 2023-09-05 NOTE — PROGRESS NOTE ADULT - ASSESSMENT
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 Pt without any symptoms or complaints. Pt admitted for bridging to heparin gtt in preparation for colonoscopy.  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 Pt without any symptoms or complaints. Pt admitted for bridging to heparin gtt in preparation for colonoscopy

## 2023-09-05 NOTE — PROGRESS NOTE ADULT - ASSESSMENT
Impression:  1. History of diverticulitis  2. 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    Plan:  -patient admitted for screening colonoscopy and for bridging given VTE  -Offered patient to prep tomorrow for colonoscopy Wednesday, but she wants colonoscopy Friday as "it's better for me and my daughter" and she wants her family here for procedure, and she was told by another doctor that she can eat regular food today and declined colonoscopy for tomorrow  Continue to hold warfarin, INR still therapeutic  -Continue heparin gtt in the meantime  -Change diet to clears on Thursday, will prep for colonoscopy Friday    Dany (Reynaldo Julio) MD BJ Berumen e-mail: [email or web address]

## 2023-09-06 LAB
APTT BLD: 70 SEC — HIGH (ref 24.5–35.6)
BILIRUB SERPL-MCNC: 0.4 MG/DL — SIGNIFICANT CHANGE UP (ref 0.2–1.2)
CREAT SERPL-MCNC: 1 MG/DL — SIGNIFICANT CHANGE UP (ref 0.5–1.3)
EGFR: 60 ML/MIN/1.73M2 — SIGNIFICANT CHANGE UP
HCT VFR BLD CALC: 44.9 % — SIGNIFICANT CHANGE UP (ref 34.5–45)
HGB BLD-MCNC: 14.2 G/DL — SIGNIFICANT CHANGE UP (ref 11.5–15.5)
INR BLD: 1.51 RATIO — HIGH (ref 0.85–1.18)
MCHC RBC-ENTMCNC: 28.1 PG — SIGNIFICANT CHANGE UP (ref 27–34)
MCHC RBC-ENTMCNC: 31.6 GM/DL — LOW (ref 32–36)
MCV RBC AUTO: 88.9 FL — SIGNIFICANT CHANGE UP (ref 80–100)
MELD SCORE WITH DIALYSIS: 24 POINTS — SIGNIFICANT CHANGE UP
MELD SCORE WITHOUT DIALYSIS: 11 POINTS — SIGNIFICANT CHANGE UP
NRBC # BLD: 0 /100 WBCS — SIGNIFICANT CHANGE UP (ref 0–0)
PLATELET # BLD AUTO: 311 K/UL — SIGNIFICANT CHANGE UP (ref 150–400)
PROTHROM AB SERPL-ACNC: 15.7 SEC — HIGH (ref 9.5–13)
RBC # BLD: 5.05 M/UL — SIGNIFICANT CHANGE UP (ref 3.8–5.2)
RBC # FLD: 14 % — SIGNIFICANT CHANGE UP (ref 10.3–14.5)
SODIUM SERPL-SCNC: 140 MMOL/L — SIGNIFICANT CHANGE UP (ref 135–145)
WBC # BLD: 8.26 K/UL — SIGNIFICANT CHANGE UP (ref 3.8–10.5)
WBC # FLD AUTO: 8.26 K/UL — SIGNIFICANT CHANGE UP (ref 3.8–10.5)

## 2023-09-06 PROCEDURE — 99232 SBSQ HOSP IP/OBS MODERATE 35: CPT

## 2023-09-06 PROCEDURE — 99231 SBSQ HOSP IP/OBS SF/LOW 25: CPT | Mod: GC

## 2023-09-06 RX ADMIN — HEPARIN SODIUM 1000 UNIT(S)/HR: 5000 INJECTION INTRAVENOUS; SUBCUTANEOUS at 07:10

## 2023-09-06 RX ADMIN — HEPARIN SODIUM 1000 UNIT(S)/HR: 5000 INJECTION INTRAVENOUS; SUBCUTANEOUS at 19:04

## 2023-09-06 RX ADMIN — ATORVASTATIN CALCIUM 20 MILLIGRAM(S): 80 TABLET, FILM COATED ORAL at 21:43

## 2023-09-06 RX ADMIN — LISINOPRIL 40 MILLIGRAM(S): 2.5 TABLET ORAL at 06:11

## 2023-09-06 RX ADMIN — Medication 2 APPLICATION(S): at 21:43

## 2023-09-06 NOTE — PROGRESS NOTE ADULT - SUBJECTIVE AND OBJECTIVE BOX
[facility] Division of Hospital Medicine  Diallo Carter DO  Pager (M-F, 8A-5P):  MS Teams PREFERRED        SUBJECTIVE / OVERNIGHT EVENTS:  Patient seen at the bedside, requests to speak to GI team and have them contact her daughter. She denies any shortness of breath, chest pian, n/v/abdominal pain.       MEDICATIONS  (STANDING):  atorvastatin 20 milliGRAM(s) Oral at bedtime  heparin  Infusion.  Unit(s)/Hr (15 mL/Hr) IV Continuous <Continuous>  influenza  Vaccine (HIGH DOSE) 0.7 milliLiter(s) IntraMuscular once  lisinopril 40 milliGRAM(s) Oral daily  triamcinolone 0.1% Cream 2 Application(s) Topical every 12 hours    MEDICATIONS  (PRN):  heparin   Injectable 6500 Unit(s) IV Push every 6 hours PRN For aPTT less than 40  heparin   Injectable 3000 Unit(s) IV Push every 6 hours PRN For aPTT between 40 - 57      I&O's Summary    05 Sep 2023 07:01  -  06 Sep 2023 07:00  --------------------------------------------------------  IN: 1000 mL / OUT: 0 mL / NET: 1000 mL        PHYSICAL EXAM:  Vital Signs Last 24 Hrs  T(C): 36.4 (06 Sep 2023 06:11), Max: 36.7 (05 Sep 2023 19:44)  T(F): 97.6 (06 Sep 2023 06:11), Max: 98.1 (05 Sep 2023 19:44)  HR: 67 (06 Sep 2023 06:21) (67 - 80)  BP: 126/71 (06 Sep 2023 06:21) (104/61 - 126/71)  BP(mean): --  RR: 18 (06 Sep 2023 06:11) (18 - 18)  SpO2: 96% (06 Sep 2023 06:11) (96% - 97%)    Parameters below as of 06 Sep 2023 06:11  Patient On (Oxygen Delivery Method): room air      CONSTITUTIONAL: NAD, well-developed, well-groomed  EYES: PERRLA; conjunctiva and sclera clear  ENMT: Moist oral mucosa, no pharyngeal injection or exudates; normal dentition  NECK: Supple, no palpable masses; no thyromegaly  RESPIRATORY: Normal respiratory effort; lungs are clear to auscultation bilaterally  CARDIOVASCULAR: Regular rate and rhythm, normal S1 and S2, no murmur/rub/gallop; No lower extremity edema; Peripheral pulses are 2+ bilaterally  ABDOMEN: Nontender to palpation, normoactive bowel sounds, no rebound/guarding; No hepatosplenomegaly  MUSCULOSKELETAL:  Normal gait; no clubbing or cyanosis of digits; no joint swelling or tenderness to palpation  PSYCH: A+O to person, place, and time; affect appropriate  NEUROLOGY: CN 2-12 are intact and symmetric; no gross sensory deficits   SKIN: skin lesions on abdomen consistent with psoriatic arthritis.     LABS:                        14.2   8.26  )-----------( 311      ( 06 Sep 2023 11:09 )             44.9     09-06    140  |  x   |  x   ----------------------------<  x   x    |  x   |  1.00      TPro  x   /  Alb  x   /  TBili  0.4  /  DBili  x   /  AST  x   /  ALT  x   /  AlkPhos  x   09-06    PT/INR - ( 06 Sep 2023 11:09 )   PT: 15.7 sec;   INR: 1.51 ratio         PTT - ( 06 Sep 2023 11:09 )  PTT:70.0 sec            RADIOLOGY & ADDITIONAL TESTS:  Results Reviewed:  CBC/CMP personally reviewed by me Hgb 14.2, WBC 8.26  Imaging Personally Reviewed:  Electrocardiogram Personally Reviewed:    COORDINATION OF CARE:  Care Discussed with Consultants/Other Providers [Y/N]: Y   Prior or Outpatient Records Reviewed [Y/N]: Y

## 2023-09-06 NOTE — PROGRESS NOTE ADULT - ASSESSMENT
Impression:  1. History of diverticulitis  2. 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    Thank you for involving us in this patient's care. Please reach out if any further questions or concerns.    Christina Almaraz MD  Gastroenterology/Hepatology Fellow, PGY-7    NON-URGENT CONSULTS:  Please email [email or web address]  [email or web address]  AT NIGHT AND ON WEEKENDS:  Contact on-call GI fellow via answering service ([phone]) from 5pm-8am and on weekends/holidays  MONDAY-FRIDAY 8AM-5PM:  Pager: [phone] ([facility])  Pager: [phone] ([facility])

## 2023-09-06 NOTE — PROGRESS NOTE ADULT - PROBLEM SELECTOR PLAN 2
Recently diagnosed thromboses in PV/IMV/SMV in setting of diverticulitis, actively being treated with anticoagulation. Was initially on lovenox but switched to warfarin due to patient preference.   - Hold warfarin, trend INR  - Continue  heparin gtt.

## 2023-09-06 NOTE — PROGRESS NOTE ADULT - SUBJECTIVE AND OBJECTIVE BOX
Chief Complaint:  Patient is a 72y old  Female who presents with a chief complaint of Colonoscopy (06 Sep 2023 13:15)       Interval Events:   No acute events overnight  Remains afebrile and stable    Hospital Medications:  atorvastatin 20 milliGRAM(s) Oral at bedtime  heparin   Injectable 6500 Unit(s) IV Push every 6 hours PRN  heparin   Injectable 3000 Unit(s) IV Push every 6 hours PRN  heparin  Infusion.  Unit(s)/Hr IV Continuous <Continuous>  influenza  Vaccine (HIGH DOSE) 0.7 milliLiter(s) IntraMuscular once  lisinopril 40 milliGRAM(s) Oral daily  triamcinolone 0.1% Cream 2 Application(s) Topical every 12 hours      ROS:   Complete and normal except as mentioned above.    PHYSICAL EXAM:   Vital Signs:  Vital Signs Last 24 Hrs  T(C): 36.4 (06 Sep 2023 06:11), Max: 36.7 (05 Sep 2023 19:44)  T(F): 97.6 (06 Sep 2023 06:11), Max: 98.1 (05 Sep 2023 19:44)  HR: 67 (06 Sep 2023 06:21) (67 - 80)  BP: 126/71 (06 Sep 2023 06:21) (104/61 - 126/71)  BP(mean): --  RR: 18 (06 Sep 2023 06:11) (18 - 18)  SpO2: 96% (06 Sep 2023 06:11) (96% - 97%)    Parameters below as of 06 Sep 2023 06:11  Patient On (Oxygen Delivery Method): room air      Daily     Daily     GENERAL: no acute distress  NEURO: alert  HEENT: anicteric sclera, no conjunctival pallor appreciated  CHEST: no respiratory distress, no accessory muscle use  CARDIAC: regular rate, rhythm  ABDOMEN: soft, non-tender, non-distended, no rebound or guarding  EXTREMITIES: warm, well perfused, no edema  SKIN: no lesions noted    LABS:                         14.2   8.26  )-----------( 311      ( 06 Sep 2023 11:09 )             44.9     09-06    140  |  x   |  x   ----------------------------<  x   x    |  x   |  1.00      TPro  x   /  Alb  x   /  TBili  0.4  /  DBili  x   /  AST  x   /  ALT  x   /  AlkPhos  x   09-06        Interval Diagnostic Studies:   No new imaging.

## 2023-09-06 NOTE — PROGRESS NOTE ADULT - ATTENDING COMMENTS
Agree with above. Called daughter and let her know patient requested colonoscopy on Friday. Continue to hold warfarin, heparin gtt in the meantime for A/C.

## 2023-09-07 LAB
APTT BLD: 61.9 SEC — HIGH (ref 24.5–35.6)
HCT VFR BLD CALC: 44.8 % — SIGNIFICANT CHANGE UP (ref 34.5–45)
HGB BLD-MCNC: 14.4 G/DL — SIGNIFICANT CHANGE UP (ref 11.5–15.5)
MCHC RBC-ENTMCNC: 28.6 PG — SIGNIFICANT CHANGE UP (ref 27–34)
MCHC RBC-ENTMCNC: 32.1 GM/DL — SIGNIFICANT CHANGE UP (ref 32–36)
MCV RBC AUTO: 88.9 FL — SIGNIFICANT CHANGE UP (ref 80–100)
NRBC # BLD: 0 /100 WBCS — SIGNIFICANT CHANGE UP (ref 0–0)
PLATELET # BLD AUTO: 321 K/UL — SIGNIFICANT CHANGE UP (ref 150–400)
RBC # BLD: 5.04 M/UL — SIGNIFICANT CHANGE UP (ref 3.8–5.2)
RBC # FLD: 14.1 % — SIGNIFICANT CHANGE UP (ref 10.3–14.5)
WBC # BLD: 9.14 K/UL — SIGNIFICANT CHANGE UP (ref 3.8–10.5)
WBC # FLD AUTO: 9.14 K/UL — SIGNIFICANT CHANGE UP (ref 3.8–10.5)

## 2023-09-07 PROCEDURE — 99232 SBSQ HOSP IP/OBS MODERATE 35: CPT

## 2023-09-07 PROCEDURE — 99232 SBSQ HOSP IP/OBS MODERATE 35: CPT | Mod: GC

## 2023-09-07 RX ORDER — SOD SULF/SODIUM/NAHCO3/KCL/PEG
4000 SOLUTION, RECONSTITUTED, ORAL ORAL ONCE
Refills: 0 | Status: COMPLETED | OUTPATIENT
Start: 2023-09-07 | End: 2023-09-07

## 2023-09-07 RX ADMIN — HEPARIN SODIUM 1000 UNIT(S)/HR: 5000 INJECTION INTRAVENOUS; SUBCUTANEOUS at 19:13

## 2023-09-07 RX ADMIN — HEPARIN SODIUM 1000 UNIT(S)/HR: 5000 INJECTION INTRAVENOUS; SUBCUTANEOUS at 07:42

## 2023-09-07 RX ADMIN — LISINOPRIL 40 MILLIGRAM(S): 2.5 TABLET ORAL at 05:44

## 2023-09-07 RX ADMIN — ATORVASTATIN CALCIUM 20 MILLIGRAM(S): 80 TABLET, FILM COATED ORAL at 21:32

## 2023-09-07 RX ADMIN — Medication 2 APPLICATION(S): at 05:43

## 2023-09-07 RX ADMIN — Medication 4000 MILLILITER(S): at 16:47

## 2023-09-07 RX ADMIN — HEPARIN SODIUM 1000 UNIT(S)/HR: 5000 INJECTION INTRAVENOUS; SUBCUTANEOUS at 11:16

## 2023-09-07 RX ADMIN — HEPARIN SODIUM 1000 UNIT(S)/HR: 5000 INJECTION INTRAVENOUS; SUBCUTANEOUS at 19:23

## 2023-09-07 NOTE — PROGRESS NOTE ADULT - ATTENDING COMMENTS
Agree with above. Pt scheduled for colonoscopy tomorrow. Hold heparin in AM. Continue to hold warfarin. Agree with above. Pt scheduled for colonoscopy tomorrow. Hold heparin in AM. Continue to hold warfarin. Risks and benefits of colonoscopy including risks of bleeding, infection, missed lesions, perforation, anesthesia side effects were explained to patient in detail. All questions answered, and she's agreeable to proceed.

## 2023-09-07 NOTE — PROGRESS NOTE ADULT - SUBJECTIVE AND OBJECTIVE BOX
[facility] Division of Hospital Medicine  Diallo Carter DO  Pager (M-F, 8A-5P):  MS Teams PREFERRED        SUBJECTIVE / OVERNIGHT EVENTS:  Patient seen at the bedside during morning rounds in no acute distress, denies abdominal pain, nausea, vomiting, chest pain, shortness of breath. States she needs for triamcinolone cream, stated will inquire with pharmacy how best to order it.     MEDICATIONS  (STANDING):  atorvastatin 20 milliGRAM(s) Oral at bedtime  heparin  Infusion.  Unit(s)/Hr (15 mL/Hr) IV Continuous <Continuous>  influenza  Vaccine (HIGH DOSE) 0.7 milliLiter(s) IntraMuscular once  lisinopril 40 milliGRAM(s) Oral daily  triamcinolone 0.1% Cream 2 Application(s) Topical two times a day    MEDICATIONS  (PRN):  heparin   Injectable 6500 Unit(s) IV Push every 6 hours PRN For aPTT less than 40  heparin   Injectable 3000 Unit(s) IV Push every 6 hours PRN For aPTT between 40 - 57      I&O's Summary    06 Sep 2023 07:01  -  07 Sep 2023 07:00  --------------------------------------------------------  IN: 360 mL / OUT: 0 mL / NET: 360 mL        PHYSICAL EXAM:  Vital Signs Last 24 Hrs  T(C): 36.6 (07 Sep 2023 12:29), Max: 36.9 (06 Sep 2023 14:43)  T(F): 97.9 (07 Sep 2023 12:29), Max: 98.4 (06 Sep 2023 14:43)  HR: 81 (07 Sep 2023 12:29) (79 - 81)  BP: 123/77 (07 Sep 2023 12:29) (123/77 - 145/83)  BP(mean): --  RR: 18 (07 Sep 2023 12:29) (18 - 18)  SpO2: 97% (07 Sep 2023 12:29) (96% - 99%)    Parameters below as of 07 Sep 2023 12:29  Patient On (Oxygen Delivery Method): room air      CONSTITUTIONAL: NAD  RESPIRATORY: Normal respiratory effort; lungs are clear to auscultation bilaterally  CARDIOVASCULAR: Regular rate and rhythm, normal S1/S2, no murmurs. No lower extremity edema, No JVD   ABDOMEN: Nontender to palpation, normoactive bowel sounds, no rebound/guarding; No hepatosplenomegaly  MUSCLOSKELETAL: no clubbing or cyanosis of digits; no joint swelling or tenderness to palpation  NEURO: No focal deficits, upper/lower motor strength grossly intact bilaterally   EXT: Warm, well perfused   PSYCH: A+O to person, place, and time; affect appropriate        LABS:                        14.4   9.14  )-----------( 321      ( 07 Sep 2023 10:07 )             44.8     09-06    140  |  x   |  x   ----------------------------<  x   x    |  x   |  1.00      TPro  x   /  Alb  x   /  TBili  0.4  /  DBili  x   /  AST  x   /  ALT  x   /  AlkPhos  x   09-06    PT/INR - ( 06 Sep 2023 11:09 )   PT: 15.7 sec;   INR: 1.51 ratio         PTT - ( 07 Sep 2023 10:07 )  PTT:61.9 sec            RADIOLOGY & ADDITIONAL TESTS:  Results Reviewed: CBC/CMP personally reviewed by me Hgb 14.4, WBC  Imaging Personally Reviewed:  Electrocardiogram Personally Reviewed:    COORDINATION OF CARE:  Care Discussed with Consultants/Other Providers [Y/N]:  Prior or Outpatient Records Reviewed [Y/N]:

## 2023-09-07 NOTE — PROGRESS NOTE ADULT - SUBJECTIVE AND OBJECTIVE BOX
Chief Complaint:  Patient is a 72y old  Female who presents with a chief complaint of Colonoscopy (07 Sep 2023 12:57)         Interval Events:   Denying n/v/abdominal pain  Expressing discomfort of prior IV line site on left arm    Hospital Medications:  atorvastatin 20 milliGRAM(s) Oral at bedtime  heparin   Injectable 6500 Unit(s) IV Push every 6 hours PRN  heparin   Injectable 3000 Unit(s) IV Push every 6 hours PRN  heparin  Infusion.  Unit(s)/Hr IV Continuous <Continuous>  influenza  Vaccine (HIGH DOSE) 0.7 milliLiter(s) IntraMuscular once  lisinopril 40 milliGRAM(s) Oral daily  triamcinolone 0.1% Cream 2 Application(s) Topical two times a day      ROS:   Complete and normal except as mentioned above.    PHYSICAL EXAM:   Vital Signs:  Vital Signs Last 24 Hrs  T(C): 36.6 (07 Sep 2023 12:29), Max: 36.9 (06 Sep 2023 14:43)  T(F): 97.9 (07 Sep 2023 12:29), Max: 98.4 (06 Sep 2023 14:43)  HR: 81 (07 Sep 2023 12:29) (79 - 81)  BP: 123/77 (07 Sep 2023 12:29) (123/77 - 145/83)  BP(mean): --  RR: 18 (07 Sep 2023 12:29) (18 - 18)  SpO2: 97% (07 Sep 2023 12:29) (96% - 99%)    Parameters below as of 07 Sep 2023 12:29  Patient On (Oxygen Delivery Method): room air      Daily     Daily     GENERAL: no acute distress  NEURO: alert  HEENT: anicteric sclera, no conjunctival pallor appreciated  CHEST: no respiratory distress, no accessory muscle use  CARDIAC: regular rate, rhythm  ABDOMEN: soft, non-tender, non-distended, no rebound or guarding  EXTREMITIES: warm, well perfused, no edema  SKIN: no lesions noted    LABS:                         14.4   9.14  )-----------( 321      ( 07 Sep 2023 10:07 )             44.8     09-06    140  |  x   |  x   ----------------------------<  x   x    |  x   |  1.00      TPro  x   /  Alb  x   /  TBili  0.4  /  DBili  x   /  AST  x   /  ALT  x   /  AlkPhos  x   09-06        Interval Diagnostic Studies:   No new imaging.

## 2023-09-07 NOTE — PROGRESS NOTE ADULT - PROBLEM SELECTOR PLAN 2
Recently diagnosed thromboses in PV/IMV/SMV in setting of diverticulitis, actively being treated with anticoagulation. Was initially on lovenox but switched to warfarin due to patient preference  - Hold warfarin, trend INR  - Continue  heparin gtt

## 2023-09-07 NOTE — PROGRESS NOTE ADULT - ASSESSMENT
Impression:  1. History of diverticulitis  2. 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    Thank you for involving us in this patient's care. Please reach out if any further questions or concerns.    Christina Almaraz MD  Gastroenterology/Hepatology Fellow, PGY-7    NON-URGENT CONSULTS:  Please email [email or web address]  AT NIGHT AND ON WEEKENDS:  Contact on-call GI fellow via answering service ([phone]) from 5pm-8am and on weekends/holidays  MONDAY-FRIDAY 8AM-5PM:  Pager: [phone] ([facility])  Pager: [phone] ([facility])   Impression:  1. History of diverticulitis  2. 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    Thank you for involving us in this patient's care. Please reach out if any further questions or concerns.    Christina Almaraz MD  Gastroenterology/Hepatology Fellow, PGY-7    NON-URGENT CONSULTS:  Please email [email or web address]  AT NIGHT AND ON WEEKENDS:  Contact on-call GI fellow via answering service ([phone]) from 5pm-8am and on weekends/holidays  MONDAY-FRIDAY 8AM-5PM:  Pager: [phone] ([facility])  Pager: [phone] ([facility])   Impression:  1. History of diverticulitis  2. 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    Thank you for involving us in this patient's care. Please reach out if any further questions or concerns.    Christina Almaraz MD  Gastroenterology/Hepatology Fellow, PGY-7    NON-URGENT CONSULTS:  Please email [email or web address]  AT NIGHT AND ON WEEKENDS:  Contact on-call GI fellow via answering service ([phone]) from 5pm-8am and on weekends/holidays  MONDAY-FRIDAY 8AM-5PM:  Pager: [phone] ([facility])  Pager: [phone] ([facility])

## 2023-09-08 ENCOUNTER — RESULT REVIEW (OUTPATIENT)
Age: 73
End: 2023-09-08

## 2023-09-08 LAB
ANION GAP SERPL CALC-SCNC: 14 MMOL/L — SIGNIFICANT CHANGE UP (ref 5–17)
APTT BLD: 30.6 SEC — SIGNIFICANT CHANGE UP (ref 24.5–35.6)
APTT BLD: 46.3 SEC — HIGH (ref 24.5–35.6)
BUN SERPL-MCNC: 19 MG/DL — SIGNIFICANT CHANGE UP (ref 7–23)
CALCIUM SERPL-MCNC: 11 MG/DL — HIGH (ref 8.4–10.5)
CHLORIDE SERPL-SCNC: 103 MMOL/L — SIGNIFICANT CHANGE UP (ref 96–108)
CO2 SERPL-SCNC: 26 MMOL/L — SIGNIFICANT CHANGE UP (ref 22–31)
CREAT SERPL-MCNC: 0.87 MG/DL — SIGNIFICANT CHANGE UP (ref 0.5–1.3)
EGFR: 71 ML/MIN/1.73M2 — SIGNIFICANT CHANGE UP
GLUCOSE SERPL-MCNC: 83 MG/DL — SIGNIFICANT CHANGE UP (ref 70–99)
HCT VFR BLD CALC: 44.5 % — SIGNIFICANT CHANGE UP (ref 34.5–45)
HCT VFR BLD CALC: 46.5 % — HIGH (ref 34.5–45)
HGB BLD-MCNC: 14.1 G/DL — SIGNIFICANT CHANGE UP (ref 11.5–15.5)
HGB BLD-MCNC: 14.1 G/DL — SIGNIFICANT CHANGE UP (ref 11.5–15.5)
INR BLD: 1.23 RATIO — HIGH (ref 0.85–1.18)
MCHC RBC-ENTMCNC: 28.4 PG — SIGNIFICANT CHANGE UP (ref 27–34)
MCHC RBC-ENTMCNC: 28.5 PG — SIGNIFICANT CHANGE UP (ref 27–34)
MCHC RBC-ENTMCNC: 30.3 GM/DL — LOW (ref 32–36)
MCHC RBC-ENTMCNC: 31.7 GM/DL — LOW (ref 32–36)
MCV RBC AUTO: 90.1 FL — SIGNIFICANT CHANGE UP (ref 80–100)
MCV RBC AUTO: 93.6 FL — SIGNIFICANT CHANGE UP (ref 80–100)
NRBC # BLD: 0 /100 WBCS — SIGNIFICANT CHANGE UP (ref 0–0)
NRBC # BLD: 0 /100 WBCS — SIGNIFICANT CHANGE UP (ref 0–0)
PLATELET # BLD AUTO: 275 K/UL — SIGNIFICANT CHANGE UP (ref 150–400)
PLATELET # BLD AUTO: 309 K/UL — SIGNIFICANT CHANGE UP (ref 150–400)
POTASSIUM SERPL-MCNC: 4.1 MMOL/L — SIGNIFICANT CHANGE UP (ref 3.5–5.3)
POTASSIUM SERPL-SCNC: 4.1 MMOL/L — SIGNIFICANT CHANGE UP (ref 3.5–5.3)
PROTHROM AB SERPL-ACNC: 12.8 SEC — SIGNIFICANT CHANGE UP (ref 9.5–13)
RBC # BLD: 4.94 M/UL — SIGNIFICANT CHANGE UP (ref 3.8–5.2)
RBC # BLD: 4.97 M/UL — SIGNIFICANT CHANGE UP (ref 3.8–5.2)
RBC # FLD: 14.2 % — SIGNIFICANT CHANGE UP (ref 10.3–14.5)
RBC # FLD: 14.2 % — SIGNIFICANT CHANGE UP (ref 10.3–14.5)
SODIUM SERPL-SCNC: 143 MMOL/L — SIGNIFICANT CHANGE UP (ref 135–145)
WBC # BLD: 7.99 K/UL — SIGNIFICANT CHANGE UP (ref 3.8–10.5)
WBC # BLD: 8.5 K/UL — SIGNIFICANT CHANGE UP (ref 3.8–10.5)
WBC # FLD AUTO: 7.99 K/UL — SIGNIFICANT CHANGE UP (ref 3.8–10.5)
WBC # FLD AUTO: 8.5 K/UL — SIGNIFICANT CHANGE UP (ref 3.8–10.5)

## 2023-09-08 PROCEDURE — 45385 COLONOSCOPY W/LESION REMOVAL: CPT | Mod: GC

## 2023-09-08 PROCEDURE — 88305 TISSUE EXAM BY PATHOLOGIST: CPT | Mod: 26

## 2023-09-08 PROCEDURE — 99232 SBSQ HOSP IP/OBS MODERATE 35: CPT

## 2023-09-08 PROCEDURE — 45380 COLONOSCOPY AND BIOPSY: CPT | Mod: 59,GC

## 2023-09-08 DEVICE — NET RETRV ROT ROTH 2.5MMX230CM: Type: IMPLANTABLE DEVICE | Status: FUNCTIONAL

## 2023-09-08 RX ORDER — SODIUM CHLORIDE 9 MG/ML
500 INJECTION INTRAMUSCULAR; INTRAVENOUS; SUBCUTANEOUS
Refills: 0 | Status: DISCONTINUED | OUTPATIENT
Start: 2023-09-08 | End: 2023-09-17

## 2023-09-08 RX ORDER — HEPARIN SODIUM 5000 [USP'U]/ML
6500 INJECTION INTRAVENOUS; SUBCUTANEOUS EVERY 6 HOURS
Refills: 0 | Status: DISCONTINUED | OUTPATIENT
Start: 2023-09-08 | End: 2023-09-14

## 2023-09-08 RX ORDER — HEPARIN SODIUM 5000 [USP'U]/ML
3000 INJECTION INTRAVENOUS; SUBCUTANEOUS EVERY 6 HOURS
Refills: 0 | Status: DISCONTINUED | OUTPATIENT
Start: 2023-09-08 | End: 2023-09-14

## 2023-09-08 RX ORDER — HEPARIN SODIUM 5000 [USP'U]/ML
1000 INJECTION INTRAVENOUS; SUBCUTANEOUS
Qty: 25000 | Refills: 0 | Status: DISCONTINUED | OUTPATIENT
Start: 2023-09-08 | End: 2023-09-14

## 2023-09-08 RX ORDER — WARFARIN SODIUM 2.5 MG/1
5 TABLET ORAL ONCE
Refills: 0 | Status: COMPLETED | OUTPATIENT
Start: 2023-09-08 | End: 2023-09-08

## 2023-09-08 RX ADMIN — HEPARIN SODIUM 1200 UNIT(S)/HR: 5000 INJECTION INTRAVENOUS; SUBCUTANEOUS at 21:56

## 2023-09-08 RX ADMIN — HEPARIN SODIUM 1000 UNIT(S)/HR: 5000 INJECTION INTRAVENOUS; SUBCUTANEOUS at 14:23

## 2023-09-08 RX ADMIN — HEPARIN SODIUM 3000 UNIT(S): 5000 INJECTION INTRAVENOUS; SUBCUTANEOUS at 22:06

## 2023-09-08 RX ADMIN — ATORVASTATIN CALCIUM 20 MILLIGRAM(S): 80 TABLET, FILM COATED ORAL at 22:07

## 2023-09-08 RX ADMIN — LISINOPRIL 40 MILLIGRAM(S): 2.5 TABLET ORAL at 05:45

## 2023-09-08 RX ADMIN — HEPARIN SODIUM 1000 UNIT(S)/HR: 5000 INJECTION INTRAVENOUS; SUBCUTANEOUS at 19:34

## 2023-09-08 RX ADMIN — WARFARIN SODIUM 5 MILLIGRAM(S): 2.5 TABLET ORAL at 22:07

## 2023-09-08 NOTE — PROGRESS NOTE ADULT - PROBLEM SELECTOR PLAN 2
Recently diagnosed thromboses in PV/IMV/SMV in setting of diverticulitis, actively being treated with anticoagulation. Was initially on lovenox but switched to warfarin due to patient preference  - Will restart Coumadin and bridge at night.   - Continue  heparin gtt

## 2023-09-08 NOTE — ASU PREOP CHECKLIST - HAIR REMOVAL
For information on Fall & Injury Prevention, visit: [email or web address] OR  [email or web address] OR  https://www.cdc.gov/steadi/patient.html
na

## 2023-09-08 NOTE — PROGRESS NOTE ADULT - SUBJECTIVE AND OBJECTIVE BOX
[facility] Division of Hospital Medicine  Diallo Carter DO  Pager (M-F, 8A-5P):  MS Teams PREFERRED        SUBJECTIVE / OVERNIGHT EVENTS:  Patient seen at the bedside during morning rounds in no acute distress, denies abdominal pain, nausea, vomiting, chest pain, shortness of breath.     MEDICATIONS  (STANDING):  atorvastatin 20 milliGRAM(s) Oral at bedtime  heparin  Infusion. 1000 Unit(s)/Hr (10 mL/Hr) IV Continuous <Continuous>  influenza  Vaccine (HIGH DOSE) 0.7 milliLiter(s) IntraMuscular once  lisinopril 40 milliGRAM(s) Oral daily  sodium chloride 0.9%. 500 milliLiter(s) (30 mL/Hr) IV Continuous <Continuous>  triamcinolone 0.1% Cream 2 Application(s) Topical two times a day  warfarin 5 milliGRAM(s) Oral once    MEDICATIONS  (PRN):  heparin   Injectable 6500 Unit(s) IV Push every 6 hours PRN For aPTT less than 40  heparin   Injectable 3000 Unit(s) IV Push every 6 hours PRN For aPTT between 40 - 57      I&O's Summary    08 Sep 2023 07:01  -  08 Sep 2023 16:50  --------------------------------------------------------  IN: 240 mL / OUT: 0 mL / NET: 240 mL        PHYSICAL EXAM:  Vital Signs Last 24 Hrs  T(C): 35.8 (08 Sep 2023 10:47), Max: 36.9 (07 Sep 2023 20:28)  T(F): 96.4 (08 Sep 2023 09:39), Max: 98.5 (07 Sep 2023 20:28)  HR: 62 (08 Sep 2023 12:55) (60 - 75)  BP: 135/60 (08 Sep 2023 12:55) (97/55 - 150/70)  BP(mean): --  RR: 18 (08 Sep 2023 12:55) (14 - 20)  SpO2: 100% (08 Sep 2023 12:55) (96% - 100%)    Parameters below as of 08 Sep 2023 12:55  Patient On (Oxygen Delivery Method): room air    CONSTITUTIONAL: NAD  RESPIRATORY: Normal respiratory effort; lungs are clear to auscultation bilaterally  CARDIOVASCULAR: Regular rate and rhythm, normal S1/S2, no murmurs. No lower extremity edema, No JVD   ABDOMEN: Nontender to palpation, normoactive bowel sounds, no rebound/guarding; No hepatosplenomegaly  MUSCLOSKELETAL: no clubbing or cyanosis of digits; no joint swelling or tenderness to palpation  NEURO: No focal deficits, upper/lower motor strength grossly intact bilaterally   EXT: Warm, well perfused   PSYCH: A+O to person, place, and time; affect appropriate      LABS:                        14.1   8.50  )-----------( 309      ( 08 Sep 2023 09:14 )             44.5     09-08    143  |  103  |  19  ----------------------------<  83  4.1   |  26  |  0.87    Ca    11.0<H>      08 Sep 2023 09:14      PT/INR - ( 08 Sep 2023 09:14 )   PT: 12.8 sec;   INR: 1.23 ratio         PTT - ( 08 Sep 2023 09:14 )  PTT:30.6 sec      Urinalysis Basic - ( 08 Sep 2023 09:14 )    Color: x / Appearance: x / SG: x / pH: x  Gluc: 83 mg/dL / Ketone: x  / Bili: x / Urobili: x   Blood: x / Protein: x / Nitrite: x   Leuk Esterase: x / RBC: x / WBC x   Sq Epi: x / Non Sq Epi: x / Bacteria: x          RADIOLOGY & ADDITIONAL TESTS:  Results Reviewed: CBC/CMP personally reviewed by me Hgb 14.1, WBC 8.50, Cr 0.87  Imaging Personally Reviewed:  Electrocardiogram Personally Reviewed:    COORDINATION OF CARE:  Care Discussed with Consultants/Other Providers [Y/N]: Y   Prior or Outpatient Records Reviewed [Y/N]: Y

## 2023-09-08 NOTE — PRE PROCEDURE NOTE - PRE PROCEDURE EVALUATION
Attending Physician:  Dr Berumen                           Procedure: colonoscopy     Indication for Procedure: colon cancer screening   ________________________________________________________  PAST MEDICAL & SURGICAL HISTORY:  HTN (hypertension)      HLD (hyperlipidemia)      Psoriatic arthritis      H/O retinal vein occlusion      Osteoarthritis      No significant past surgical history        ALLERGIES:  cephalexin (Unknown)    HOME MEDICATIONS:  apremilast 30 mg oral tablet: 1 tab(s) orally 2 times a day  atorvastatin 20 mg oral tablet: 1 tab(s) orally once a day  lisinopril 40 mg oral tablet: 1 tab(s) orally once a day  warfarin 5 mg oral tablet: 1 tab(s) orally once a day    AICD/PPM: [ ] yes   [x ] no    PERTINENT LAB DATA:                        14.1   8.50  )-----------( 309      ( 08 Sep 2023 09:14 )             44.5     09-06    140  |  x   |  x   ----------------------------<  x   x    |  x   |  1.00      TPro  x   /  Alb  x   /  TBili  0.4  /  DBili  x   /  AST  x   /  ALT  x   /  AlkPhos  x   09-06    PT/INR - ( 06 Sep 2023 11:09 )   PT: 15.7 sec;   INR: 1.51 ratio         PTT - ( 07 Sep 2023 10:07 )  PTT:61.9 sec            PHYSICAL EXAMINATION:    T(C): 36.7  HR: 74  BP: 122/70  RR: 18  SpO2: 98%    Constitutional: NAD    Neck:  No JVD  Respiratory: no resp distress   Cardiovascular: RRR  Extremities: No peripheral edema  Neurological: A/O x 3, no focal deficits        COMMENTS:    The patient is a suitable candidate for the planned procedure unless box checked [ ]  No, explain:

## 2023-09-09 LAB
ANION GAP SERPL CALC-SCNC: 9 MMOL/L — SIGNIFICANT CHANGE UP (ref 5–17)
APTT BLD: 51.5 SEC — HIGH (ref 24.5–35.6)
APTT BLD: 78.6 SEC — HIGH (ref 24.5–35.6)
APTT BLD: 88.8 SEC — HIGH (ref 24.5–35.6)
BUN SERPL-MCNC: 20 MG/DL — SIGNIFICANT CHANGE UP (ref 7–23)
CALCIUM SERPL-MCNC: 10.1 MG/DL — SIGNIFICANT CHANGE UP (ref 8.4–10.5)
CHLORIDE SERPL-SCNC: 107 MMOL/L — SIGNIFICANT CHANGE UP (ref 96–108)
CO2 SERPL-SCNC: 26 MMOL/L — SIGNIFICANT CHANGE UP (ref 22–31)
CREAT SERPL-MCNC: 1.1 MG/DL — SIGNIFICANT CHANGE UP (ref 0.5–1.3)
EGFR: 54 ML/MIN/1.73M2 — LOW
GLUCOSE SERPL-MCNC: 103 MG/DL — HIGH (ref 70–99)
HCT VFR BLD CALC: 42.1 % — SIGNIFICANT CHANGE UP (ref 34.5–45)
HGB BLD-MCNC: 13.2 G/DL — SIGNIFICANT CHANGE UP (ref 11.5–15.5)
INR BLD: 1.29 RATIO — HIGH (ref 0.85–1.18)
MCHC RBC-ENTMCNC: 28.5 PG — SIGNIFICANT CHANGE UP (ref 27–34)
MCHC RBC-ENTMCNC: 31.4 GM/DL — LOW (ref 32–36)
MCV RBC AUTO: 90.9 FL — SIGNIFICANT CHANGE UP (ref 80–100)
NRBC # BLD: 0 /100 WBCS — SIGNIFICANT CHANGE UP (ref 0–0)
PLATELET # BLD AUTO: 273 K/UL — SIGNIFICANT CHANGE UP (ref 150–400)
POTASSIUM SERPL-MCNC: 4.2 MMOL/L — SIGNIFICANT CHANGE UP (ref 3.5–5.3)
POTASSIUM SERPL-SCNC: 4.2 MMOL/L — SIGNIFICANT CHANGE UP (ref 3.5–5.3)
PROTHROM AB SERPL-ACNC: 13.4 SEC — HIGH (ref 9.5–13)
RBC # BLD: 4.63 M/UL — SIGNIFICANT CHANGE UP (ref 3.8–5.2)
RBC # FLD: 14.2 % — SIGNIFICANT CHANGE UP (ref 10.3–14.5)
SODIUM SERPL-SCNC: 142 MMOL/L — SIGNIFICANT CHANGE UP (ref 135–145)
WBC # BLD: 7.46 K/UL — SIGNIFICANT CHANGE UP (ref 3.8–10.5)
WBC # FLD AUTO: 7.46 K/UL — SIGNIFICANT CHANGE UP (ref 3.8–10.5)

## 2023-09-09 PROCEDURE — 99232 SBSQ HOSP IP/OBS MODERATE 35: CPT

## 2023-09-09 RX ORDER — NYSTATIN CREAM 100000 [USP'U]/G
1 CREAM TOPICAL ONCE
Refills: 0 | Status: DISCONTINUED | OUTPATIENT
Start: 2023-09-09 | End: 2023-09-09

## 2023-09-09 RX ORDER — WARFARIN SODIUM 2.5 MG/1
5 TABLET ORAL ONCE
Refills: 0 | Status: COMPLETED | OUTPATIENT
Start: 2023-09-09 | End: 2023-09-09

## 2023-09-09 RX ORDER — NYSTATIN CREAM 100000 [USP'U]/G
1 CREAM TOPICAL ONCE
Refills: 0 | Status: COMPLETED | OUTPATIENT
Start: 2023-09-09 | End: 2023-09-09

## 2023-09-09 RX ADMIN — HEPARIN SODIUM 1400 UNIT(S)/HR: 5000 INJECTION INTRAVENOUS; SUBCUTANEOUS at 19:03

## 2023-09-09 RX ADMIN — NYSTATIN CREAM 1 APPLICATION(S): 100000 CREAM TOPICAL at 17:26

## 2023-09-09 RX ADMIN — HEPARIN SODIUM 1400 UNIT(S)/HR: 5000 INJECTION INTRAVENOUS; SUBCUTANEOUS at 14:44

## 2023-09-09 RX ADMIN — ATORVASTATIN CALCIUM 20 MILLIGRAM(S): 80 TABLET, FILM COATED ORAL at 21:06

## 2023-09-09 RX ADMIN — LISINOPRIL 40 MILLIGRAM(S): 2.5 TABLET ORAL at 05:28

## 2023-09-09 RX ADMIN — Medication 2 APPLICATION(S): at 05:29

## 2023-09-09 RX ADMIN — Medication 2 APPLICATION(S): at 18:02

## 2023-09-09 RX ADMIN — HEPARIN SODIUM 1200 UNIT(S)/HR: 5000 INJECTION INTRAVENOUS; SUBCUTANEOUS at 05:13

## 2023-09-09 RX ADMIN — HEPARIN SODIUM 1200 UNIT(S)/HR: 5000 INJECTION INTRAVENOUS; SUBCUTANEOUS at 04:36

## 2023-09-09 RX ADMIN — WARFARIN SODIUM 5 MILLIGRAM(S): 2.5 TABLET ORAL at 21:06

## 2023-09-09 RX ADMIN — HEPARIN SODIUM 1400 UNIT(S)/HR: 5000 INJECTION INTRAVENOUS; SUBCUTANEOUS at 20:44

## 2023-09-09 RX ADMIN — HEPARIN SODIUM 1400 UNIT(S)/HR: 5000 INJECTION INTRAVENOUS; SUBCUTANEOUS at 23:37

## 2023-09-09 RX ADMIN — HEPARIN SODIUM 1200 UNIT(S)/HR: 5000 INJECTION INTRAVENOUS; SUBCUTANEOUS at 07:10

## 2023-09-09 NOTE — PROGRESS NOTE ADULT - ASSESSMENT
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 Pt without any symptoms or complaints. Pt admitted for bridging to heparin gtt in preparation for colonoscopy. Patient with polyps removed,all <5mm pending biopsy results. Receiving Coumadin for bridging.

## 2023-09-09 NOTE — PROGRESS NOTE ADULT - PROBLEM SELECTOR PLAN 2
Recently diagnosed thromboses in PV/IMV/SMV in setting of diverticulitis, actively being treated with anticoagulation. Was initially on lovenox but switched to warfarin due to patient preference  - Will restart Coumadin and bridge at night.   - Continue  heparin gtt.

## 2023-09-09 NOTE — PROGRESS NOTE ADULT - SUBJECTIVE AND OBJECTIVE BOX
[facility] Division of Hospital Medicine  Diallo Carter DO  Pager (M-F, 8A-5P):  MS Teams PREFERRED        SUBJECTIVE / OVERNIGHT EVENTS:  Patient seen at the bedside during morning rounds in no acute distress, denies abdominal pain, nausea, vomiting, chest pain, shortness of breath.       MEDICATIONS  (STANDING):  atorvastatin 20 milliGRAM(s) Oral at bedtime  heparin  Infusion. 1000 Unit(s)/Hr (10 mL/Hr) IV Continuous <Continuous>  influenza  Vaccine (HIGH DOSE) 0.7 milliLiter(s) IntraMuscular once  lisinopril 40 milliGRAM(s) Oral daily  sodium chloride 0.9%. 500 milliLiter(s) (30 mL/Hr) IV Continuous <Continuous>  triamcinolone 0.1% Cream 2 Application(s) Topical two times a day  warfarin 5 milliGRAM(s) Oral once    MEDICATIONS  (PRN):  heparin   Injectable 3000 Unit(s) IV Push every 6 hours PRN For aPTT between 40 - 57  heparin   Injectable 6500 Unit(s) IV Push every 6 hours PRN For aPTT less than 40      I&O's Summary    08 Sep 2023 07:01  -  09 Sep 2023 07:00  --------------------------------------------------------  IN: 870 mL / OUT: 0 mL / NET: 870 mL    09 Sep 2023 07:01  -  09 Sep 2023 18:56  --------------------------------------------------------  IN: 480 mL / OUT: 0 mL / NET: 480 mL        PHYSICAL EXAM:  Vital Signs Last 24 Hrs  T(C): 36.7 (09 Sep 2023 12:01), Max: 36.8 (08 Sep 2023 19:53)  T(F): 98 (09 Sep 2023 12:01), Max: 98.2 (08 Sep 2023 19:53)  HR: 74 (09 Sep 2023 12:01) (58 - 75)  BP: 112/68 (09 Sep 2023 12:01) (105/66 - 118/74)  BP(mean): --  RR: 18 (09 Sep 2023 12:01) (18 - 18)  SpO2: 98% (09 Sep 2023 12:01) (97% - 98%)    Parameters below as of 09 Sep 2023 12:01  Patient On (Oxygen Delivery Method): room air      CONSTITUTIONAL: NAD  RESPIRATORY: Normal respiratory effort; lungs are clear to auscultation bilaterally  CARDIOVASCULAR: Regular rate and rhythm, normal S1/S2, no murmurs. No lower extremity edema, No JVD   ABDOMEN: Nontender to palpation, normoactive bowel sounds, no rebound/guarding; No hepatosplenomegaly  MUSCLOSKELETAL: no clubbing or cyanosis of digits; no joint swelling or tenderness to palpation  NEURO: No focal deficits, upper/lower motor strength grossly intact bilaterally   EXT: Warm, well perfused   PSYCH: A+O to person, place, and time; affect appropriate    LABS:                        13.2   7.46  )-----------( 273      ( 09 Sep 2023 04:43 )             42.1     09-09    142  |  107  |  20  ----------------------------<  103<H>  4.2   |  26  |  1.10    Ca    10.1      09 Sep 2023 04:43      PT/INR - ( 09 Sep 2023 04:43 )   PT: 13.4 sec;   INR: 1.29 ratio         PTT - ( 09 Sep 2023 13:45 )  PTT:51.5 sec      Urinalysis Basic - ( 09 Sep 2023 04:43 )    Color: x / Appearance: x / SG: x / pH: x  Gluc: 103 mg/dL / Ketone: x  / Bili: x / Urobili: x   Blood: x / Protein: x / Nitrite: x   Leuk Esterase: x / RBC: x / WBC x   Sq Epi: x / Non Sq Epi: x / Bacteria: x          RADIOLOGY & ADDITIONAL TESTS:  Results Reviewed: CBC/CMP personally reviewed by me Cr 1.10, Hgb 13.2  WBC 7.46, Na 142  Imaging Personally Reviewed:  Electrocardiogram Personally Reviewed:    COORDINATION OF CARE:  Care Discussed with Consultants/Other Providers [Y/N]:   Prior or Outpatient Records Reviewed [Y/N]:

## 2023-09-10 LAB
APTT BLD: 102.7 SEC — HIGH (ref 24.5–35.6)
APTT BLD: 104.2 SEC — HIGH (ref 24.5–35.6)
APTT BLD: 91.2 SEC — HIGH (ref 24.5–35.6)
HCT VFR BLD CALC: 41.1 % — SIGNIFICANT CHANGE UP (ref 34.5–45)
HGB BLD-MCNC: 12.6 G/DL — SIGNIFICANT CHANGE UP (ref 11.5–15.5)
INR BLD: 1.29 RATIO — HIGH (ref 0.85–1.18)
INR BLD: 1.32 RATIO — HIGH (ref 0.85–1.18)
MCHC RBC-ENTMCNC: 28.1 PG — SIGNIFICANT CHANGE UP (ref 27–34)
MCHC RBC-ENTMCNC: 30.7 GM/DL — LOW (ref 32–36)
MCV RBC AUTO: 91.7 FL — SIGNIFICANT CHANGE UP (ref 80–100)
NRBC # BLD: 0 /100 WBCS — SIGNIFICANT CHANGE UP (ref 0–0)
PLATELET # BLD AUTO: 276 K/UL — SIGNIFICANT CHANGE UP (ref 150–400)
PROTHROM AB SERPL-ACNC: 13.7 SEC — HIGH (ref 9.5–13)
PROTHROM AB SERPL-ACNC: 14.1 SEC — HIGH (ref 9.5–13)
RBC # BLD: 4.48 M/UL — SIGNIFICANT CHANGE UP (ref 3.8–5.2)
RBC # FLD: 14.2 % — SIGNIFICANT CHANGE UP (ref 10.3–14.5)
WBC # BLD: 7.75 K/UL — SIGNIFICANT CHANGE UP (ref 3.8–10.5)
WBC # FLD AUTO: 7.75 K/UL — SIGNIFICANT CHANGE UP (ref 3.8–10.5)

## 2023-09-10 PROCEDURE — 99232 SBSQ HOSP IP/OBS MODERATE 35: CPT

## 2023-09-10 RX ORDER — WARFARIN SODIUM 2.5 MG/1
5 TABLET ORAL ONCE
Refills: 0 | Status: COMPLETED | OUTPATIENT
Start: 2023-09-10 | End: 2023-09-10

## 2023-09-10 RX ORDER — NYSTATIN CREAM 100000 [USP'U]/G
1 CREAM TOPICAL
Refills: 0 | Status: DISCONTINUED | OUTPATIENT
Start: 2023-09-10 | End: 2023-09-17

## 2023-09-10 RX ORDER — WARFARIN SODIUM 2.5 MG/1
5 TABLET ORAL ONCE
Refills: 0 | Status: DISCONTINUED | OUTPATIENT
Start: 2023-09-10 | End: 2023-09-10

## 2023-09-10 RX ADMIN — HEPARIN SODIUM 1200 UNIT(S)/HR: 5000 INJECTION INTRAVENOUS; SUBCUTANEOUS at 06:59

## 2023-09-10 RX ADMIN — HEPARIN SODIUM 1000 UNIT(S)/HR: 5000 INJECTION INTRAVENOUS; SUBCUTANEOUS at 18:49

## 2023-09-10 RX ADMIN — ATORVASTATIN CALCIUM 20 MILLIGRAM(S): 80 TABLET, FILM COATED ORAL at 21:11

## 2023-09-10 RX ADMIN — HEPARIN SODIUM 1200 UNIT(S)/HR: 5000 INJECTION INTRAVENOUS; SUBCUTANEOUS at 11:38

## 2023-09-10 RX ADMIN — WARFARIN SODIUM 5 MILLIGRAM(S): 2.5 TABLET ORAL at 21:11

## 2023-09-10 RX ADMIN — Medication 2 APPLICATION(S): at 17:20

## 2023-09-10 RX ADMIN — NYSTATIN CREAM 1 APPLICATION(S): 100000 CREAM TOPICAL at 17:20

## 2023-09-10 RX ADMIN — LISINOPRIL 40 MILLIGRAM(S): 2.5 TABLET ORAL at 05:29

## 2023-09-10 RX ADMIN — Medication 2 APPLICATION(S): at 05:29

## 2023-09-10 RX ADMIN — HEPARIN SODIUM 1000 UNIT(S)/HR: 5000 INJECTION INTRAVENOUS; SUBCUTANEOUS at 18:24

## 2023-09-10 RX ADMIN — HEPARIN SODIUM 1200 UNIT(S)/HR: 5000 INJECTION INTRAVENOUS; SUBCUTANEOUS at 03:13

## 2023-09-10 NOTE — PROGRESS NOTE ADULT - ASSESSMENT
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 Pt without any symptoms or complaints. Pt admitted for bridging to heparin gtt in preparation for colonoscopy. Patient with polyps removed,all <5mm pending biopsy results. Receiving Coumadin for bridging

## 2023-09-10 NOTE — PROGRESS NOTE ADULT - PROBLEM SELECTOR PLAN 2
Recently diagnosed thromboses in PV/IMV/SMV in setting of diverticulitis, actively being treated with anticoagulation. Was initially on lovenox but switched to warfarin due to patient preference  Continue with  Coumadin and bridge at night.   Continue  heparin gtt.

## 2023-09-10 NOTE — PROGRESS NOTE ADULT - SUBJECTIVE AND OBJECTIVE BOX
[facility] Division of Hospital Medicine  Diallo Carter DO  Pager (M-F, 8A-5P):  MS Teams PREFERRED      SUBJECTIVE / OVERNIGHT EVENTS:  Patient seen at the bedside during morning rounds in no acute distress, denies abdominal pain, nausea, vomiting, chest pain, shortness of breath. States that she wants to take her apremilast(Oteszla) unsure if she can get it inhouse.     MEDICATIONS  (STANDING):  atorvastatin 20 milliGRAM(s) Oral at bedtime  heparin  Infusion. 1000 Unit(s)/Hr (10 mL/Hr) IV Continuous <Continuous>  influenza  Vaccine (HIGH DOSE) 0.7 milliLiter(s) IntraMuscular once  lisinopril 40 milliGRAM(s) Oral daily  nystatin Powder 1 Application(s) Topical two times a day  sodium chloride 0.9%. 500 milliLiter(s) (30 mL/Hr) IV Continuous <Continuous>  triamcinolone 0.1% Cream 2 Application(s) Topical two times a day  warfarin 5 milliGRAM(s) Oral once    MEDICATIONS  (PRN):  heparin   Injectable 6500 Unit(s) IV Push every 6 hours PRN For aPTT less than 40  heparin   Injectable 3000 Unit(s) IV Push every 6 hours PRN For aPTT between 40 - 57      I&O's Summary    09 Sep 2023 07:01  -  10 Sep 2023 07:00  --------------------------------------------------------  IN: 480 mL / OUT: 400 mL / NET: 80 mL        PHYSICAL EXAM:  Vital Signs Last 24 Hrs  T(C): 36.7 (10 Sep 2023 13:10), Max: 36.8 (10 Sep 2023 05:29)  T(F): 98.1 (10 Sep 2023 13:10), Max: 98.2 (10 Sep 2023 05:29)  HR: 72 (10 Sep 2023 13:10) (67 - 74)  BP: 140/78 (10 Sep 2023 13:10) (136/76 - 160/82)  BP(mean): --  RR: 18 (10 Sep 2023 13:10) (18 - 18)  SpO2: 97% (10 Sep 2023 13:10) (96% - 97%)    Parameters below as of 10 Sep 2023 13:10  Patient On (Oxygen Delivery Method): room air      CONSTITUTIONAL: NAD  RESPIRATORY: Normal respiratory effort; lungs are clear to auscultation bilaterally  CARDIOVASCULAR: Regular rate and rhythm, normal S1/S2, no murmurs. No lower extremity edema, No JVD   ABDOMEN: Nontender to palpation, normoactive bowel sounds, no rebound/guarding; No hepatosplenomegaly  MUSCLOSKELETAL: no clubbing or cyanosis of digits; no joint swelling or tenderness to palpation  NEURO: No focal deficits, upper/lower motor strength grossly intact bilaterally   EXT: Warm, well perfused   PSYCH: A+O to person, place, and time; affect appropriate    LABS:                        12.6   7.75  )-----------( 276      ( 10 Sep 2023 06:47 )             41.1     09-09    142  |  107  |  20  ----------------------------<  103<H>  4.2   |  26  |  1.10    Ca    10.1      09 Sep 2023 04:43      PT/INR - ( 10 Sep 2023 07:50 )   PT: 13.7 sec;   INR: 1.32 ratio         PTT - ( 10 Sep 2023 07:50 )  PTT:91.2 sec      Urinalysis Basic - ( 09 Sep 2023 04:43 )    Color: x / Appearance: x / SG: x / pH: x  Gluc: 103 mg/dL / Ketone: x  / Bili: x / Urobili: x   Blood: x / Protein: x / Nitrite: x   Leuk Esterase: x / RBC: x / WBC x   Sq Epi: x / Non Sq Epi: x / Bacteria: x          RADIOLOGY & ADDITIONAL TESTS:  Results Reviewed: CBC/CMP personally reviewed by me Hgb 12.6, WBC 7.76, Cr 1.10 Na 142, K 4.2.   Imaging Personally Reviewed:  Electrocardiogram Personally Reviewed:    COORDINATION OF CARE:  Care Discussed with Consultants/Other Providers [Y/N]:  Prior or Outpatient Records Reviewed [Y/N]:

## 2023-09-11 ENCOUNTER — NON-APPOINTMENT (OUTPATIENT)
Age: 73
End: 2023-09-11

## 2023-09-11 LAB
APTT BLD: 140.1 SEC — CRITICAL HIGH (ref 24.5–35.6)
APTT BLD: 39.2 SEC — HIGH (ref 24.5–35.6)
APTT BLD: 57.9 SEC — HIGH (ref 24.5–35.6)
HCT VFR BLD CALC: 45.2 % — HIGH (ref 34.5–45)
HGB BLD-MCNC: 13.6 G/DL — SIGNIFICANT CHANGE UP (ref 11.5–15.5)
INR BLD: 1.4 RATIO — HIGH (ref 0.85–1.18)
MCHC RBC-ENTMCNC: 28.5 PG — SIGNIFICANT CHANGE UP (ref 27–34)
MCHC RBC-ENTMCNC: 30.1 GM/DL — LOW (ref 32–36)
MCV RBC AUTO: 94.6 FL — SIGNIFICANT CHANGE UP (ref 80–100)
NRBC # BLD: 0 /100 WBCS — SIGNIFICANT CHANGE UP (ref 0–0)
PLATELET # BLD AUTO: 255 K/UL — SIGNIFICANT CHANGE UP (ref 150–400)
PROTHROM AB SERPL-ACNC: 15.3 SEC — HIGH (ref 9.5–13)
RBC # BLD: 4.78 M/UL — SIGNIFICANT CHANGE UP (ref 3.8–5.2)
RBC # FLD: 14.3 % — SIGNIFICANT CHANGE UP (ref 10.3–14.5)
WBC # BLD: 7.41 K/UL — SIGNIFICANT CHANGE UP (ref 3.8–10.5)
WBC # FLD AUTO: 7.41 K/UL — SIGNIFICANT CHANGE UP (ref 3.8–10.5)

## 2023-09-11 PROCEDURE — 99232 SBSQ HOSP IP/OBS MODERATE 35: CPT

## 2023-09-11 RX ORDER — WARFARIN SODIUM 2.5 MG/1
5 TABLET ORAL ONCE
Refills: 0 | Status: COMPLETED | OUTPATIENT
Start: 2023-09-11 | End: 2023-09-11

## 2023-09-11 RX ADMIN — HEPARIN SODIUM 1000 UNIT(S)/HR: 5000 INJECTION INTRAVENOUS; SUBCUTANEOUS at 01:17

## 2023-09-11 RX ADMIN — HEPARIN SODIUM 0 UNIT(S)/HR: 5000 INJECTION INTRAVENOUS; SUBCUTANEOUS at 17:40

## 2023-09-11 RX ADMIN — Medication 2 APPLICATION(S): at 07:03

## 2023-09-11 RX ADMIN — HEPARIN SODIUM 1100 UNIT(S)/HR: 5000 INJECTION INTRAVENOUS; SUBCUTANEOUS at 18:42

## 2023-09-11 RX ADMIN — HEPARIN SODIUM 1100 UNIT(S)/HR: 5000 INJECTION INTRAVENOUS; SUBCUTANEOUS at 20:15

## 2023-09-11 RX ADMIN — Medication 2 APPLICATION(S): at 17:20

## 2023-09-11 RX ADMIN — WARFARIN SODIUM 5 MILLIGRAM(S): 2.5 TABLET ORAL at 21:04

## 2023-09-11 RX ADMIN — HEPARIN SODIUM 1000 UNIT(S)/HR: 5000 INJECTION INTRAVENOUS; SUBCUTANEOUS at 07:01

## 2023-09-11 RX ADMIN — HEPARIN SODIUM 1100 UNIT(S)/HR: 5000 INJECTION INTRAVENOUS; SUBCUTANEOUS at 19:14

## 2023-09-11 RX ADMIN — HEPARIN SODIUM 6500 UNIT(S): 5000 INJECTION INTRAVENOUS; SUBCUTANEOUS at 08:55

## 2023-09-11 RX ADMIN — NYSTATIN CREAM 1 APPLICATION(S): 100000 CREAM TOPICAL at 07:03

## 2023-09-11 RX ADMIN — LISINOPRIL 40 MILLIGRAM(S): 2.5 TABLET ORAL at 05:12

## 2023-09-11 RX ADMIN — NYSTATIN CREAM 1 APPLICATION(S): 100000 CREAM TOPICAL at 17:19

## 2023-09-11 RX ADMIN — HEPARIN SODIUM 1400 UNIT(S)/HR: 5000 INJECTION INTRAVENOUS; SUBCUTANEOUS at 08:50

## 2023-09-11 RX ADMIN — ATORVASTATIN CALCIUM 20 MILLIGRAM(S): 80 TABLET, FILM COATED ORAL at 21:04

## 2023-09-11 NOTE — PROGRESS NOTE ADULT - SUBJECTIVE AND OBJECTIVE BOX
[facility] Division of Hospital Medicine  Cal Russ DO  Reachable on [employer] Teams    Patient is a 72y old  Female who presents with a chief complaint of Colonoscopy (10 Sep 2023 17:10)    SUBJECTIVE / OVERNIGHT EVENTS: No acute events overnight. Patient seen and examined at bedside this morning, feels well, denies chest pain, shortness of breath, abdominal pain.    REVIEW OF SYSTEMS:    CONSTITUTIONAL: No weakness, fevers or chills  EYES/ENT: No visual changes;  No vertigo or throat pain   NECK: No pain or stiffness  RESPIRATORY: No cough, wheezing, hemoptysis; No shortness of breath  CARDIOVASCULAR: No chest pain or palpitations  GASTROINTESTINAL: No abdominal or epigastric pain. No nausea, vomiting, or hematemesis; No diarrhea or constipation. No melena or hematochezia.  GENITOURINARY: No dysuria, frequency or hematuria  NEUROLOGICAL: No numbness or weakness  SKIN: No itching, burning, rashes, or lesions  MSK: No joint pain, no back pain  HEME: No easy bleeding, no easy bruising  All other review of systems is negative unless indicated above.    MEDICATIONS  (STANDING):  atorvastatin 20 milliGRAM(s) Oral at bedtime  heparin  Infusion. 1000 Unit(s)/Hr (10 mL/Hr) IV Continuous <Continuous>  influenza  Vaccine (HIGH DOSE) 0.7 milliLiter(s) IntraMuscular once  lisinopril 40 milliGRAM(s) Oral daily  nystatin Powder 1 Application(s) Topical two times a day  sodium chloride 0.9%. 500 milliLiter(s) (30 mL/Hr) IV Continuous <Continuous>  triamcinolone 0.1% Cream 2 Application(s) Topical two times a day  warfarin 5 milliGRAM(s) Oral once    MEDICATIONS  (PRN):  heparin   Injectable 3000 Unit(s) IV Push every 6 hours PRN For aPTT between 40 - 57  heparin   Injectable 6500 Unit(s) IV Push every 6 hours PRN For aPTT less than 40      CAPILLARY BLOOD GLUCOSE        I&O's Summary    10 Sep 2023 07:01  -  11 Sep 2023 07:00  --------------------------------------------------------  IN: 1200 mL / OUT: 2200 mL / NET: -1000 mL    11 Sep 2023 07:01  -  11 Sep 2023 15:46  --------------------------------------------------------  IN: 360 mL / OUT: 0 mL / NET: 360 mL        PHYSICAL EXAM:  Vital Signs Last 24 Hrs  T(C): 36.8 (11 Sep 2023 12:18), Max: 36.8 (10 Sep 2023 21:09)  T(F): 98.2 (11 Sep 2023 12:18), Max: 98.2 (10 Sep 2023 21:09)  HR: 63 (11 Sep 2023 06:43) (63 - 73)  BP: 119/81 (11 Sep 2023 12:18) (119/81 - 153/82)  BP(mean): --  RR: 18 (11 Sep 2023 12:18) (17 - 18)  SpO2: 98% (11 Sep 2023 12:18) (97% - 99%)    Parameters below as of 11 Sep 2023 12:18  Patient On (Oxygen Delivery Method): room air    CONSTITUTIONAL: NAD, well-developed, well-groomed  EYES: EOMI; conjunctiva and sclera clear  ENMT: Moist oral mucosa, no pharyngeal injection or exudates  RESPIRATORY: Normal respiratory effort; lungs are clear to auscultation bilaterally  CARDIOVASCULAR: Regular rate and rhythm, normal S1 and S2, no murmur/rub/gallop  ABDOMEN: Nontender to palpation, soft, nondistended  MUSCULOSKELETAL: No clubbing or cyanosis of digits; no joint swelling or tenderness to palpation  PSYCH: A+O to person, place, and time; affect appropriate    LABS:                        13.6   7.41  )-----------( 255      ( 11 Sep 2023 07:09 )             45.2           PT/INR - ( 11 Sep 2023 07:09 )   PT: 15.3 sec;   INR: 1.40 ratio         PTT - ( 11 Sep 2023 07:09 )  PTT:39.2 sec

## 2023-09-12 ENCOUNTER — TRANSCRIPTION ENCOUNTER (OUTPATIENT)
Age: 73
End: 2023-09-12

## 2023-09-12 ENCOUNTER — NON-APPOINTMENT (OUTPATIENT)
Age: 73
End: 2023-09-12

## 2023-09-12 LAB
APTT BLD: 66.6 SEC — HIGH (ref 24.5–35.6)
APTT BLD: 75.2 SEC — HIGH (ref 24.5–35.6)
HCT VFR BLD CALC: 47 % — HIGH (ref 34.5–45)
HGB BLD-MCNC: 14.5 G/DL — SIGNIFICANT CHANGE UP (ref 11.5–15.5)
INR BLD: 1.64 RATIO — HIGH (ref 0.85–1.18)
MCHC RBC-ENTMCNC: 28.2 PG — SIGNIFICANT CHANGE UP (ref 27–34)
MCHC RBC-ENTMCNC: 30.9 GM/DL — LOW (ref 32–36)
MCV RBC AUTO: 91.3 FL — SIGNIFICANT CHANGE UP (ref 80–100)
NRBC # BLD: 0 /100 WBCS — SIGNIFICANT CHANGE UP (ref 0–0)
PLATELET # BLD AUTO: 318 K/UL — SIGNIFICANT CHANGE UP (ref 150–400)
PROTHROM AB SERPL-ACNC: 17 SEC — HIGH (ref 9.5–13)
RBC # BLD: 5.15 M/UL — SIGNIFICANT CHANGE UP (ref 3.8–5.2)
RBC # FLD: 14.6 % — HIGH (ref 10.3–14.5)
WBC # BLD: 7.93 K/UL — SIGNIFICANT CHANGE UP (ref 3.8–10.5)
WBC # FLD AUTO: 7.93 K/UL — SIGNIFICANT CHANGE UP (ref 3.8–10.5)

## 2023-09-12 PROCEDURE — 99232 SBSQ HOSP IP/OBS MODERATE 35: CPT

## 2023-09-12 RX ORDER — WARFARIN SODIUM 2.5 MG/1
5 TABLET ORAL ONCE
Refills: 0 | Status: COMPLETED | OUTPATIENT
Start: 2023-09-12 | End: 2023-09-12

## 2023-09-12 RX ORDER — POLYETHYLENE GLYCOL 3350 17 G/17G
17 POWDER, FOR SOLUTION ORAL
Refills: 0 | Status: DISCONTINUED | OUTPATIENT
Start: 2023-09-12 | End: 2023-09-17

## 2023-09-12 RX ORDER — SENNA PLUS 8.6 MG/1
2 TABLET ORAL AT BEDTIME
Refills: 0 | Status: DISCONTINUED | OUTPATIENT
Start: 2023-09-12 | End: 2023-09-17

## 2023-09-12 RX ADMIN — SENNA PLUS 2 TABLET(S): 8.6 TABLET ORAL at 21:13

## 2023-09-12 RX ADMIN — WARFARIN SODIUM 5 MILLIGRAM(S): 2.5 TABLET ORAL at 21:13

## 2023-09-12 RX ADMIN — HEPARIN SODIUM 1100 UNIT(S)/HR: 5000 INJECTION INTRAVENOUS; SUBCUTANEOUS at 11:16

## 2023-09-12 RX ADMIN — HEPARIN SODIUM 1100 UNIT(S)/HR: 5000 INJECTION INTRAVENOUS; SUBCUTANEOUS at 07:23

## 2023-09-12 RX ADMIN — Medication 2 APPLICATION(S): at 06:02

## 2023-09-12 RX ADMIN — HEPARIN SODIUM 1100 UNIT(S)/HR: 5000 INJECTION INTRAVENOUS; SUBCUTANEOUS at 17:42

## 2023-09-12 RX ADMIN — NYSTATIN CREAM 1 APPLICATION(S): 100000 CREAM TOPICAL at 06:02

## 2023-09-12 RX ADMIN — NYSTATIN CREAM 1 APPLICATION(S): 100000 CREAM TOPICAL at 17:48

## 2023-09-12 RX ADMIN — HEPARIN SODIUM 1100 UNIT(S)/HR: 5000 INJECTION INTRAVENOUS; SUBCUTANEOUS at 19:15

## 2023-09-12 RX ADMIN — Medication 2 APPLICATION(S): at 17:47

## 2023-09-12 RX ADMIN — HEPARIN SODIUM 1100 UNIT(S)/HR: 5000 INJECTION INTRAVENOUS; SUBCUTANEOUS at 03:03

## 2023-09-12 RX ADMIN — POLYETHYLENE GLYCOL 3350 17 GRAM(S): 17 POWDER, FOR SOLUTION ORAL at 17:47

## 2023-09-12 RX ADMIN — ATORVASTATIN CALCIUM 20 MILLIGRAM(S): 80 TABLET, FILM COATED ORAL at 21:13

## 2023-09-12 RX ADMIN — LISINOPRIL 40 MILLIGRAM(S): 2.5 TABLET ORAL at 06:02

## 2023-09-12 NOTE — DISCHARGE NOTE PROVIDER - NSDCCPCAREPLAN_GEN_ALL_CORE_FT
PRINCIPAL DISCHARGE DIAGNOSIS  Diagnosis: Abdominal pain  Assessment and Plan of Treatment: Resolved  s/p colonoscopy - colon polpys removed - clips placed  Follow up with gastroenterology on discharge.      SECONDARY DISCHARGE DIAGNOSES  Diagnosis: Deep vein thrombosis of portal vein  Assessment and Plan of Treatment: Continue with coumadin  INR to be checked weekly and Dr. Cabrera to adjust, based on level    Diagnosis: Psoriatic arthritis  Assessment and Plan of Treatment: Continue with home medication    Diagnosis: HTN (hypertension)  Assessment and Plan of Treatment: Low salt diet  Activity as tolerated.  Take all medication as prescribed.  Follow up with your medical doctor for routine blood pressure monitoring at your next visit.  Notify your doctor if you have any of the following symptoms:   Dizziness, Lightheadedness, Blurry vision, Headache, Chest pain, Shortness of breath

## 2023-09-12 NOTE — DISCHARGE NOTE PROVIDER - NSDCMRMEDTOKEN_GEN_ALL_CORE_FT
apremilast 30 mg oral tablet: 1 tab(s) orally 2 times a day  atorvastatin 20 mg oral tablet: 1 tab(s) orally once a day  lisinopril 40 mg oral tablet: 1 tab(s) orally once a day  Rehab at Home: Evaluate for PT at home  warfarin 5 mg oral tablet: 1 tab(s) orally once a day   apremilast 30 mg oral tablet: 1 tab(s) orally 2 times a day  atorvastatin 20 mg oral tablet: 1 tab(s) orally once a day  Draw PT/INR weekly and send results to patient&#x27;s PCP Dr. Luis Armando Cabrera: ICD 10 code - K55.0  lisinopril 40 mg oral tablet: 1 tab(s) orally once a day  Rehab at Home: Evaluate for PT at home  warfarin 5 mg oral tablet: 1 tab(s) orally once a day   atorvastatin 20 mg oral tablet: 1 tab(s) orally once a day  lisinopril 40 mg oral tablet: 1 tab(s) orally once a day  polyethylene glycol 3350 oral powder for reconstitution: 17 gram(s) orally 2 times a day  senna leaf extract oral tablet: 2 tab(s) orally once a day (at bedtime)  warfarin 5 mg oral tablet: 1 tab(s) orally once a day   atorvastatin 20 mg oral tablet: 1 tab(s) orally once a day  lisinopril 40 mg oral tablet: 1 tab(s) orally once a day  polyethylene glycol 3350 oral powder for reconstitution: 17 gram(s) orally 2 times a day  senna leaf extract oral tablet: 2 tab(s) orally once a day (at bedtime)  sodium chloride 0.65% nasal spray: nasal 4 times a day  warfarin 5 mg oral tablet: 1 tab(s) orally once a day   atorvastatin 20 mg oral tablet: 1 tab(s) orally once a day  draw weekly PT/INR and send results to Dr. evans: weekly  lisinopril 40 mg oral tablet: 1 tab(s) orally once a day  polyethylene glycol 3350 oral powder for reconstitution: 17 gram(s) orally 2 times a day  rehab at home: to evaluate for PT needs  senna leaf extract oral tablet: 2 tab(s) orally once a day (at bedtime)  sodium chloride 0.65% nasal spray: nasal 4 times a day  warfarin 5 mg oral tablet: 1 tab(s) orally once a day

## 2023-09-12 NOTE — PROGRESS NOTE ADULT - SUBJECTIVE AND OBJECTIVE BOX
[facility] Division of Hospital Medicine  Cal Russ DO  Reachable on [employer] Teams    Patient is a 72y old  Female who presents with a chief complaint of Colonoscopy (11 Sep 2023 15:45)    SUBJECTIVE / OVERNIGHT EVENTS: No acute events overnight. Patient seen and examined at bedside this morning, feels well, denies chest pain, shortness of breath, abdominal pain.    REVIEW OF SYSTEMS:    CONSTITUTIONAL: No weakness, fevers or chills  EYES/ENT: No visual changes;  No vertigo or throat pain   NECK: No pain or stiffness  RESPIRATORY: No cough, wheezing, hemoptysis; No shortness of breath  CARDIOVASCULAR: No chest pain or palpitations  GASTROINTESTINAL: No abdominal or epigastric pain. No nausea, vomiting, or hematemesis; No diarrhea or constipation. No melena or hematochezia.  GENITOURINARY: No dysuria, frequency or hematuria  NEUROLOGICAL: No numbness or weakness  SKIN: No itching, burning, rashes, or lesions  MSK: No joint pain, no back pain  HEME: No easy bleeding, no easy bruising  All other review of systems is negative unless indicated above.    MEDICATIONS  (STANDING):  atorvastatin 20 milliGRAM(s) Oral at bedtime  heparin  Infusion. 1000 Unit(s)/Hr (10 mL/Hr) IV Continuous <Continuous>  influenza  Vaccine (HIGH DOSE) 0.7 milliLiter(s) IntraMuscular once  lisinopril 40 milliGRAM(s) Oral daily  nystatin Powder 1 Application(s) Topical two times a day  polyethylene glycol 3350 17 Gram(s) Oral two times a day  senna 2 Tablet(s) Oral at bedtime  sodium chloride 0.9%. 500 milliLiter(s) (30 mL/Hr) IV Continuous <Continuous>  triamcinolone 0.1% Cream 2 Application(s) Topical two times a day  warfarin 5 milliGRAM(s) Oral once    MEDICATIONS  (PRN):  heparin   Injectable 6500 Unit(s) IV Push every 6 hours PRN For aPTT less than 40  heparin   Injectable 3000 Unit(s) IV Push every 6 hours PRN For aPTT between 40 - 57      CAPILLARY BLOOD GLUCOSE        I&O's Summary    11 Sep 2023 07:01  -  12 Sep 2023 07:00  --------------------------------------------------------  IN: 492 mL / OUT: 0 mL / NET: 492 mL        PHYSICAL EXAM:  Vital Signs Last 24 Hrs  T(C): 36.4 (12 Sep 2023 12:57), Max: 36.8 (11 Sep 2023 20:37)  T(F): 97.6 (12 Sep 2023 12:57), Max: 98.2 (11 Sep 2023 20:37)  HR: 81 (12 Sep 2023 12:57) (66 - 81)  BP: 106/67 (12 Sep 2023 12:57) (106/67 - 123/77)  BP(mean): --  RR: 18 (12 Sep 2023 12:57) (18 - 18)  SpO2: 95% (12 Sep 2023 12:57) (95% - 99%)    Parameters below as of 12 Sep 2023 12:57  Patient On (Oxygen Delivery Method): room air    CONSTITUTIONAL: NAD, well-developed, well-groomed  EYES: EOMI; conjunctiva and sclera clear  ENMT: Moist oral mucosa, no pharyngeal injection or exudates  RESPIRATORY: Normal respiratory effort; lungs are clear to auscultation bilaterally  CARDIOVASCULAR: Regular rate and rhythm, normal S1 and S2, no murmur/rub/gallop  ABDOMEN: Nontender to palpation, soft, nondistended  MUSCULOSKELETAL: No clubbing or cyanosis of digits; no joint swelling or tenderness to palpation  PSYCH: A+O to person, place, and time; affect appropriate    LABS:                        14.5   7.93  )-----------( 318      ( 12 Sep 2023 10:31 )             47.0           PT/INR - ( 12 Sep 2023 10:31 )   PT: 17.0 sec;   INR: 1.64 ratio         PTT - ( 12 Sep 2023 10:31 )  PTT:75.2 sec

## 2023-09-12 NOTE — DISCHARGE NOTE PROVIDER - CARE PROVIDERS DIRECT ADDRESSES
,[email or web address]

## 2023-09-12 NOTE — DISCHARGE NOTE PROVIDER - HOSPITAL COURSE
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 Pt without any symptoms or complaints. Pt admitted for bridging to heparin gtt in preparation for colonoscopy. Patient with polyps removed,all <5mm. Receiving Coumadin for bridging.       Problem/Plan - 1:  ·  Problem: Encounter for screening colonoscopy.   ·  Plan: Given pt's history of diverticulitis with complication of PV/IMV/SMV thrombosis, screening colonoscopy recommended to evaluate for malignancy  - GI consulted recs appreciated  - Colonoscopy shows polyps <5mm removed and internal hemorrhoids.   -Bridging to Coumadin with Heparin drip underway.  Coumadin 5mg tonight.     Problem/Plan - 2:  ·  Problem: Deep vein thrombosis of portal vein.   ·  Plan: Recently diagnosed thromboses in PV/IMV/SMV in setting of diverticulitis, actively being treated with anticoagulation. Was initially on lovenox but switched to warfarin due to patient preference  Continue with  Coumadin and bridge at night.   Continue  heparin gtt.     Problem/Plan - 3:  ·  Problem: HTN (hypertension).   ·  Plan: Continue home lisinopril 40mg.     Problem/Plan - 4:  ·  Problem: Psoriatic arthritis.   ·  Plan: On Otezla (apremilast) for her psoriatic arthritis. Currently with minimal joint pains. Rash noted on thigh, abdomen, and trunk. Symptoms manageable at this time  - Ordered triamcinolone cream  Pharmacy does not have Otezla inhouse, patient will need to bring in Otezla to continue it.     Problem/Plan - 5:  ·  Problem: History of diverticulitis.   ·  Plan: Last diverticulitis flare in Jan 2023, no other flares since then, Currently no GI symptoms.  Patient has not had colonoscopy after last episode of Diverticulitis  Colonoscopy shows internal hemorrhoids and diverticulosis.     Problem/Plan - 6:  ·  Problem: Prophylactic measure.   ·  Plan: DVT ppx: on heparin gtt, receiving Coumadin 5mg tonight  Diet: DASH diet  Dispo:  OPT.     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 Pt without any symptoms or complaints. Pt admitted for bridging to heparin gtt in preparation for colonoscopy. Patient with polyps removed,all <5mm. Receiving Coumadin for bridging.       Problem/Plan - 1:  ·  Problem: Encounter for screening colonoscopy.   ·  Plan: Given pt's history of diverticulitis with complication of PV/IMV/SMV thrombosis, screening colonoscopy recommended to evaluate for malignancy  - GI consulted recs appreciated  - Colonoscopy shows polyps <5mm removed and internal hemorrhoids.   -Bridged with Heparin and coumadin - INR now therapeutic     Problem/Plan - 2:  ·  Problem: Deep vein thrombosis of portal vein.   ·  Plan: Recently diagnosed thromboses in PV/IMV/SMV in setting of diverticulitis, actively being treated with anticoagulation. Was initially on lovenox but switched to warfarin due to patient preference  Continue with  Coumadin     Problem/Plan - 3:  ·  Problem: HTN (hypertension).   ·  Plan: Continue home lisinopril 40mg.     Problem/Plan - 4:  ·  Problem: Psoriatic arthritis.   ·  Plan: On Otezla (apremilast) for her psoriatic arthritis. Currently with minimal joint pains. Rash noted on thigh, abdomen, and trunk. Symptoms manageable at this time  - Ordered triamcinolone cream  Pharmacy does not have Otezla inhouse, patient will need to bring in Otezla to continue it.     Problem/Plan - 5:  ·  Problem: History of diverticulitis.   ·  Plan: Last diverticulitis flare in Jan 2023, no other flares since then, Currently no GI symptoms.  Patient has not had colonoscopy after last episode of Diverticulitis  Colonoscopy shows internal hemorrhoids and diverticulosis.     Problem/Plan - 6:  ·  Problem: Prophylactic measure.   ·  Plan: DVT ppx: on heparin gtt, receiving Coumadin 5mg tonight  Diet: DASH diet  Dispo:  OPT.     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 Pt without any symptoms or complaints. Pt admitted for bridging to heparin gtt in preparation for colonoscopy. Patient with polyps removed,all <5mm. Receiving Coumadin for bridging.       Problem/Plan - 1:  ·  Problem: Encounter for screening colonoscopy.   ·  Plan: Given pt's history of diverticulitis with complication of PV/IMV/SMV thrombosis, screening colonoscopy recommended to evaluate for malignancy  - GI consulted recs appreciated  - Colonoscopy shows polyps <5mm removed and internal hemorrhoids.   -Bridged with Heparin and coumadin - INR now therapeutic     Problem/Plan - 2:  ·  Problem: Deep vein thrombosis of portal vein.   ·  Plan: Recently diagnosed thromboses in PV/IMV/SMV in setting of diverticulitis, actively being treated with anticoagulation. Was initially on lovenox but switched to warfarin due to patient preference  Continue with  Coumadin     Problem/Plan - 3:  ·  Problem: HTN (hypertension).   ·  Plan: Continue home lisinopril 40mg.     Problem/Plan - 4:  ·  Problem: Psoriatic arthritis.   ·  Plan: On Otezla (apremilast) for her psoriatic arthritis. Currently with minimal joint pains. Rash noted on thigh, abdomen, and trunk. Symptoms manageable at this time  - Ordered triamcinolone cream  Pharmacy does not have Otezla inhouse, patient will need to bring in Otezla to continue it.     Problem/Plan - 5:  ·  Problem: History of diverticulitis.   ·  Plan: Last diverticulitis flare in Jan 2023, no other flares since then, Currently no GI symptoms.  Patient has not had colonoscopy after last episode of Diverticulitis  Colonoscopy shows internal hemorrhoids and diverticulosis.     Problem/Plan - 6:  ·  Problem: Prophylactic measure.   ·  Plan: DVT ppx: on heparin gtt, receiving Coumadin 5mg tonight  Diet: DASH diet  Dispo:  OPT.   Hemorrhoids found on perianal exam.                       - One 3 mm polyp in the ascending colon, removed with a cold snare. Resected                        and retrieved.                       - One 3 mm, non-bleeding polyp in the transverse colon, removed with a cold                        snare. Resected and retrieved.                       - One small polyp in the rectum.                       - One 4 mm, non-bleeding polyp in the descending colon, removed with a cold                        snare. Resected and retrieved. Clipped.                       - One 4 mm polyp in the sigmoid colon, removed with a jumbo cold forceps.                        Resected and retrieved.                       - Diverticulosis in the sigmoid colon, in the descending colon and in the                        ascending colon.                       - Non-bleeding internal hemorrhoids.                       - Stool in the entire examined colon.  Recommendation:      - Return patient to hospital stafford for ongoing care.                       - Advance diet as tolerated today. High fiber diet.                       - Resume anticoagulation today.                       - Continue present medications.                       - Await pathology results.                       - Repeat colonoscopy date to be determined after pending pathology results                        are reviewed for surveillance.                       - Follow-up with Dr. Yancey as outpatient at GI clinic.

## 2023-09-12 NOTE — DISCHARGE NOTE PROVIDER - NSDCFUADDAPPT_GEN_ALL_CORE_FT
You must follow up with your primary medical doctor within one week of discharge - please call to make an appointment. You must follow up with your primary medical doctor within one week of discharge - please call to make an appointment.  APPTS ARE READY TO BE MADE: [ ] YES    Best Family or Patient Contact (if needed):    Additional Information about above appointments (if needed):    1: Rick 1 week  2: Dr. Yancey, GI clinic  3:     Other comments or requests:    You must follow up with your primary medical doctor within one week of discharge - please call to make an appointment.  APPTS ARE READY TO BE MADE: [x ] YES    Best Family or Patient Contact (if needed):    Additional Information about above appointments (if needed):    1: Rick 1 week  2: Dr. Yancey, GI clinic  3:     Other comments or requests:    You must follow up with your primary medical doctor within one week of discharge - please call to make an appointment.  APPTS ARE READY TO BE MADE: [x ] YES    Best Family or Patient Contact (if needed):    Additional Information about above appointments (if needed):    1: Rick 1 week  2: Dr. Yancey, GI clinic  3:     Other comments or requests:   Patient/Caregiver declined scheduling assistance. Patient refused to provide .

## 2023-09-12 NOTE — DISCHARGE NOTE PROVIDER - PROVIDER TOKENS
PROVIDER:[TOKEN:[[id number]],FOLLOWUP:[1 week]] PROVIDER:[TOKEN:[[id number]],FOLLOWUP:[1 week]],FREE:[LAST:[GI Clinic],PHONE:[(   )    -],FAX:[(   )    -]]

## 2023-09-12 NOTE — DISCHARGE NOTE PROVIDER - NSDCFUSCHEDAPPT_GEN_ALL_CORE_FT
Goldberg, Naomi  [facility]  [address]ALM [phone]  Scheduled Appointment: 10/18/2023    Fidencio Raygoza  [facility]  RHEUM OP [id number]  Scheduled Appointment: 11/20/2023     [facility]  INTMED OP [id number]  Scheduled Appointment: 09/26/2023    Goldberg, Naomi  [facility]  [address]ALM [phone]  Scheduled Appointment: 10/18/2023    Fidencio Raygoza  [facility]  RHEUM OP [id number]  Scheduled Appointment: 11/20/2023

## 2023-09-12 NOTE — DISCHARGE NOTE PROVIDER - CARE PROVIDER_API CALL
Luis Armando Cabrera.  Internal Medicine  [address] [place] [address]  Phone: [phone]  Fax: [phone]  Follow Up Time: 1 week   Luis Armando Cabrera  Internal Medicine  [address] [place] [address]  Phone: [phone]  Fax: [phone]  Follow Up Time: 1 week    GI Clinic,   Phone: (   )    -  Fax: (   )    -  Follow Up Time:

## 2023-09-13 LAB
APTT BLD: 77 SEC — HIGH (ref 24.5–35.6)
HCT VFR BLD CALC: 43.7 % — SIGNIFICANT CHANGE UP (ref 34.5–45)
HGB BLD-MCNC: 13.8 G/DL — SIGNIFICANT CHANGE UP (ref 11.5–15.5)
INR BLD: 1.77 RATIO — HIGH (ref 0.85–1.18)
MCHC RBC-ENTMCNC: 28.5 PG — SIGNIFICANT CHANGE UP (ref 27–34)
MCHC RBC-ENTMCNC: 31.6 GM/DL — LOW (ref 32–36)
MCV RBC AUTO: 90.3 FL — SIGNIFICANT CHANGE UP (ref 80–100)
NRBC # BLD: 0 /100 WBCS — SIGNIFICANT CHANGE UP (ref 0–0)
PLATELET # BLD AUTO: 264 K/UL — SIGNIFICANT CHANGE UP (ref 150–400)
PROTHROM AB SERPL-ACNC: 19.1 SEC — HIGH (ref 9.5–13)
RBC # BLD: 4.84 M/UL — SIGNIFICANT CHANGE UP (ref 3.8–5.2)
RBC # FLD: 14.7 % — HIGH (ref 10.3–14.5)
WBC # BLD: 7.86 K/UL — SIGNIFICANT CHANGE UP (ref 3.8–10.5)
WBC # FLD AUTO: 7.86 K/UL — SIGNIFICANT CHANGE UP (ref 3.8–10.5)

## 2023-09-13 PROCEDURE — 99232 SBSQ HOSP IP/OBS MODERATE 35: CPT

## 2023-09-13 RX ORDER — WARFARIN SODIUM 2.5 MG/1
5 TABLET ORAL ONCE
Refills: 0 | Status: COMPLETED | OUTPATIENT
Start: 2023-09-13 | End: 2023-09-13

## 2023-09-13 RX ORDER — LACTULOSE 10 G/15ML
10 SOLUTION ORAL ONCE
Refills: 0 | Status: COMPLETED | OUTPATIENT
Start: 2023-09-13 | End: 2023-09-13

## 2023-09-13 RX ADMIN — LACTULOSE 10 GRAM(S): 10 SOLUTION ORAL at 14:43

## 2023-09-13 RX ADMIN — HEPARIN SODIUM 1100 UNIT(S)/HR: 5000 INJECTION INTRAVENOUS; SUBCUTANEOUS at 11:04

## 2023-09-13 RX ADMIN — Medication 2 APPLICATION(S): at 17:32

## 2023-09-13 RX ADMIN — HEPARIN SODIUM 1100 UNIT(S)/HR: 5000 INJECTION INTRAVENOUS; SUBCUTANEOUS at 19:22

## 2023-09-13 RX ADMIN — HEPARIN SODIUM 1100 UNIT(S)/HR: 5000 INJECTION INTRAVENOUS; SUBCUTANEOUS at 07:34

## 2023-09-13 RX ADMIN — Medication 2 APPLICATION(S): at 05:21

## 2023-09-13 RX ADMIN — WARFARIN SODIUM 5 MILLIGRAM(S): 2.5 TABLET ORAL at 21:55

## 2023-09-13 RX ADMIN — SENNA PLUS 2 TABLET(S): 8.6 TABLET ORAL at 21:54

## 2023-09-13 RX ADMIN — POLYETHYLENE GLYCOL 3350 17 GRAM(S): 17 POWDER, FOR SOLUTION ORAL at 05:21

## 2023-09-13 RX ADMIN — NYSTATIN CREAM 1 APPLICATION(S): 100000 CREAM TOPICAL at 06:10

## 2023-09-13 RX ADMIN — POLYETHYLENE GLYCOL 3350 17 GRAM(S): 17 POWDER, FOR SOLUTION ORAL at 17:30

## 2023-09-13 RX ADMIN — ATORVASTATIN CALCIUM 20 MILLIGRAM(S): 80 TABLET, FILM COATED ORAL at 21:54

## 2023-09-13 RX ADMIN — NYSTATIN CREAM 1 APPLICATION(S): 100000 CREAM TOPICAL at 17:31

## 2023-09-13 NOTE — PROGRESS NOTE ADULT - TIME BILLING
Time-based billing (NON-critical care).     The necessity of the time spent during the encounter on this date of service was due to:     - Ordering, reviewing, and interpreting labs, testing, and imaging.  - Independently obtaining a review of systems and performing a physical exam  - Reviewing prior hospitalization and where necessary, outpatient records.  - Counselling and educating patient  regarding interpretation of aforementioned items and plan of care.
Review of tests, imaging, labs, documents, medical management, coordination of care and counseling.
Review of tests, imaging, labs, documents, medical management, coordination of care and counseling.
Time-based billing (NON-critical care).     The necessity of the time spent during the encounter on this date of service was due to:     - Ordering, reviewing, and interpreting labs, testing, and imaging.  - Independently obtaining a review of systems and performing a physical exam  - Reviewing prior hospitalization and where necessary, outpatient records.  - Counselling and educating patient regarding interpretation of aforementioned items and plan of care.
Time-based billing (NON-critical care).     The necessity of the time spent during the encounter on this date of service was due to:     - Ordering, reviewing, and interpreting labs, testing, and imaging.  - Independently obtaining a review of systems and performing a physical exam  - Reviewing prior hospitalization and where necessary, outpatient records.  - Counselling and educating patient  regarding interpretation of aforementioned items and plan of care.
Review of tests, imaging, labs, documents, medical management, coordination of care and counseling.
Time-based billing (NON-critical care).     The necessity of the time spent during the encounter on this date of service was due to:     - Ordering, reviewing, and interpreting labs, testing, and imaging.  - Independently obtaining a review of systems and performing a physical exam  - Reviewing prior hospitalization and where necessary, outpatient records.  - Counselling and educating patient and family regarding interpretation of aforementioned items and plan of care.
Time-based billing (NON-critical care).     The necessity of the time spent during the encounter on this date of service was due to:     - Ordering, reviewing, and interpreting labs, testing, and imaging.  - Independently obtaining a review of systems and performing a physical exam  - Reviewing prior hospitalization and where necessary, outpatient records.  - Counselling and educating patient and family regarding interpretation of aforementioned items and plan of care.

## 2023-09-13 NOTE — PROGRESS NOTE ADULT - SUBJECTIVE AND OBJECTIVE BOX
[facility] Division of Hospital Medicine  Cal Russ DO  Reachable on [employer] Teams    Patient is a 72y old  Female who presents with a chief complaint of Colonoscopy (12 Sep 2023 17:15)    SUBJECTIVE / OVERNIGHT EVENTS: No acute events overnight. Patient seen and examined at bedside this morning, feels well, denies chest pain or shortness of breath, endorses constipation.    REVIEW OF SYSTEMS:    CONSTITUTIONAL: No weakness, fevers or chills  EYES/ENT: No visual changes;  No vertigo or throat pain   NECK: No pain or stiffness  RESPIRATORY: No cough, wheezing, hemoptysis; No shortness of breath  CARDIOVASCULAR: No chest pain or palpitations  GASTROINTESTINAL: No abdominal or epigastric pain. No nausea, vomiting, or hematemesis; Endorses constipation  GENITOURINARY: No dysuria, frequency or hematuria  NEUROLOGICAL: No numbness or weakness  SKIN: No itching, burning, rashes, or lesions  MSK: No joint pain, no back pain  HEME: No easy bleeding, no easy bruising  All other review of systems is negative unless indicated above.    MEDICATIONS  (STANDING):  atorvastatin 20 milliGRAM(s) Oral at bedtime  heparin  Infusion. 1000 Unit(s)/Hr (10 mL/Hr) IV Continuous <Continuous>  influenza  Vaccine (HIGH DOSE) 0.7 milliLiter(s) IntraMuscular once  lisinopril 40 milliGRAM(s) Oral daily  nystatin Powder 1 Application(s) Topical two times a day  polyethylene glycol 3350 17 Gram(s) Oral two times a day  senna 2 Tablet(s) Oral at bedtime  sodium chloride 0.9%. 500 milliLiter(s) (30 mL/Hr) IV Continuous <Continuous>  triamcinolone 0.1% Cream 2 Application(s) Topical two times a day  warfarin 5 milliGRAM(s) Oral once    MEDICATIONS  (PRN):  heparin   Injectable 3000 Unit(s) IV Push every 6 hours PRN For aPTT between 40 - 57  heparin   Injectable 6500 Unit(s) IV Push every 6 hours PRN For aPTT less than 40      CAPILLARY BLOOD GLUCOSE        I&O's Summary    12 Sep 2023 07:01  -  13 Sep 2023 07:00  --------------------------------------------------------  IN: 132 mL / OUT: 0 mL / NET: 132 mL    13 Sep 2023 07:01  -  13 Sep 2023 16:03  --------------------------------------------------------  IN: 240 mL / OUT: 0 mL / NET: 240 mL        PHYSICAL EXAM:  Vital Signs Last 24 Hrs  T(C): 36.8 (13 Sep 2023 13:24), Max: 36.8 (13 Sep 2023 13:24)  T(F): 98.2 (13 Sep 2023 13:24), Max: 98.2 (13 Sep 2023 13:24)  HR: 93 (13 Sep 2023 13:24) (67 - 93)  BP: 116/75 (13 Sep 2023 13:24) (102/60 - 119/68)  BP(mean): --  RR: 18 (13 Sep 2023 13:24) (18 - 18)  SpO2: 98% (13 Sep 2023 13:24) (96% - 100%)    Parameters below as of 13 Sep 2023 13:24  Patient On (Oxygen Delivery Method): room air      CONSTITUTIONAL: NAD, well-developed, well-groomed  EYES: EOMI; conjunctiva and sclera clear  ENMT: Moist oral mucosa, no pharyngeal injection or exudates  RESPIRATORY: Normal respiratory effort; lungs are clear to auscultation bilaterally  CARDIOVASCULAR: Regular rate and rhythm, normal S1 and S2, no murmur/rub/gallop  ABDOMEN: Nontender to palpation, soft, nondistended  MUSCULOSKELETAL: No clubbing or cyanosis of digits; no joint swelling or tenderness to palpation  PSYCH: A+O to person, place, and time; affect appropriate    LABS:                        13.8   7.86  )-----------( 264      ( 13 Sep 2023 07:30 )             43.7           PT/INR - ( 13 Sep 2023 07:30 )   PT: 19.1 sec;   INR: 1.77 ratio         PTT - ( 13 Sep 2023 07:30 )  PTT:77.0 sec

## 2023-09-14 LAB
ANION GAP SERPL CALC-SCNC: 13 MMOL/L — SIGNIFICANT CHANGE UP (ref 5–17)
APTT BLD: 85.2 SEC — HIGH (ref 24.5–35.6)
BUN SERPL-MCNC: 40 MG/DL — HIGH (ref 7–23)
CALCIUM SERPL-MCNC: 11.2 MG/DL — HIGH (ref 8.4–10.5)
CHLORIDE SERPL-SCNC: 101 MMOL/L — SIGNIFICANT CHANGE UP (ref 96–108)
CO2 SERPL-SCNC: 23 MMOL/L — SIGNIFICANT CHANGE UP (ref 22–31)
CREAT SERPL-MCNC: 1.13 MG/DL — SIGNIFICANT CHANGE UP (ref 0.5–1.3)
EGFR: 52 ML/MIN/1.73M2 — LOW
GLUCOSE SERPL-MCNC: 83 MG/DL — SIGNIFICANT CHANGE UP (ref 70–99)
HCT VFR BLD CALC: 46.4 % — HIGH (ref 34.5–45)
HGB BLD-MCNC: 14.3 G/DL — SIGNIFICANT CHANGE UP (ref 11.5–15.5)
INR BLD: 2.45 RATIO — HIGH (ref 0.85–1.18)
MCHC RBC-ENTMCNC: 27.8 PG — SIGNIFICANT CHANGE UP (ref 27–34)
MCHC RBC-ENTMCNC: 30.8 GM/DL — LOW (ref 32–36)
MCV RBC AUTO: 90.1 FL — SIGNIFICANT CHANGE UP (ref 80–100)
NRBC # BLD: 0 /100 WBCS — SIGNIFICANT CHANGE UP (ref 0–0)
PLATELET # BLD AUTO: 311 K/UL — SIGNIFICANT CHANGE UP (ref 150–400)
POTASSIUM SERPL-MCNC: 4.3 MMOL/L — SIGNIFICANT CHANGE UP (ref 3.5–5.3)
POTASSIUM SERPL-SCNC: 4.3 MMOL/L — SIGNIFICANT CHANGE UP (ref 3.5–5.3)
PROTHROM AB SERPL-ACNC: 25.1 SEC — HIGH (ref 9.5–13)
RBC # BLD: 5.15 M/UL — SIGNIFICANT CHANGE UP (ref 3.8–5.2)
RBC # FLD: 14.8 % — HIGH (ref 10.3–14.5)
SODIUM SERPL-SCNC: 137 MMOL/L — SIGNIFICANT CHANGE UP (ref 135–145)
WBC # BLD: 10.29 K/UL — SIGNIFICANT CHANGE UP (ref 3.8–10.5)
WBC # FLD AUTO: 10.29 K/UL — SIGNIFICANT CHANGE UP (ref 3.8–10.5)

## 2023-09-14 PROCEDURE — 99232 SBSQ HOSP IP/OBS MODERATE 35: CPT

## 2023-09-14 RX ORDER — WARFARIN SODIUM 2.5 MG/1
5 TABLET ORAL ONCE
Refills: 0 | Status: COMPLETED | OUTPATIENT
Start: 2023-09-14 | End: 2023-09-14

## 2023-09-14 RX ADMIN — Medication 1 ENEMA: at 12:10

## 2023-09-14 RX ADMIN — Medication 2 APPLICATION(S): at 06:39

## 2023-09-14 RX ADMIN — LISINOPRIL 40 MILLIGRAM(S): 2.5 TABLET ORAL at 06:39

## 2023-09-14 RX ADMIN — POLYETHYLENE GLYCOL 3350 17 GRAM(S): 17 POWDER, FOR SOLUTION ORAL at 17:51

## 2023-09-14 RX ADMIN — ATORVASTATIN CALCIUM 20 MILLIGRAM(S): 80 TABLET, FILM COATED ORAL at 21:50

## 2023-09-14 RX ADMIN — SENNA PLUS 2 TABLET(S): 8.6 TABLET ORAL at 21:50

## 2023-09-14 RX ADMIN — POLYETHYLENE GLYCOL 3350 17 GRAM(S): 17 POWDER, FOR SOLUTION ORAL at 06:40

## 2023-09-14 RX ADMIN — HEPARIN SODIUM 1100 UNIT(S)/HR: 5000 INJECTION INTRAVENOUS; SUBCUTANEOUS at 12:11

## 2023-09-14 RX ADMIN — NYSTATIN CREAM 1 APPLICATION(S): 100000 CREAM TOPICAL at 06:39

## 2023-09-14 RX ADMIN — WARFARIN SODIUM 5 MILLIGRAM(S): 2.5 TABLET ORAL at 21:51

## 2023-09-14 NOTE — PROGRESS NOTE ADULT - SUBJECTIVE AND OBJECTIVE BOX
[facility] Division of Hospital Medicine  Cal Russ DO  Reachable on [employer]    Patient is a 72y old  Female who presents with a chief complaint of Colonoscopy (13 Sep 2023 16:03)      SUBJECTIVE / OVERNIGHT EVENTS:  ADDITIONAL REVIEW OF SYSTEMS:    MEDICATIONS  (STANDING):  atorvastatin 20 milliGRAM(s) Oral at bedtime  heparin  Infusion. 1000 Unit(s)/Hr (10 mL/Hr) IV Continuous <Continuous>  influenza  Vaccine (HIGH DOSE) 0.7 milliLiter(s) IntraMuscular once  lisinopril 40 milliGRAM(s) Oral daily  nystatin Powder 1 Application(s) Topical two times a day  polyethylene glycol 3350 17 Gram(s) Oral two times a day  senna 2 Tablet(s) Oral at bedtime  sodium chloride 0.9%. 500 milliLiter(s) (30 mL/Hr) IV Continuous <Continuous>  triamcinolone 0.1% Cream 2 Application(s) Topical two times a day    MEDICATIONS  (PRN):  heparin   Injectable 3000 Unit(s) IV Push every 6 hours PRN For aPTT between 40 - 57  heparin   Injectable 6500 Unit(s) IV Push every 6 hours PRN For aPTT less than 40      CAPILLARY BLOOD GLUCOSE        I&O's Summary    13 Sep 2023 07:01  -  14 Sep 2023 07:00  --------------------------------------------------------  IN: 372 mL / OUT: 0 mL / NET: 372 mL        PHYSICAL EXAM:  Vital Signs Last 24 Hrs  T(C): 36.6 (14 Sep 2023 04:35), Max: 36.8 (13 Sep 2023 13:24)  T(F): 97.8 (14 Sep 2023 04:35), Max: 98.2 (13 Sep 2023 13:24)  HR: 73 (14 Sep 2023 04:35) (73 - 93)  BP: 109/68 (14 Sep 2023 04:35) (109/68 - 116/75)  BP(mean): --  RR: 18 (14 Sep 2023 04:35) (18 - 18)  SpO2: 97% (14 Sep 2023 04:35) (97% - 98%)    Parameters below as of 14 Sep 2023 04:35  Patient On (Oxygen Delivery Method): room air      CONSTITUTIONAL: NAD, well-developed, well-groomed  EYES: PERRLA; conjunctiva and sclera clear  ENMT: Moist oral mucosa, no pharyngeal injection or exudates; normal dentition  NECK: Supple, no palpable masses; no thyromegaly  RESPIRATORY: Normal respiratory effort; lungs are clear to auscultation bilaterally  CARDIOVASCULAR: Regular rate and rhythm, normal S1 and S2, no murmur/rub/gallop; No lower extremity edema; Peripheral pulses are 2+ bilaterally  ABDOMEN: Nontender to palpation, normoactive bowel sounds, no rebound/guarding; No hepatosplenomegaly  MUSCULOSKELETAL:  Normal gait; no clubbing or cyanosis of digits; no joint swelling or tenderness to palpation  PSYCH: A+O to person, place, and time; affect appropriate  NEUROLOGY: CN 2-12 are intact and symmetric; no gross sensory deficits   SKIN: No rashes; no palpable lesions    LABS:                        13.8   7.86  )-----------( 264      ( 13 Sep 2023 07:30 )             43.7           PT/INR - ( 13 Sep 2023 07:30 )   PT: 19.1 sec;   INR: 1.77 ratio         PTT - ( 13 Sep 2023 07:30 )  PTT:77.0 sec            RADIOLOGY & ADDITIONAL TESTS:  Results Reviewed:   Imaging Personally Reviewed:  Electrocardiogram Personally Reviewed:    COORDINATION OF CARE:  Care Discussed with Consultants/Other Providers [Y/N]:  Prior or Outpatient Records Reviewed [Y/N]:   [facility] Division of Hospital Medicine  Cal Russ DO  Reachable on [employer] Teams    Patient is a 72y old  Female who presents with a chief complaint of Colonoscopy (13 Sep 2023 16:03)    SUBJECTIVE / OVERNIGHT EVENTS: No acute events overnight. Patient seen and examined at bedside this morning, feels well, denies chest pain, shortness of breath but endorses constipation.    REVIEW OF SYSTEMS:    CONSTITUTIONAL: No weakness, fevers or chills  EYES/ENT: No visual changes;  No vertigo or throat pain   NECK: No pain or stiffness  RESPIRATORY: No cough, wheezing, hemoptysis; No shortness of breath  CARDIOVASCULAR: No chest pain or palpitations  GASTROINTESTINAL: No abdominal or epigastric pain. No nausea, vomiting, or hematemesis; Endorses constipation  GENITOURINARY: No dysuria, frequency or hematuria  NEUROLOGICAL: No numbness or weakness  SKIN: No itching, burning, rashes, or lesions  MSK: No joint pain, no back pain  HEME: No easy bleeding, no easy bruising  All other review of systems is negative unless indicated above.    MEDICATIONS  (STANDING):  atorvastatin 20 milliGRAM(s) Oral at bedtime  heparin  Infusion. 1000 Unit(s)/Hr (10 mL/Hr) IV Continuous <Continuous>  influenza  Vaccine (HIGH DOSE) 0.7 milliLiter(s) IntraMuscular once  lisinopril 40 milliGRAM(s) Oral daily  nystatin Powder 1 Application(s) Topical two times a day  polyethylene glycol 3350 17 Gram(s) Oral two times a day  senna 2 Tablet(s) Oral at bedtime  sodium chloride 0.9%. 500 milliLiter(s) (30 mL/Hr) IV Continuous <Continuous>  triamcinolone 0.1% Cream 2 Application(s) Topical two times a day    MEDICATIONS  (PRN):  heparin   Injectable 3000 Unit(s) IV Push every 6 hours PRN For aPTT between 40 - 57  heparin   Injectable 6500 Unit(s) IV Push every 6 hours PRN For aPTT less than 40      CAPILLARY BLOOD GLUCOSE        I&O's Summary    13 Sep 2023 07:01  -  14 Sep 2023 07:00  --------------------------------------------------------  IN: 372 mL / OUT: 0 mL / NET: 372 mL        PHYSICAL EXAM:  Vital Signs Last 24 Hrs  T(C): 36.6 (14 Sep 2023 04:35), Max: 36.8 (13 Sep 2023 13:24)  T(F): 97.8 (14 Sep 2023 04:35), Max: 98.2 (13 Sep 2023 13:24)  HR: 73 (14 Sep 2023 04:35) (73 - 93)  BP: 109/68 (14 Sep 2023 04:35) (109/68 - 116/75)  BP(mean): --  RR: 18 (14 Sep 2023 04:35) (18 - 18)  SpO2: 97% (14 Sep 2023 04:35) (97% - 98%)    Parameters below as of 14 Sep 2023 04:35  Patient On (Oxygen Delivery Method): room air    CONSTITUTIONAL: NAD, well-developed, well-groomed  EYES: EOMI; conjunctiva and sclera clear  ENMT: Moist oral mucosa, no pharyngeal injection or exudates  RESPIRATORY: Normal respiratory effort; lungs are clear to auscultation bilaterally  CARDIOVASCULAR: Regular rate and rhythm, normal S1 and S2, no murmur/rub/gallop  ABDOMEN: Nontender to palpation, soft, nondistended  MUSCULOSKELETAL: No clubbing or cyanosis of digits; no joint swelling or tenderness to palpation  PSYCH: A+O to person, place, and time; affect appropriate    LABS:                        13.8   7.86  )-----------( 264      ( 13 Sep 2023 07:30 )             43.7           PT/INR - ( 13 Sep 2023 07:30 )   PT: 19.1 sec;   INR: 1.77 ratio         PTT - ( 13 Sep 2023 07:30 )  PTT:77.0 sec

## 2023-09-14 NOTE — PROGRESS NOTE ADULT - ASSESSMENT
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 Pt without any symptoms or complaints. Pt admitted for bridging to heparin gtt in preparation for colonoscopy. Patient with polyps removed,all <5mm pending biopsy results. 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 Pt without any symptoms or complaints. Pt admitted for bridging to heparin gtt in preparation for colonoscopy. Patient with polyps removed,all <5mm. Receiving Coumadin for bridging.

## 2023-09-15 LAB
APTT BLD: 35.2 SEC — SIGNIFICANT CHANGE UP (ref 24.5–35.6)
INR BLD: 2.05 RATIO — HIGH (ref 0.85–1.18)
PROTHROM AB SERPL-ACNC: 22.1 SEC — HIGH (ref 9.5–13)

## 2023-09-15 PROCEDURE — 99231 SBSQ HOSP IP/OBS SF/LOW 25: CPT

## 2023-09-15 RX ORDER — WARFARIN SODIUM 2.5 MG/1
5 TABLET ORAL ONCE
Refills: 0 | Status: COMPLETED | OUTPATIENT
Start: 2023-09-15 | End: 2023-09-15

## 2023-09-15 RX ADMIN — LISINOPRIL 40 MILLIGRAM(S): 2.5 TABLET ORAL at 05:04

## 2023-09-15 RX ADMIN — WARFARIN SODIUM 5 MILLIGRAM(S): 2.5 TABLET ORAL at 21:57

## 2023-09-15 RX ADMIN — NYSTATIN CREAM 1 APPLICATION(S): 100000 CREAM TOPICAL at 05:06

## 2023-09-15 RX ADMIN — Medication 2 APPLICATION(S): at 18:37

## 2023-09-15 RX ADMIN — NYSTATIN CREAM 1 APPLICATION(S): 100000 CREAM TOPICAL at 18:00

## 2023-09-15 RX ADMIN — ATORVASTATIN CALCIUM 20 MILLIGRAM(S): 80 TABLET, FILM COATED ORAL at 21:57

## 2023-09-15 RX ADMIN — POLYETHYLENE GLYCOL 3350 17 GRAM(S): 17 POWDER, FOR SOLUTION ORAL at 05:04

## 2023-09-15 RX ADMIN — Medication 2 APPLICATION(S): at 05:06

## 2023-09-15 RX ADMIN — SENNA PLUS 2 TABLET(S): 8.6 TABLET ORAL at 21:57

## 2023-09-15 RX ADMIN — POLYETHYLENE GLYCOL 3350 17 GRAM(S): 17 POWDER, FOR SOLUTION ORAL at 18:02

## 2023-09-15 NOTE — PROGRESS NOTE ADULT - PROBLEM SELECTOR PLAN 2
Recently diagnosed thromboses in PV/IMV/SMV in setting of diverticulitis, actively being treated with anticoagulation. Was initially on lovenox but switched to warfarin due to patient preference  Continue with  Coumadin

## 2023-09-15 NOTE — PROGRESS NOTE ADULT - SUBJECTIVE AND OBJECTIVE BOX
Rochelle Head MD  Division of Hospital Medicine  Reachable on MS Teams    PROGRESS NOTE:     Patient is a 72y old  Female who presents with a chief complaint of Colonoscopy (14 Sep 2023 08:23)      SUBJECTIVE / OVERNIGHT EVENTS: No acute events overnight, having BMs. Does not endorse pain, chest pain, shortness of breath     ADDITIONAL REVIEW OF SYSTEMS:    MEDICATIONS  (STANDING):  atorvastatin 20 milliGRAM(s) Oral at bedtime  influenza  Vaccine (HIGH DOSE) 0.7 milliLiter(s) IntraMuscular once  lisinopril 40 milliGRAM(s) Oral daily  nystatin Powder 1 Application(s) Topical two times a day  polyethylene glycol 3350 17 Gram(s) Oral two times a day  senna 2 Tablet(s) Oral at bedtime  sodium chloride 0.9%. 500 milliLiter(s) (30 mL/Hr) IV Continuous <Continuous>  triamcinolone 0.1% Cream 2 Application(s) Topical two times a day  warfarin 5 milliGRAM(s) Oral once    MEDICATIONS  (PRN):      CAPILLARY BLOOD GLUCOSE        I&O's Summary      PHYSICAL EXAM:  Vital Signs Last 24 Hrs  T(C): 36.6 (15 Sep 2023 12:01), Max: 36.8 (14 Sep 2023 21:30)  T(F): 97.8 (15 Sep 2023 12:01), Max: 98.3 (14 Sep 2023 21:30)  HR: 92 (15 Sep 2023 12:01) (87 - 92)  BP: 108/73 (15 Sep 2023 12:01) (108/73 - 117/66)  BP(mean): --  RR: 18 (15 Sep 2023 12:01) (18 - 20)  SpO2: 95% (15 Sep 2023 12:01) (95% - 99%)    Parameters below as of 15 Sep 2023 12:01  Patient On (Oxygen Delivery Method): room air        CONSTITUTIONAL: NAD, well-developed  RESPIRATORY: Normal respiratory effort; lungs are clear to auscultation bilaterally  CARDIOVASCULAR: Regular rate and rhythm, normal S1 and S2, no murmur/rub/gallop; No lower extremity edema; Peripheral pulses are 2+ bilaterally  ABDOMEN: Nontender to palpation, normoactive bowel sounds, no rebound/guarding; No hepatosplenomegaly  MUSCLOSKELETAL: no clubbing or cyanosis of digits; no joint swelling or tenderness to palpation  PSYCH: A+O to person, place, and time; affect appropriate    LABS:                        14.3   10.29 )-----------( 311      ( 14 Sep 2023 09:25 )             46.4     09-14    137  |  101  |  40<H>  ----------------------------<  83  4.3   |  23  |  1.13    Ca    11.2<H>      14 Sep 2023 09:25      PT/INR - ( 15 Sep 2023 09:37 )   PT: 22.1 sec;   INR: 2.05 ratio         PTT - ( 15 Sep 2023 09:37 )  PTT:35.2 sec      Urinalysis Basic - ( 14 Sep 2023 09:25 )    Color: x / Appearance: x / SG: x / pH: x  Gluc: 83 mg/dL / Ketone: x  / Bili: x / Urobili: x   Blood: x / Protein: x / Nitrite: x   Leuk Esterase: x / RBC: x / WBC x   Sq Epi: x / Non Sq Epi: x / Bacteria: x          RADIOLOGY & ADDITIONAL TESTS:  Results Reviewed:   Imaging Personally Reviewed:  Electrocardiogram Personally Reviewed:    COORDINATION OF CARE:  Care Discussed with Consultants/Other Providers [Y/N]:  Prior or Outpatient Records Reviewed [Y/N]:

## 2023-09-15 NOTE — PROGRESS NOTE ADULT - ASSESSMENT
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 Pt without any symptoms or complaints. Pt admitted for bridging to heparin gtt in preparation for colonoscopy. Patient with polyps removed,all <5mm. Receiving Coumadin for bridging.

## 2023-09-16 LAB
INR BLD: 2.28 RATIO — HIGH (ref 0.85–1.18)
PROTHROM AB SERPL-ACNC: 23.4 SEC — HIGH (ref 9.5–13)

## 2023-09-16 PROCEDURE — 99231 SBSQ HOSP IP/OBS SF/LOW 25: CPT

## 2023-09-16 RX ORDER — WARFARIN SODIUM 2.5 MG/1
5 TABLET ORAL ONCE
Refills: 0 | Status: COMPLETED | OUTPATIENT
Start: 2023-09-16 | End: 2023-09-16

## 2023-09-16 RX ADMIN — ATORVASTATIN CALCIUM 20 MILLIGRAM(S): 80 TABLET, FILM COATED ORAL at 21:29

## 2023-09-16 RX ADMIN — LISINOPRIL 40 MILLIGRAM(S): 2.5 TABLET ORAL at 05:35

## 2023-09-16 RX ADMIN — WARFARIN SODIUM 5 MILLIGRAM(S): 2.5 TABLET ORAL at 21:28

## 2023-09-16 RX ADMIN — NYSTATIN CREAM 1 APPLICATION(S): 100000 CREAM TOPICAL at 17:15

## 2023-09-16 RX ADMIN — Medication 2 APPLICATION(S): at 05:36

## 2023-09-16 RX ADMIN — POLYETHYLENE GLYCOL 3350 17 GRAM(S): 17 POWDER, FOR SOLUTION ORAL at 17:14

## 2023-09-16 RX ADMIN — POLYETHYLENE GLYCOL 3350 17 GRAM(S): 17 POWDER, FOR SOLUTION ORAL at 05:35

## 2023-09-16 RX ADMIN — NYSTATIN CREAM 1 APPLICATION(S): 100000 CREAM TOPICAL at 05:36

## 2023-09-16 RX ADMIN — Medication 2 APPLICATION(S): at 17:15

## 2023-09-16 RX ADMIN — SENNA PLUS 2 TABLET(S): 8.6 TABLET ORAL at 21:28

## 2023-09-16 NOTE — PROGRESS NOTE ADULT - SUBJECTIVE AND OBJECTIVE BOX
Rochelle Head MD  Division of Hospital Medicine  Reachable on MS Teams    PROGRESS NOTE:     Patient is a 72y old  Female who presents with a chief complaint of Colonoscopy (15 Sep 2023 12:35)      SUBJECTIVE / OVERNIGHT EVENTS: No acute events overnight, seen and examined this AM. No complaints, continues to have BMs, aware that her appeal was denied, planning to leave tomorrow     ADDITIONAL REVIEW OF SYSTEMS:    MEDICATIONS  (STANDING):  atorvastatin 20 milliGRAM(s) Oral at bedtime  influenza  Vaccine (HIGH DOSE) 0.7 milliLiter(s) IntraMuscular once  lisinopril 40 milliGRAM(s) Oral daily  nystatin Powder 1 Application(s) Topical two times a day  polyethylene glycol 3350 17 Gram(s) Oral two times a day  senna 2 Tablet(s) Oral at bedtime  sodium chloride 0.9%. 500 milliLiter(s) (30 mL/Hr) IV Continuous <Continuous>  triamcinolone 0.1% Cream 2 Application(s) Topical two times a day    MEDICATIONS  (PRN):      CAPILLARY BLOOD GLUCOSE        I&O's Summary    15 Sep 2023 07:01  -  16 Sep 2023 07:00  --------------------------------------------------------  IN: 760 mL / OUT: 0 mL / NET: 760 mL        PHYSICAL EXAM:  Vital Signs Last 24 Hrs  T(C): 36.4 (16 Sep 2023 05:20), Max: 36.7 (15 Sep 2023 20:12)  T(F): 97.6 (16 Sep 2023 05:20), Max: 98.1 (15 Sep 2023 20:12)  HR: 70 (16 Sep 2023 05:20) (70 - 81)  BP: 129/77 (16 Sep 2023 05:20) (120/75 - 129/77)  BP(mean): --  RR: 18 (16 Sep 2023 05:20) (18 - 18)  SpO2: 96% (16 Sep 2023 05:20) (96% - 97%)    Parameters below as of 16 Sep 2023 05:20  Patient On (Oxygen Delivery Method): room air        CONSTITUTIONAL: NAD, well-developed  RESPIRATORY: Normal respiratory effort; lungs are clear to auscultation bilaterally  CARDIOVASCULAR: Regular rate and rhythm, normal S1 and S2, no murmur/rub/gallop; No lower extremity edema; Peripheral pulses are 2+ bilaterally  ABDOMEN: Nontender to palpation, normoactive bowel sounds, no rebound/guarding; No hepatosplenomegaly  MUSCLOSKELETAL: no clubbing or cyanosis of digits; no joint swelling or tenderness to palpation  PSYCH: A+O to person, place, and time; affect appropriate    LABS:          PT/INR - ( 16 Sep 2023 11:44 )   PT: 23.4 sec;   INR: 2.28 ratio         PTT - ( 15 Sep 2023 09:37 )  PTT:35.2 sec            RADIOLOGY & ADDITIONAL TESTS:  Results Reviewed:   Imaging Personally Reviewed:  Electrocardiogram Personally Reviewed:    COORDINATION OF CARE:  Care Discussed with Consultants/Other Providers [Y/N]:  Prior or Outpatient Records Reviewed [Y/N]:

## 2023-09-17 ENCOUNTER — TRANSCRIPTION ENCOUNTER (OUTPATIENT)
Age: 73
End: 2023-09-17

## 2023-09-17 VITALS
DIASTOLIC BLOOD PRESSURE: 68 MMHG | TEMPERATURE: 98 F | HEART RATE: 83 BPM | OXYGEN SATURATION: 97 % | RESPIRATION RATE: 17 BRPM | SYSTOLIC BLOOD PRESSURE: 102 MMHG

## 2023-09-17 LAB
INR BLD: 2.44 RATIO — HIGH (ref 0.85–1.18)
PROTHROM AB SERPL-ACNC: 26.1 SEC — HIGH (ref 9.5–13)

## 2023-09-17 PROCEDURE — 99231 SBSQ HOSP IP/OBS SF/LOW 25: CPT

## 2023-09-17 RX ORDER — APREMILAST 10-20-30MG
1 KIT ORAL
Refills: 0 | DISCHARGE

## 2023-09-17 RX ORDER — SENNA PLUS 8.6 MG/1
2 TABLET ORAL
Qty: 0 | Refills: 0 | DISCHARGE
Start: 2023-09-17

## 2023-09-17 RX ORDER — SODIUM CHLORIDE 0.65 %
0 AEROSOL, SPRAY (ML) NASAL
Qty: 0 | Refills: 0 | DISCHARGE
Start: 2023-09-17

## 2023-09-17 RX ORDER — POLYETHYLENE GLYCOL 3350 17 G/17G
17 POWDER, FOR SOLUTION ORAL
Qty: 0 | Refills: 0 | DISCHARGE
Start: 2023-09-17

## 2023-09-17 RX ORDER — SODIUM CHLORIDE 0.65 %
1 AEROSOL, SPRAY (ML) NASAL
Refills: 0 | Status: DISCONTINUED | OUTPATIENT
Start: 2023-09-17 | End: 2023-09-17

## 2023-09-17 RX ORDER — ACETAMINOPHEN 500 MG
650 TABLET ORAL EVERY 6 HOURS
Refills: 0 | Status: DISCONTINUED | OUTPATIENT
Start: 2023-09-17 | End: 2023-09-17

## 2023-09-17 RX ADMIN — POLYETHYLENE GLYCOL 3350 17 GRAM(S): 17 POWDER, FOR SOLUTION ORAL at 06:02

## 2023-09-17 RX ADMIN — NYSTATIN CREAM 1 APPLICATION(S): 100000 CREAM TOPICAL at 06:35

## 2023-09-17 RX ADMIN — Medication 650 MILLIGRAM(S): at 12:48

## 2023-09-17 RX ADMIN — Medication 2 APPLICATION(S): at 06:35

## 2023-09-17 RX ADMIN — Medication 1 SPRAY(S): at 11:11

## 2023-09-17 RX ADMIN — LISINOPRIL 40 MILLIGRAM(S): 2.5 TABLET ORAL at 05:59

## 2023-09-17 NOTE — PROGRESS NOTE ADULT - PROBLEM SELECTOR PLAN 6
DVT ppx: on heparin gtt  Diet: DASH diet  Dispo: pending colonoscopy, PT evaluation
DVT ppx: on heparin gtt  Diet: DASH diet  Dispo: pending colonoscopy, PT eval
DVT ppx: Coumadin 5mg  Diet: DASH diet  Dispo:  OPT, medically cleared for dc
DVT ppx: on heparin gtt, receiving Coumadin 5mg tonight  Diet: DASH diet  Dispo:  OPT
DVT ppx: on heparin gtt, receiving Coumadin 5mg tonight.   Diet: DASH diet  Dispo:  PT evaluation
DVT ppx: Coumadin 5mg  Diet: DASH diet  Dispo:  OPT, medically cleared for dc
DVT ppx: on heparin gtt  Diet: DASH diet  Dispo: pending colonoscopy, PT eval.
DVT ppx: on heparin gtt, receiving Coumadin 5mg tonight  Diet: DASH diet  Dispo:  PT evaluation
DVT ppx: Coumadin 5mg  Diet: DASH diet  Dispo:  OPT, medically cleared for dc
DVT ppx: on heparin gtt  Diet: DASH diet  Dispo: pending colonoscopy, PT evaluation.
DVT ppx: on heparin gtt, receiving Coumadin 5mg tonight  Diet: DASH diet  Dispo:  OPT

## 2023-09-17 NOTE — PROGRESS NOTE ADULT - REASON FOR ADMISSION
Colonoscopy

## 2023-09-17 NOTE — DISCHARGE NOTE NURSING/CASE MANAGEMENT/SOCIAL WORK - NSDCPNINST_GEN_ALL_CORE
call md for any discomforts  felt--safety measures reemphasized call md for any discomforts  felt--safety measures reemphasized-- barrier cream to bilateral buttocks and sacral area

## 2023-09-17 NOTE — PROGRESS NOTE ADULT - PROBLEM SELECTOR PROBLEM 1
Encounter for screening colonoscopy

## 2023-09-17 NOTE — DISCHARGE NOTE NURSING/CASE MANAGEMENT/SOCIAL WORK - NSDCFUADDAPPT_GEN_ALL_CORE_FT
You must follow up with your primary medical doctor within one week of discharge - please call to make an appointment.

## 2023-09-17 NOTE — PROGRESS NOTE ADULT - PROBLEM SELECTOR PROBLEM 5
History of diverticulitis
Prophylactic measure
History of diverticulitis
Prophylactic measure
History of diverticulitis

## 2023-09-17 NOTE — PROGRESS NOTE ADULT - PROBLEM SELECTOR PLAN 5
Last diverticulitis flare in Jan 2023, no other flares since then, Currently no GI symptoms.  Patient has not had colonoscopy after last episode of Diverticulitis  Colonoscopy shows internal hemorrhoids and diverticulosis.
Last diverticulitis flare in Jan 2023, no other flares since then, Currently no GI symptoms.  Patient has not had colonoscopy after last episode of Diverticulitis  Colonoscopy shows internal hemorrhoids and diverticulosis
DVT ppx: on heparin gtt  Diet: DASH diet  Dispo: pending procedure, PT eval
Last diverticulitis flare in Jan 2023, no other flares since then, Currently no GI symptoms.  Patient has not had colonoscopy after last episode of Diverticulitis  Colonoscopy on Friday pending
Last diverticulitis flare in Jan 2023, no other flares since then, Currently no GI symptoms.  Patient has not had colonoscopy after last episode of Diverticulitis  Colonoscopy on Friday pending.
Last diverticulitis flare in Jan 2023, no other flares since then, Currently no GI symptoms.  Patient has not had colonoscopy after last episode of Diverticulitis  Colonoscopy shows internal hemorrhoids and diverticulosis
DVT ppx: on heparin gtt  Diet: DASH diet  Dispo: pending procedure, PT eval
Last diverticulitis flare in Jan 2023, no other flares since then, Currently no GI symptoms.  Patient has not had colonoscopy after last episode of Diverticulitis  Colonoscopy shows internal hemorrhoids and diverticulosis
Last diverticulitis flare in Jan 2023, no other flares since then, Currently no GI symptoms.  Patient has not had colonoscopy after last episode of Diverticulitis  Colonoscopy on Friday pending.
Last diverticulitis flare in Jan 2023, no other flares since then, Currently no GI symptoms.  Patient has not had colonoscopy after last episode of Diverticulitis  Colonoscopy shows internal hemorrhoids and diverticulosis
Last diverticulitis flare in Jan 2023, no other flares since then, Currently no GI symptoms.  Patient has not had colonoscopy after last episode of Diverticulitis  Colonoscopy  pending.

## 2023-09-17 NOTE — PROGRESS NOTE ADULT - PROBLEM SELECTOR PROBLEM 2
Deep vein thrombosis of portal vein

## 2023-09-17 NOTE — PROGRESS NOTE ADULT - PROBLEM SELECTOR PLAN 4
On Otezla (apremilast) for her psoriatic arthritis. Currently with minimal joint pains. Rash noted on thigh, abdomen, and trunk. Symptoms manageable at this time  - Ordered triamcinolone cream
On Otezla (apremilast) for her psoriatic arthritis. Currently with minimal joint pains. Rash noted on thigh, abdomen, and trunk. Symptoms manageable at this time.   - Ordered triamcinolone cream
On Otezla (apremilast) for her psoriatic arthritis. Currently with minimal joint pains. Rash noted on thigh, abdomen, and trunk. Symptoms manageable at this time  - Ordered triamcinolone cream  Pharmacy does not have Otezla inhouse, patient will need to bring in Otezla to continue it.
On Otezla (apremilast) for her psoriatic arthritis. Currently with minimal joint pains. Rash noted on thigh, abdomen, and trunk. Symptoms manageable at this time.   - Ordered triamcinolone cream
On Otezla (apremilast) for her psoriatic arthritis. Currently with minimal joint pains. Rash noted on thigh, abdomen, and trunk. Symptoms manageable at this time.   - Ordered triamcinolone cream
On Otezla (apremilast) for her psoriatic arthritis. Currently with minimal joint pains. Rash noted on thigh, abdomen, and trunk. Symptoms manageable at this time.   - Ordered triamcinolone cream.
On Otezla (apremilast) for her psoriatic arthritis. Currently with minimal joint pains. Rash noted on thigh, abdomen, and trunk. Symptoms manageable at this time  - Ordered triamcinolone cream.
On Otezla (apremilast) for her psoriatic arthritis. Currently with minimal joint pains. Rash noted on thigh, abdomen, and trunk. Symptoms manageable at this time  - Ordered triamcinolone cream  Pharmacy does not have Otezla inhouse, patient will need to bring in Otezla to continue it.
On Otezla (apremilast) for her psoriatic arthritis. Currently with minimal joint pains. Rash noted on thigh, abdomen, and trunk. Symptoms manageable at this time  - Ordered triamcinolone cream

## 2023-09-17 NOTE — PROGRESS NOTE ADULT - SUBJECTIVE AND OBJECTIVE BOX
Rochelle Head MD  Division of Hospital Medicine  Reachable on MS Teams    PROGRESS NOTE:     Patient is a 72y old  Female who presents with a chief complaint of Colonoscopy (16 Sep 2023 13:10)      SUBJECTIVE / OVERNIGHT EVENTS: No acute events overnight, seen and examined this AM. Resting in bed, feels well, no medical complaints.     ADDITIONAL REVIEW OF SYSTEMS:    MEDICATIONS  (STANDING):  atorvastatin 20 milliGRAM(s) Oral at bedtime  lisinopril 40 milliGRAM(s) Oral daily  nystatin Powder 1 Application(s) Topical two times a day  polyethylene glycol 3350 17 Gram(s) Oral two times a day  senna 2 Tablet(s) Oral at bedtime  sodium chloride 0.9%. 500 milliLiter(s) (30 mL/Hr) IV Continuous <Continuous>  triamcinolone 0.1% Cream 2 Application(s) Topical two times a day    MEDICATIONS  (PRN):  acetaminophen     Tablet .. 650 milliGRAM(s) Oral every 6 hours PRN Mild Pain (1 - 3)  sodium chloride 0.65% Nasal 1 Spray(s) Both Nostrils four times a day PRN Nasal Congestion      CAPILLARY BLOOD GLUCOSE        I&O's Summary    16 Sep 2023 07:01  -  17 Sep 2023 07:00  --------------------------------------------------------  IN: 240 mL / OUT: 0 mL / NET: 240 mL        PHYSICAL EXAM:  Vital Signs Last 24 Hrs  T(C): 36.7 (17 Sep 2023 12:25), Max: 37.2 (16 Sep 2023 14:26)  T(F): 98.1 (17 Sep 2023 12:25), Max: 98.9 (16 Sep 2023 14:26)  HR: 83 (17 Sep 2023 12:25) (66 - 87)  BP: 102/68 (17 Sep 2023 12:25) (102/68 - 134/78)  BP(mean): --  RR: 17 (17 Sep 2023 12:25) (17 - 18)  SpO2: 97% (17 Sep 2023 12:25) (96% - 99%)    Parameters below as of 17 Sep 2023 12:25  Patient On (Oxygen Delivery Method): room air        CONSTITUTIONAL: NAD, well-developed  RESPIRATORY: Normal respiratory effort; lungs are clear to auscultation bilaterally  CARDIOVASCULAR: Regular rate and rhythm, normal S1 and S2, no murmur/rub/gallop; No lower extremity edema; Peripheral pulses are 2+ bilaterally  ABDOMEN: Nontender to palpation, normoactive bowel sounds, no rebound/guarding; No hepatosplenomegaly  MUSCLOSKELETAL: no clubbing or cyanosis of digits; no joint swelling or tenderness to palpation  PSYCH: A+O to person, place, and time; affect appropriate  SKIN: diffuse psoriatic rash on back     LABS:          PT/INR - ( 17 Sep 2023 10:58 )   PT: 26.1 sec;   INR: 2.44 ratio                     RADIOLOGY & ADDITIONAL TESTS:  Results Reviewed:   Imaging Personally Reviewed:  Electrocardiogram Personally Reviewed:    COORDINATION OF CARE:  Care Discussed with Consultants/Other Providers [Y/N]:  Prior or Outpatient Records Reviewed [Y/N]:

## 2023-09-17 NOTE — DISCHARGE NOTE NURSING/CASE MANAGEMENT/SOCIAL WORK - PATIENT PORTAL LINK FT
You can access the FollowMyHealth Patient Portal offered by [facility] by registering at the following website: [email or web address]. By joining [facility]’s FollowMyHealth portal, you will also be able to view your health information using other applications (apps) compatible with our system.

## 2023-09-17 NOTE — PROGRESS NOTE ADULT - PROVIDER SPECIALTY LIST ADULT
Gastroenterology
Hospitalist

## 2023-09-17 NOTE — PROGRESS NOTE ADULT - NSPROGADDITIONALINFOA_GEN_ALL_CORE
Discusssed case with GI service and Medicine ACP regarding care  Discussed with GI team regarding communication with patient's daughter, Tierra to answer questions as requested by patient.
Discussed with patient and 4 DSU ACP, left VM for daughter Tierra.
Time-based billing (NON-critical care).     The necessity of the time spent during the encounter on this date of service was due to:     - Ordering, reviewing, and interpreting labs, testing, and imaging.  - Independently obtaining a review of systems and performing a physical exam  - Reviewing prior hospitalization and where necessary, outpatient records.  - Counselling and educating patient and family regarding interpretation of aforementioned items and plan of care.
Discussed with patient, daughter and 4 DSU ACP.
Plan d/w ACP Meredith
Plan d/w FELIX Funk and CM/EDUARDO
Patient medically ready for discharge with outpatient follow up. Discussed with patient and 4 DSU ACP. Total time spent discharge planning 43 minutes.
Discussed with patient, daughter Tierra and 4 DSU ACP.

## 2023-09-17 NOTE — PROGRESS NOTE ADULT - PROBLEM SELECTOR PROBLEM 4
Psoriatic arthritis

## 2023-09-17 NOTE — PROGRESS NOTE ADULT - PROBLEM SELECTOR PLAN 1
Given pt's history of diverticulitis with complication of PV/IMV/SMV thrombosis, screening colonoscopy recommended to evaluate for malignancy  - GI consulted reccs appreciated  - Colonoscopy today.   NPO for colonoscopy. Pending final recommendations.
Given pt's history of diverticulitis with complication of PV/IMV/SMV thrombosis, screening colonoscopy recommended to evaluate for malignancy. Last diverticulitis flare in Jan 2023, no other flares since then, Currently no GI symptoms.  - GI consulted reccs appreciated  - Regular diet for now. Switch to clear liquid once procedure date determined.
Given pt's history of diverticulitis with complication of PV/IMV/SMV thrombosis, screening colonoscopy recommended to evaluate for malignancy  - GI consulted recs appreciated  - Colonoscopy shows polyps <5mm removed and internal hemorrhoids.   -Bridging to Coumadin with Heparin drip underway.  Coumadin 5mg tonight.
Given pt's history of diverticulitis with complication of PV/IMV/SMV thrombosis, screening colonoscopy recommended to evaluate for malignancy  - GI consulted recs appreciated  - Colonoscopy shows polyps <5mm removed and internal hemorrhoids.   -Bridging to Coumadin with Heparin drip underway.  Coumadin 5mg tonight.
Given pt's history of diverticulitis with complication of PV/IMV/SMV thrombosis, screening colonoscopy recommended to evaluate for malignancy  - GI consulted reccs appreciated  - Colonoscopy shows polyps <5mm removed and internal hemorrhoids.   -Bridging to Coumadin with Heparin drip underway.
Given pt's history of diverticulitis with complication of PV/IMV/SMV thrombosis, screening colonoscopy recommended to evaluate for malignancy  - GI consulted reccs appreciated  - Colonoscopy shows polyps <5mm removed and internal hemorrhoids.   -Bridging to Coumadin with Heparin drip underway.  Coumadin 5mg tonight.
Given pt's history of diverticulitis with complication of PV/IMV/SMV thrombosis, screening colonoscopy recommended to evaluate for malignancy  - GI consulted reccs appreciated  - Colonoscopy shows polyps <5mm removed and internal hemorrhoids.   -Bridging to Coumadin with Heparin drip underway.  Coumadin 5mg tonight.
Given pt's history of diverticulitis with complication of PV/IMV/SMV thrombosis, screening colonoscopy recommended to evaluate for malignancy  - GI consulted recs appreciated  - Colonoscopy shows polyps <5mm removed and internal hemorrhoids.   -INR therapeutic, c/w coumadin 5mg qhs
Given pt's history of diverticulitis with complication of PV/IMV/SMV thrombosis, screening colonoscopy recommended to evaluate for malignancy  - GI consulted reccs appreciated  - Colonoscopy on Friday. Patient requests that date as daughter is unable to come until Friday. Refused Wednesday colonoscopy.  Clear liquid diet on Thursday.
Given pt's history of diverticulitis with complication of PV/IMV/SMV thrombosis, screening colonoscopy recommended to evaluate for malignancy. Last diverticulitis flare in Jan 2023, no other flares since then, Currently no GI symptoms.  - GI consulted reccs appreciated  - Colonoscopy tentatively Wednesday, CLD and prep tomorrow
Given pt's history of diverticulitis with complication of PV/IMV/SMV thrombosis, screening colonoscopy recommended to evaluate for malignancy  - GI consulted recs appreciated  - Colonoscopy shows polyps <5mm removed and internal hemorrhoids.   -INR therapeutic, c/w coumadin 5mg qhs
Given pt's history of diverticulitis with complication of PV/IMV/SMV thrombosis, screening colonoscopy recommended to evaluate for malignancy  - GI consulted recs appreciated  - Colonoscopy shows polyps <5mm removed and internal hemorrhoids.   -INR therapeutic, c/w coumadin 5mg qhs
Given pt's history of diverticulitis with complication of PV/IMV/SMV thrombosis, screening colonoscopy recommended to evaluate for malignancy  - GI consulted reccs appreciated  - Colonoscopy on Friday. Patient requests that date as daughter is unable to come until Friday. Refused Wednesday colonoscopy.  Clear liquid diet today.
Given pt's history of diverticulitis with complication of PV/IMV/SMV thrombosis, screening colonoscopy recommended to evaluate for malignancy.   - GI consulted reccs appreciated  - Colonoscopy on Friday. Patient requests that date as daughter is unable to come until Friday. Refused Wednesday colonoscopy.  Clear liquid diet on Thursday.
Given pt's history of diverticulitis with complication of PV/IMV/SMV thrombosis, screening colonoscopy recommended to evaluate for malignancy  - GI consulted recs appreciated  - Colonoscopy shows polyps <5mm removed and internal hemorrhoids.   -Bridging to Coumadin with Heparin drip underway.  Coumadin 5mg tonight.

## 2023-09-20 ENCOUNTER — NON-APPOINTMENT (OUTPATIENT)
Age: 73
End: 2023-09-20

## 2023-09-20 ENCOUNTER — TRANSCRIPTION ENCOUNTER (OUTPATIENT)
Age: 73
End: 2023-09-20

## 2023-09-26 ENCOUNTER — APPOINTMENT (OUTPATIENT)
Dept: INTERNAL MEDICINE | Facility: CLINIC | Age: 73
End: 2023-09-26

## 2023-09-26 ENCOUNTER — NON-APPOINTMENT (OUTPATIENT)
Age: 73
End: 2023-09-26

## 2023-09-28 ENCOUNTER — NON-APPOINTMENT (OUTPATIENT)
Age: 73
End: 2023-09-28

## 2023-10-03 LAB — INR PPP: 2.6 RATIO

## 2023-10-05 ENCOUNTER — OUTPATIENT (OUTPATIENT)
Dept: OUTPATIENT SERVICES | Facility: HOSPITAL | Age: 73
LOS: 1 days | End: 2023-10-05

## 2023-10-05 ENCOUNTER — APPOINTMENT (OUTPATIENT)
Dept: INTERNAL MEDICINE | Facility: CLINIC | Age: 73
End: 2023-10-05
Payer: MEDICARE

## 2023-10-05 ENCOUNTER — MED ADMIN CHARGE (OUTPATIENT)
Age: 73
End: 2023-10-05

## 2023-10-05 ENCOUNTER — APPOINTMENT (OUTPATIENT)
Dept: GASTROENTEROLOGY | Facility: CLINIC | Age: 73
End: 2023-10-05
Payer: MEDICARE

## 2023-10-05 VITALS
OXYGEN SATURATION: 98 % | HEIGHT: 63 IN | HEART RATE: 97 BPM | DIASTOLIC BLOOD PRESSURE: 60 MMHG | SYSTOLIC BLOOD PRESSURE: 132 MMHG | BODY MASS INDEX: 34.02 KG/M2 | WEIGHT: 192 LBS

## 2023-10-05 VITALS
BODY MASS INDEX: 31.89 KG/M2 | SYSTOLIC BLOOD PRESSURE: 131 MMHG | OXYGEN SATURATION: 98 % | HEIGHT: 63 IN | RESPIRATION RATE: 18 BRPM | WEIGHT: 180 LBS | HEART RATE: 87 BPM | DIASTOLIC BLOOD PRESSURE: 71 MMHG

## 2023-10-05 DIAGNOSIS — K76.0 FATTY (CHANGE OF) LIVER, NOT ELSEWHERE CLASSIFIED: ICD-10-CM

## 2023-10-05 DIAGNOSIS — E66.9 OBESITY, UNSPECIFIED: ICD-10-CM

## 2023-10-05 DIAGNOSIS — D12.6 BENIGN NEOPLASM OF COLON, UNSPECIFIED: ICD-10-CM

## 2023-10-05 DIAGNOSIS — K64.9 UNSPECIFIED HEMORRHOIDS: ICD-10-CM

## 2023-10-05 DIAGNOSIS — I81 PORTAL VEIN THROMBOSIS: ICD-10-CM

## 2023-10-05 DIAGNOSIS — E83.52 HYPERCALCEMIA: ICD-10-CM

## 2023-10-05 PROCEDURE — 99214 OFFICE O/P EST MOD 30 MIN: CPT | Mod: GC

## 2023-10-05 RX ORDER — POLYETHYLENE GLYCOL 3350 17 G/17G
17 POWDER, FOR SOLUTION ORAL TWICE DAILY
Qty: 60 | Refills: 2 | Status: DISCONTINUED | COMMUNITY
Start: 2023-03-31 | End: 2023-10-05

## 2023-10-05 RX ORDER — MUPIROCIN 20 MG/G
2 OINTMENT TOPICAL
Qty: 1 | Refills: 1 | Status: DISCONTINUED | COMMUNITY
Start: 2020-08-26 | End: 2023-10-05

## 2023-10-05 RX ORDER — KETOCONAZOLE 20 MG/G
2 CREAM TOPICAL TWICE DAILY
Qty: 60 | Refills: 2 | Status: DISCONTINUED | COMMUNITY
Start: 2022-04-06 | End: 2023-10-05

## 2023-10-05 RX ORDER — NAPROXEN 500 MG/1
500 TABLET ORAL
Qty: 30 | Refills: 0 | Status: DISCONTINUED | COMMUNITY
Start: 2021-12-07 | End: 2023-10-05

## 2023-10-05 RX ORDER — TACROLIMUS 1 MG/G
0.1 OINTMENT TOPICAL TWICE DAILY
Qty: 1 | Refills: 0 | Status: DISCONTINUED | COMMUNITY
Start: 2020-11-09 | End: 2023-10-05

## 2023-10-05 RX ORDER — PIMECROLIMUS 10 MG/G
1 CREAM TOPICAL TWICE DAILY
Qty: 1 | Refills: 3 | Status: DISCONTINUED | COMMUNITY
Start: 2021-09-29 | End: 2023-10-05

## 2023-10-05 RX ORDER — APREMILAST 10-20-30MG
10 & 20 & 30 KIT ORAL
Qty: 1 | Refills: 6 | Status: DISCONTINUED | COMMUNITY
Start: 2021-12-14 | End: 2023-10-05

## 2023-10-06 DIAGNOSIS — L40.9 PSORIASIS, UNSPECIFIED: ICD-10-CM

## 2023-10-06 DIAGNOSIS — E83.52 HYPERCALCEMIA: ICD-10-CM

## 2023-10-06 DIAGNOSIS — Z00.00 ENCOUNTER FOR GENERAL ADULT MEDICAL EXAMINATION WITHOUT ABNORMAL FINDINGS: ICD-10-CM

## 2023-10-06 DIAGNOSIS — I82.90 ACUTE EMBOLISM AND THROMBOSIS OF UNSPECIFIED VEIN: ICD-10-CM

## 2023-10-06 DIAGNOSIS — R73.03 PREDIABETES: ICD-10-CM

## 2023-10-06 DIAGNOSIS — J45.909 UNSPECIFIED ASTHMA, UNCOMPLICATED: ICD-10-CM

## 2023-10-06 LAB
ALBUMIN SERPL ELPH-MCNC: 4.2 G/DL
ALP BLD-CCNC: 124 U/L
ALT SERPL-CCNC: 17 U/L
ANION GAP SERPL CALC-SCNC: 12 MMOL/L
AST SERPL-CCNC: 23 U/L
BILIRUB SERPL-MCNC: 0.2 MG/DL
BUN SERPL-MCNC: 33 MG/DL
CALCIUM SERPL-MCNC: 10.5 MG/DL
CHLORIDE SERPL-SCNC: 105 MMOL/L
CO2 SERPL-SCNC: 26 MMOL/L
CREAT SERPL-MCNC: 0.86 MG/DL
EGFR: 72 ML/MIN/1.73M2
ESTIMATED AVERAGE GLUCOSE: 117 MG/DL
GLUCOSE SERPL-MCNC: 122 MG/DL
HBA1C MFR BLD HPLC: 5.7 %
POTASSIUM SERPL-SCNC: 3.9 MMOL/L
PROT SERPL-MCNC: 7.5 G/DL
SODIUM SERPL-SCNC: 143 MMOL/L

## 2023-10-09 ENCOUNTER — LABORATORY RESULT (OUTPATIENT)
Age: 73
End: 2023-10-09

## 2023-10-10 ENCOUNTER — NON-APPOINTMENT (OUTPATIENT)
Age: 73
End: 2023-10-10

## 2023-10-10 DIAGNOSIS — K64.9 UNSPECIFIED HEMORRHOIDS: ICD-10-CM

## 2023-10-10 DIAGNOSIS — K57.32 DIVERTICULITIS OF LARGE INTESTINE WITHOUT PERFORATION OR ABSCESS WITHOUT BLEEDING: ICD-10-CM

## 2023-10-10 DIAGNOSIS — I81 PORTAL VEIN THROMBOSIS: ICD-10-CM

## 2023-10-10 DIAGNOSIS — D12.6 BENIGN NEOPLASM OF COLON, UNSPECIFIED: ICD-10-CM

## 2023-10-17 ENCOUNTER — LABORATORY RESULT (OUTPATIENT)
Age: 73
End: 2023-10-17

## 2023-10-17 ENCOUNTER — NON-APPOINTMENT (OUTPATIENT)
Age: 73
End: 2023-10-17

## 2023-10-18 ENCOUNTER — APPOINTMENT (OUTPATIENT)
Dept: OPHTHALMOLOGY | Facility: CLINIC | Age: 73
End: 2023-10-18
Payer: MEDICARE

## 2023-10-18 ENCOUNTER — NON-APPOINTMENT (OUTPATIENT)
Age: 73
End: 2023-10-18

## 2023-10-18 PROCEDURE — 92012 INTRM OPH EXAM EST PATIENT: CPT

## 2023-10-18 PROCEDURE — 92134 CPTRZ OPH DX IMG PST SGM RTA: CPT

## 2023-10-19 ENCOUNTER — RX RENEWAL (OUTPATIENT)
Age: 73
End: 2023-10-19

## 2023-10-19 RX ORDER — CLOBETASOL PROPIONATE 0.5 MG/ML
0.05 SOLUTION TOPICAL
Qty: 454 | Refills: 1 | Status: ACTIVE | COMMUNITY
Start: 2019-07-18 | End: 1900-01-01

## 2023-10-24 ENCOUNTER — LABORATORY RESULT (OUTPATIENT)
Age: 73
End: 2023-10-24

## 2023-10-25 ENCOUNTER — NON-APPOINTMENT (OUTPATIENT)
Age: 73
End: 2023-10-25

## 2023-10-31 ENCOUNTER — LABORATORY RESULT (OUTPATIENT)
Age: 73
End: 2023-10-31

## 2023-10-31 ENCOUNTER — NON-APPOINTMENT (OUTPATIENT)
Age: 73
End: 2023-10-31

## 2023-11-01 ENCOUNTER — OUTPATIENT (OUTPATIENT)
Dept: OUTPATIENT SERVICES | Facility: HOSPITAL | Age: 73
LOS: 1 days | Discharge: HOME | End: 2023-11-01

## 2023-11-01 DIAGNOSIS — D64.9 ANEMIA, UNSPECIFIED: ICD-10-CM

## 2023-11-02 ENCOUNTER — LABORATORY RESULT (OUTPATIENT)
Age: 73
End: 2023-11-02

## 2023-11-02 ENCOUNTER — APPOINTMENT (OUTPATIENT)
Dept: HEMATOLOGY ONCOLOGY | Facility: CLINIC | Age: 73
End: 2023-11-02
Payer: MEDICARE

## 2023-11-02 VITALS
OXYGEN SATURATION: 98 % | SYSTOLIC BLOOD PRESSURE: 165 MMHG | BODY MASS INDEX: 37.25 KG/M2 | TEMPERATURE: 97.6 F | HEART RATE: 95 BPM | HEIGHT: 60.83 IN | RESPIRATION RATE: 16 BRPM | DIASTOLIC BLOOD PRESSURE: 81 MMHG | WEIGHT: 197.31 LBS

## 2023-11-02 PROCEDURE — 99205 OFFICE O/P NEW HI 60 MIN: CPT

## 2023-11-03 ENCOUNTER — OUTPATIENT (OUTPATIENT)
Dept: OUTPATIENT SERVICES | Facility: HOSPITAL | Age: 73
LOS: 1 days | End: 2023-11-03
Payer: MEDICARE

## 2023-11-03 ENCOUNTER — APPOINTMENT (OUTPATIENT)
Dept: ULTRASOUND IMAGING | Facility: IMAGING CENTER | Age: 73
End: 2023-11-03
Payer: MEDICARE

## 2023-11-03 DIAGNOSIS — I82.90 ACUTE EMBOLISM AND THROMBOSIS OF UNSPECIFIED VEIN: ICD-10-CM

## 2023-11-03 LAB
AT III PPP CHRO-ACNC: 114 %
DEPRECATED D DIMER PPP IA-ACNC: <150 NG/ML DDU
HCYS SERPL-MCNC: 19 UMOL/L
PROT C PPP CHRO-ACNC: 78 %
PROT S AG ACT/NOR PPP IA: 26 %

## 2023-11-03 PROCEDURE — 93975 VASCULAR STUDY: CPT | Mod: 26

## 2023-11-03 PROCEDURE — 93975 VASCULAR STUDY: CPT

## 2023-11-04 LAB
B2 GLYCOPROT1 IGA SERPL IA-ACNC: 6.3 SAU
B2 GLYCOPROT1 IGG SER-ACNC: <5 SGU
B2 GLYCOPROT1 IGM SER-ACNC: <5 SMU
CARDIOLIPIN IGM SER-MCNC: 33.6 MPL
CARDIOLIPIN IGM SER-MCNC: 9.8 GPL

## 2023-11-06 ENCOUNTER — APPOINTMENT (OUTPATIENT)
Dept: RHEUMATOLOGY | Facility: CLINIC | Age: 73
End: 2023-11-06

## 2023-11-06 LAB
DNA PLOIDY SPEC FC-IMP: NORMAL
PROT S FREE PPP-ACNC: 32 %
PTR INTERP: NORMAL

## 2023-11-07 ENCOUNTER — LABORATORY RESULT (OUTPATIENT)
Age: 73
End: 2023-11-07

## 2023-11-08 ENCOUNTER — NON-APPOINTMENT (OUTPATIENT)
Age: 73
End: 2023-11-08

## 2023-11-10 ENCOUNTER — APPOINTMENT (OUTPATIENT)
Dept: INTERNAL MEDICINE | Facility: CLINIC | Age: 73
End: 2023-11-10
Payer: MEDICARE

## 2023-11-10 ENCOUNTER — OUTPATIENT (OUTPATIENT)
Dept: OUTPATIENT SERVICES | Facility: HOSPITAL | Age: 73
LOS: 1 days | End: 2023-11-10

## 2023-11-10 VITALS
OXYGEN SATURATION: 99 % | BODY MASS INDEX: 37.29 KG/M2 | HEART RATE: 97 BPM | SYSTOLIC BLOOD PRESSURE: 151 MMHG | RESPIRATION RATE: 16 BRPM | DIASTOLIC BLOOD PRESSURE: 90 MMHG | WEIGHT: 197.5 LBS | HEIGHT: 60.83 IN | TEMPERATURE: 97.5 F

## 2023-11-10 DIAGNOSIS — I82.90 ACUTE EMBOLISM AND THROMBOSIS OF UNSPECIFIED VEIN: ICD-10-CM

## 2023-11-10 PROCEDURE — 99213 OFFICE O/P EST LOW 20 MIN: CPT | Mod: GE

## 2023-11-13 DIAGNOSIS — L40.9 PSORIASIS, UNSPECIFIED: ICD-10-CM

## 2023-11-13 DIAGNOSIS — D68.59 OTHER PRIMARY THROMBOPHILIA: ICD-10-CM

## 2023-11-13 DIAGNOSIS — Z51.81 ENCOUNTER FOR THERAPEUTIC DRUG LEVEL MONITORING: ICD-10-CM

## 2023-11-13 DIAGNOSIS — I82.90 ACUTE EMBOLISM AND THROMBOSIS OF UNSPECIFIED VEIN: ICD-10-CM

## 2023-11-13 DIAGNOSIS — L40.50 ARTHROPATHIC PSORIASIS, UNSPECIFIED: ICD-10-CM

## 2023-11-13 DIAGNOSIS — D12.6 BENIGN NEOPLASM OF COLON, UNSPECIFIED: ICD-10-CM

## 2023-11-13 PROCEDURE — 82962 GLUCOSE BLOOD TEST: CPT

## 2023-11-13 PROCEDURE — 85025 COMPLETE CBC W/AUTO DIFF WBC: CPT

## 2023-11-13 PROCEDURE — 97162 PT EVAL MOD COMPLEX 30 MIN: CPT

## 2023-11-13 PROCEDURE — 36415 COLL VENOUS BLD VENIPUNCTURE: CPT

## 2023-11-13 PROCEDURE — 85610 PROTHROMBIN TIME: CPT

## 2023-11-13 PROCEDURE — 80048 BASIC METABOLIC PNL TOTAL CA: CPT

## 2023-11-13 PROCEDURE — 97116 GAIT TRAINING THERAPY: CPT

## 2023-11-13 PROCEDURE — 99285 EMERGENCY DEPT VISIT HI MDM: CPT | Mod: 25

## 2023-11-13 PROCEDURE — 85027 COMPLETE CBC AUTOMATED: CPT

## 2023-11-13 PROCEDURE — 84100 ASSAY OF PHOSPHORUS: CPT

## 2023-11-13 PROCEDURE — 85730 THROMBOPLASTIN TIME PARTIAL: CPT

## 2023-11-13 PROCEDURE — 97530 THERAPEUTIC ACTIVITIES: CPT

## 2023-11-13 PROCEDURE — 83735 ASSAY OF MAGNESIUM: CPT

## 2023-11-13 PROCEDURE — 97110 THERAPEUTIC EXERCISES: CPT

## 2023-11-13 PROCEDURE — 80053 COMPREHEN METABOLIC PANEL: CPT

## 2023-11-13 PROCEDURE — 88305 TISSUE EXAM BY PATHOLOGIST: CPT

## 2023-11-14 ENCOUNTER — LABORATORY RESULT (OUTPATIENT)
Age: 73
End: 2023-11-14

## 2023-11-15 ENCOUNTER — NON-APPOINTMENT (OUTPATIENT)
Age: 73
End: 2023-11-15

## 2023-11-19 PROBLEM — I81 PORTAL VEIN THROMBOSIS: Status: ACTIVE | Noted: 2023-10-10

## 2023-11-19 PROBLEM — K64.9 HEMORRHOIDS: Status: ACTIVE | Noted: 2023-05-12

## 2023-11-19 PROBLEM — D12.6 TUBULAR ADENOMA OF COLON: Status: ACTIVE | Noted: 2023-10-06

## 2023-11-20 ENCOUNTER — APPOINTMENT (OUTPATIENT)
Dept: RHEUMATOLOGY | Facility: CLINIC | Age: 73
End: 2023-11-20

## 2023-11-21 ENCOUNTER — NON-APPOINTMENT (OUTPATIENT)
Age: 73
End: 2023-11-21

## 2023-11-21 ENCOUNTER — LABORATORY RESULT (OUTPATIENT)
Age: 73
End: 2023-11-21

## 2023-11-27 ENCOUNTER — LABORATORY RESULT (OUTPATIENT)
Age: 73
End: 2023-11-27

## 2023-11-28 ENCOUNTER — NON-APPOINTMENT (OUTPATIENT)
Age: 73
End: 2023-11-28

## 2023-11-30 RX ORDER — FAMOTIDINE 20 MG/1
20 TABLET, FILM COATED ORAL
Qty: 30 | Refills: 3 | Status: ACTIVE | COMMUNITY
Start: 2023-11-30 | End: 1900-01-01

## 2023-11-30 RX ORDER — APREMILAST 30 MG/1
30 TABLET, FILM COATED ORAL
Qty: 60 | Refills: 5 | Status: DISCONTINUED | COMMUNITY
Start: 2021-12-14 | End: 2023-11-30

## 2023-12-04 ENCOUNTER — LABORATORY RESULT (OUTPATIENT)
Age: 73
End: 2023-12-04

## 2023-12-05 ENCOUNTER — APPOINTMENT (OUTPATIENT)
Dept: RHEUMATOLOGY | Facility: CLINIC | Age: 73
End: 2023-12-05
Payer: MEDICARE

## 2023-12-05 ENCOUNTER — NON-APPOINTMENT (OUTPATIENT)
Age: 73
End: 2023-12-05

## 2023-12-05 VITALS
HEART RATE: 84 BPM | OXYGEN SATURATION: 98 % | SYSTOLIC BLOOD PRESSURE: 160 MMHG | TEMPERATURE: 98.1 F | DIASTOLIC BLOOD PRESSURE: 71 MMHG | RESPIRATION RATE: 16 BRPM

## 2023-12-05 PROCEDURE — 99214 OFFICE O/P EST MOD 30 MIN: CPT

## 2023-12-06 ENCOUNTER — LABORATORY RESULT (OUTPATIENT)
Age: 73
End: 2023-12-06

## 2023-12-08 ENCOUNTER — APPOINTMENT (OUTPATIENT)
Dept: DERMATOLOGY | Facility: CLINIC | Age: 73
End: 2023-12-08
Payer: MEDICARE

## 2023-12-08 PROCEDURE — 99214 OFFICE O/P EST MOD 30 MIN: CPT

## 2023-12-08 RX ORDER — TRIAMCINOLONE ACETONIDE 1 MG/G
0.1 OINTMENT TOPICAL
Qty: 1 | Refills: 1 | Status: ACTIVE | COMMUNITY
Start: 2023-12-08 | End: 1900-01-01

## 2023-12-11 ENCOUNTER — LABORATORY RESULT (OUTPATIENT)
Age: 73
End: 2023-12-11

## 2023-12-11 ENCOUNTER — NON-APPOINTMENT (OUTPATIENT)
Age: 73
End: 2023-12-11

## 2023-12-12 ENCOUNTER — NON-APPOINTMENT (OUTPATIENT)
Age: 73
End: 2023-12-12

## 2023-12-12 ENCOUNTER — OUTPATIENT (OUTPATIENT)
Dept: OUTPATIENT SERVICES | Facility: HOSPITAL | Age: 73
LOS: 1 days | End: 2023-12-12

## 2023-12-12 ENCOUNTER — APPOINTMENT (OUTPATIENT)
Dept: INTERNAL MEDICINE | Facility: CLINIC | Age: 73
End: 2023-12-12
Payer: MEDICARE

## 2023-12-12 VITALS
WEIGHT: 203 LBS | BODY MASS INDEX: 39.85 KG/M2 | HEART RATE: 85 BPM | OXYGEN SATURATION: 98 % | SYSTOLIC BLOOD PRESSURE: 142 MMHG | DIASTOLIC BLOOD PRESSURE: 70 MMHG | HEIGHT: 60 IN

## 2023-12-12 DIAGNOSIS — R76.12 NONSPECIFIC REACTION TO CELL MEDIATED IMMUNITY MEASUREMENT OF GAMMA INTERFERON ANTIGEN RESPONSE W/OUT ACTIVE TUBERCULOSIS: ICD-10-CM

## 2023-12-12 DIAGNOSIS — R73.03 PREDIABETES.: ICD-10-CM

## 2023-12-12 PROCEDURE — 99213 OFFICE O/P EST LOW 20 MIN: CPT | Mod: GE

## 2023-12-12 RX ORDER — PREDNISONE 5 MG/1
5 TABLET ORAL
Qty: 30 | Refills: 0 | Status: DISCONTINUED | COMMUNITY
Start: 2023-11-29 | End: 2023-12-12

## 2023-12-12 RX ORDER — NAPHAZOLINE HCL/PHENIRAMINE .0268-.315
6.5 DROPS OPHTHALMIC (EYE)
Qty: 1 | Refills: 0 | Status: ACTIVE | COMMUNITY
Start: 2023-12-12 | End: 1900-01-01

## 2023-12-13 LAB
25(OH)D3 SERPL-MCNC: 39.2 NG/ML
BILIRUBIN URINE: NORMAL
BLOOD URINE: NORMAL
CHOLEST SERPL-MCNC: 139 MG/DL
GLUCOSE QUALITATIVE U: NORMAL
HDLC SERPL-MCNC: 61 MG/DL
KETONES URINE: NORMAL
LDLC SERPL CALC-MCNC: 53 MG/DL
LEUKOCYTE ESTERASE URINE: NORMAL
NITRITE URINE: POSITIVE
NONHDLC SERPL-MCNC: 78 MG/DL
PH URINE: 5.5
PROTEIN URINE: NORMAL
SPECIFIC GRAVITY URINE: 1.02
TRIGL SERPL-MCNC: 148 MG/DL
UROBILINOGEN URINE: 0.2

## 2023-12-14 DIAGNOSIS — R73.03 PREDIABETES: ICD-10-CM

## 2023-12-14 DIAGNOSIS — H61.22 IMPACTED CERUMEN, LEFT EAR: ICD-10-CM

## 2023-12-14 DIAGNOSIS — R76.12 NONSPECIFIC REACTION TO CELL MEDIATED IMMUNITY MEASUREMENT OF GAMMA INTERFERON ANTIGEN RESPONSE WITHOUT ACTIVE TUBERCULOSIS: ICD-10-CM

## 2023-12-14 DIAGNOSIS — L40.50 ARTHROPATHIC PSORIASIS, UNSPECIFIED: ICD-10-CM

## 2023-12-14 LAB
CARDIOLIPIN IGM SER-MCNC: <5 GPL
ESTIMATED AVERAGE GLUCOSE: 108 MG/DL
HBA1C MFR BLD HPLC: 5.4 %

## 2023-12-14 NOTE — ASSESSMENT
[FreeTextEntry1] : #HCM ::VACCINATIONS:: [x] Flu: 10/5/2023 [ ] COVID-19 [x] Tdap: 02/04/2016 (netx due 2026) [ ] Zoster: recommende  to get this at pharmacy [x] Pneumo: 09/11/2018 (PCV), PPSV 03/21/2005 [ ] RSV: recommended to get this at pharmacy [ ] Others: ::AGE-APPROPRIATE SCREENINGS:: - Disease - [ ] HIV [ ] Syphilis [x] HCV: NR 12/6/2023 [ ] HBV: would consider assess HBV immunity next visit - Cancer - [x] CRC: Colonoscopy/FIT [ ] Breast Ca: Mammogram [ ] Lung Ca: CT or Smoking < 20 pack year  Juan Carlos Mi MD EM/IM PGY3 ISSUES NOT DISCUSSED TODAY: - F/U Issues: L Adrenal Adenoma (CT imaging from 1/2023), Hepatic Cysts & Hepatic Granuloma - F/U Issues: Intrauterine Cysts --> Will Task for helping pt to schedule GYN follow-up now, but path was negative in 02/2023  RTC 8 weeks for CPE - issues above - ?STI screening  Juan Carlos Mi MD EM/IM PGY3 Case D/W Dr. Magallon

## 2023-12-14 NOTE — HISTORY OF PRESENT ILLNESS
[FreeTextEntry1] : "I'm worried about this new medication" [de-identified] : Ms. Miller is a 73F with h/o refractory Psoriatic Arthritis, Retinal Vein Occlusion & clots elsewhere as well, HTN, HLD, MASH, BMI > 35 who presents for scheduled follow-up and specifically wants to discuss the results of her TB testing and possibly starting a new medication for her psoriasis.  #Indeterminant Quant Gold She says that she got these results but doesn't understand them. She has never been imprisoned or in the shelter system. She denies any exposures to known TB. She denies night sweats, weight loss, hemoptysis. She denies international travel (except to [place] many years ago) to endemic areas.  #Psoriatic Arthritis She says that she stopped her 5mg prednisone last week per her rheumatologist. She will still intermittently have irritation  under her breatss.  #Left Ear Muffled Hearing She says that she feels like she's listening through water on her left ear. She says this has been going on "for a while". She's worried about psoriasis in the ear. She denies any ear pain, fever/chills, sinus congestion, headaches. She denies frequent ear popping.

## 2023-12-14 NOTE — PHYSICAL EXAM
[No Acute Distress] : no acute distress [Well Nourished] : well nourished [Well Developed] : well developed [Well-Appearing] : well-appearing [Normal Sclera/Conjunctiva] : normal sclera/conjunctiva [EOMI] : extraocular movements intact [Normal Outer Ear/Nose] : the outer ears and nose were normal in appearance [Normal Oropharynx] : the oropharynx was normal [No Respiratory Distress] : no respiratory distress  [No Accessory Muscle Use] : no accessory muscle use [Alert and Oriented x3] : oriented to person, place, and time [Normal Insight/Judgement] : insight and judgment were intact [de-identified] : left TM completedly occluded by cerumen; right TM normal [de-identified] : diffuse psoriasiform lesions across trunk & abdomen [de-identified] : anxious mood, congruent affect

## 2023-12-15 ENCOUNTER — APPOINTMENT (OUTPATIENT)
Dept: DERMATOLOGY | Facility: CLINIC | Age: 73
End: 2023-12-15

## 2023-12-15 ENCOUNTER — NON-APPOINTMENT (OUTPATIENT)
Age: 73
End: 2023-12-15

## 2023-12-15 NOTE — ASSESSMENT
[FreeTextEntry1] : #Psoriasis with #Psoriatic arthritis >40% BSA - failing Otezla, topicals, previously failed NBUVB - stop otezla - previously strongly recommended Tremfya but pt refused, she is ready to start today - cont Clobetasol 0.05% ointment BID PRN 2 weeks on 1 week off, SED such as skin thinning, telangiectasias, hypopigmentation; instructions discussed, pt verbalizes understanding - cont start triamcinolone 0.1% ointment BID PRN for body, not face or groin, med side effects discussed such as skin thinning, telangiectasias, hypopigmentation; instructions discussed - will check if Tremfya is covered  #high risk med use - CBC, CMP, hepatitis panel 12/6/2023 wnl - pending TB that was also drawn  RTC for injection training

## 2023-12-15 NOTE — HISTORY OF PRESENT ILLNESS
[FreeTextEntry1] : f/u pt psoriasis [de-identified] : SABINO TOMLINSON is a 73 year old obese F hx of HTN, fatty liver here for FUV below.   Problem: Psoriasis on Otezla, triamcinolone, has previously been approved for Tremfya but pt scared to take, also has had NBUVB in past when she did not want injectables + psoriatic arthritis, being seen by rheum who also recommends DMARDs flaring today, large BSA, and getting worse pt ready to start Tremfya now, wondering if it will interact with her warfarin  No personal or FH of skin cancer. Social hx: has daughter;

## 2023-12-15 NOTE — HISTORY OF PRESENT ILLNESS
[FreeTextEntry1] : f/u pt psoriasis [de-identified] : SABINO TOMLINSON is a 73 year old obese F hx of HTN, fatty liver here for FUV below.   Problem: Psoriasis on Otezla, triamcinolone, has previously been approved for Tremfya but pt scared to take, also has had NBUVB in past when she did not want injectables + psoriatic arthritis, being seen by rheum who also recommends DMARDs flaring today, large BSA, and getting worse pt ready to start Tremfya now, wondering if it will interact with her warfarin  No personal or FH of skin cancer. Social hx: has daughter;

## 2023-12-15 NOTE — PHYSICAL EXAM
[FreeTextEntry3] : Focused exam only (see below) per patient request:  many salmon pink plaques on chest, abdomen, back, arms, and legs with micaceous scale, small guttate pink scaly papules on legs and upper chest

## 2023-12-16 NOTE — HISTORY OF PRESENT ILLNESS
[FreeTextEntry1] : Interval History: 12/5/2023 Pt has been on Otezla, not helping, has joint pain, has severe pso flare that started in the past month after COVID infection.  9/2022 PSO rash much better on Otezla Still with right knee pain, had streoid injection 7/21/2022 started PT  8/2022 Pt developed right knee and calf swelling, Doppler US performed 7/15/2022 demonstrating no DVT but 4cm Baker cyst. Pt seen by ortho, had a steroid injection which helped. Patient has been on Otezla for about almost a year. Her psoriasis is mostly cleared. Pt has CRVO

## 2023-12-16 NOTE — ASSESSMENT
[FreeTextEntry1] : 71F with extensive plaque psoriasis and psoriatic arthritis and dactylitis deferring DMARDs in the past now presenting with pso flare. No active psoriatic arthritis at the moment.  Plan: -infection screening labs today -recommend Tremfya for pso to which patient is agreeable - seeing dermatology on Friday -stop Otezla as it is not helping -refrain from steroids due to risk of pso exacerbation with taper -Discussed importance of weight loss in controlling pso and psa -follow up in 3months

## 2023-12-16 NOTE — PHYSICAL EXAM
[Musculoskeletal - Swelling] : no joint swelling seen [FreeTextEntry1] : extensive psoriatic plaques BSA > 50%

## 2023-12-18 ENCOUNTER — LABORATORY RESULT (OUTPATIENT)
Age: 73
End: 2023-12-18

## 2023-12-19 ENCOUNTER — NON-APPOINTMENT (OUTPATIENT)
Age: 73
End: 2023-12-19

## 2023-12-19 LAB — PTR INTERP: NORMAL

## 2023-12-20 ENCOUNTER — NON-APPOINTMENT (OUTPATIENT)
Age: 73
End: 2023-12-20

## 2023-12-20 LAB
M TB IFN-G BLD-IMP: NEGATIVE
QUANTIFERON TB PLUS MITOGEN MINUS NIL: 9.27 IU/ML
QUANTIFERON TB PLUS NIL: 0.04 IU/ML
QUANTIFERON TB PLUS TB1 MINUS NIL: 0 IU/ML
QUANTIFERON TB PLUS TB2 MINUS NIL: -0.01 IU/ML

## 2023-12-21 RX ORDER — GUSELKUMAB 100 MG/ML
100 INJECTION SUBCUTANEOUS
Qty: 1 | Refills: 1 | Status: ACTIVE | COMMUNITY
Start: 2023-12-08

## 2023-12-27 ENCOUNTER — RX RENEWAL (OUTPATIENT)
Age: 73
End: 2023-12-27

## 2023-12-27 ENCOUNTER — LABORATORY RESULT (OUTPATIENT)
Age: 73
End: 2023-12-27

## 2023-12-28 ENCOUNTER — NON-APPOINTMENT (OUTPATIENT)
Age: 73
End: 2023-12-28

## 2024-01-03 ENCOUNTER — LABORATORY RESULT (OUTPATIENT)
Age: 74
End: 2024-01-03

## 2024-01-04 ENCOUNTER — NON-APPOINTMENT (OUTPATIENT)
Age: 74
End: 2024-01-04

## 2024-01-05 ENCOUNTER — NON-APPOINTMENT (OUTPATIENT)
Age: 74
End: 2024-01-05

## 2024-01-10 ENCOUNTER — APPOINTMENT (OUTPATIENT)
Dept: OTOLARYNGOLOGY | Facility: CLINIC | Age: 74
End: 2024-01-10
Payer: MEDICARE

## 2024-01-10 VITALS
BODY MASS INDEX: 39.85 KG/M2 | WEIGHT: 203 LBS | HEART RATE: 89 BPM | OXYGEN SATURATION: 98 % | HEIGHT: 60 IN | SYSTOLIC BLOOD PRESSURE: 170 MMHG | DIASTOLIC BLOOD PRESSURE: 78 MMHG

## 2024-01-10 VITALS
WEIGHT: 203 LBS | HEART RATE: 98 BPM | SYSTOLIC BLOOD PRESSURE: 176 MMHG | DIASTOLIC BLOOD PRESSURE: 81 MMHG | HEIGHT: 60 IN | OXYGEN SATURATION: 97 % | BODY MASS INDEX: 39.85 KG/M2

## 2024-01-10 DIAGNOSIS — H61.23 IMPACTED CERUMEN, BILATERAL: ICD-10-CM

## 2024-01-10 PROCEDURE — 69210 REMOVE IMPACTED EAR WAX UNI: CPT

## 2024-01-10 PROCEDURE — 31231 NASAL ENDOSCOPY DX: CPT

## 2024-01-10 PROCEDURE — 99204 OFFICE O/P NEW MOD 45 MIN: CPT | Mod: 25

## 2024-01-10 NOTE — END OF VISIT
[FreeTextEntry3] : I, Dr. Ramirez personally performed the evaluation and management (E/M) services including all necessary procedures, for this new patient. That E/M includes conducting the clinically appropriate initial history &/or exam, assessing all conditions, and establishing the plan of care. Today, my MP, Ele Campuzano, was here to observe &/or participate in the visit & follow plan of care established by me.

## 2024-01-10 NOTE — CONSULT LETTER
[Dear  ___] : Dear  [unfilled], [Consult Letter:] : I had the pleasure of evaluating your patient, [unfilled]. [Please see my note below.] : Please see my note below. [Consult Closing:] : Thank you very much for allowing me to participate in the care of this patient.  If you have any questions, please do not hesitate to contact me. [Sincerely,] : Sincerely, [FreeTextEntry3] : Nathan Ramirez MD [facility] Associated Professor, Naima

## 2024-01-10 NOTE — PHYSICAL EXAM
[Midline] : trachea located in midline position [Normal] : no rashes [Nasal Endoscopy Performed] : nasal endoscopy was performed, see procedure section for findings [] : septum deviated to the left [de-identified] : cerumen in the ear canals; once removed normal EACs

## 2024-01-10 NOTE — HISTORY OF PRESENT ILLNESS
[de-identified] : Patient comes in for cerumen impaction, as per PCP, in the left ear. She does not have any pain or pressure. She does feel some mild muffling in the left ear. She otherwise does not have hearing complaints. She does have nasal congestion issues, worse on the left side  She does have psoriasis and thought that her ear clogging was a result of that  She is on Warfarin

## 2024-01-10 NOTE — ASSESSMENT
[FreeTextEntry1] : Patient complaining of some diminished hearing referred here for evaluation massive cerumen impaction bilaterally curetted out also nasally congested endoscopically some postnasal drip no tumors masses or polyps she is on warfarin so nasal sprays are contraindicated she refuses a hearing test at this time since the wax is removed she is hearing fine follow-up with us as needed.

## 2024-01-11 ENCOUNTER — NON-APPOINTMENT (OUTPATIENT)
Age: 74
End: 2024-01-11

## 2024-01-12 ENCOUNTER — APPOINTMENT (OUTPATIENT)
Dept: DERMATOLOGY | Facility: CLINIC | Age: 74
End: 2024-01-12
Payer: MEDICARE

## 2024-01-12 PROCEDURE — 99214 OFFICE O/P EST MOD 30 MIN: CPT

## 2024-01-12 RX ORDER — CLOBETASOL PROPIONATE 0.5 MG/G
0.05 OINTMENT TOPICAL
Qty: 3 | Refills: 2 | Status: ACTIVE | COMMUNITY
Start: 2023-12-08 | End: 1900-01-01

## 2024-01-16 ENCOUNTER — LABORATORY RESULT (OUTPATIENT)
Age: 74
End: 2024-01-16

## 2024-01-17 ENCOUNTER — NON-APPOINTMENT (OUTPATIENT)
Age: 74
End: 2024-01-17

## 2024-01-18 ENCOUNTER — NON-APPOINTMENT (OUTPATIENT)
Age: 74
End: 2024-01-18

## 2024-01-23 ENCOUNTER — APPOINTMENT (OUTPATIENT)
Dept: OPHTHALMOLOGY | Facility: CLINIC | Age: 74
End: 2024-01-23
Payer: MEDICARE

## 2024-01-23 ENCOUNTER — NON-APPOINTMENT (OUTPATIENT)
Age: 74
End: 2024-01-23

## 2024-01-23 PROCEDURE — 92020 GONIOSCOPY: CPT

## 2024-01-23 PROCEDURE — 92014 COMPRE OPH EXAM EST PT 1/>: CPT

## 2024-01-24 ENCOUNTER — LABORATORY RESULT (OUTPATIENT)
Age: 74
End: 2024-01-24

## 2024-01-24 DIAGNOSIS — H34.8112 CENTRAL RETINAL VEIN OCCLUSION, RIGHT EYE, STABLE: ICD-10-CM

## 2024-01-24 DIAGNOSIS — H34.8192 CENTRAL RETINAL VEIN OCCLUSION, UNSPECIFIED EYE, STABLE: ICD-10-CM

## 2024-01-25 ENCOUNTER — APPOINTMENT (OUTPATIENT)
Dept: INFECTIOUS DISEASE | Facility: CLINIC | Age: 74
End: 2024-01-25

## 2024-01-25 ENCOUNTER — NON-APPOINTMENT (OUTPATIENT)
Age: 74
End: 2024-01-25

## 2024-01-26 ENCOUNTER — APPOINTMENT (OUTPATIENT)
Dept: DERMATOLOGY | Facility: CLINIC | Age: 74
End: 2024-01-26

## 2024-01-30 ENCOUNTER — LABORATORY RESULT (OUTPATIENT)
Age: 74
End: 2024-01-30

## 2024-01-30 ENCOUNTER — NON-APPOINTMENT (OUTPATIENT)
Age: 74
End: 2024-01-30

## 2024-01-31 ENCOUNTER — NON-APPOINTMENT (OUTPATIENT)
Age: 74
End: 2024-01-31

## 2024-01-31 RX ORDER — GUSELKUMAB 100 MG/ML
100 INJECTION SUBCUTANEOUS
Qty: 1 | Refills: 3 | Status: ACTIVE | COMMUNITY
Start: 2023-12-08

## 2024-02-06 ENCOUNTER — LABORATORY RESULT (OUTPATIENT)
Age: 74
End: 2024-02-06

## 2024-02-07 ENCOUNTER — NON-APPOINTMENT (OUTPATIENT)
Age: 74
End: 2024-02-07

## 2024-02-14 ENCOUNTER — LABORATORY RESULT (OUTPATIENT)
Age: 74
End: 2024-02-14

## 2024-02-15 ENCOUNTER — NON-APPOINTMENT (OUTPATIENT)
Age: 74
End: 2024-02-15

## 2024-02-15 ENCOUNTER — RESULT CHARGE (OUTPATIENT)
Age: 74
End: 2024-02-15

## 2024-02-16 ENCOUNTER — APPOINTMENT (OUTPATIENT)
Dept: INTERNAL MEDICINE | Facility: CLINIC | Age: 74
End: 2024-02-16
Payer: MEDICARE

## 2024-02-16 ENCOUNTER — OUTPATIENT (OUTPATIENT)
Dept: OUTPATIENT SERVICES | Facility: HOSPITAL | Age: 74
LOS: 1 days | End: 2024-02-16

## 2024-02-16 ENCOUNTER — NON-APPOINTMENT (OUTPATIENT)
Age: 74
End: 2024-02-16

## 2024-02-16 VITALS — SYSTOLIC BLOOD PRESSURE: 150 MMHG | DIASTOLIC BLOOD PRESSURE: 82 MMHG

## 2024-02-16 VITALS
BODY MASS INDEX: 39.85 KG/M2 | HEART RATE: 80 BPM | OXYGEN SATURATION: 98 % | WEIGHT: 203 LBS | DIASTOLIC BLOOD PRESSURE: 90 MMHG | HEIGHT: 60 IN | SYSTOLIC BLOOD PRESSURE: 190 MMHG

## 2024-02-16 DIAGNOSIS — Z87.2 PERSONAL HISTORY OF DISEASES OF THE SKIN AND SUBCUTANEOUS TISSUE: ICD-10-CM

## 2024-02-16 DIAGNOSIS — L40.50 ARTHROPATHIC PSORIASIS, UNSPECIFIED: ICD-10-CM

## 2024-02-16 DIAGNOSIS — R32 UNSPECIFIED URINARY INCONTINENCE: ICD-10-CM

## 2024-02-16 DIAGNOSIS — H93.8X3 OTHER SPECIFIED DISORDERS OF EAR, BILATERAL: ICD-10-CM

## 2024-02-16 DIAGNOSIS — L40.9 PSORIASIS, UNSPECIFIED: ICD-10-CM

## 2024-02-16 DIAGNOSIS — H40.059 OCULAR HYPERTENSION, UNSPECIFIED EYE: ICD-10-CM

## 2024-02-16 DIAGNOSIS — D68.59 OTHER PRIMARY THROMBOPHILIA: ICD-10-CM

## 2024-02-16 DIAGNOSIS — T38.0X5A ADVERSE EFFECT OF GLUCOCORTICOIDS AND SYNTHETIC ANALOGUES, INITIAL ENCOUNTER: ICD-10-CM

## 2024-02-16 DIAGNOSIS — M79.89 OTHER SPECIFIED SOFT TISSUE DISORDERS: ICD-10-CM

## 2024-02-16 DIAGNOSIS — E27.8 OTHER SPECIFIED DISORDERS OF ADRENAL GLAND: ICD-10-CM

## 2024-02-16 DIAGNOSIS — Z00.00 ENCOUNTER FOR GENERAL ADULT MEDICAL EXAMINATION WITHOUT ABNORMAL FINDINGS: ICD-10-CM

## 2024-02-16 DIAGNOSIS — M25.50 PAIN IN UNSPECIFIED JOINT: ICD-10-CM

## 2024-02-16 DIAGNOSIS — Z87.19 PERSONAL HISTORY OF OTHER DISEASES OF THE DIGESTIVE SYSTEM: ICD-10-CM

## 2024-02-16 DIAGNOSIS — H61.22 IMPACTED CERUMEN, LEFT EAR: ICD-10-CM

## 2024-02-16 DIAGNOSIS — Z00.00 ENCOUNTER FOR GENERAL ADULT MEDICAL EXAMINATION W/OUT ABNORMAL FINDINGS: ICD-10-CM

## 2024-02-16 PROCEDURE — 99213 OFFICE O/P EST LOW 20 MIN: CPT | Mod: GE

## 2024-02-16 RX ORDER — TIMOLOL MALEATE 5 MG/ML
0.5 SOLUTION OPHTHALMIC DAILY
Refills: 0 | Status: ACTIVE | COMMUNITY
Start: 2024-02-16

## 2024-02-16 RX ORDER — DEXAMETHASONE 1 MG/1
1 TABLET ORAL
Qty: 1 | Refills: 0 | Status: ACTIVE | COMMUNITY
Start: 2024-02-16 | End: 1900-01-01

## 2024-02-16 RX ORDER — ATORVASTATIN CALCIUM 40 MG/1
40 TABLET, FILM COATED ORAL
Qty: 90 | Refills: 1 | Status: ACTIVE | COMMUNITY
Start: 2022-10-28 | End: 1900-01-01

## 2024-02-20 ENCOUNTER — LABORATORY RESULT (OUTPATIENT)
Age: 74
End: 2024-02-20

## 2024-02-21 ENCOUNTER — APPOINTMENT (OUTPATIENT)
Dept: OPHTHALMOLOGY | Facility: CLINIC | Age: 74
End: 2024-02-21
Payer: MEDICARE

## 2024-02-21 ENCOUNTER — NON-APPOINTMENT (OUTPATIENT)
Age: 74
End: 2024-02-21

## 2024-02-21 LAB
ALDOSTERONE SERUM: 5.6 NG/DL
HBV SURFACE AB SERPL IA-ACNC: <3 MIU/ML
HIV1+2 AB SPEC QL IA.RAPID: NONREACTIVE
T PALLIDUM AB SER QL IA: NEGATIVE

## 2024-02-21 PROCEDURE — 92250 FUNDUS PHOTOGRAPHY W/I&R: CPT

## 2024-02-21 PROCEDURE — 67028 INJECTION EYE DRUG: CPT | Mod: RT

## 2024-02-21 PROCEDURE — 92012 INTRM OPH EXAM EST PATIENT: CPT | Mod: 25

## 2024-02-23 PROBLEM — H40.059 ELEVATED IOP: Status: ACTIVE | Noted: 2024-02-16

## 2024-02-23 NOTE — REVIEW OF SYSTEMS
Encounter Date: 2/22/2024    SCRIBE #1 NOTE: I, Anthony Eisenberg, am scribing for, and in the presence of,  Kelsea Choi FNP.       History     Chief Complaint   Patient presents with    Rectal Pain     Patient presents w/a c/o of rectal pain, and rectal bleeding since yesterday. States that she has been constipated and trying to have a bowel movement. No relief with OTC medication. Hx of hemorrhoids. GCS 15.     36 y.o. female with a PMHx of anemia, presents to the ED for evaluation of rectal pain and bleeding that started 1 day ago. She also reports of constipation and abdominal pain. Denies straining during bowel movements. Endorses a hx of hemorrhoids but states that this episode is different. Attempted tx with GasX and pepto bismol w/o relief. Denies nausea or vomiting. No other complaints at this time.     The history is provided by the patient. No  was used.     Review of patient's allergies indicates:  No Known Allergies  Past Medical History:   Diagnosis Date    Anemia      No past surgical history on file.  Family History   Problem Relation Age of Onset    Cancer Maternal Grandmother     Cancer Maternal Grandfather      Social History     Tobacco Use    Smoking status: Former     Current packs/day: 0.30     Types: Cigarettes    Smokeless tobacco: Never   Substance Use Topics    Alcohol use: No    Drug use: Yes     Frequency: 2.0 times per week     Types: Marijuana     Review of Systems   Constitutional:  Negative for chills and fever.   HENT:  Negative for sore throat.    Respiratory:  Negative for cough, chest tightness, shortness of breath and stridor.    Cardiovascular:  Negative for chest pain.   Gastrointestinal:  Positive for abdominal pain, anal bleeding, constipation and rectal pain. Negative for nausea and vomiting.   Genitourinary:  Negative for dysuria and genital sores.   Musculoskeletal:  Negative for back pain and myalgias.   Skin:  Negative for rash.   Neurological:   Negative for weakness.   Hematological:  Does not bruise/bleed easily.   All other systems reviewed and are negative.    Physical Exam     Initial Vitals [02/22/24 1833]   BP Pulse Resp Temp SpO2   109/67 78 20 98.4 °F (36.9 °C) 99 %      MAP       --         Physical Exam    Nursing note and vitals reviewed.  Constitutional: She appears well-developed and well-nourished.   HENT:   Head: Normocephalic and atraumatic.   Eyes: Conjunctivae and EOM are normal. Pupils are equal, round, and reactive to light.   Neck:   Normal range of motion.  Cardiovascular:  Normal rate, regular rhythm, normal heart sounds and intact distal pulses.     Exam reveals no gallop and no friction rub.       No murmur heard.  Pulmonary/Chest: Breath sounds normal. No respiratory distress. She has no wheezes. She has no rhonchi. She has no rales. She exhibits no tenderness.   Abdominal: Abdomen is soft. Bowel sounds are normal. She exhibits no distension and no mass. There is abdominal tenderness in the left lower quadrant. There is no rebound and no guarding.   Genitourinary: Rectum:      Internal hemorrhoid present.      Genitourinary Comments: Internal hemorrhoid noted to the 9 o'clock position-pain with rectal exam.  No external hemorrhoids noted     Musculoskeletal:         General: No tenderness or edema. Normal range of motion.      Cervical back: Normal range of motion.     Neurological: She is alert and oriented to person, place, and time. She has normal strength. GCS score is 15. GCS eye subscore is 4. GCS verbal subscore is 5. GCS motor subscore is 6.   Skin: Skin is warm. Capillary refill takes less than 2 seconds. No rash noted. No erythema.   Psychiatric: She has a normal mood and affect.         ED Course   Procedures  Labs Reviewed   POCT OCCULT BLOOD STOOL - Abnormal; Notable for the following components:       Result Value    Fecal Occult Blood Positive (*)     All other components within normal limits   POCT CMP - Abnormal;  Notable for the following components:    POC BUN 5 (*)     All other components within normal limits   POCT URINE PREGNANCY   POCT CBC   POCT URINALYSIS W/O SCOPE   POCT LIVER PANEL          Imaging Results              CT Abdomen Pelvis With IV Contrast NO Oral Contrast (Final result)  Result time 02/22/24 20:55:04      Final result by Michelle Templeton MD (02/22/24 20:55:04)                   Impression:      No acute intra-abdominal abnormalities identified.      Electronically signed by: Michelle Templeton MD  Date:    02/22/2024  Time:    20:55               Narrative:    EXAMINATION:  CT ABDOMEN PELVIS WITH IV CONTRAST    CLINICAL HISTORY:  GI bleed;    TECHNIQUE:  Low dose axial images, sagittal and coronal reformations were obtained from the lung bases to the pubic symphysis following the IV administration of 100 mL of Omnipaque 350 .  Oral contrast was not given.    COMPARISON:  CT abdomen and pelvis from June 2021.    FINDINGS:  The visualized portion of the heart is unremarkable.  The lung bases are clear.    No significant hepatic abnormalities are identified.  There is no intra-or extrahepatic biliary ductal dilatation.  The gallbladder is unremarkable.  The stomach, pancreas, spleen, and adrenal glands are unremarkable.    Kidneys enhance normally with no evidence of hydronephrosis.  Small right renal hypodensity, probable cyst is noted.  Ureters are unable to be tracked.  Urinary bladder is nondistended.  IUD is visualized within the uterus.  Tampon is noted in the vagina.  No definite adnexal abnormalities are appreciated.    Appendix is partially seen and is unremarkable where visualized.  The visualized loops of small and large bowel show no evidence of obstruction or inflammation.  No free air or free fluid.    Aorta tapers normally.  Abdominal aortic branch vessels and iliacs are widely patent.    No acute osseous abnormality identified. Subcutaneous soft tissues show no significant abnormalities.                                        Medications   iohexoL (OMNIPAQUE 350) injection 100 mL (90 mLs Intravenous Given 2/22/24 2041)   ketorolac injection 15 mg (15 mg Intravenous Given 2/22/24 2122)     Medical Decision Making  36-year-old female which presents to the emergency room with rectal pain and bleeding that began yesterday.  She states that she has blood when she wipes and also in the toilet.  Pain but feels that she is constipated.  CBC, CMP and urinalysis unremarkable.  Positive fecal occult blood.  CT abdomen pelvis negative.  Patient has been referred to GI for further evaluation and potential colonoscopy.  The patient has been advised to take MiraLax to help with her constipation. Patient given strict return precautions and voiced understanding of all discharge instructions. Pt was stable at discharge.       Differential diagnosis: gastroenteritis, gastritis, ulcer, cholecystitis, gallstones, pancreatitis, ileus, small bowel obstruction, appendicitis, constipation, hemorrhoids    Problems Addressed:  Constipation, unspecified constipation type: acute illness or injury  Rectal bleeding: acute illness or injury  Rectal pain: acute illness or injury    Amount and/or Complexity of Data Reviewed  Labs: ordered. Decision-making details documented in ED Course.  Radiology: ordered.    Risk  OTC drugs.  Prescription drug management.            Scribe Attestation:   Scribe #1: I performed the above scribed service and the documentation accurately describes the services I performed. I attest to the accuracy of the note.        ED Course as of 02/22/24 2124   Thu Feb 22, 2024 1930 BP: 109/67 [AT]   1930 Temp: 98.4 °F (36.9 °C) [AT]   1930 Temp Source: Oral [AT]   1930 Pulse: 78 [AT]   1930 Resp: 20 [AT]   1930 SpO2: 99 % [AT]   2107 Fecal Occult Blood(!): Positive [AT]      ED Course User Index  [AT] Kelsea Choi FNP                             IKelsea FNP, personally performed the services  described in this documentation. All medical record entries made by the scribe were at my direction and in my presence. I have reviewed the chart and agree that the record reflects my personal performance and is accurate and complete.              Clinical Impression:  Final diagnoses:  [K62.5] Rectal bleeding (Primary)  [K62.89] Rectal pain  [K59.00] Constipation, unspecified constipation type          ED Disposition Condition    Discharge Stable          ED Prescriptions    None       Follow-up Information       Follow up With Specialties Details Why Contact Info Additional Information    [facility] Gastroenterology Schedule an appointment as soon as possible for a visit in 1 week  [address]  [address]  [facility] [id number] [phone] Please park in garage or surface lot and use Medical Office Bldg entrance. Check in at [address]             Kelsea Choi FNP  02/22/24 2123       Kelsea Choi FNP  02/22/24 2124     [Negative] : Heme/Lymph

## 2024-02-23 NOTE — PHYSICAL EXAM
[No Acute Distress] : no acute distress [Well Nourished] : well nourished [Well Developed] : well developed [Well-Appearing] : well-appearing [Normal Sclera/Conjunctiva] : normal sclera/conjunctiva [Normal Outer Ear/Nose] : the outer ears and nose were normal in appearance [Normal TMs] : both tympanic membranes were normal [No Lymphadenopathy] : no lymphadenopathy [No Respiratory Distress] : no respiratory distress  [No Accessory Muscle Use] : no accessory muscle use [Normal Rate] : normal rate  [Regular Rhythm] : with a regular rhythm

## 2024-02-23 NOTE — COUNSELING
[Fall prevention counseling provided] : Fall prevention counseling provided [Transportation Issues] : Transportation issues

## 2024-02-27 ENCOUNTER — LABORATORY RESULT (OUTPATIENT)
Age: 74
End: 2024-02-27

## 2024-02-28 ENCOUNTER — NON-APPOINTMENT (OUTPATIENT)
Age: 74
End: 2024-02-28

## 2024-02-29 ENCOUNTER — NON-APPOINTMENT (OUTPATIENT)
Age: 74
End: 2024-02-29

## 2024-02-29 LAB
CORTICOSTEROID BIND GLOBULIN: 2.4 MG/DL
CORTIS SERPL-MCNC: 5.7 UG/DL
CORTISOL, FREE: 0.18 UG/DL
CREAT UR-MCNC: 37 MG/DL
METANEPHRINE, PL: <25 PG/ML
METANEPHS 24H UR-MCNC: 44 UG/L
METANEPHS/CREAT UR: 0.5
NORMETANEPHRINE 24H UR-MCNC: 116 UG/L
NORMETANEPHRINE, PL: 60.7 PG/ML
PFCX: 3.2 %
RENIN ACTIVITY, PLASMA: 1.74 NG/ML/HR

## 2024-03-01 ENCOUNTER — NON-APPOINTMENT (OUTPATIENT)
Age: 74
End: 2024-03-01

## 2024-03-01 ENCOUNTER — APPOINTMENT (OUTPATIENT)
Dept: DERMATOLOGY | Facility: CLINIC | Age: 74
End: 2024-03-01
Payer: MEDICARE

## 2024-03-01 DIAGNOSIS — Z51.81 ENCOUNTER FOR THERAPEUTIC DRUG LVL MONITORING: ICD-10-CM

## 2024-03-01 PROCEDURE — 99214 OFFICE O/P EST MOD 30 MIN: CPT

## 2024-03-04 PROBLEM — Z51.81 ENCOUNTER FOR MEDICATION MONITORING: Status: ACTIVE | Noted: 2020-08-26

## 2024-03-05 ENCOUNTER — LABORATORY RESULT (OUTPATIENT)
Age: 74
End: 2024-03-05

## 2024-03-05 ENCOUNTER — NON-APPOINTMENT (OUTPATIENT)
Age: 74
End: 2024-03-05

## 2024-03-05 NOTE — ASSESSMENT
[FreeTextEntry1] : #HCM ::VACCINATIONS:: [x] Flu: 10/5/2023 [ ] COVID-19 [x] Tdap: 02/04/2016 (next due 2026) [ ] Zoster: recommended to get this at pharmacy [x] Pneumo: 09/11/2018 (PCV), PPSV 03/21/2005 [ ] RSV: recommended to get this at pharmacy [ ] Others: ::AGE-APPROPRIATE SCREENINGS:: - Disease - [ ] HIV: will check today as not checked before [ ] Syphilis: will check today as not checked before [x] HCV: NR 12/6/2023 [ ] HBV: check immunity today given started new mAb. [ ] DEXA: ordered - Cancer - [x] CRC: Colonoscopy/FIT [ ] Breast Ca: Mammogram no record in system, can check next time [x] Lung Ca: never smoker  Juan Carlos Mi MD PGY3 EM/IM Case D/W Dr. Magallon  RTC 5 weeks for TTM (BP Check) (any provider) & then 8 weeks with Dr. Miller for RPA

## 2024-03-05 NOTE — HISTORY OF PRESENT ILLNESS
[FreeTextEntry1] : "What do you think about this new medicine?" [de-identified] : Ms. Miller is a 73F with h/o refractory Psoriatic Arthritis, HTN, HLD, metabolic/non-alcoholic hepaticsteatosis (MASH), BMI >35, hypercoagulability c/b non-occlusive thrombi (IMV, SMV, Portal Vein, Retinal Vein OD; on Warfarin goal 2-3), diverticulosis c/b diverticulitis (01/2023) who presents as scheduled for her annual comprehensive exam.   Summary of Specialist Follow-Ups - Dermatology: Dr. Zainab Kenney ----> Plan to start Tremfya (Guselkumab): human IgG1 against IL-23 - Rheumatology: Dr. Macey Matamoros - Ophthalmology: Dr. Naomi Goldberg - Gastroenterology (Colonoscopy/Hemorrhoids): Dr. Adam Patel  #HTN #HLD She has a blood pressure wrist device at home and checks infrequently. She says that her numbers are generally in the 140-150s at home. She is very anxious about her initial pressure here, which is quite elevated. She denies headache/nausea/vomiting, weakness/numbess in her extremities, or new vision changes in this context.   #Psoriatic Arthritis She is planning to initiate Tremfya with Dermatology as documented thoroughly elsewhere. There were concerns that she would need ID clerance given the indeterminant Quant Golds, but it seems that has resolved. She still has significant skin lesions though no joint flares at this time.   #Hypercoagulable State She had previously been following with Hematology who told her that she may be able to stop the Warfarin at some point; however, this is not documented.

## 2024-03-06 ENCOUNTER — NON-APPOINTMENT (OUTPATIENT)
Age: 74
End: 2024-03-06

## 2024-03-06 ENCOUNTER — LABORATORY RESULT (OUTPATIENT)
Age: 74
End: 2024-03-06

## 2024-03-07 ENCOUNTER — LABORATORY RESULT (OUTPATIENT)
Age: 74
End: 2024-03-07

## 2024-03-12 ENCOUNTER — LABORATORY RESULT (OUTPATIENT)
Age: 74
End: 2024-03-12

## 2024-03-13 ENCOUNTER — NON-APPOINTMENT (OUTPATIENT)
Age: 74
End: 2024-03-13

## 2024-03-19 ENCOUNTER — LABORATORY RESULT (OUTPATIENT)
Age: 74
End: 2024-03-19

## 2024-03-20 ENCOUNTER — OUTPATIENT (OUTPATIENT)
Dept: OUTPATIENT SERVICES | Facility: HOSPITAL | Age: 74
LOS: 1 days | End: 2024-03-20

## 2024-03-20 ENCOUNTER — APPOINTMENT (OUTPATIENT)
Dept: INTERNAL MEDICINE | Facility: CLINIC | Age: 74
End: 2024-03-20
Payer: MEDICARE

## 2024-03-20 ENCOUNTER — LABORATORY RESULT (OUTPATIENT)
Age: 74
End: 2024-03-20

## 2024-03-20 VITALS
HEART RATE: 93 BPM | BODY MASS INDEX: 39.85 KG/M2 | HEIGHT: 60 IN | WEIGHT: 203 LBS | SYSTOLIC BLOOD PRESSURE: 127 MMHG | DIASTOLIC BLOOD PRESSURE: 67 MMHG

## 2024-03-20 DIAGNOSIS — Z79.01 ENCOUNTER FOR THERAPEUTIC DRUG LVL MONITORING: ICD-10-CM

## 2024-03-20 DIAGNOSIS — E27.8 OTHER SPECIFIED DISORDERS OF ADRENAL GLAND: ICD-10-CM

## 2024-03-20 DIAGNOSIS — Z51.81 ENCOUNTER FOR THERAPEUTIC DRUG LVL MONITORING: ICD-10-CM

## 2024-03-20 DIAGNOSIS — I10 ESSENTIAL (PRIMARY) HYPERTENSION: ICD-10-CM

## 2024-03-20 DIAGNOSIS — L40.50 ARTHROPATHIC PSORIASIS, UNSPECIFIED: ICD-10-CM

## 2024-03-20 DIAGNOSIS — Z51.81 ENCOUNTER FOR THERAPEUTIC DRUG LEVEL MONITORING: ICD-10-CM

## 2024-03-20 PROCEDURE — 99442: CPT

## 2024-03-20 RX ORDER — BUDESONIDE 180 UG/1
180 AEROSOL, POWDER RESPIRATORY (INHALATION)
Qty: 1 | Refills: 1 | Status: DISCONTINUED | COMMUNITY
Start: 2023-10-06 | End: 2024-03-20

## 2024-03-20 NOTE — ASSESSMENT
[FreeTextEntry1] : #HCM  to be addressed at next in-person visit  RTC at already scheduled appt in a month with PCP   Case discussed with Dr. Shelbi Horton, PGY-3 [facility]

## 2024-03-20 NOTE — END OF VISIT
[] : Resident [FreeTextEntry3] : Follow up call to ensure that patient is getting all her lab work done for her adrenal nodule, thus far, work up in negative. Patient expresses frustration with the work up and refuses to go further testing even when risks were discussed. Physical therapy ordered for psoriatic arthritis.  [Time Spent: ___ minutes] : I have spent [unfilled] minutes of time on the encounter.

## 2024-03-20 NOTE — HISTORY OF PRESENT ILLNESS
[Home] : at home, [unfilled] , at the time of the visit. [Medical Office: ([facility])___] : at the medical office located in  [Verbal consent obtained from patient] : the patient, [unfilled] [FreeTextEntry1] : f/u  [de-identified] : 72 Y/O F with h/o refractory Psoriatic Arthritis, HTN, HLD, metabolic/non-alcoholic hepaticsteatosis (MASH), BMI >35, hypercoagulability c/b non-occlusive thrombi (IMV, SMV, Portal Vein, Retinal Vein OD; on Warfarin goal 2-3), diverticulosis c/b diverticulitis (01/2023), incidental adrenal nodule who presents as a tele visit. Pt unsure of why visit was scheduled and did not have any acute complaints. Reports that she is feeling well. Patient requesting repeat PT referral   HTN: started on amlodipine 5 mg which pt is compliant with. Reports she has been checking her BP at home and has been in the 120s/80s and has not been >130/80   Psoriatic arthritis: started tremfya injections at home on March 1st. Tolerating it well. No joint flares at this time.   No bleeds reported, no melena/hematochezia. No hematuria, no other signs of bleeding.

## 2024-03-20 NOTE — REVIEW OF SYSTEMS
[Fever] : no fever [Chills] : no chills [Pain] : no pain [Vision Problems] : no vision problems [Chest Pain] : no chest pain [Palpitations] : no palpitations [Lower Ext Edema] : no lower extremity edema [Orthopnea] : no orthopnea [Wheezing] : no wheezing [Shortness Of Breath] : no shortness of breath [Cough] : no cough [Abdominal Pain] : no abdominal pain [Dyspnea on Exertion] : no dyspnea on exertion [Nausea] : no nausea [Constipation] : no constipation [Diarrhea] : diarrhea [Vomiting] : no vomiting [Melena] : no melena [Dysuria] : no dysuria [Incontinence] : no incontinence [Hematuria] : no hematuria [Frequency] : no frequency [Easy Bleeding] : no easy bleeding [Muscle Pain] : no muscle pain [Easy Bruising] : no easy bruising

## 2024-03-21 ENCOUNTER — NON-APPOINTMENT (OUTPATIENT)
Age: 74
End: 2024-03-21

## 2024-03-25 ENCOUNTER — NON-APPOINTMENT (OUTPATIENT)
Age: 74
End: 2024-03-25

## 2024-03-26 ENCOUNTER — LABORATORY RESULT (OUTPATIENT)
Age: 74
End: 2024-03-26

## 2024-03-29 DIAGNOSIS — Z91.81 HISTORY OF FALLING: ICD-10-CM

## 2024-03-29 RX ORDER — CALCIUM CARBONATE 160(400)MG
TABLET,CHEWABLE ORAL
Qty: 1 | Refills: 0 | Status: ACTIVE | OUTPATIENT
Start: 2024-03-29

## 2024-04-02 ENCOUNTER — LABORATORY RESULT (OUTPATIENT)
Age: 74
End: 2024-04-02

## 2024-04-03 ENCOUNTER — NON-APPOINTMENT (OUTPATIENT)
Age: 74
End: 2024-04-03

## 2024-04-03 ENCOUNTER — RX RENEWAL (OUTPATIENT)
Age: 74
End: 2024-04-03

## 2024-04-09 ENCOUNTER — LABORATORY RESULT (OUTPATIENT)
Age: 74
End: 2024-04-09

## 2024-04-10 ENCOUNTER — NON-APPOINTMENT (OUTPATIENT)
Age: 74
End: 2024-04-10

## 2024-04-16 ENCOUNTER — LABORATORY RESULT (OUTPATIENT)
Age: 74
End: 2024-04-16

## 2024-04-16 ENCOUNTER — NON-APPOINTMENT (OUTPATIENT)
Age: 74
End: 2024-04-16

## 2024-04-17 ENCOUNTER — APPOINTMENT (OUTPATIENT)
Dept: OPHTHALMOLOGY | Facility: CLINIC | Age: 74
End: 2024-04-17

## 2024-04-17 ENCOUNTER — NON-APPOINTMENT (OUTPATIENT)
Age: 74
End: 2024-04-17

## 2024-04-23 ENCOUNTER — LABORATORY RESULT (OUTPATIENT)
Age: 74
End: 2024-04-23

## 2024-04-23 ENCOUNTER — APPOINTMENT (OUTPATIENT)
Dept: OPHTHALMOLOGY | Facility: CLINIC | Age: 74
End: 2024-04-23

## 2024-04-24 ENCOUNTER — NON-APPOINTMENT (OUTPATIENT)
Age: 74
End: 2024-04-24

## 2024-05-01 ENCOUNTER — LABORATORY RESULT (OUTPATIENT)
Age: 74
End: 2024-05-01

## 2024-05-02 ENCOUNTER — NON-APPOINTMENT (OUTPATIENT)
Age: 74
End: 2024-05-02

## 2024-05-03 ENCOUNTER — OUTPATIENT (OUTPATIENT)
Dept: OUTPATIENT SERVICES | Facility: HOSPITAL | Age: 74
LOS: 1 days | End: 2024-05-03

## 2024-05-03 ENCOUNTER — APPOINTMENT (OUTPATIENT)
Dept: INTERNAL MEDICINE | Facility: CLINIC | Age: 74
End: 2024-05-03

## 2024-05-03 VITALS
HEART RATE: 76 BPM | SYSTOLIC BLOOD PRESSURE: 179 MMHG | WEIGHT: 203 LBS | BODY MASS INDEX: 39.85 KG/M2 | DIASTOLIC BLOOD PRESSURE: 92 MMHG | OXYGEN SATURATION: 97 % | HEIGHT: 60 IN

## 2024-05-03 DIAGNOSIS — I10 ESSENTIAL (PRIMARY) HYPERTENSION: ICD-10-CM

## 2024-05-03 DIAGNOSIS — D68.59 OTHER PRIMARY THROMBOPHILIA: ICD-10-CM

## 2024-05-03 DIAGNOSIS — L40.9 PSORIASIS, UNSPECIFIED: ICD-10-CM

## 2024-05-03 DIAGNOSIS — L40.50 ARTHROPATHIC PSORIASIS, UNSPECIFIED: ICD-10-CM

## 2024-05-03 PROCEDURE — 99213 OFFICE O/P EST LOW 20 MIN: CPT | Mod: GE

## 2024-05-03 RX ORDER — DOXYCYCLINE 100 MG/1
100 TABLET, FILM COATED ORAL TWICE DAILY
Qty: 20 | Refills: 0 | Status: ACTIVE | COMMUNITY
Start: 2024-05-03 | End: 1900-01-01

## 2024-05-03 RX ORDER — ATORVASTATIN CALCIUM 20 MG/1
20 TABLET, FILM COATED ORAL
Qty: 30 | Refills: 3 | Status: DISCONTINUED | COMMUNITY
Start: 2024-04-03 | End: 2024-05-03

## 2024-05-03 RX ORDER — TRIAMCINOLONE ACETONIDE 1 MG/G
0.1 CREAM TOPICAL
Qty: 454 | Refills: 0 | Status: DISCONTINUED | COMMUNITY
Start: 2020-08-26 | End: 2024-05-03

## 2024-05-03 RX ORDER — AMLODIPINE BESYLATE 10 MG/1
10 TABLET ORAL
Qty: 90 | Refills: 1 | Status: ACTIVE | COMMUNITY
Start: 2024-02-16 | End: 1900-01-01

## 2024-05-03 NOTE — HISTORY OF PRESENT ILLNESS
[FreeTextEntry1] : Follow-up [de-identified] : Ms. Miller is a 73F with h/o refractory Psoriatic Arthritis, HTN, HLD, metabolic/non-alcoholic hepaticsteatosis (MASH), BMI >35, hypercoagulability c/b non-occlusive thrombi (IMV, SMV, Portal Vein, Retinal Vein OD; on Warfarin goal 2-3), diverticulosis c/b diverticulitis (01/2023) who presents for follow-up.   #Psoriatic Arthritis She started on Tryfema in the interim and has reported a lot of improvement in her psoriasis and she is also noting improvement in her joint mobility with PT. She has goals to walk again with a cane and driving.  #Skin Lesion She noticed a lesion on her abdomen that started as "like a pimple" and then started having drainage and got larger. She has been applying some OTC antimicrobial and keeping it clean/dry. She says it is slowly improving.

## 2024-05-07 ENCOUNTER — NON-APPOINTMENT (OUTPATIENT)
Age: 74
End: 2024-05-07

## 2024-05-07 ENCOUNTER — LABORATORY RESULT (OUTPATIENT)
Age: 74
End: 2024-05-07

## 2024-05-14 ENCOUNTER — LABORATORY RESULT (OUTPATIENT)
Age: 74
End: 2024-05-14

## 2024-05-15 ENCOUNTER — NON-APPOINTMENT (OUTPATIENT)
Age: 74
End: 2024-05-15

## 2024-05-16 ENCOUNTER — NON-APPOINTMENT (OUTPATIENT)
Age: 74
End: 2024-05-16

## 2024-05-22 ENCOUNTER — LABORATORY RESULT (OUTPATIENT)
Age: 74
End: 2024-05-22

## 2024-05-24 ENCOUNTER — NON-APPOINTMENT (OUTPATIENT)
Age: 74
End: 2024-05-24

## 2024-05-28 ENCOUNTER — NON-APPOINTMENT (OUTPATIENT)
Age: 74
End: 2024-05-28

## 2024-05-28 ENCOUNTER — APPOINTMENT (OUTPATIENT)
Dept: OPHTHALMOLOGY | Facility: CLINIC | Age: 74
End: 2024-05-28
Payer: MEDICARE

## 2024-05-28 PROCEDURE — 92012 INTRM OPH EXAM EST PATIENT: CPT

## 2024-05-29 ENCOUNTER — NON-APPOINTMENT (OUTPATIENT)
Age: 74
End: 2024-05-29

## 2024-05-29 ENCOUNTER — APPOINTMENT (OUTPATIENT)
Dept: OPHTHALMOLOGY | Facility: CLINIC | Age: 74
End: 2024-05-29
Payer: MEDICARE

## 2024-05-29 PROCEDURE — 92134 CPTRZ OPH DX IMG PST SGM RTA: CPT

## 2024-05-29 PROCEDURE — 92014 COMPRE OPH EXAM EST PT 1/>: CPT

## 2024-05-30 ENCOUNTER — LABORATORY RESULT (OUTPATIENT)
Age: 74
End: 2024-05-30

## 2024-05-31 ENCOUNTER — NON-APPOINTMENT (OUTPATIENT)
Age: 74
End: 2024-05-31

## 2024-06-03 ENCOUNTER — APPOINTMENT (OUTPATIENT)
Dept: INTERNAL MEDICINE | Facility: CLINIC | Age: 74
End: 2024-06-03

## 2024-06-03 ENCOUNTER — OUTPATIENT (OUTPATIENT)
Dept: OUTPATIENT SERVICES | Facility: HOSPITAL | Age: 74
LOS: 1 days | End: 2024-06-03

## 2024-06-03 VITALS
HEART RATE: 104 BPM | HEIGHT: 60 IN | BODY MASS INDEX: 39.85 KG/M2 | SYSTOLIC BLOOD PRESSURE: 134 MMHG | DIASTOLIC BLOOD PRESSURE: 80 MMHG | WEIGHT: 203 LBS

## 2024-06-03 DIAGNOSIS — L03.90 CELLULITIS, UNSPECIFIED: ICD-10-CM

## 2024-06-03 DIAGNOSIS — L02.91 CELLULITIS, UNSPECIFIED: ICD-10-CM

## 2024-06-04 NOTE — ASSESSMENT
[FreeTextEntry1] : Ms. Miller is a 73F with h/o refractory Psoriatic Arthritis, HTN, HLD, metabolic/non-alcoholic hepaticsteatosis (MASH), BMI >35, hypercoagulability c/b non-occlusive thrombi (IMV, SMV, Portal Vein, Retinal Vein OD; on Warfarin goal 2-3), diverticulosis c/b diverticulitis (01/2023) who presents for follow-up of skin lesion.  RTC 6-8 weeks for f/u chronic medical conditions  Case Discussed with Dr. Edin Johansen MD EM/IM PGY-3

## 2024-06-04 NOTE — HISTORY OF PRESENT ILLNESS
[Home] : at home, [unfilled] , at the time of the visit. [Medical Office: ([facility])___] : at the medical office located in  [Verbal consent obtained from patient] : the patient, [unfilled] [de-identified] : Ms. Miller is a 73F with h/o refractory Psoriatic Arthritis, HTN, HLD, metabolic/non-alcoholic hepaticsteatosis (MASH), BMI >35, hypercoagulability c/b non-occlusive thrombi (IMV, SMV, Portal Vein, Retinal Vein OD; on Warfarin goal 2-3), diverticulosis c/b diverticulitis (01/2023) who presents for follow-up of skin wound.  Last appointment was on 5/3 and she had a large boil vs infected cyst on the abdomen and she was prescribed Doxycycline which she finished. The wound has resolved, and she has no systemic symptoms. No other concerns at this time.  [FreeTextEntry1] : Follow up

## 2024-06-04 NOTE — END OF VISIT
[] : Resident [FreeTextEntry3] : The patient reports resolution of abscess and notes a small red dot on the neck. Unclear if in the setting of new Tremfya. The boil resolved with doxycycline. Patient feels great. Contingency plan of fever and chills prompting immediate medical care established.  [Time Spent: ___ minutes] : I have spent [unfilled] minutes of time on the encounter.

## 2024-06-05 ENCOUNTER — LABORATORY RESULT (OUTPATIENT)
Age: 74
End: 2024-06-05

## 2024-06-05 DIAGNOSIS — L03.90 CELLULITIS, UNSPECIFIED: ICD-10-CM

## 2024-06-06 ENCOUNTER — LABORATORY RESULT (OUTPATIENT)
Age: 74
End: 2024-06-06

## 2024-06-07 ENCOUNTER — NON-APPOINTMENT (OUTPATIENT)
Age: 74
End: 2024-06-07

## 2024-06-07 ENCOUNTER — APPOINTMENT (OUTPATIENT)
Dept: DERMATOLOGY | Facility: CLINIC | Age: 74
End: 2024-06-07
Payer: MEDICARE

## 2024-06-07 DIAGNOSIS — L30.4 ERYTHEMA INTERTRIGO: ICD-10-CM

## 2024-06-07 DIAGNOSIS — R23.8 OTHER SKIN CHANGES: ICD-10-CM

## 2024-06-07 DIAGNOSIS — L40.9 PSORIASIS, UNSPECIFIED: ICD-10-CM

## 2024-06-07 DIAGNOSIS — B07.8 OTHER VIRAL WARTS: ICD-10-CM

## 2024-06-07 DIAGNOSIS — Z79.899 OTHER LONG TERM (CURRENT) DRUG THERAPY: ICD-10-CM

## 2024-06-07 DIAGNOSIS — B07.9 VIRAL WART, UNSPECIFIED: ICD-10-CM

## 2024-06-07 PROCEDURE — 17110 DESTRUCTION B9 LES UP TO 14: CPT

## 2024-06-07 PROCEDURE — 99214 OFFICE O/P EST MOD 30 MIN: CPT | Mod: 25

## 2024-06-12 RX ORDER — NYSTATIN 100000 1/G
100000 POWDER TOPICAL
Qty: 1 | Refills: 2 | Status: ACTIVE | COMMUNITY
Start: 2022-10-31 | End: 1900-01-01

## 2024-06-13 ENCOUNTER — LABORATORY RESULT (OUTPATIENT)
Age: 74
End: 2024-06-13

## 2024-06-13 PROBLEM — L30.4 INTERTRIGO: Status: ACTIVE | Noted: 2022-04-06

## 2024-06-13 PROBLEM — L40.9 PSORIASIS: Status: ACTIVE | Noted: 2024-06-13

## 2024-06-13 PROBLEM — B07.8 VERRUCA PLANA: Status: ACTIVE | Noted: 2024-06-13

## 2024-06-13 PROBLEM — R23.8 INFLAMMATORY PAPULE: Status: ACTIVE | Noted: 2024-06-13

## 2024-06-13 PROBLEM — Z79.899 ENCOUNTER FOR LONG-TERM (CURRENT) USE OF HIGH-RISK MEDICATION: Status: ACTIVE | Noted: 2020-08-26

## 2024-06-13 PROBLEM — B07.9 VERRUCA VULGARIS: Status: ACTIVE | Noted: 2024-06-13

## 2024-06-14 ENCOUNTER — NON-APPOINTMENT (OUTPATIENT)
Age: 74
End: 2024-06-14

## 2024-06-14 LAB
ALBUMIN SERPL ELPH-MCNC: 4.3 G/DL
ALP BLD-CCNC: 127 U/L
ALT SERPL-CCNC: 19 U/L
ANION GAP SERPL CALC-SCNC: 14 MMOL/L
AST SERPL-CCNC: 24 U/L
BASOPHILS # BLD AUTO: 0.09 K/UL
BASOPHILS NFR BLD AUTO: 1.1 %
BILIRUB SERPL-MCNC: 0.4 MG/DL
BUN SERPL-MCNC: 31 MG/DL
CALCIUM SERPL-MCNC: 10.4 MG/DL
CHLORIDE SERPL-SCNC: 104 MMOL/L
CO2 SERPL-SCNC: 24 MMOL/L
CREAT SERPL-MCNC: 0.88 MG/DL
EGFR: 70 ML/MIN/1.73M2
EOSINOPHIL # BLD AUTO: 0.57 K/UL
EOSINOPHIL NFR BLD AUTO: 7 %
GLUCOSE SERPL-MCNC: 97 MG/DL
HCT VFR BLD CALC: 42.3 %
HGB BLD-MCNC: 13.1 G/DL
IMM GRANULOCYTES NFR BLD AUTO: 0.2 %
LYMPHOCYTES # BLD AUTO: 2 K/UL
LYMPHOCYTES NFR BLD AUTO: 24.7 %
MAN DIFF?: NORMAL
MCHC RBC-ENTMCNC: 28.2 PG
MCHC RBC-ENTMCNC: 31 GM/DL
MCV RBC AUTO: 91 FL
MONOCYTES # BLD AUTO: 0.56 K/UL
MONOCYTES NFR BLD AUTO: 6.9 %
NEUTROPHILS # BLD AUTO: 4.86 K/UL
NEUTROPHILS NFR BLD AUTO: 60.1 %
PLATELET # BLD AUTO: 257 K/UL
POTASSIUM SERPL-SCNC: 4.8 MMOL/L
PROT SERPL-MCNC: 7.6 G/DL
RBC # BLD: 4.65 M/UL
RBC # FLD: 14.9 %
SODIUM SERPL-SCNC: 142 MMOL/L
WBC # FLD AUTO: 8.1 K/UL

## 2024-06-20 ENCOUNTER — LABORATORY RESULT (OUTPATIENT)
Age: 74
End: 2024-06-20

## 2024-06-21 ENCOUNTER — NON-APPOINTMENT (OUTPATIENT)
Age: 74
End: 2024-06-21

## 2024-06-21 RX ORDER — WARFARIN 5 MG/1
5 TABLET ORAL
Qty: 90 | Refills: 1 | Status: ACTIVE | COMMUNITY
Start: 2023-07-06 | End: 1900-01-01

## 2024-06-27 ENCOUNTER — LABORATORY RESULT (OUTPATIENT)
Age: 74
End: 2024-06-27

## 2024-06-28 ENCOUNTER — NON-APPOINTMENT (OUTPATIENT)
Age: 74
End: 2024-06-28

## 2024-07-03 ENCOUNTER — LABORATORY RESULT (OUTPATIENT)
Age: 74
End: 2024-07-03

## 2024-07-05 ENCOUNTER — APPOINTMENT (OUTPATIENT)
Dept: DERMATOLOGY | Facility: CLINIC | Age: 74
End: 2024-07-05
Payer: MEDICARE

## 2024-07-05 DIAGNOSIS — R23.8 OTHER SKIN CHANGES: ICD-10-CM

## 2024-07-05 DIAGNOSIS — L21.9 SEBORRHEIC DERMATITIS, UNSPECIFIED: ICD-10-CM

## 2024-07-05 DIAGNOSIS — B07.9 VIRAL WART, UNSPECIFIED: ICD-10-CM

## 2024-07-05 PROCEDURE — 99213 OFFICE O/P EST LOW 20 MIN: CPT

## 2024-07-05 RX ORDER — KETOCONAZOLE 20.5 MG/ML
2 SHAMPOO, SUSPENSION TOPICAL
Qty: 1 | Refills: 3 | Status: ACTIVE | COMMUNITY
Start: 2024-07-05 | End: 1900-01-01

## 2024-07-11 ENCOUNTER — LABORATORY RESULT (OUTPATIENT)
Age: 74
End: 2024-07-11

## 2024-07-12 ENCOUNTER — NON-APPOINTMENT (OUTPATIENT)
Age: 74
End: 2024-07-12

## 2024-07-18 ENCOUNTER — LABORATORY RESULT (OUTPATIENT)
Age: 74
End: 2024-07-18

## 2024-07-19 ENCOUNTER — NON-APPOINTMENT (OUTPATIENT)
Age: 74
End: 2024-07-19

## 2024-07-25 ENCOUNTER — LABORATORY RESULT (OUTPATIENT)
Age: 74
End: 2024-07-25

## 2024-07-26 ENCOUNTER — NON-APPOINTMENT (OUTPATIENT)
Age: 74
End: 2024-07-26

## 2024-07-30 ENCOUNTER — APPOINTMENT (OUTPATIENT)
Dept: INTERNAL MEDICINE | Facility: CLINIC | Age: 74
End: 2024-07-30
Payer: MEDICARE

## 2024-07-30 ENCOUNTER — OUTPATIENT (OUTPATIENT)
Dept: OUTPATIENT SERVICES | Facility: HOSPITAL | Age: 74
LOS: 1 days | End: 2024-07-30

## 2024-07-30 VITALS
OXYGEN SATURATION: 97 % | HEIGHT: 60 IN | HEART RATE: 88 BPM | DIASTOLIC BLOOD PRESSURE: 62 MMHG | BODY MASS INDEX: 39.85 KG/M2 | WEIGHT: 203 LBS | SYSTOLIC BLOOD PRESSURE: 130 MMHG

## 2024-07-30 DIAGNOSIS — E66.9 OBESITY, UNSPECIFIED: ICD-10-CM

## 2024-07-30 DIAGNOSIS — E27.9 DISORDER OF ADRENAL GLAND, UNSPECIFIED: ICD-10-CM

## 2024-07-30 DIAGNOSIS — D68.59 OTHER PRIMARY THROMBOPHILIA: ICD-10-CM

## 2024-07-30 DIAGNOSIS — Z87.2 PERSONAL HISTORY OF DISEASES OF THE SKIN AND SUBCUTANEOUS TISSUE: ICD-10-CM

## 2024-07-30 DIAGNOSIS — I10 ESSENTIAL (PRIMARY) HYPERTENSION: ICD-10-CM

## 2024-07-30 DIAGNOSIS — L40.50 ARTHROPATHIC PSORIASIS, UNSPECIFIED: ICD-10-CM

## 2024-07-30 DIAGNOSIS — E78.5 HYPERLIPIDEMIA, UNSPECIFIED: ICD-10-CM

## 2024-07-30 DIAGNOSIS — L02.91 PERSONAL HISTORY OF DISEASES OF THE SKIN AND SUBCUTANEOUS TISSUE: ICD-10-CM

## 2024-07-30 PROCEDURE — G2211 COMPLEX E/M VISIT ADD ON: CPT

## 2024-07-30 PROCEDURE — 99214 OFFICE O/P EST MOD 30 MIN: CPT

## 2024-07-31 NOTE — HISTORY OF PRESENT ILLNESS
[de-identified] : 74 Y/O F with h/o refractory Psoriatic Arthritis, HTN, HLD, metabolic/non-alcoholic hepaticsteatosis (MASH), BMI >35, hypercoagulability c/b non-occlusive thrombi (IMV, SMV, Portal Vein, Retinal Vein OD; on Warfarin goal 2-3), diverticulosis c/b diverticulitis (01/2023), incidental adrenal nodule who presents for follow-up. She says that she's been better able to participate in PT at home ("a couple times per week"). She says that she's frustrated by the persistent enlargement of her legs bilaterally. Does not change thorughout the day, is not responsive to direct pressure.  Summary of Specialist Providers - Dermatology: Dr. Zainab Kenney ----> Plan to start Tremfya (Guselkumab): human IgG1 against IL-23 - Rheumatology: Dr. Macey Matamoros - Ophthalmology: Dr. Naomi Goldberg - Gastroenterology (Colonoscopy/Hemorrhoids): Dr. Adam Patel

## 2024-07-31 NOTE — ASSESSMENT
[FreeTextEntry1] : #HCM ::VACCINATIONS:: [x] Flu: 10/5/2023 [ ] COVID-19 [x] Tdap: 02/04/2016 (next due 2026) [ ] Zoster: recommended to get this at pharmacy [x] Pneumo: 09/11/2018 (PCV), PPSV 03/21/2005 [ ] RSV: recommended to get this at pharmacy [ ] Others: ::AGE-APPROPRIATE SCREENINGS:: - Disease - [x] HIV: NR 02/16/2024 [x] Syphilis: NR 02/16/2024 [x] HCV: NR 12/6/2023 [ ] HBV: HBV non-immune, recommended repeat series, patient wants to think about it [ ] DEXA: re-ordered - Cancer - [x] CRC: Colonoscopy/FIT [ ] Breast Ca: Mammogram no record in system, can check next time [x] Lung Ca: never smoker  Juan Carlos Mi MD EM/IM PGY4 Case D/W Dr. Cabrera  RTC 3 months for RPA - F/U DEXA, F/U HBV Series, F/U Leg Swelling  RTC 6 months for RPA

## 2024-07-31 NOTE — HISTORY OF PRESENT ILLNESS
[de-identified] : 72 Y/O F with h/o refractory Psoriatic Arthritis, HTN, HLD, metabolic/non-alcoholic hepaticsteatosis (MASH), BMI >35, hypercoagulability c/b non-occlusive thrombi (IMV, SMV, Portal Vein, Retinal Vein OD; on Warfarin goal 2-3), diverticulosis c/b diverticulitis (01/2023), incidental adrenal nodule who presents for follow-up. She says that she's been better able to participate in PT at home ("a couple times per week"). She says that she's frustrated by the persistent enlargement of her legs bilaterally. Does not change thorughout the day, is not responsive to direct pressure.  Summary of Specialist Providers - Dermatology: Dr. Zainab Kenney ----> Plan to start Tremfya (Guselkumab): human IgG1 against IL-23 - Rheumatology: Dr. Macey Matamoros - Ophthalmology: Dr. Naomi Goldberg - Gastroenterology (Colonoscopy/Hemorrhoids): Dr. Adam Patel

## 2024-08-01 ENCOUNTER — LABORATORY RESULT (OUTPATIENT)
Age: 74
End: 2024-08-01

## 2024-08-02 ENCOUNTER — NON-APPOINTMENT (OUTPATIENT)
Age: 74
End: 2024-08-02

## 2024-08-02 DIAGNOSIS — L40.50 ARTHROPATHIC PSORIASIS, UNSPECIFIED: ICD-10-CM

## 2024-08-02 DIAGNOSIS — E66.9 OBESITY, UNSPECIFIED: ICD-10-CM

## 2024-08-02 DIAGNOSIS — I10 ESSENTIAL (PRIMARY) HYPERTENSION: ICD-10-CM

## 2024-08-02 DIAGNOSIS — E78.5 HYPERLIPIDEMIA, UNSPECIFIED: ICD-10-CM

## 2024-08-02 DIAGNOSIS — D68.59 OTHER PRIMARY THROMBOPHILIA: ICD-10-CM

## 2024-08-02 DIAGNOSIS — E27.9 DISORDER OF ADRENAL GLAND, UNSPECIFIED: ICD-10-CM

## 2024-08-08 ENCOUNTER — LABORATORY RESULT (OUTPATIENT)
Age: 74
End: 2024-08-08

## 2024-08-09 ENCOUNTER — NON-APPOINTMENT (OUTPATIENT)
Age: 74
End: 2024-08-09

## 2024-08-12 ENCOUNTER — NON-APPOINTMENT (OUTPATIENT)
Age: 74
End: 2024-08-12

## 2024-08-15 ENCOUNTER — NON-APPOINTMENT (OUTPATIENT)
Age: 74
End: 2024-08-15

## 2024-08-15 ENCOUNTER — LABORATORY RESULT (OUTPATIENT)
Age: 74
End: 2024-08-15

## 2024-08-19 ENCOUNTER — NON-APPOINTMENT (OUTPATIENT)
Age: 74
End: 2024-08-19

## 2024-08-19 ENCOUNTER — APPOINTMENT (OUTPATIENT)
Dept: OPHTHALMOLOGY | Facility: CLINIC | Age: 74
End: 2024-08-19
Payer: MEDICARE

## 2024-08-19 PROCEDURE — 92134 CPTRZ OPH DX IMG PST SGM RTA: CPT

## 2024-08-19 PROCEDURE — 92012 INTRM OPH EXAM EST PATIENT: CPT

## 2024-08-22 ENCOUNTER — LABORATORY RESULT (OUTPATIENT)
Age: 74
End: 2024-08-22

## 2024-08-29 ENCOUNTER — LABORATORY RESULT (OUTPATIENT)
Age: 74
End: 2024-08-29

## 2024-08-30 ENCOUNTER — NON-APPOINTMENT (OUTPATIENT)
Age: 74
End: 2024-08-30

## 2024-09-05 ENCOUNTER — LABORATORY RESULT (OUTPATIENT)
Age: 74
End: 2024-09-05

## 2024-09-12 ENCOUNTER — LABORATORY RESULT (OUTPATIENT)
Age: 74
End: 2024-09-12

## 2024-09-13 ENCOUNTER — NON-APPOINTMENT (OUTPATIENT)
Age: 74
End: 2024-09-13

## 2024-09-19 ENCOUNTER — LABORATORY RESULT (OUTPATIENT)
Age: 74
End: 2024-09-19

## 2024-09-20 ENCOUNTER — NON-APPOINTMENT (OUTPATIENT)
Age: 74
End: 2024-09-20

## 2024-09-26 ENCOUNTER — LABORATORY RESULT (OUTPATIENT)
Age: 74
End: 2024-09-26

## 2024-09-27 ENCOUNTER — NON-APPOINTMENT (OUTPATIENT)
Age: 74
End: 2024-09-27

## 2024-09-27 ENCOUNTER — APPOINTMENT (OUTPATIENT)
Dept: DERMATOLOGY | Facility: CLINIC | Age: 74
End: 2024-09-27
Payer: MEDICARE

## 2024-09-27 DIAGNOSIS — M72.0 PALMAR FASCIAL FIBROMATOSIS [DUPUYTREN]: ICD-10-CM

## 2024-09-27 DIAGNOSIS — L30.4 ERYTHEMA INTERTRIGO: ICD-10-CM

## 2024-09-27 DIAGNOSIS — L73.8 OTHER SPECIFIED FOLLICULAR DISORDERS: ICD-10-CM

## 2024-09-27 DIAGNOSIS — D48.5 NEOPLASM OF UNCERTAIN BEHAVIOR OF SKIN: ICD-10-CM

## 2024-09-27 DIAGNOSIS — L40.9 PSORIASIS, UNSPECIFIED: ICD-10-CM

## 2024-09-27 DIAGNOSIS — Z51.81 ENCOUNTER FOR THERAPEUTIC DRUG LVL MONITORING: ICD-10-CM

## 2024-09-27 PROCEDURE — 99214 OFFICE O/P EST MOD 30 MIN: CPT

## 2024-09-27 RX ORDER — KETOCONAZOLE 20 MG/G
2 CREAM TOPICAL TWICE DAILY
Qty: 2 | Refills: 6 | Status: ACTIVE | COMMUNITY
Start: 2024-09-27 | End: 1900-01-01

## 2024-10-02 PROBLEM — D48.5 NEOPLASM OF UNCERTAIN BEHAVIOR OF SKIN OF UPPER EXTREMITY: Status: ACTIVE | Noted: 2024-10-02

## 2024-10-02 PROBLEM — L73.8 SEBACEOUS HYPERPLASIA: Status: ACTIVE | Noted: 2024-10-02

## 2024-10-02 PROBLEM — M72.0 DUPUYTREN DISEASE: Status: ACTIVE | Noted: 2024-10-02

## 2024-10-02 LAB
ALBUMIN SERPL ELPH-MCNC: 4.4 G/DL
ALP BLD-CCNC: 130 U/L
ALT SERPL-CCNC: 14 U/L
ANION GAP SERPL CALC-SCNC: 16 MMOL/L
AST SERPL-CCNC: 23 U/L
BASOPHILS # BLD AUTO: 0.09 K/UL
BASOPHILS NFR BLD AUTO: 1 %
BILIRUB SERPL-MCNC: 0.4 MG/DL
BUN SERPL-MCNC: 30 MG/DL
CALCIUM SERPL-MCNC: 10 MG/DL
CHLORIDE SERPL-SCNC: 102 MMOL/L
CO2 SERPL-SCNC: 22 MMOL/L
CREAT SERPL-MCNC: 0.93 MG/DL
EGFR: 65 ML/MIN/1.73M2
EOSINOPHIL # BLD AUTO: 0.43 K/UL
EOSINOPHIL NFR BLD AUTO: 4.6 %
GLUCOSE SERPL-MCNC: 98 MG/DL
HCT VFR BLD CALC: 44.3 %
HGB BLD-MCNC: 13.7 G/DL
IMM GRANULOCYTES NFR BLD AUTO: 0.2 %
LYMPHOCYTES # BLD AUTO: 2.12 K/UL
LYMPHOCYTES NFR BLD AUTO: 22.9 %
MAN DIFF?: NORMAL
MCHC RBC-ENTMCNC: 28.8 PG
MCHC RBC-ENTMCNC: 30.9 GM/DL
MCV RBC AUTO: 93.3 FL
MONOCYTES # BLD AUTO: 0.68 K/UL
MONOCYTES NFR BLD AUTO: 7.3 %
NEUTROPHILS # BLD AUTO: 5.92 K/UL
NEUTROPHILS NFR BLD AUTO: 64 %
PLATELET # BLD AUTO: 292 K/UL
POTASSIUM SERPL-SCNC: 5.1 MMOL/L
PROT SERPL-MCNC: 7.8 G/DL
RBC # BLD: 4.75 M/UL
RBC # FLD: 14.6 %
SODIUM SERPL-SCNC: 141 MMOL/L
WBC # FLD AUTO: 9.26 K/UL

## 2024-10-02 NOTE — PHYSICAL EXAM
[Alert] : alert [Oriented x 3] : ~L oriented x 3 [Well Nourished] : well nourished [Conjunctiva Non-injected] : conjunctiva non-injected [No Visual Lymphadenopathy] : no visual  lymphadenopathy [No Clubbing] : no clubbing [No Edema] : no edema [No Bromhidrosis] : no bromhidrosis [No Chromhidrosis] : no chromhidrosis [Declined] : declined [FreeTextEntry3] : The relevant portions of the exam were performed today AAOx3, NAD, well-appearing / pleasant Examination within normal limits with the exception of: inframammary skin with erythematous patches  otherwise psoriatic lesions clear yellow papules on face with central umbilication - right palm under the right 4th finger subtle fibrosis - Left dorsal index finger - subcutaneous moveable 3mm papule, no epidermal changes

## 2024-10-02 NOTE — ASSESSMENT
[FreeTextEntry1] : #Psoriasis with #Psoriatic arthritis - chronic, skin disease well controlled, occasional joint aches, unsure if PsA or OA, patient to follow up with Rheumatology  We have discussed the nature and course of this condition. I have discussed the goals of therapy with the patient. We have discussed multiple therapeutic options, their risk and benefits and expectations from treatment. 0% BSA - failing Otezla, topicals, previously failed NBUVB - continue Tremfya [id number] - Clobetasol 0.05% ointment BID PRN 2 weeks on 1 week off, SED such as skin thinning, telangiectasias, hypopigmentation; instructions discussed, pt verbalizes understanding  High risk med monitoring  - indeterminate quant TB then repeat negative Dec 2023 - hep B/C negative Dec 2023 - due for CBC, CMP, orders placed  Intertrigo - under breasts - chronic flaring  We have discussed the nature and course of this condition. I have discussed the goals of therapy with the patient. We have discussed multiple therapeutic options, their risk and benefits and expectations from treatment. - refill nystatin powder  Sebaceous hyperplasia - face  - reassurance provided regarding these lesions  Hardening of the right palm under the 4th finger - possible dupuytren disease - recommend eval by hand surgery for medical management or other intervention  - patient declines  Left dorsal index finger - subcutaneous moveable 3mm papule, no epidermal changes  - stable in size and present for years per patient  - unsure of etiology would require excisional biopsy (ddx cyst, thrombosed vein, adnexal neoplasm vs other) - recommend hand surgery eval for biopsy given proximity to  joint space, however patient declines any intervention at the present time and will continue to monitor   FOLLOW UP: 3 months, sooner prn for any new, changing or bleeding lesions.

## 2024-10-02 NOTE — HISTORY OF PRESENT ILLNESS
[FreeTextEntry1] : f/u pt psoriasis [de-identified] : SABINO TOMLINSON is a 73 year old obese F hx of HTN, fatty liver here for FUV psoriasis. Patient is doing very well on Tremfya, she continues to remain clear. Complains of persistent rash under the breast, flaring but responsive to nystatin, would like a refill. Joint aches intermittently, not sure if it is osteoarthritis or PsA. Had a verruca treated at the last visit which had resolved. Is asking about several bumps on the face. She also noticed hardening of the skin on the Right palm under the right 4th finger, and also a bump under the skin on the Left index finger. No other concerns.   No personal or FH of skin cancer. Social hx: has daughter;

## 2024-10-03 ENCOUNTER — LABORATORY RESULT (OUTPATIENT)
Age: 74
End: 2024-10-03

## 2024-10-04 ENCOUNTER — NON-APPOINTMENT (OUTPATIENT)
Age: 74
End: 2024-10-04

## 2024-10-04 RX ORDER — GUSELKUMAB 100 MG/ML
100 INJECTION SUBCUTANEOUS
Qty: 1 | Refills: 3 | Status: ACTIVE | COMMUNITY
Start: 2024-10-04 | End: 1900-01-01

## 2024-10-04 NOTE — ED ADULT NURSE NOTE - TEMPLATE
CARPAL AND ULNAR NERVE RELEASE POST-OPERATIVE INSTRUCTIONS    Instructions      Have someone drive you home after surgery and help you at home for 1-2      days.     Get plenty of rest.     Follow balanced diet.     Decreased activity may promote constipation, so you may want to add      more raw fruit to your diet, and be sure to increase fluid intake.     Take pain medication as prescribed. Do not take aspirin or any products      containing aspirin.     Do not drink alcohol when taking pain medications.     Even when not taking pain medications, no alcohol for 3 weeks as it      causes fluid retention.     If you are taking vitamins with iron, resume these as tolerated.     Do not smoke, as smoking delays healing and increases the risk of      complications.    Activities     Do not drive until you are no longer taking any pain medications      (narcotics).     Start walking as soon as possible, this helps to reduce swelling and       lowers the chance of blood clots.     Resume normal activities gradually.     Return to work in 1-2 days, depending upon extent of surgery     No strenuous work or stress on wound for 2-3 weeks.    Incision Care     Keep ACE wrap on for 3 days then discontinue. Keep dry until then with plastic bag. After 3 days may get wet. Every hour raise your hand above your head and pump your fist 10 times. Rewrap the ACE if it gets loose or too tight.      Avoid exposing scars to sun for at least 12 months.     Always use a strong sunblock, if sun exposure is unavoidable (SPF 50 or      greater).     Inspect daily for signs of infection.     No tub soaking, bathing, or swimming while sutures or drains are in place.     Keep area clean and dry for first 24 hours.     What to Expect     Some swelling and bruising.     May have slight bleeding from incision.  Apply 4 x 4 gauze with slight pressure to       control bleeding.     Skin grafts and flaps may take several weeks or months to heal; a  support garment or      bandage may be necessary for up to a year.    Appearance     Final results of surgery may take a year or more.    Follow-Up Care     If external sutures have been used, they will be removed in 5-14 days.    Please note my office will call you 1-2 business days after your procedure to check up on how you're doing and to schedule your post-operative appointment.        When to Call     If you have increased swelling or bruising.     If swelling and redness persist after a few days.     If you have increased redness along the incision.     If you have severe or increased pain not relieved by medication.     If you have any side effects to medications; such as, rash, nausea,      headache, vomiting.     If you have an oral temperature over 100.4 degrees.     If you have any yellowish or greenish drainage from the incisions or      notice a foul odor.     If you have bleeding from the incisions that is difficult to control with      light pressure.     If you have loss of feeling or motion.    For Medical Questions, Please Call:     [phone], Monday - Friday, 8 a.m. - 4:30 p.m.     After hours and on weekends, call Hospital Paging at [phone] and      ask for the Plastic Surgeon on call.   Abdominal Pain, N/V/D

## 2024-10-08 ENCOUNTER — APPOINTMENT (OUTPATIENT)
Dept: INTERNAL MEDICINE | Facility: CLINIC | Age: 74
End: 2024-10-08

## 2024-10-10 ENCOUNTER — LABORATORY RESULT (OUTPATIENT)
Age: 74
End: 2024-10-10

## 2024-10-17 ENCOUNTER — LABORATORY RESULT (OUTPATIENT)
Age: 74
End: 2024-10-17

## 2024-10-24 ENCOUNTER — LABORATORY RESULT (OUTPATIENT)
Age: 74
End: 2024-10-24

## 2024-10-25 ENCOUNTER — NON-APPOINTMENT (OUTPATIENT)
Age: 74
End: 2024-10-25

## 2024-10-30 ENCOUNTER — NON-APPOINTMENT (OUTPATIENT)
Age: 74
End: 2024-10-30

## 2024-10-30 ENCOUNTER — APPOINTMENT (OUTPATIENT)
Dept: MEDICATION MANAGEMENT | Facility: CLINIC | Age: 74
End: 2024-10-30

## 2024-10-30 ENCOUNTER — OUTPATIENT (OUTPATIENT)
Dept: OUTPATIENT SERVICES | Facility: HOSPITAL | Age: 74
LOS: 1 days | End: 2024-10-30
Payer: MEDICARE

## 2024-10-30 DIAGNOSIS — Z79.01 LONG TERM (CURRENT) USE OF ANTICOAGULANTS: ICD-10-CM

## 2024-10-30 DIAGNOSIS — I82.90 ACUTE EMBOLISM AND THROMBOSIS OF UNSPECIFIED VEIN: ICD-10-CM

## 2024-10-30 LAB
INR PPP: 2.52
PT BLD: 29.5

## 2024-10-30 PROCEDURE — G0463: CPT

## 2024-10-31 ENCOUNTER — LABORATORY RESULT (OUTPATIENT)
Age: 74
End: 2024-10-31

## 2024-10-31 DIAGNOSIS — I82.90 ACUTE EMBOLISM AND THROMBOSIS OF UNSPECIFIED VEIN: ICD-10-CM

## 2024-10-31 DIAGNOSIS — Z79.01 LONG TERM (CURRENT) USE OF ANTICOAGULANTS: ICD-10-CM

## 2024-11-06 ENCOUNTER — OUTPATIENT (OUTPATIENT)
Dept: OUTPATIENT SERVICES | Facility: HOSPITAL | Age: 74
LOS: 1 days | End: 2024-11-06
Payer: MEDICARE

## 2024-11-06 ENCOUNTER — APPOINTMENT (OUTPATIENT)
Dept: MEDICATION MANAGEMENT | Facility: CLINIC | Age: 74
End: 2024-11-06

## 2024-11-06 DIAGNOSIS — I82.90 ACUTE EMBOLISM AND THROMBOSIS OF UNSPECIFIED VEIN: ICD-10-CM

## 2024-11-06 DIAGNOSIS — Z79.01 LONG TERM (CURRENT) USE OF ANTICOAGULANTS: ICD-10-CM

## 2024-11-06 LAB
INR PPP: 2.38
PT BLD: 27.8

## 2024-11-06 PROCEDURE — G0463: CPT

## 2024-11-07 DIAGNOSIS — Z79.01 LONG TERM (CURRENT) USE OF ANTICOAGULANTS: ICD-10-CM

## 2024-11-07 DIAGNOSIS — I82.90 ACUTE EMBOLISM AND THROMBOSIS OF UNSPECIFIED VEIN: ICD-10-CM

## 2024-11-07 NOTE — ED ADULT NURSE NOTE - NS ED NOTE ABUSE RESPONSE YN
11/7/2024      Sofia Hdz  [id number] [facility] [id number]      Dear Colleague,    Thank you for referring your patient, Sofia Hdz, to the [facility]. Please see a copy of my visit note below.    Sofia Hdz is a 34 year old female who presents for:  Chief Complaint   Patient presents with     Follow Up     Return Neuro / Kaushal tumor            Initial Vitals:  /79 (BP Location: Right arm, Patient Position: Sitting, Cuff Size: Adult Regular)   Pulse 92   SpO2 97%  There is no height or weight on file to calculate BMI.. There is no height or weight on file to calculate BSA. 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  Was discovered on MRI Cervical for evaluation of neck pain.  Patient describes a couple months of other symptoms, including right eyelid twitching, some non-specific right eye vision change, and dull burning pain in the right forehead and maxilla, with throbbing frontal and occipital headaches as well.  She notes that she used to have very irregular menstrual cycles prior to starting birth control a few years ago.  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    11/7/24:  Returns for follow up.  Continued headaches and vision changes.  She saw Neuro-Optho, who felt that she might have left eye temporal field loss, unclear if it was related to the tumor.  She did a month of cabergoline, and her prolactin has dropped to 4, but she could not continue due to severe side effects.       No past medical history on file.  Past Surgical History:   Procedure Laterality Date     IR LUMBAR PUNCTURE  2/13/2023         Physical Exam  /79 (BP Location: Right arm, Patient Position: Sitting, Cuff Size: Adult  Regular)   Pulse 92   SpO2 97%     Mental status:  Alert and Oriented x 3, speech is fluent.  HEENT:  PERRL.  EOM s intact.  Visual fields full to gross exam  Cranial nerves:  II-XII intact.   Motor:    Shoulder Abduction:  Right:  5/5   Left:  5/5  Biceps:                      Right:  5/5   Left:  5/5  Triceps:                     Right:  5/5   Left:  5/5  Interosseus :             Right:  5/5   Left:  5/5  Hip Flexion:               Right: 5/5  Left:  5/5  Quadriceps:              Right:  5/5  Left:  5/5  Hamstrings:              Right:  5/5  Left:  5/5  Gastroc Soleus:        Right:  5/5  Left:  5/5  Tib/Ant:                      Right:  5/5  Left:  5/5  EHL:                          Right:  5/5  Left:  5/5  Sensation:  Intact  Reflexes:  Negative Zamarripa's.  Smooth tandem walking.      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        Sincerely,        Oscar Ybarra MD   Yes

## 2024-11-14 ENCOUNTER — OUTPATIENT (OUTPATIENT)
Dept: OUTPATIENT SERVICES | Facility: HOSPITAL | Age: 74
LOS: 1 days | End: 2024-11-14
Payer: MEDICARE

## 2024-11-14 ENCOUNTER — APPOINTMENT (OUTPATIENT)
Dept: MEDICATION MANAGEMENT | Facility: CLINIC | Age: 74
End: 2024-11-14

## 2024-11-14 DIAGNOSIS — I82.90 ACUTE EMBOLISM AND THROMBOSIS OF UNSPECIFIED VEIN: ICD-10-CM

## 2024-11-14 DIAGNOSIS — Z79.01 LONG TERM (CURRENT) USE OF ANTICOAGULANTS: ICD-10-CM

## 2024-11-14 LAB
INR PPP: 2
PT BLD: 23.4

## 2024-11-14 PROCEDURE — G0463: CPT

## 2024-11-15 DIAGNOSIS — Z79.01 LONG TERM (CURRENT) USE OF ANTICOAGULANTS: ICD-10-CM

## 2024-11-15 DIAGNOSIS — I82.90 ACUTE EMBOLISM AND THROMBOSIS OF UNSPECIFIED VEIN: ICD-10-CM

## 2024-11-20 ENCOUNTER — OUTPATIENT (OUTPATIENT)
Dept: OUTPATIENT SERVICES | Facility: HOSPITAL | Age: 74
LOS: 1 days | End: 2024-11-20
Payer: MEDICARE

## 2024-11-20 ENCOUNTER — APPOINTMENT (OUTPATIENT)
Dept: MEDICATION MANAGEMENT | Facility: CLINIC | Age: 74
End: 2024-11-20

## 2024-11-20 DIAGNOSIS — Z79.01 LONG TERM (CURRENT) USE OF ANTICOAGULANTS: ICD-10-CM

## 2024-11-20 DIAGNOSIS — I82.90 ACUTE EMBOLISM AND THROMBOSIS OF UNSPECIFIED VEIN: ICD-10-CM

## 2024-11-20 LAB
INR PPP: 1.63
PT BLD: 19.3

## 2024-11-20 PROCEDURE — G0463: CPT

## 2024-11-21 DIAGNOSIS — Z79.01 LONG TERM (CURRENT) USE OF ANTICOAGULANTS: ICD-10-CM

## 2024-11-21 DIAGNOSIS — I82.90 ACUTE EMBOLISM AND THROMBOSIS OF UNSPECIFIED VEIN: ICD-10-CM

## 2024-11-26 ENCOUNTER — APPOINTMENT (OUTPATIENT)
Dept: OPHTHALMOLOGY | Facility: CLINIC | Age: 74
End: 2024-11-26
Payer: MEDICARE

## 2024-11-26 ENCOUNTER — NON-APPOINTMENT (OUTPATIENT)
Age: 74
End: 2024-11-26

## 2024-11-26 PROCEDURE — 92012 INTRM OPH EXAM EST PATIENT: CPT

## 2024-11-27 ENCOUNTER — OUTPATIENT (OUTPATIENT)
Dept: OUTPATIENT SERVICES | Facility: HOSPITAL | Age: 74
LOS: 1 days | End: 2024-11-27
Payer: MEDICARE

## 2024-11-27 ENCOUNTER — APPOINTMENT (OUTPATIENT)
Dept: MEDICATION MANAGEMENT | Facility: CLINIC | Age: 74
End: 2024-11-27

## 2024-11-27 DIAGNOSIS — Z79.01 LONG TERM (CURRENT) USE OF ANTICOAGULANTS: ICD-10-CM

## 2024-11-27 DIAGNOSIS — I82.90 ACUTE EMBOLISM AND THROMBOSIS OF UNSPECIFIED VEIN: ICD-10-CM

## 2024-11-27 LAB
INR PPP: 2.07
PT BLD: 24.3

## 2024-11-27 PROCEDURE — G0463: CPT

## 2024-11-28 DIAGNOSIS — I82.90 ACUTE EMBOLISM AND THROMBOSIS OF UNSPECIFIED VEIN: ICD-10-CM

## 2024-11-28 DIAGNOSIS — Z79.01 LONG TERM (CURRENT) USE OF ANTICOAGULANTS: ICD-10-CM

## 2024-12-03 ENCOUNTER — OUTPATIENT (OUTPATIENT)
Dept: OUTPATIENT SERVICES | Facility: HOSPITAL | Age: 74
LOS: 1 days | End: 2024-12-03
Payer: MEDICARE

## 2024-12-03 ENCOUNTER — APPOINTMENT (OUTPATIENT)
Dept: MEDICATION MANAGEMENT | Facility: CLINIC | Age: 74
End: 2024-12-03

## 2024-12-03 ENCOUNTER — APPOINTMENT (OUTPATIENT)
Dept: DERMATOLOGY | Facility: CLINIC | Age: 74
End: 2024-12-03

## 2024-12-03 DIAGNOSIS — Z79.01 LONG TERM (CURRENT) USE OF ANTICOAGULANTS: ICD-10-CM

## 2024-12-03 DIAGNOSIS — I82.90 ACUTE EMBOLISM AND THROMBOSIS OF UNSPECIFIED VEIN: ICD-10-CM

## 2024-12-03 LAB
INR PPP: 4.19
PT BLD: 48.7

## 2024-12-03 PROCEDURE — G0463: CPT

## 2024-12-04 DIAGNOSIS — Z79.01 LONG TERM (CURRENT) USE OF ANTICOAGULANTS: ICD-10-CM

## 2024-12-04 DIAGNOSIS — I82.90 ACUTE EMBOLISM AND THROMBOSIS OF UNSPECIFIED VEIN: ICD-10-CM

## 2024-12-09 ENCOUNTER — NON-APPOINTMENT (OUTPATIENT)
Age: 74
End: 2024-12-09

## 2024-12-09 ENCOUNTER — APPOINTMENT (OUTPATIENT)
Dept: OPHTHALMOLOGY | Facility: CLINIC | Age: 74
End: 2024-12-09
Payer: MEDICARE

## 2024-12-09 PROCEDURE — 92012 INTRM OPH EXAM EST PATIENT: CPT

## 2024-12-09 PROCEDURE — 92250 FUNDUS PHOTOGRAPHY W/I&R: CPT

## 2024-12-11 ENCOUNTER — APPOINTMENT (OUTPATIENT)
Dept: MEDICATION MANAGEMENT | Facility: CLINIC | Age: 74
End: 2024-12-11

## 2024-12-11 ENCOUNTER — OUTPATIENT (OUTPATIENT)
Dept: OUTPATIENT SERVICES | Facility: HOSPITAL | Age: 74
LOS: 1 days | End: 2024-12-11
Payer: MEDICARE

## 2024-12-11 DIAGNOSIS — I82.90 ACUTE EMBOLISM AND THROMBOSIS OF UNSPECIFIED VEIN: ICD-10-CM

## 2024-12-11 DIAGNOSIS — Z79.01 LONG TERM (CURRENT) USE OF ANTICOAGULANTS: ICD-10-CM

## 2024-12-11 LAB
INR PPP: 2.83
PT BLD: 33.1

## 2024-12-11 PROCEDURE — G0463: CPT

## 2024-12-12 DIAGNOSIS — I82.90 ACUTE EMBOLISM AND THROMBOSIS OF UNSPECIFIED VEIN: ICD-10-CM

## 2024-12-12 DIAGNOSIS — Z79.01 LONG TERM (CURRENT) USE OF ANTICOAGULANTS: ICD-10-CM

## 2024-12-17 ENCOUNTER — APPOINTMENT (OUTPATIENT)
Dept: INTERNAL MEDICINE | Facility: CLINIC | Age: 74
End: 2024-12-17

## 2024-12-18 ENCOUNTER — OUTPATIENT (OUTPATIENT)
Dept: OUTPATIENT SERVICES | Facility: HOSPITAL | Age: 74
LOS: 1 days | End: 2024-12-18
Payer: MEDICARE

## 2024-12-18 ENCOUNTER — APPOINTMENT (OUTPATIENT)
Dept: MEDICATION MANAGEMENT | Facility: CLINIC | Age: 74
End: 2024-12-18

## 2024-12-18 DIAGNOSIS — I82.90 ACUTE EMBOLISM AND THROMBOSIS OF UNSPECIFIED VEIN: ICD-10-CM

## 2024-12-18 DIAGNOSIS — Z79.01 LONG TERM (CURRENT) USE OF ANTICOAGULANTS: ICD-10-CM

## 2024-12-18 LAB
INR PPP: 2.51
PT BLD: 29.4

## 2024-12-18 PROCEDURE — G0463: CPT

## 2024-12-19 DIAGNOSIS — Z79.01 LONG TERM (CURRENT) USE OF ANTICOAGULANTS: ICD-10-CM

## 2024-12-19 DIAGNOSIS — I82.90 ACUTE EMBOLISM AND THROMBOSIS OF UNSPECIFIED VEIN: ICD-10-CM

## 2024-12-20 ENCOUNTER — APPOINTMENT (OUTPATIENT)
Dept: RADIOLOGY | Facility: IMAGING CENTER | Age: 74
End: 2024-12-20

## 2024-12-26 ENCOUNTER — APPOINTMENT (OUTPATIENT)
Dept: MEDICATION MANAGEMENT | Facility: CLINIC | Age: 74
End: 2024-12-26

## 2024-12-26 ENCOUNTER — OUTPATIENT (OUTPATIENT)
Dept: OUTPATIENT SERVICES | Facility: HOSPITAL | Age: 74
LOS: 1 days | End: 2024-12-26
Payer: MEDICARE

## 2024-12-26 DIAGNOSIS — I82.90 ACUTE EMBOLISM AND THROMBOSIS OF UNSPECIFIED VEIN: ICD-10-CM

## 2024-12-26 DIAGNOSIS — Z79.01 LONG TERM (CURRENT) USE OF ANTICOAGULANTS: ICD-10-CM

## 2024-12-26 LAB
INR PPP: 1.69
PT BLD: 19.8

## 2024-12-26 PROCEDURE — G0463: CPT

## 2024-12-27 DIAGNOSIS — I82.90 ACUTE EMBOLISM AND THROMBOSIS OF UNSPECIFIED VEIN: ICD-10-CM

## 2024-12-27 DIAGNOSIS — Z79.01 LONG TERM (CURRENT) USE OF ANTICOAGULANTS: ICD-10-CM

## 2025-01-02 ENCOUNTER — OUTPATIENT (OUTPATIENT)
Dept: OUTPATIENT SERVICES | Facility: HOSPITAL | Age: 75
LOS: 1 days | End: 2025-01-02
Payer: MEDICARE

## 2025-01-02 ENCOUNTER — APPOINTMENT (OUTPATIENT)
Dept: MEDICATION MANAGEMENT | Facility: CLINIC | Age: 75
End: 2025-01-02

## 2025-01-02 DIAGNOSIS — Z79.01 LONG TERM (CURRENT) USE OF ANTICOAGULANTS: ICD-10-CM

## 2025-01-02 DIAGNOSIS — I82.90 ACUTE EMBOLISM AND THROMBOSIS OF UNSPECIFIED VEIN: ICD-10-CM

## 2025-01-02 LAB
INR PPP: 2.89
PT BLD: 34

## 2025-01-02 PROCEDURE — 98012 SYNCH AUDIO-ONLY EST SF 10: CPT

## 2025-01-03 DIAGNOSIS — I82.90 ACUTE EMBOLISM AND THROMBOSIS OF UNSPECIFIED VEIN: ICD-10-CM

## 2025-01-03 DIAGNOSIS — Z79.01 LONG TERM (CURRENT) USE OF ANTICOAGULANTS: ICD-10-CM

## 2025-01-07 ENCOUNTER — LABORATORY RESULT (OUTPATIENT)
Age: 75
End: 2025-01-07

## 2025-01-07 ENCOUNTER — APPOINTMENT (OUTPATIENT)
Dept: INTERNAL MEDICINE | Facility: CLINIC | Age: 75
End: 2025-01-07

## 2025-01-07 VITALS
HEART RATE: 101 BPM | RESPIRATION RATE: 16 BRPM | WEIGHT: 280 LBS | OXYGEN SATURATION: 97 % | SYSTOLIC BLOOD PRESSURE: 117 MMHG | TEMPERATURE: 97.4 F | BODY MASS INDEX: 54.97 KG/M2 | DIASTOLIC BLOOD PRESSURE: 73 MMHG | HEIGHT: 60 IN

## 2025-01-07 DIAGNOSIS — Z82.61 FAMILY HISTORY OF ARTHRITIS: ICD-10-CM

## 2025-01-07 DIAGNOSIS — Z00.00 ENCOUNTER FOR GENERAL ADULT MEDICAL EXAMINATION W/OUT ABNORMAL FINDINGS: ICD-10-CM

## 2025-01-07 DIAGNOSIS — D68.59 OTHER PRIMARY THROMBOPHILIA: ICD-10-CM

## 2025-01-07 DIAGNOSIS — R26.81 UNSTEADINESS ON FEET: ICD-10-CM

## 2025-01-07 DIAGNOSIS — R26.2 DIFFICULTY IN WALKING, NOT ELSEWHERE CLASSIFIED: ICD-10-CM

## 2025-01-07 DIAGNOSIS — L40.50 ARTHROPATHIC PSORIASIS, UNSPECIFIED: ICD-10-CM

## 2025-01-07 PROCEDURE — G0444 DEPRESSION SCREEN ANNUAL: CPT | Mod: 59

## 2025-01-07 PROCEDURE — G0439: CPT

## 2025-01-07 PROCEDURE — 99203 OFFICE O/P NEW LOW 30 MIN: CPT | Mod: 25

## 2025-01-08 LAB
25(OH)D3 SERPL-MCNC: 33.6 NG/ML
ALBUMIN SERPL ELPH-MCNC: 4.4 G/DL
ALP BLD-CCNC: 92 U/L
ALT SERPL-CCNC: 19 U/L
ANION GAP SERPL CALC-SCNC: 17 MMOL/L
APPEARANCE: CLEAR
AST SERPL-CCNC: 25 U/L
BASOPHILS # BLD AUTO: 0.1 K/UL
BASOPHILS NFR BLD AUTO: 1.1 %
BILIRUB SERPL-MCNC: 0.5 MG/DL
BILIRUBIN URINE: NEGATIVE
BLOOD URINE: NEGATIVE
BUN SERPL-MCNC: 38 MG/DL
CALCIUM SERPL-MCNC: 10.5 MG/DL
CHLORIDE SERPL-SCNC: 102 MMOL/L
CHOLEST SERPL-MCNC: 128 MG/DL
CO2 SERPL-SCNC: 20 MMOL/L
COLOR: YELLOW
CREAT SERPL-MCNC: 0.97 MG/DL
EGFR: 62 ML/MIN/1.73M2
EOSINOPHIL # BLD AUTO: 0.37 K/UL
EOSINOPHIL NFR BLD AUTO: 4.2 %
ESTIMATED AVERAGE GLUCOSE: 120 MG/DL
GLUCOSE QUALITATIVE U: NEGATIVE MG/DL
GLUCOSE SERPL-MCNC: 95 MG/DL
HBA1C MFR BLD HPLC: 5.8 %
HCT VFR BLD CALC: 43.6 %
HDLC SERPL-MCNC: 61 MG/DL
HGB BLD-MCNC: 13.9 G/DL
IMM GRANULOCYTES NFR BLD AUTO: 0.2 %
KETONES URINE: NEGATIVE MG/DL
LDLC SERPL CALC-MCNC: 52 MG/DL
LEUKOCYTE ESTERASE URINE: ABNORMAL
LYMPHOCYTES # BLD AUTO: 2.1 K/UL
LYMPHOCYTES NFR BLD AUTO: 23.8 %
MAN DIFF?: NORMAL
MCHC RBC-ENTMCNC: 29.1 PG
MCHC RBC-ENTMCNC: 31.9 G/DL
MCV RBC AUTO: 91.4 FL
MONOCYTES # BLD AUTO: 0.63 K/UL
MONOCYTES NFR BLD AUTO: 7.1 %
NEUTROPHILS # BLD AUTO: 5.62 K/UL
NEUTROPHILS NFR BLD AUTO: 63.6 %
NITRITE URINE: POSITIVE
NONHDLC SERPL-MCNC: 66 MG/DL
PH URINE: 5.5
PLATELET # BLD AUTO: 334 K/UL
POTASSIUM SERPL-SCNC: 4.8 MMOL/L
PROT SERPL-MCNC: 8.4 G/DL
PROTEIN URINE: 30 MG/DL
RBC # BLD: 4.77 M/UL
RBC # FLD: 14.7 %
SODIUM SERPL-SCNC: 139 MMOL/L
SPECIFIC GRAVITY URINE: 1.03
TRIGL SERPL-MCNC: 72 MG/DL
TSH SERPL-ACNC: 2.42 UIU/ML
UROBILINOGEN URINE: 1 MG/DL
VIT B12 SERPL-MCNC: 501 PG/ML
WBC # FLD AUTO: 8.84 K/UL

## 2025-01-09 ENCOUNTER — APPOINTMENT (OUTPATIENT)
Dept: MEDICATION MANAGEMENT | Facility: CLINIC | Age: 75
End: 2025-01-09

## 2025-01-09 ENCOUNTER — OUTPATIENT (OUTPATIENT)
Dept: OUTPATIENT SERVICES | Facility: HOSPITAL | Age: 75
LOS: 1 days | End: 2025-01-09
Payer: MEDICARE

## 2025-01-09 DIAGNOSIS — Z79.01 LONG TERM (CURRENT) USE OF ANTICOAGULANTS: ICD-10-CM

## 2025-01-09 DIAGNOSIS — I82.90 ACUTE EMBOLISM AND THROMBOSIS OF UNSPECIFIED VEIN: ICD-10-CM

## 2025-01-09 LAB
INR PPP: 2.45
PT BLD: 28.9

## 2025-01-09 PROCEDURE — 98012 SYNCH AUDIO-ONLY EST SF 10: CPT

## 2025-01-10 DIAGNOSIS — Z79.01 LONG TERM (CURRENT) USE OF ANTICOAGULANTS: ICD-10-CM

## 2025-01-10 DIAGNOSIS — I82.90 ACUTE EMBOLISM AND THROMBOSIS OF UNSPECIFIED VEIN: ICD-10-CM

## 2025-01-12 PROBLEM — R26.81 UNSTEADY GAIT: Status: ACTIVE | Noted: 2025-01-12

## 2025-01-12 PROBLEM — R26.2 AMBULATORY DYSFUNCTION: Status: ACTIVE | Noted: 2025-01-12

## 2025-01-15 ENCOUNTER — APPOINTMENT (OUTPATIENT)
Dept: MEDICATION MANAGEMENT | Facility: CLINIC | Age: 75
End: 2025-01-15

## 2025-01-15 ENCOUNTER — OUTPATIENT (OUTPATIENT)
Dept: OUTPATIENT SERVICES | Facility: HOSPITAL | Age: 75
LOS: 1 days | End: 2025-01-15
Payer: MEDICARE

## 2025-01-15 DIAGNOSIS — I82.90 ACUTE EMBOLISM AND THROMBOSIS OF UNSPECIFIED VEIN: ICD-10-CM

## 2025-01-15 DIAGNOSIS — Z79.01 LONG TERM (CURRENT) USE OF ANTICOAGULANTS: ICD-10-CM

## 2025-01-15 LAB
INR PPP: 3.93
PT BLD: 46.1

## 2025-01-15 PROCEDURE — 98012 SYNCH AUDIO-ONLY EST SF 10: CPT

## 2025-01-16 DIAGNOSIS — I82.90 ACUTE EMBOLISM AND THROMBOSIS OF UNSPECIFIED VEIN: ICD-10-CM

## 2025-01-16 DIAGNOSIS — Z79.01 LONG TERM (CURRENT) USE OF ANTICOAGULANTS: ICD-10-CM

## 2025-01-22 ENCOUNTER — OUTPATIENT (OUTPATIENT)
Dept: OUTPATIENT SERVICES | Facility: HOSPITAL | Age: 75
LOS: 1 days | End: 2025-01-22
Payer: MEDICARE

## 2025-01-22 ENCOUNTER — APPOINTMENT (OUTPATIENT)
Dept: MEDICATION MANAGEMENT | Facility: CLINIC | Age: 75
End: 2025-01-22

## 2025-01-22 DIAGNOSIS — Z79.01 LONG TERM (CURRENT) USE OF ANTICOAGULANTS: ICD-10-CM

## 2025-01-22 DIAGNOSIS — I82.90 ACUTE EMBOLISM AND THROMBOSIS OF UNSPECIFIED VEIN: ICD-10-CM

## 2025-01-22 LAB
INR PPP: 2.09
PT BLD: 24.7

## 2025-01-22 PROCEDURE — 98012 SYNCH AUDIO-ONLY EST SF 10: CPT

## 2025-01-23 DIAGNOSIS — I82.90 ACUTE EMBOLISM AND THROMBOSIS OF UNSPECIFIED VEIN: ICD-10-CM

## 2025-01-23 DIAGNOSIS — Z79.01 LONG TERM (CURRENT) USE OF ANTICOAGULANTS: ICD-10-CM

## 2025-01-29 ENCOUNTER — APPOINTMENT (OUTPATIENT)
Dept: MEDICATION MANAGEMENT | Facility: CLINIC | Age: 75
End: 2025-01-29

## 2025-01-29 ENCOUNTER — OUTPATIENT (OUTPATIENT)
Dept: OUTPATIENT SERVICES | Facility: HOSPITAL | Age: 75
LOS: 1 days | End: 2025-01-29
Payer: MEDICARE

## 2025-01-29 DIAGNOSIS — Z79.01 LONG TERM (CURRENT) USE OF ANTICOAGULANTS: ICD-10-CM

## 2025-01-29 DIAGNOSIS — I82.90 ACUTE EMBOLISM AND THROMBOSIS OF UNSPECIFIED VEIN: ICD-10-CM

## 2025-01-29 LAB
INR PPP: 3
PT BLD: 35.3

## 2025-01-29 PROCEDURE — 98012 SYNCH AUDIO-ONLY EST SF 10: CPT

## 2025-02-05 ENCOUNTER — APPOINTMENT (OUTPATIENT)
Dept: MEDICATION MANAGEMENT | Facility: CLINIC | Age: 75
End: 2025-02-05

## 2025-02-05 ENCOUNTER — OUTPATIENT (OUTPATIENT)
Dept: OUTPATIENT SERVICES | Facility: HOSPITAL | Age: 75
LOS: 1 days | End: 2025-02-05
Payer: MEDICARE

## 2025-02-05 DIAGNOSIS — Z79.01 LONG TERM (CURRENT) USE OF ANTICOAGULANTS: ICD-10-CM

## 2025-02-05 DIAGNOSIS — I82.90 ACUTE EMBOLISM AND THROMBOSIS OF UNSPECIFIED VEIN: ICD-10-CM

## 2025-02-05 LAB
INR PPP: 2.56
PT BLD: 29.9

## 2025-02-05 PROCEDURE — 98012 SYNCH AUDIO-ONLY EST SF 10: CPT

## 2025-02-10 DIAGNOSIS — N39.0 URINARY TRACT INFECTION, SITE NOT SPECIFIED: ICD-10-CM

## 2025-02-10 RX ORDER — SULFAMETHOXAZOLE AND TRIMETHOPRIM 800; 160 MG/1; MG/1
800-160 TABLET ORAL TWICE DAILY
Qty: 14 | Refills: 0 | Status: ACTIVE | COMMUNITY
Start: 2025-02-10 | End: 1900-01-01

## 2025-02-12 ENCOUNTER — OUTPATIENT (OUTPATIENT)
Dept: OUTPATIENT SERVICES | Facility: HOSPITAL | Age: 75
LOS: 1 days | End: 2025-02-12
Payer: MEDICARE

## 2025-02-12 ENCOUNTER — APPOINTMENT (OUTPATIENT)
Dept: MEDICATION MANAGEMENT | Facility: CLINIC | Age: 75
End: 2025-02-12

## 2025-02-12 DIAGNOSIS — I82.90 ACUTE EMBOLISM AND THROMBOSIS OF UNSPECIFIED VEIN: ICD-10-CM

## 2025-02-12 DIAGNOSIS — Z79.01 LONG TERM (CURRENT) USE OF ANTICOAGULANTS: ICD-10-CM

## 2025-02-12 LAB
INR PPP: 1.94
PT BLD: 22.8

## 2025-02-12 PROCEDURE — 98012 SYNCH AUDIO-ONLY EST SF 10: CPT

## 2025-02-19 ENCOUNTER — APPOINTMENT (OUTPATIENT)
Dept: MEDICATION MANAGEMENT | Facility: CLINIC | Age: 75
End: 2025-02-19

## 2025-02-19 ENCOUNTER — OUTPATIENT (OUTPATIENT)
Dept: OUTPATIENT SERVICES | Facility: HOSPITAL | Age: 75
LOS: 1 days | End: 2025-02-19
Payer: MEDICARE

## 2025-02-19 DIAGNOSIS — I82.90 ACUTE EMBOLISM AND THROMBOSIS OF UNSPECIFIED VEIN: ICD-10-CM

## 2025-02-19 DIAGNOSIS — Z79.01 LONG TERM (CURRENT) USE OF ANTICOAGULANTS: ICD-10-CM

## 2025-02-19 LAB
INR PPP: 3.06
PT BLD: 35.7

## 2025-02-19 PROCEDURE — 98012 SYNCH AUDIO-ONLY EST SF 10: CPT

## 2025-02-20 ENCOUNTER — LABORATORY RESULT (OUTPATIENT)
Age: 75
End: 2025-02-20

## 2025-02-25 ENCOUNTER — LABORATORY RESULT (OUTPATIENT)
Age: 75
End: 2025-02-25

## 2025-02-25 ENCOUNTER — APPOINTMENT (OUTPATIENT)
Dept: MEDICATION MANAGEMENT | Facility: CLINIC | Age: 75
End: 2025-02-25

## 2025-02-25 ENCOUNTER — OUTPATIENT (OUTPATIENT)
Dept: OUTPATIENT SERVICES | Facility: HOSPITAL | Age: 75
LOS: 1 days | End: 2025-02-25
Payer: MEDICARE

## 2025-02-25 DIAGNOSIS — Z79.01 LONG TERM (CURRENT) USE OF ANTICOAGULANTS: ICD-10-CM

## 2025-02-25 DIAGNOSIS — I82.90 ACUTE EMBOLISM AND THROMBOSIS OF UNSPECIFIED VEIN: ICD-10-CM

## 2025-02-25 LAB
INR PPP: 1.65
PT BLD: 19.4

## 2025-02-25 PROCEDURE — 98012 SYNCH AUDIO-ONLY EST SF 10: CPT

## 2025-02-28 ENCOUNTER — LABORATORY RESULT (OUTPATIENT)
Age: 75
End: 2025-02-28

## 2025-03-06 ENCOUNTER — OUTPATIENT (OUTPATIENT)
Dept: OUTPATIENT SERVICES | Facility: HOSPITAL | Age: 75
LOS: 1 days | End: 2025-03-06
Payer: MEDICARE

## 2025-03-06 ENCOUNTER — APPOINTMENT (OUTPATIENT)
Dept: MEDICATION MANAGEMENT | Facility: CLINIC | Age: 75
End: 2025-03-06

## 2025-03-06 DIAGNOSIS — I82.90 ACUTE EMBOLISM AND THROMBOSIS OF UNSPECIFIED VEIN: ICD-10-CM

## 2025-03-06 DIAGNOSIS — Z79.01 LONG TERM (CURRENT) USE OF ANTICOAGULANTS: ICD-10-CM

## 2025-03-06 LAB
INR PPP: 2.79
PT BLD: 32.6

## 2025-03-06 PROCEDURE — 98012 SYNCH AUDIO-ONLY EST SF 10: CPT

## 2025-03-08 LAB
APPEARANCE: CLEAR
BILIRUBIN URINE: NEGATIVE
BLOOD URINE: NEGATIVE
COLOR: YELLOW
GLUCOSE QUALITATIVE U: NEGATIVE MG/DL
KETONES URINE: NEGATIVE MG/DL
LEUKOCYTE ESTERASE URINE: NEGATIVE
NITRITE URINE: NEGATIVE
PH URINE: 7
PROTEIN URINE: NEGATIVE MG/DL
SPECIFIC GRAVITY URINE: 1.01
UROBILINOGEN URINE: 0.2 MG/DL

## 2025-03-12 ENCOUNTER — OUTPATIENT (OUTPATIENT)
Dept: OUTPATIENT SERVICES | Facility: HOSPITAL | Age: 75
LOS: 1 days | End: 2025-03-12
Payer: MEDICARE

## 2025-03-12 ENCOUNTER — APPOINTMENT (OUTPATIENT)
Dept: MEDICATION MANAGEMENT | Facility: CLINIC | Age: 75
End: 2025-03-12

## 2025-03-12 DIAGNOSIS — I82.90 ACUTE EMBOLISM AND THROMBOSIS OF UNSPECIFIED VEIN: ICD-10-CM

## 2025-03-12 DIAGNOSIS — Z79.01 LONG TERM (CURRENT) USE OF ANTICOAGULANTS: ICD-10-CM

## 2025-03-12 LAB
INR PPP: 1.36
PT BLD: 16

## 2025-03-12 PROCEDURE — 98012 SYNCH AUDIO-ONLY EST SF 10: CPT

## 2025-03-19 ENCOUNTER — OUTPATIENT (OUTPATIENT)
Dept: OUTPATIENT SERVICES | Facility: HOSPITAL | Age: 75
LOS: 1 days | End: 2025-03-19
Payer: MEDICARE

## 2025-03-19 ENCOUNTER — APPOINTMENT (OUTPATIENT)
Dept: MEDICATION MANAGEMENT | Facility: CLINIC | Age: 75
End: 2025-03-19

## 2025-03-19 DIAGNOSIS — I82.90 ACUTE EMBOLISM AND THROMBOSIS OF UNSPECIFIED VEIN: ICD-10-CM

## 2025-03-19 DIAGNOSIS — Z79.01 LONG TERM (CURRENT) USE OF ANTICOAGULANTS: ICD-10-CM

## 2025-03-19 LAB
INR PPP: 1.63
PT BLD: 19.1

## 2025-03-19 PROCEDURE — 98012 SYNCH AUDIO-ONLY EST SF 10: CPT

## 2025-03-24 ENCOUNTER — APPOINTMENT (OUTPATIENT)
Dept: OPHTHALMOLOGY | Facility: CLINIC | Age: 75
End: 2025-03-24
Payer: MEDICARE

## 2025-03-24 ENCOUNTER — NON-APPOINTMENT (OUTPATIENT)
Age: 75
End: 2025-03-24

## 2025-03-24 PROCEDURE — 92134 CPTRZ OPH DX IMG PST SGM RTA: CPT

## 2025-03-24 PROCEDURE — 92012 INTRM OPH EXAM EST PATIENT: CPT | Mod: 25

## 2025-03-24 PROCEDURE — 67228 TREATMENT X10SV RETINOPATHY: CPT | Mod: RT

## 2025-03-26 ENCOUNTER — OUTPATIENT (OUTPATIENT)
Dept: OUTPATIENT SERVICES | Facility: HOSPITAL | Age: 75
LOS: 1 days | End: 2025-03-26
Payer: MEDICARE

## 2025-03-26 ENCOUNTER — APPOINTMENT (OUTPATIENT)
Dept: MEDICATION MANAGEMENT | Facility: CLINIC | Age: 75
End: 2025-03-26

## 2025-03-26 DIAGNOSIS — I82.90 ACUTE EMBOLISM AND THROMBOSIS OF UNSPECIFIED VEIN: ICD-10-CM

## 2025-03-26 DIAGNOSIS — Z79.01 LONG TERM (CURRENT) USE OF ANTICOAGULANTS: ICD-10-CM

## 2025-03-26 LAB
INR PPP: 1.35
PT BLD: 15.9

## 2025-03-26 PROCEDURE — 98012 SYNCH AUDIO-ONLY EST SF 10: CPT

## 2025-04-01 ENCOUNTER — OUTPATIENT (OUTPATIENT)
Dept: OUTPATIENT SERVICES | Facility: HOSPITAL | Age: 75
LOS: 1 days | End: 2025-04-01
Payer: MEDICARE

## 2025-04-01 ENCOUNTER — APPOINTMENT (OUTPATIENT)
Dept: MEDICATION MANAGEMENT | Facility: CLINIC | Age: 75
End: 2025-04-01

## 2025-04-01 DIAGNOSIS — Z79.01 LONG TERM (CURRENT) USE OF ANTICOAGULANTS: ICD-10-CM

## 2025-04-01 DIAGNOSIS — I82.90 ACUTE EMBOLISM AND THROMBOSIS OF UNSPECIFIED VEIN: ICD-10-CM

## 2025-04-01 LAB
INR PPP: 3.21
PT BLD: 37.4

## 2025-04-01 PROCEDURE — 98013 SYNCH AUDIO-ONLY EST LOW 20: CPT

## 2025-04-03 ENCOUNTER — APPOINTMENT (OUTPATIENT)
Dept: MEDICATION MANAGEMENT | Facility: CLINIC | Age: 75
End: 2025-04-03

## 2025-04-03 ENCOUNTER — OUTPATIENT (OUTPATIENT)
Dept: OUTPATIENT SERVICES | Facility: HOSPITAL | Age: 75
LOS: 1 days | End: 2025-04-03
Payer: MEDICARE

## 2025-04-03 DIAGNOSIS — I82.90 ACUTE EMBOLISM AND THROMBOSIS OF UNSPECIFIED VEIN: ICD-10-CM

## 2025-04-03 DIAGNOSIS — Z79.01 LONG TERM (CURRENT) USE OF ANTICOAGULANTS: ICD-10-CM

## 2025-04-03 LAB
INR PPP: 4.75
PT BLD: 55.2

## 2025-04-03 PROCEDURE — 98012 SYNCH AUDIO-ONLY EST SF 10: CPT

## 2025-04-04 ENCOUNTER — APPOINTMENT (OUTPATIENT)
Dept: DERMATOLOGY | Facility: CLINIC | Age: 75
End: 2025-04-04

## 2025-04-04 DIAGNOSIS — L21.9 SEBORRHEIC DERMATITIS, UNSPECIFIED: ICD-10-CM

## 2025-04-04 DIAGNOSIS — Z79.899 OTHER LONG TERM (CURRENT) DRUG THERAPY: ICD-10-CM

## 2025-04-04 DIAGNOSIS — L30.4 ERYTHEMA INTERTRIGO: ICD-10-CM

## 2025-04-04 DIAGNOSIS — L73.8 OTHER SPECIFIED FOLLICULAR DISORDERS: ICD-10-CM

## 2025-04-04 DIAGNOSIS — L40.9 PSORIASIS, UNSPECIFIED: ICD-10-CM

## 2025-04-04 DIAGNOSIS — L30.9 DERMATITIS, UNSPECIFIED: ICD-10-CM

## 2025-04-04 PROCEDURE — 99214 OFFICE O/P EST MOD 30 MIN: CPT

## 2025-04-04 RX ORDER — FLUOCINOLONE ACETONIDE 0.1 MG/ML
0.01 SOLUTION TOPICAL
Qty: 1 | Refills: 2 | Status: ACTIVE | COMMUNITY
Start: 2025-04-04 | End: 1900-01-01

## 2025-04-04 RX ORDER — NYSTATIN 100000 [USP'U]/G
100000 CREAM TOPICAL
Qty: 3 | Refills: 3 | Status: ACTIVE | COMMUNITY
Start: 2025-04-04 | End: 1900-01-01

## 2025-04-07 LAB
ALBUMIN SERPL ELPH-MCNC: 4.4 G/DL
ALP BLD-CCNC: 96 U/L
ALT SERPL-CCNC: 18 U/L
ANION GAP SERPL CALC-SCNC: 12 MMOL/L
AST SERPL-CCNC: 24 U/L
BASOPHILS # BLD AUTO: 0.08 K/UL
BASOPHILS NFR BLD AUTO: 1 %
BILIRUB SERPL-MCNC: 0.2 MG/DL
BUN SERPL-MCNC: 50 MG/DL
CALCIUM SERPL-MCNC: 10.2 MG/DL
CHLORIDE SERPL-SCNC: 105 MMOL/L
CO2 SERPL-SCNC: 26 MMOL/L
CREAT SERPL-MCNC: 0.94 MG/DL
EGFRCR SERPLBLD CKD-EPI 2021: 64 ML/MIN/1.73M2
EOSINOPHIL # BLD AUTO: 0.52 K/UL
EOSINOPHIL NFR BLD AUTO: 6.6 %
GLUCOSE SERPL-MCNC: 96 MG/DL
HCT VFR BLD CALC: 44.4 %
HGB BLD-MCNC: 13.9 G/DL
IMM GRANULOCYTES NFR BLD AUTO: 0.4 %
LYMPHOCYTES # BLD AUTO: 2.07 K/UL
LYMPHOCYTES NFR BLD AUTO: 26.5 %
MAN DIFF?: NORMAL
MCHC RBC-ENTMCNC: 29.4 PG
MCHC RBC-ENTMCNC: 31.3 G/DL
MCV RBC AUTO: 93.9 FL
MONOCYTES # BLD AUTO: 0.66 K/UL
MONOCYTES NFR BLD AUTO: 8.4 %
NEUTROPHILS # BLD AUTO: 4.46 K/UL
NEUTROPHILS NFR BLD AUTO: 57.1 %
PLATELET # BLD AUTO: 281 K/UL
POTASSIUM SERPL-SCNC: 5.4 MMOL/L
PROT SERPL-MCNC: 7.5 G/DL
RBC # BLD: 4.73 M/UL
RBC # FLD: 14.2 %
SODIUM SERPL-SCNC: 143 MMOL/L
WBC # FLD AUTO: 7.82 K/UL

## 2025-04-09 ENCOUNTER — OUTPATIENT (OUTPATIENT)
Dept: OUTPATIENT SERVICES | Facility: HOSPITAL | Age: 75
LOS: 1 days | End: 2025-04-09
Payer: MEDICARE

## 2025-04-09 ENCOUNTER — APPOINTMENT (OUTPATIENT)
Dept: MEDICATION MANAGEMENT | Facility: CLINIC | Age: 75
End: 2025-04-09

## 2025-04-09 DIAGNOSIS — Z79.01 LONG TERM (CURRENT) USE OF ANTICOAGULANTS: ICD-10-CM

## 2025-04-09 DIAGNOSIS — I82.90 ACUTE EMBOLISM AND THROMBOSIS OF UNSPECIFIED VEIN: ICD-10-CM

## 2025-04-09 LAB
INR PPP: 2.16
PT BLD: 25.3

## 2025-04-09 PROCEDURE — 98012 SYNCH AUDIO-ONLY EST SF 10: CPT

## 2025-04-10 ENCOUNTER — NON-APPOINTMENT (OUTPATIENT)
Age: 75
End: 2025-04-10

## 2025-04-11 ENCOUNTER — NON-APPOINTMENT (OUTPATIENT)
Age: 75
End: 2025-04-11

## 2025-04-16 ENCOUNTER — OUTPATIENT (OUTPATIENT)
Dept: OUTPATIENT SERVICES | Facility: HOSPITAL | Age: 75
LOS: 1 days | End: 2025-04-16
Payer: MEDICARE

## 2025-04-16 ENCOUNTER — APPOINTMENT (OUTPATIENT)
Dept: MEDICATION MANAGEMENT | Facility: CLINIC | Age: 75
End: 2025-04-16

## 2025-04-16 DIAGNOSIS — Z79.01 LONG TERM (CURRENT) USE OF ANTICOAGULANTS: ICD-10-CM

## 2025-04-16 DIAGNOSIS — I82.90 ACUTE EMBOLISM AND THROMBOSIS OF UNSPECIFIED VEIN: ICD-10-CM

## 2025-04-16 LAB
INR PPP: 1.79
PT BLD: 21.2

## 2025-04-16 PROCEDURE — 98012 SYNCH AUDIO-ONLY EST SF 10: CPT

## 2025-04-23 ENCOUNTER — APPOINTMENT (OUTPATIENT)
Dept: MEDICATION MANAGEMENT | Facility: CLINIC | Age: 75
End: 2025-04-23

## 2025-04-23 ENCOUNTER — OUTPATIENT (OUTPATIENT)
Dept: OUTPATIENT SERVICES | Facility: HOSPITAL | Age: 75
LOS: 1 days | End: 2025-04-23
Payer: MEDICARE

## 2025-04-23 DIAGNOSIS — I82.90 ACUTE EMBOLISM AND THROMBOSIS OF UNSPECIFIED VEIN: ICD-10-CM

## 2025-04-23 DIAGNOSIS — Z79.01 LONG TERM (CURRENT) USE OF ANTICOAGULANTS: ICD-10-CM

## 2025-04-23 LAB
INR PPP: 1.58
PT BLD: 18.7

## 2025-04-23 PROCEDURE — 98012 SYNCH AUDIO-ONLY EST SF 10: CPT

## 2025-04-29 ENCOUNTER — APPOINTMENT (OUTPATIENT)
Dept: MEDICATION MANAGEMENT | Facility: CLINIC | Age: 75
End: 2025-04-29

## 2025-04-29 ENCOUNTER — OUTPATIENT (OUTPATIENT)
Dept: OUTPATIENT SERVICES | Facility: HOSPITAL | Age: 75
LOS: 1 days | End: 2025-04-29
Payer: MEDICARE

## 2025-04-29 DIAGNOSIS — I82.90 ACUTE EMBOLISM AND THROMBOSIS OF UNSPECIFIED VEIN: ICD-10-CM

## 2025-04-29 DIAGNOSIS — Z79.01 LONG TERM (CURRENT) USE OF ANTICOAGULANTS: ICD-10-CM

## 2025-04-29 LAB
INR PPP: 2.01
PT BLD: 23.7

## 2025-04-29 PROCEDURE — 98012 SYNCH AUDIO-ONLY EST SF 10: CPT

## 2025-05-01 ENCOUNTER — APPOINTMENT (OUTPATIENT)
Dept: ORTHOPEDIC SURGERY | Facility: CLINIC | Age: 75
End: 2025-05-01
Payer: MEDICARE

## 2025-05-01 VITALS — BODY MASS INDEX: 54.97 KG/M2 | HEIGHT: 60 IN | WEIGHT: 280 LBS

## 2025-05-01 DIAGNOSIS — M25.562 PAIN IN LEFT KNEE: ICD-10-CM

## 2025-05-01 DIAGNOSIS — M17.12 UNILATERAL PRIMARY OSTEOARTHRITIS, LEFT KNEE: ICD-10-CM

## 2025-05-01 PROCEDURE — 73564 X-RAY EXAM KNEE 4 OR MORE: CPT | Mod: LT

## 2025-05-01 PROCEDURE — 99213 OFFICE O/P EST LOW 20 MIN: CPT

## 2025-05-05 RX ORDER — IBUPROFEN 600 MG/1
600 TABLET, FILM COATED ORAL
Qty: 60 | Refills: 0 | Status: ACTIVE | COMMUNITY
Start: 2025-05-05 | End: 1900-01-01

## 2025-05-07 ENCOUNTER — OUTPATIENT (OUTPATIENT)
Dept: OUTPATIENT SERVICES | Facility: HOSPITAL | Age: 75
LOS: 1 days | End: 2025-05-07
Payer: MEDICARE

## 2025-05-07 ENCOUNTER — APPOINTMENT (OUTPATIENT)
Dept: MEDICATION MANAGEMENT | Facility: CLINIC | Age: 75
End: 2025-05-07

## 2025-05-07 DIAGNOSIS — I82.90 ACUTE EMBOLISM AND THROMBOSIS OF UNSPECIFIED VEIN: ICD-10-CM

## 2025-05-07 DIAGNOSIS — Z79.01 LONG TERM (CURRENT) USE OF ANTICOAGULANTS: ICD-10-CM

## 2025-05-07 LAB
INR PPP: 2.15
PT BLD: 25.2

## 2025-05-07 PROCEDURE — 98012 SYNCH AUDIO-ONLY EST SF 10: CPT

## 2025-05-14 ENCOUNTER — OUTPATIENT (OUTPATIENT)
Dept: OUTPATIENT SERVICES | Facility: HOSPITAL | Age: 75
LOS: 1 days | End: 2025-05-14
Payer: MEDICARE

## 2025-05-14 ENCOUNTER — APPOINTMENT (OUTPATIENT)
Dept: MEDICATION MANAGEMENT | Facility: CLINIC | Age: 75
End: 2025-05-14

## 2025-05-14 DIAGNOSIS — I82.90 ACUTE EMBOLISM AND THROMBOSIS OF UNSPECIFIED VEIN: ICD-10-CM

## 2025-05-14 DIAGNOSIS — Z79.01 LONG TERM (CURRENT) USE OF ANTICOAGULANTS: ICD-10-CM

## 2025-05-14 LAB
INR PPP: 1.7
PT BLD: 20.1

## 2025-05-14 PROCEDURE — 98012 SYNCH AUDIO-ONLY EST SF 10: CPT

## 2025-05-15 ENCOUNTER — APPOINTMENT (OUTPATIENT)
Dept: INTERNAL MEDICINE | Facility: CLINIC | Age: 75
End: 2025-05-15

## 2025-05-15 ENCOUNTER — OUTPATIENT (OUTPATIENT)
Dept: OUTPATIENT SERVICES | Facility: HOSPITAL | Age: 75
LOS: 1 days | End: 2025-05-15

## 2025-05-21 ENCOUNTER — APPOINTMENT (OUTPATIENT)
Dept: MEDICATION MANAGEMENT | Facility: CLINIC | Age: 75
End: 2025-05-21

## 2025-05-21 ENCOUNTER — OUTPATIENT (OUTPATIENT)
Dept: OUTPATIENT SERVICES | Facility: HOSPITAL | Age: 75
LOS: 1 days | End: 2025-05-21
Payer: MEDICARE

## 2025-05-21 DIAGNOSIS — Z79.01 LONG TERM (CURRENT) USE OF ANTICOAGULANTS: ICD-10-CM

## 2025-05-21 DIAGNOSIS — I82.90 ACUTE EMBOLISM AND THROMBOSIS OF UNSPECIFIED VEIN: ICD-10-CM

## 2025-05-21 PROCEDURE — 98012 SYNCH AUDIO-ONLY EST SF 10: CPT

## 2025-05-23 LAB
INR PPP: 3.67
PT BLD: 42.8

## 2025-05-28 ENCOUNTER — OUTPATIENT (OUTPATIENT)
Dept: OUTPATIENT SERVICES | Facility: HOSPITAL | Age: 75
LOS: 1 days | End: 2025-05-28
Payer: MEDICARE

## 2025-05-28 ENCOUNTER — APPOINTMENT (OUTPATIENT)
Dept: MEDICATION MANAGEMENT | Facility: CLINIC | Age: 75
End: 2025-05-28

## 2025-05-28 DIAGNOSIS — I82.90 ACUTE EMBOLISM AND THROMBOSIS OF UNSPECIFIED VEIN: ICD-10-CM

## 2025-05-28 DIAGNOSIS — Z79.01 LONG TERM (CURRENT) USE OF ANTICOAGULANTS: ICD-10-CM

## 2025-05-28 LAB
INR PPP: 2.98
PT BLD: 34.8

## 2025-05-28 PROCEDURE — 98012 SYNCH AUDIO-ONLY EST SF 10: CPT

## 2025-06-04 ENCOUNTER — APPOINTMENT (OUTPATIENT)
Dept: MEDICATION MANAGEMENT | Facility: CLINIC | Age: 75
End: 2025-06-04

## 2025-06-04 ENCOUNTER — OUTPATIENT (OUTPATIENT)
Dept: OUTPATIENT SERVICES | Facility: HOSPITAL | Age: 75
LOS: 1 days | End: 2025-06-04
Payer: MEDICARE

## 2025-06-04 DIAGNOSIS — Z79.01 LONG TERM (CURRENT) USE OF ANTICOAGULANTS: ICD-10-CM

## 2025-06-04 DIAGNOSIS — I82.90 ACUTE EMBOLISM AND THROMBOSIS OF UNSPECIFIED VEIN: ICD-10-CM

## 2025-06-04 LAB
INR PPP: 4.76
PT BLD: 55.3

## 2025-06-04 PROCEDURE — 98012 SYNCH AUDIO-ONLY EST SF 10: CPT

## 2025-06-10 ENCOUNTER — NON-APPOINTMENT (OUTPATIENT)
Age: 75
End: 2025-06-10

## 2025-06-10 ENCOUNTER — APPOINTMENT (OUTPATIENT)
Dept: OPHTHALMOLOGY | Facility: CLINIC | Age: 75
End: 2025-06-10
Payer: MEDICARE

## 2025-06-10 PROCEDURE — 92012 INTRM OPH EXAM EST PATIENT: CPT

## 2025-06-12 ENCOUNTER — OUTPATIENT (OUTPATIENT)
Dept: OUTPATIENT SERVICES | Facility: HOSPITAL | Age: 75
LOS: 1 days | End: 2025-06-12
Payer: MEDICARE

## 2025-06-12 ENCOUNTER — OUTPATIENT (OUTPATIENT)
Dept: OUTPATIENT SERVICES | Facility: HOSPITAL | Age: 75
LOS: 1 days | Discharge: HOME | End: 2025-06-12

## 2025-06-12 ENCOUNTER — APPOINTMENT (OUTPATIENT)
Dept: MEDICATION MANAGEMENT | Facility: CLINIC | Age: 75
End: 2025-06-12

## 2025-06-12 DIAGNOSIS — I82.90 ACUTE EMBOLISM AND THROMBOSIS OF UNSPECIFIED VEIN: ICD-10-CM

## 2025-06-12 DIAGNOSIS — Z79.01 LONG TERM (CURRENT) USE OF ANTICOAGULANTS: ICD-10-CM

## 2025-06-12 DIAGNOSIS — D64.9 ANEMIA, UNSPECIFIED: ICD-10-CM

## 2025-06-12 LAB
INR PPP: 2.51
PT BLD: 29.4

## 2025-06-12 PROCEDURE — 99211 OFF/OP EST MAY X REQ PHY/QHP: CPT

## 2025-06-12 PROCEDURE — 36416 COLLJ CAPILLARY BLOOD SPEC: CPT

## 2025-06-12 PROCEDURE — 85610 PROTHROMBIN TIME: CPT

## 2025-06-13 ENCOUNTER — APPOINTMENT (OUTPATIENT)
Dept: HEMATOLOGY ONCOLOGY | Facility: CLINIC | Age: 75
End: 2025-06-13
Payer: MEDICARE

## 2025-06-13 VITALS
BODY MASS INDEX: 54.68 KG/M2 | DIASTOLIC BLOOD PRESSURE: 66 MMHG | SYSTOLIC BLOOD PRESSURE: 112 MMHG | HEART RATE: 75 BPM | TEMPERATURE: 97.2 F | WEIGHT: 279.99 LBS | RESPIRATION RATE: 16 BRPM | OXYGEN SATURATION: 96 %

## 2025-06-13 PROCEDURE — 99214 OFFICE O/P EST MOD 30 MIN: CPT

## 2025-06-15 LAB — HCYS SERPL-MCNC: 30.5 UMOL/L

## 2025-06-16 LAB
PROT S AG ACT/NOR PPP IA: 39 %
PROT S FREE PPP-ACNC: 40 %

## 2025-06-18 ENCOUNTER — APPOINTMENT (OUTPATIENT)
Dept: MEDICATION MANAGEMENT | Facility: CLINIC | Age: 75
End: 2025-06-18

## 2025-06-18 ENCOUNTER — OUTPATIENT (OUTPATIENT)
Dept: OUTPATIENT SERVICES | Facility: HOSPITAL | Age: 75
LOS: 1 days | End: 2025-06-18
Payer: MEDICARE

## 2025-06-18 DIAGNOSIS — Z79.01 LONG TERM (CURRENT) USE OF ANTICOAGULANTS: ICD-10-CM

## 2025-06-18 DIAGNOSIS — I82.90 ACUTE EMBOLISM AND THROMBOSIS OF UNSPECIFIED VEIN: ICD-10-CM

## 2025-06-18 LAB
INR PPP: 2.4
PT BLD: 28.1

## 2025-06-18 PROCEDURE — 98012 SYNCH AUDIO-ONLY EST SF 10: CPT

## 2025-06-25 ENCOUNTER — APPOINTMENT (OUTPATIENT)
Dept: MEDICATION MANAGEMENT | Facility: CLINIC | Age: 75
End: 2025-06-25

## 2025-06-25 ENCOUNTER — OUTPATIENT (OUTPATIENT)
Dept: OUTPATIENT SERVICES | Facility: HOSPITAL | Age: 75
LOS: 1 days | End: 2025-06-25
Payer: MEDICARE

## 2025-06-25 DIAGNOSIS — I82.90 ACUTE EMBOLISM AND THROMBOSIS OF UNSPECIFIED VEIN: ICD-10-CM

## 2025-06-25 DIAGNOSIS — Z79.01 LONG TERM (CURRENT) USE OF ANTICOAGULANTS: ICD-10-CM

## 2025-06-25 LAB
INR PPP: 2.72
PT BLD: 32

## 2025-06-25 PROCEDURE — 98012 SYNCH AUDIO-ONLY EST SF 10: CPT

## 2025-07-02 ENCOUNTER — OUTPATIENT (OUTPATIENT)
Dept: OUTPATIENT SERVICES | Facility: HOSPITAL | Age: 75
LOS: 1 days | End: 2025-07-02
Payer: MEDICARE

## 2025-07-02 ENCOUNTER — APPOINTMENT (OUTPATIENT)
Dept: MEDICATION MANAGEMENT | Facility: CLINIC | Age: 75
End: 2025-07-02

## 2025-07-02 DIAGNOSIS — I82.90 ACUTE EMBOLISM AND THROMBOSIS OF UNSPECIFIED VEIN: ICD-10-CM

## 2025-07-02 DIAGNOSIS — Z79.01 LONG TERM (CURRENT) USE OF ANTICOAGULANTS: ICD-10-CM

## 2025-07-02 LAB
INR PPP: 5.6
PT BLD: 65

## 2025-07-02 PROCEDURE — 98012 SYNCH AUDIO-ONLY EST SF 10: CPT

## 2025-07-09 ENCOUNTER — OUTPATIENT (OUTPATIENT)
Dept: OUTPATIENT SERVICES | Facility: HOSPITAL | Age: 75
LOS: 1 days | End: 2025-07-09
Payer: MEDICARE

## 2025-07-09 ENCOUNTER — APPOINTMENT (OUTPATIENT)
Dept: MEDICATION MANAGEMENT | Facility: CLINIC | Age: 75
End: 2025-07-09

## 2025-07-09 DIAGNOSIS — I82.90 ACUTE EMBOLISM AND THROMBOSIS OF UNSPECIFIED VEIN: ICD-10-CM

## 2025-07-09 DIAGNOSIS — Z79.01 LONG TERM (CURRENT) USE OF ANTICOAGULANTS: ICD-10-CM

## 2025-07-09 LAB
INR PPP: 3.4
PT BLD: 39.6

## 2025-07-09 PROCEDURE — 98012 SYNCH AUDIO-ONLY EST SF 10: CPT

## 2025-07-11 ENCOUNTER — APPOINTMENT (OUTPATIENT)
Dept: DERMATOLOGY | Facility: CLINIC | Age: 75
End: 2025-07-11
Payer: MEDICARE

## 2025-07-11 PROBLEM — Z79.899 HIGH RISK MEDICATION USE: Status: ACTIVE | Noted: 2025-07-11

## 2025-07-11 PROBLEM — D48.9 NEOPLASM OF UNCERTAIN BEHAVIOR: Status: ACTIVE | Noted: 2025-07-11

## 2025-07-11 PROCEDURE — 99214 OFFICE O/P EST MOD 30 MIN: CPT

## 2025-07-11 RX ORDER — CLINDAMYCIN PHOSPHATE 10 MG/ML
1 LOTION TOPICAL
Qty: 1 | Refills: 2 | Status: ACTIVE | COMMUNITY
Start: 2025-07-11 | End: 1900-01-01

## 2025-07-11 RX ORDER — KETOCONAZOLE 20 MG/ML
2 SHAMPOO TOPICAL
Qty: 1 | Refills: 3 | Status: ACTIVE | COMMUNITY
Start: 2025-07-11 | End: 1900-01-01

## 2025-07-11 RX ORDER — KETOCONAZOLE 20 MG/G
2 CREAM TOPICAL
Qty: 1 | Refills: 2 | Status: ACTIVE | COMMUNITY
Start: 2025-07-11 | End: 1900-01-01

## 2025-07-11 RX ORDER — HYDROCORTISONE 25 MG/G
2.5 CREAM TOPICAL
Qty: 1 | Refills: 2 | Status: ACTIVE | COMMUNITY
Start: 2025-07-11 | End: 1900-01-01

## 2025-07-12 ENCOUNTER — NON-APPOINTMENT (OUTPATIENT)
Age: 75
End: 2025-07-12

## 2025-07-16 ENCOUNTER — APPOINTMENT (OUTPATIENT)
Dept: MEDICATION MANAGEMENT | Facility: CLINIC | Age: 75
End: 2025-07-16

## 2025-07-16 ENCOUNTER — OUTPATIENT (OUTPATIENT)
Dept: OUTPATIENT SERVICES | Facility: HOSPITAL | Age: 75
LOS: 1 days | End: 2025-07-16
Payer: MEDICARE

## 2025-07-16 DIAGNOSIS — I82.90 ACUTE EMBOLISM AND THROMBOSIS OF UNSPECIFIED VEIN: ICD-10-CM

## 2025-07-16 DIAGNOSIS — Z79.01 LONG TERM (CURRENT) USE OF ANTICOAGULANTS: ICD-10-CM

## 2025-07-16 LAB
ALBUMIN SERPL ELPH-MCNC: 4.4 G/DL
ALP BLD-CCNC: 101 U/L
ALT SERPL-CCNC: 46 U/L
ANION GAP SERPL CALC-SCNC: 12 MMOL/L
AST SERPL-CCNC: 36 U/L
BILIRUB SERPL-MCNC: 0.5 MG/DL
BUN SERPL-MCNC: 39 MG/DL
CALCIUM SERPL-MCNC: 10.5 MG/DL
CHLORIDE SERPL-SCNC: 106 MMOL/L
CO2 SERPL-SCNC: 22 MMOL/L
CREAT SERPL-MCNC: 0.93 MG/DL
EGFRCR SERPLBLD CKD-EPI 2021: 65 ML/MIN/1.73M2
GLUCOSE SERPL-MCNC: 101 MG/DL
HCT VFR BLD CALC: 43.2 %
HGB BLD-MCNC: 13.4 G/DL
INR PPP: 3.34
MCHC RBC-ENTMCNC: 28.6 PG
MCHC RBC-ENTMCNC: 31 G/DL
MCV RBC AUTO: 92.3 FL
PLATELET # BLD AUTO: 251 K/UL
POTASSIUM SERPL-SCNC: 5.1 MMOL/L
PROT SERPL-MCNC: 7.5 G/DL
PT BLD: 39.3
RBC # BLD: 4.68 M/UL
RBC # FLD: 14.7 %
SODIUM SERPL-SCNC: 139 MMOL/L
WBC # FLD AUTO: 8.31 K/UL

## 2025-07-16 PROCEDURE — 98012 SYNCH AUDIO-ONLY EST SF 10: CPT

## 2025-07-18 LAB — DERMATOLOGY BIOPSY: NORMAL

## 2025-07-22 ENCOUNTER — NON-APPOINTMENT (OUTPATIENT)
Age: 75
End: 2025-07-22

## 2025-07-22 DIAGNOSIS — L57.0 ACTINIC KERATOSIS: ICD-10-CM

## 2025-07-22 RX ORDER — FLUOROURACIL 50 MG/G
5 CREAM TOPICAL
Qty: 1 | Refills: 0 | Status: ACTIVE | COMMUNITY
Start: 2025-07-22 | End: 1900-01-01

## 2025-07-23 ENCOUNTER — APPOINTMENT (OUTPATIENT)
Dept: MEDICATION MANAGEMENT | Facility: CLINIC | Age: 75
End: 2025-07-23

## 2025-07-23 ENCOUNTER — OUTPATIENT (OUTPATIENT)
Dept: OUTPATIENT SERVICES | Facility: HOSPITAL | Age: 75
LOS: 1 days | End: 2025-07-23
Payer: MEDICARE

## 2025-07-23 DIAGNOSIS — I82.90 ACUTE EMBOLISM AND THROMBOSIS OF UNSPECIFIED VEIN: ICD-10-CM

## 2025-07-23 DIAGNOSIS — Z79.01 LONG TERM (CURRENT) USE OF ANTICOAGULANTS: ICD-10-CM

## 2025-07-23 LAB — INR PPP: 3.22

## 2025-07-23 PROCEDURE — 98012 SYNCH AUDIO-ONLY EST SF 10: CPT

## 2025-07-28 ENCOUNTER — APPOINTMENT (OUTPATIENT)
Dept: OPHTHALMOLOGY | Facility: CLINIC | Age: 75
End: 2025-07-28
Payer: MEDICARE

## 2025-07-28 ENCOUNTER — NON-APPOINTMENT (OUTPATIENT)
Age: 75
End: 2025-07-28

## 2025-07-28 PROCEDURE — 92014 COMPRE OPH EXAM EST PT 1/>: CPT

## 2025-07-28 PROCEDURE — 92134 CPTRZ OPH DX IMG PST SGM RTA: CPT

## 2025-07-30 ENCOUNTER — APPOINTMENT (OUTPATIENT)
Dept: MEDICATION MANAGEMENT | Facility: CLINIC | Age: 75
End: 2025-07-30

## 2025-07-30 ENCOUNTER — OUTPATIENT (OUTPATIENT)
Dept: OUTPATIENT SERVICES | Facility: HOSPITAL | Age: 75
LOS: 1 days | End: 2025-07-30
Payer: MEDICARE

## 2025-07-30 DIAGNOSIS — I82.90 ACUTE EMBOLISM AND THROMBOSIS OF UNSPECIFIED VEIN: ICD-10-CM

## 2025-07-30 DIAGNOSIS — Z79.01 LONG TERM (CURRENT) USE OF ANTICOAGULANTS: ICD-10-CM

## 2025-07-30 LAB
INR PPP: 2.18
PT BLD: 25.5

## 2025-07-30 PROCEDURE — 98012 SYNCH AUDIO-ONLY EST SF 10: CPT

## 2025-08-06 ENCOUNTER — APPOINTMENT (OUTPATIENT)
Dept: MEDICATION MANAGEMENT | Facility: CLINIC | Age: 75
End: 2025-08-06

## 2025-08-06 ENCOUNTER — OUTPATIENT (OUTPATIENT)
Dept: OUTPATIENT SERVICES | Facility: HOSPITAL | Age: 75
LOS: 1 days | End: 2025-08-06
Payer: MEDICARE

## 2025-08-06 ENCOUNTER — NON-APPOINTMENT (OUTPATIENT)
Age: 75
End: 2025-08-06

## 2025-08-06 DIAGNOSIS — I82.90 ACUTE EMBOLISM AND THROMBOSIS OF UNSPECIFIED VEIN: ICD-10-CM

## 2025-08-06 DIAGNOSIS — Z79.01 LONG TERM (CURRENT) USE OF ANTICOAGULANTS: ICD-10-CM

## 2025-08-06 LAB
INR PPP: 2.32
PT BLD: 27.4

## 2025-08-06 PROCEDURE — 98012 SYNCH AUDIO-ONLY EST SF 10: CPT

## 2025-08-13 ENCOUNTER — APPOINTMENT (OUTPATIENT)
Dept: MEDICATION MANAGEMENT | Facility: CLINIC | Age: 75
End: 2025-08-13

## 2025-08-13 ENCOUNTER — OUTPATIENT (OUTPATIENT)
Dept: OUTPATIENT SERVICES | Facility: HOSPITAL | Age: 75
LOS: 1 days | End: 2025-08-13
Payer: MEDICARE

## 2025-08-13 DIAGNOSIS — Z79.01 LONG TERM (CURRENT) USE OF ANTICOAGULANTS: ICD-10-CM

## 2025-08-13 DIAGNOSIS — I82.90 ACUTE EMBOLISM AND THROMBOSIS OF UNSPECIFIED VEIN: ICD-10-CM

## 2025-08-13 LAB
INR PPP: 1.97
PT BLD: 23.1

## 2025-08-13 PROCEDURE — 98012 SYNCH AUDIO-ONLY EST SF 10: CPT

## 2025-08-20 ENCOUNTER — OUTPATIENT (OUTPATIENT)
Dept: OUTPATIENT SERVICES | Facility: HOSPITAL | Age: 75
LOS: 1 days | End: 2025-08-20
Payer: MEDICARE

## 2025-08-20 ENCOUNTER — APPOINTMENT (OUTPATIENT)
Dept: MEDICATION MANAGEMENT | Facility: CLINIC | Age: 75
End: 2025-08-20

## 2025-08-20 DIAGNOSIS — I82.90 ACUTE EMBOLISM AND THROMBOSIS OF UNSPECIFIED VEIN: ICD-10-CM

## 2025-08-20 DIAGNOSIS — Z79.01 LONG TERM (CURRENT) USE OF ANTICOAGULANTS: ICD-10-CM

## 2025-08-20 LAB
INR PPP: 1.47
PT BLD: 17

## 2025-08-20 PROCEDURE — 98012 SYNCH AUDIO-ONLY EST SF 10: CPT

## 2025-08-27 ENCOUNTER — APPOINTMENT (OUTPATIENT)
Dept: MEDICATION MANAGEMENT | Facility: CLINIC | Age: 75
End: 2025-08-27

## 2025-08-27 ENCOUNTER — OUTPATIENT (OUTPATIENT)
Dept: OUTPATIENT SERVICES | Facility: HOSPITAL | Age: 75
LOS: 1 days | End: 2025-08-27
Payer: MEDICARE

## 2025-08-27 DIAGNOSIS — I82.90 ACUTE EMBOLISM AND THROMBOSIS OF UNSPECIFIED VEIN: ICD-10-CM

## 2025-08-27 DIAGNOSIS — Z79.01 LONG TERM (CURRENT) USE OF ANTICOAGULANTS: ICD-10-CM

## 2025-08-27 LAB
INR PPP: 1.98
PT BLD: 22.8

## 2025-08-27 PROCEDURE — 98012 SYNCH AUDIO-ONLY EST SF 10: CPT

## 2025-09-02 ENCOUNTER — APPOINTMENT (OUTPATIENT)
Dept: DERMATOLOGY | Facility: CLINIC | Age: 75
End: 2025-09-02

## 2025-09-02 DIAGNOSIS — L57.0 ACTINIC KERATOSIS: ICD-10-CM

## 2025-09-02 DIAGNOSIS — L21.9 SEBORRHEIC DERMATITIS, UNSPECIFIED: ICD-10-CM

## 2025-09-02 DIAGNOSIS — L30.9 DERMATITIS, UNSPECIFIED: ICD-10-CM

## 2025-09-02 PROCEDURE — G2211 COMPLEX E/M VISIT ADD ON: CPT

## 2025-09-02 PROCEDURE — 99214 OFFICE O/P EST MOD 30 MIN: CPT

## 2025-09-04 ENCOUNTER — OUTPATIENT (OUTPATIENT)
Dept: OUTPATIENT SERVICES | Facility: HOSPITAL | Age: 75
LOS: 1 days | End: 2025-09-04
Payer: MEDICARE

## 2025-09-04 ENCOUNTER — APPOINTMENT (OUTPATIENT)
Dept: MEDICATION MANAGEMENT | Facility: CLINIC | Age: 75
End: 2025-09-04

## 2025-09-04 DIAGNOSIS — Z79.01 LONG TERM (CURRENT) USE OF ANTICOAGULANTS: ICD-10-CM

## 2025-09-04 DIAGNOSIS — I82.90 ACUTE EMBOLISM AND THROMBOSIS OF UNSPECIFIED VEIN: ICD-10-CM

## 2025-09-04 LAB
INR PPP: 2.25
PT BLD: 25.9

## 2025-09-04 PROCEDURE — 98012 SYNCH AUDIO-ONLY EST SF 10: CPT

## 2025-09-10 ENCOUNTER — APPOINTMENT (OUTPATIENT)
Dept: MEDICATION MANAGEMENT | Facility: CLINIC | Age: 75
End: 2025-09-10

## 2025-09-10 LAB
INR PPP: 3.69
PT BLD: 42.2

## 2025-09-17 ENCOUNTER — APPOINTMENT (OUTPATIENT)
Dept: MEDICATION MANAGEMENT | Facility: CLINIC | Age: 75
End: 2025-09-17

## 2025-09-17 LAB
INR PPP: 3.57
PT BLD: 40.9

## 2025-09-19 ENCOUNTER — APPOINTMENT (OUTPATIENT)
Dept: DERMATOLOGY | Facility: CLINIC | Age: 75
End: 2025-09-19
Payer: MEDICARE

## 2025-09-19 DIAGNOSIS — L57.0 ACTINIC KERATOSIS: ICD-10-CM

## 2025-09-19 DIAGNOSIS — L30.9 DERMATITIS, UNSPECIFIED: ICD-10-CM

## 2025-09-19 PROCEDURE — 99214 OFFICE O/P EST MOD 30 MIN: CPT

## 2025-09-19 RX ORDER — MUPIROCIN 20 MG/G
2 OINTMENT TOPICAL TWICE DAILY
Qty: 1 | Refills: 0 | Status: ACTIVE | COMMUNITY
Start: 2025-09-19 | End: 1900-01-01

## (undated) DEVICE — BIOPSY FORCEP RADIAL JAW 4 STANDARD WITH NEEDLE

## (undated) DEVICE — IRRIGATOR BIO SHIELD

## (undated) DEVICE — TUBING SUCTION CONN 6FT STERILE

## (undated) DEVICE — SYR LUER LOK 50CC

## (undated) DEVICE — TUBING IV SET GRAVITY 3Y 100" MACRO

## (undated) DEVICE — POLY TRAP ETRAP

## (undated) DEVICE — BRUSH COLONOSCOPY CYTOLOGY

## (undated) DEVICE — COLONOSCOPE 2416901: Type: DURABLE MEDICAL EQUIPMENT

## (undated) DEVICE — SENSOR O2 FINGER ADULT

## (undated) DEVICE — CATH IV SAFE BC 20G X 1.16" (PINK)

## (undated) DEVICE — FOLEY HOLDER STATLOCK 2 WAY ADULT

## (undated) DEVICE — SUCTION YANKAUER NO CONTROL VENT

## (undated) DEVICE — CATH IV SAFE BC 22G X 1" (BLUE)

## (undated) DEVICE — SOL INJ NS 0.9% 500ML 2 PORT

## (undated) DEVICE — PACK IV START WITH CHG

## (undated) DEVICE — SNARE OVAL LOOP MICOR

## (undated) DEVICE — FORCEP RADIAL JAW 4 240CM DISP

## (undated) DEVICE — CLAMP BX HOT RAD JAW 3

## (undated) DEVICE — ELCTR GROUNDING PAD ADULT COVIDIEN

## (undated) DEVICE — FORCEP RADIAL JAW 4 JUMBO 2.8MM 3.2MM 240CM ORANGE DISP

## (undated) DEVICE — TUBING SUCTION 20FT